# Patient Record
Sex: FEMALE | Race: WHITE | NOT HISPANIC OR LATINO | Employment: OTHER | URBAN - METROPOLITAN AREA
[De-identification: names, ages, dates, MRNs, and addresses within clinical notes are randomized per-mention and may not be internally consistent; named-entity substitution may affect disease eponyms.]

---

## 2022-02-12 ENCOUNTER — APPOINTMENT (OUTPATIENT)
Dept: GENERAL RADIOLOGY | Facility: CLINIC | Age: 72
DRG: 233 | End: 2022-02-12
Attending: STUDENT IN AN ORGANIZED HEALTH CARE EDUCATION/TRAINING PROGRAM
Payer: MEDICARE

## 2022-02-12 ENCOUNTER — HOSPITAL ENCOUNTER (INPATIENT)
Facility: CLINIC | Age: 72
LOS: 33 days | Discharge: HOME HEALTH | DRG: 233 | End: 2022-03-18
Attending: INTERNAL MEDICINE | Admitting: INTERNAL MEDICINE
Payer: MEDICARE

## 2022-02-12 DIAGNOSIS — Z95.1 S/P CABG X 4: Primary | ICD-10-CM

## 2022-02-12 DIAGNOSIS — I99.8 LIMB ISCHEMIA: ICD-10-CM

## 2022-02-12 DIAGNOSIS — I50.20 SYSTOLIC HEART FAILURE, UNSPECIFIED HF CHRONICITY (H): ICD-10-CM

## 2022-02-12 LAB
ALBUMIN SERPL-MCNC: 2.3 G/DL (ref 3.4–5)
ALP SERPL-CCNC: 67 U/L (ref 40–150)
ALT SERPL W P-5'-P-CCNC: 39 U/L (ref 0–50)
ANION GAP SERPL CALCULATED.3IONS-SCNC: 7 MMOL/L (ref 3–14)
AST SERPL W P-5'-P-CCNC: 21 U/L (ref 0–45)
BASE EXCESS BLDA CALC-SCNC: 4.4 MMOL/L (ref -9–1.8)
BASE EXCESS BLDV CALC-SCNC: 2.9 MMOL/L (ref -7.7–1.9)
BASOPHILS # BLD AUTO: 0 10E3/UL (ref 0–0.2)
BASOPHILS NFR BLD AUTO: 0 %
BILIRUB SERPL-MCNC: 0.4 MG/DL (ref 0.2–1.3)
BUN SERPL-MCNC: 24 MG/DL (ref 7–30)
CALCIUM SERPL-MCNC: 8.4 MG/DL (ref 8.5–10.1)
CHLORIDE BLD-SCNC: 106 MMOL/L (ref 94–109)
CO2 SERPL-SCNC: 25 MMOL/L (ref 20–32)
CREAT SERPL-MCNC: 0.63 MG/DL (ref 0.52–1.04)
EOSINOPHIL # BLD AUTO: 1.6 10E3/UL (ref 0–0.7)
EOSINOPHIL NFR BLD AUTO: 13 %
ERYTHROCYTE [DISTWIDTH] IN BLOOD BY AUTOMATED COUNT: 13.9 % (ref 10–15)
GFR SERPL CREATININE-BSD FRML MDRD: >90 ML/MIN/1.73M2
GLUCOSE BLD-MCNC: 102 MG/DL (ref 70–99)
GLUCOSE BLDC GLUCOMTR-MCNC: 101 MG/DL (ref 70–99)
HCO3 BLD-SCNC: 28 MMOL/L (ref 21–28)
HCO3 BLDV-SCNC: 26 MMOL/L (ref 21–28)
HCT VFR BLD AUTO: 32.4 % (ref 35–47)
HGB BLD-MCNC: 10.6 G/DL (ref 11.7–15.7)
IMM GRANULOCYTES # BLD: 0.1 10E3/UL
IMM GRANULOCYTES NFR BLD: 1 %
INR PPP: 1.49 (ref 0.85–1.15)
LYMPHOCYTES # BLD AUTO: 4.6 10E3/UL (ref 0.8–5.3)
LYMPHOCYTES NFR BLD AUTO: 39 %
MCH RBC QN AUTO: 28.6 PG (ref 26.5–33)
MCHC RBC AUTO-ENTMCNC: 32.7 G/DL (ref 31.5–36.5)
MCV RBC AUTO: 87 FL (ref 78–100)
MONOCYTES # BLD AUTO: 1.5 10E3/UL (ref 0–1.3)
MONOCYTES NFR BLD AUTO: 13 %
NEUTROPHILS # BLD AUTO: 4.1 10E3/UL (ref 1.6–8.3)
NEUTROPHILS NFR BLD AUTO: 34 %
NRBC # BLD AUTO: 0 10E3/UL
NRBC BLD AUTO-RTO: 0 /100
NT-PROBNP SERPL-MCNC: 6517 PG/ML (ref 0–900)
O2/TOTAL GAS SETTING VFR VENT: 21 %
O2/TOTAL GAS SETTING VFR VENT: 21 %
OXYHGB MFR BLDV: 63 % (ref 70–75)
PCO2 BLD: 35 MM HG (ref 35–45)
PCO2 BLDV: 36 MM HG (ref 40–50)
PH BLD: 7.51 [PH] (ref 7.35–7.45)
PH BLDV: 7.48 [PH] (ref 7.32–7.43)
PLATELET # BLD AUTO: 278 10E3/UL (ref 150–450)
PO2 BLD: 65 MM HG (ref 80–105)
PO2 BLDV: 32 MM HG (ref 25–47)
POTASSIUM BLD-SCNC: 4.4 MMOL/L (ref 3.4–5.3)
POTASSIUM BLD-SCNC: 4.4 MMOL/L (ref 3.4–5.3)
PROT SERPL-MCNC: 6.1 G/DL (ref 6.8–8.8)
RBC # BLD AUTO: 3.71 10E6/UL (ref 3.8–5.2)
SODIUM SERPL-SCNC: 138 MMOL/L (ref 133–144)
TROPONIN I SERPL HS-MCNC: 832 NG/L
UFH PPP CHRO-ACNC: 0.27 IU/ML
URATE SERPL-MCNC: 4.6 MG/DL (ref 2.6–6)
WBC # BLD AUTO: 11.9 10E3/UL (ref 4–11)

## 2022-02-12 PROCEDURE — 85610 PROTHROMBIN TIME: CPT | Performed by: STUDENT IN AN ORGANIZED HEALTH CARE EDUCATION/TRAINING PROGRAM

## 2022-02-12 PROCEDURE — 83880 ASSAY OF NATRIURETIC PEPTIDE: CPT | Performed by: STUDENT IN AN ORGANIZED HEALTH CARE EDUCATION/TRAINING PROGRAM

## 2022-02-12 PROCEDURE — 82962 GLUCOSE BLOOD TEST: CPT

## 2022-02-12 PROCEDURE — 84550 ASSAY OF BLOOD/URIC ACID: CPT | Performed by: STUDENT IN AN ORGANIZED HEALTH CARE EDUCATION/TRAINING PROGRAM

## 2022-02-12 PROCEDURE — 999N000065 XR CHEST PORT 1 VIEW

## 2022-02-12 PROCEDURE — 84484 ASSAY OF TROPONIN QUANT: CPT | Performed by: STUDENT IN AN ORGANIZED HEALTH CARE EDUCATION/TRAINING PROGRAM

## 2022-02-12 PROCEDURE — 71045 X-RAY EXAM CHEST 1 VIEW: CPT | Mod: 26 | Performed by: RADIOLOGY

## 2022-02-12 PROCEDURE — 99223 1ST HOSP IP/OBS HIGH 75: CPT | Mod: GC | Performed by: INTERNAL MEDICINE

## 2022-02-12 PROCEDURE — 85520 HEPARIN ASSAY: CPT | Performed by: STUDENT IN AN ORGANIZED HEALTH CARE EDUCATION/TRAINING PROGRAM

## 2022-02-12 PROCEDURE — 82805 BLOOD GASES W/O2 SATURATION: CPT | Performed by: INTERNAL MEDICINE

## 2022-02-12 PROCEDURE — 84132 ASSAY OF SERUM POTASSIUM: CPT | Performed by: STUDENT IN AN ORGANIZED HEALTH CARE EDUCATION/TRAINING PROGRAM

## 2022-02-12 PROCEDURE — 82803 BLOOD GASES ANY COMBINATION: CPT | Performed by: INTERNAL MEDICINE

## 2022-02-12 PROCEDURE — 83735 ASSAY OF MAGNESIUM: CPT | Performed by: STUDENT IN AN ORGANIZED HEALTH CARE EDUCATION/TRAINING PROGRAM

## 2022-02-12 PROCEDURE — 93005 ELECTROCARDIOGRAM TRACING: CPT

## 2022-02-12 PROCEDURE — 250N000011 HC RX IP 250 OP 636

## 2022-02-12 PROCEDURE — 250N000009 HC RX 250

## 2022-02-12 PROCEDURE — 258N000003 HC RX IP 258 OP 636: Performed by: STUDENT IN AN ORGANIZED HEALTH CARE EDUCATION/TRAINING PROGRAM

## 2022-02-12 PROCEDURE — 250N000011 HC RX IP 250 OP 636: Performed by: STUDENT IN AN ORGANIZED HEALTH CARE EDUCATION/TRAINING PROGRAM

## 2022-02-12 PROCEDURE — 82040 ASSAY OF SERUM ALBUMIN: CPT | Performed by: STUDENT IN AN ORGANIZED HEALTH CARE EDUCATION/TRAINING PROGRAM

## 2022-02-12 PROCEDURE — 85018 HEMOGLOBIN: CPT | Performed by: STUDENT IN AN ORGANIZED HEALTH CARE EDUCATION/TRAINING PROGRAM

## 2022-02-12 PROCEDURE — 93010 ELECTROCARDIOGRAM REPORT: CPT | Performed by: INTERNAL MEDICINE

## 2022-02-12 PROCEDURE — 80053 COMPREHEN METABOLIC PANEL: CPT | Performed by: STUDENT IN AN ORGANIZED HEALTH CARE EDUCATION/TRAINING PROGRAM

## 2022-02-12 RX ORDER — ASPIRIN 81 MG/1
81 TABLET, CHEWABLE ORAL DAILY
Status: DISCONTINUED | OUTPATIENT
Start: 2022-02-13 | End: 2022-02-24

## 2022-02-12 RX ORDER — ATORVASTATIN CALCIUM 40 MG/1
40 TABLET, FILM COATED ORAL EVERY MORNING
Status: DISCONTINUED | OUTPATIENT
Start: 2022-02-13 | End: 2022-03-18 | Stop reason: HOSPADM

## 2022-02-12 RX ORDER — DOBUTAMINE HYDROCHLORIDE 200 MG/100ML
2.5 INJECTION INTRAVENOUS CONTINUOUS
Status: DISCONTINUED | OUTPATIENT
Start: 2022-02-12 | End: 2022-02-16

## 2022-02-12 RX ORDER — ISOSORBIDE MONONITRATE 30 MG/1
30 TABLET, EXTENDED RELEASE ORAL DAILY
Status: DISCONTINUED | OUTPATIENT
Start: 2022-02-13 | End: 2022-02-12

## 2022-02-12 RX ORDER — NOREPINEPHRINE BITARTRATE 0.06 MG/ML
INJECTION, SOLUTION INTRAVENOUS
Status: COMPLETED
Start: 2022-02-12 | End: 2022-02-12

## 2022-02-12 RX ORDER — NITROGLYCERIN 0.4 MG/1
0.4 TABLET SUBLINGUAL EVERY 5 MIN PRN
Status: DISCONTINUED | OUTPATIENT
Start: 2022-02-12 | End: 2022-03-18 | Stop reason: HOSPADM

## 2022-02-12 RX ORDER — IPRATROPIUM BROMIDE AND ALBUTEROL SULFATE 2.5; .5 MG/3ML; MG/3ML
3 SOLUTION RESPIRATORY (INHALATION) EVERY 4 HOURS PRN
Status: DISCONTINUED | OUTPATIENT
Start: 2022-02-12 | End: 2022-02-28

## 2022-02-12 RX ORDER — NOREPINEPHRINE BITARTRATE 0.06 MG/ML
.01-.6 INJECTION, SOLUTION INTRAVENOUS CONTINUOUS
Status: DISCONTINUED | OUTPATIENT
Start: 2022-02-12 | End: 2022-02-15

## 2022-02-12 RX ORDER — HEPARIN SODIUM 10000 [USP'U]/100ML
0-5000 INJECTION, SOLUTION INTRAVENOUS CONTINUOUS
Status: DISCONTINUED | OUTPATIENT
Start: 2022-02-12 | End: 2022-02-16

## 2022-02-12 RX ORDER — HEPARIN SODIUM 5000 [USP'U]/.5ML
5000 INJECTION, SOLUTION INTRAVENOUS; SUBCUTANEOUS EVERY 12 HOURS
Status: CANCELLED | OUTPATIENT
Start: 2022-02-12

## 2022-02-12 RX ADMIN — Medication 0.18 MCG/KG/MIN: at 19:55

## 2022-02-12 RX ADMIN — DOBUTAMINE IN DEXTROSE 2.5 MCG/KG/MIN: 200 INJECTION, SOLUTION INTRAVENOUS at 21:02

## 2022-02-12 RX ADMIN — HEPARIN SODIUM 950 UNITS/HR: 1000 INJECTION INTRAVENOUS; SUBCUTANEOUS at 21:03

## 2022-02-12 RX ADMIN — NOREPINEPHRINE BITARTRATE 0.18 MCG/KG/MIN: 0.06 INJECTION, SOLUTION INTRAVENOUS at 19:55

## 2022-02-12 ASSESSMENT — MIFFLIN-ST. JEOR: SCORE: 1037.94

## 2022-02-12 NOTE — Clinical Note
IABP inserted in the left femoral artery. IABP inserted with 40 cc balloon volume Lot number is: [id number]

## 2022-02-12 NOTE — LETTER
[facility] [address] [address]  [phone]    FACSIMILE TRANSMITTAL SHEET        FROM: Rosa BECKFORD   PHONE: [phone]  DATE: 03/17/22      NOTES/COMMENTS:     RE: Ashley PARISI today   Needs RN/PT/OT                                      IF YOU DID NOT RECEIVE THE CORRECT NUMBER OF PAGES OR THE FAX DID NOT COME THROUGH CLEARLY, PLEASE CALL THE SENDER     CONFIDENTIALITY STATEMENT: Confidential information that may accompany this transmission contains protected health information under state and federal law and is legally privileged. This information is intended only for the use of the individual or entity named above and may be used only for carrying out treatment, payment or other healthcare operations. The recipient or person responsible for delivering this information is prohibited by law from disclosing this information without proper authorization to any other party, unless required to do so by law or regulation. If you are not the intended recipient, you are hereby notified that any review, dissemination, distribution, or copying of this message is strictly prohibited. If you have received this communication in error, please destroy the materials and contact us immediately by calling the number listed above. No response indicates that the information was received by the appropriate authorized party

## 2022-02-13 ENCOUNTER — APPOINTMENT (OUTPATIENT)
Dept: CARDIOLOGY | Facility: CLINIC | Age: 72
DRG: 233 | End: 2022-02-13
Attending: STUDENT IN AN ORGANIZED HEALTH CARE EDUCATION/TRAINING PROGRAM
Payer: MEDICARE

## 2022-02-13 PROBLEM — I50.9 HEART FAILURE (H): Status: ACTIVE | Noted: 2022-02-13

## 2022-02-13 LAB
ALBUMIN SERPL-MCNC: 2.4 G/DL (ref 3.4–5)
ALBUMIN UR-MCNC: 10 MG/DL
ALP SERPL-CCNC: 70 U/L (ref 40–150)
ALT SERPL W P-5'-P-CCNC: 40 U/L (ref 0–50)
ANION GAP SERPL CALCULATED.3IONS-SCNC: 4 MMOL/L (ref 3–14)
APPEARANCE UR: CLEAR
AST SERPL W P-5'-P-CCNC: 22 U/L (ref 0–45)
BASE EXCESS BLDV CALC-SCNC: 1.2 MMOL/L (ref -7.7–1.9)
BASE EXCESS BLDV CALC-SCNC: 2.5 MMOL/L (ref -7.7–1.9)
BASE EXCESS BLDV CALC-SCNC: 3 MMOL/L (ref -7.7–1.9)
BASE EXCESS BLDV CALC-SCNC: 4.9 MMOL/L (ref -7.7–1.9)
BASOPHILS # BLD AUTO: 0 10E3/UL (ref 0–0.2)
BASOPHILS NFR BLD AUTO: 0 %
BILIRUB SERPL-MCNC: 0.4 MG/DL (ref 0.2–1.3)
BILIRUB UR QL STRIP: NEGATIVE
BUN SERPL-MCNC: 20 MG/DL (ref 7–30)
C PNEUM DNA SPEC QL NAA+PROBE: NOT DETECTED
CALCIUM SERPL-MCNC: 8.6 MG/DL (ref 8.5–10.1)
CHLORIDE BLD-SCNC: 104 MMOL/L (ref 94–109)
CHOLEST SERPL-MCNC: 133 MG/DL
CHOLEST SERPL-MCNC: 133 MG/DL
CO2 SERPL-SCNC: 28 MMOL/L (ref 20–32)
COLOR UR AUTO: ABNORMAL
CREAT SERPL-MCNC: 0.62 MG/DL (ref 0.52–1.04)
EOSINOPHIL # BLD AUTO: 1.7 10E3/UL (ref 0–0.7)
EOSINOPHIL NFR BLD AUTO: 14 %
ERYTHROCYTE [DISTWIDTH] IN BLOOD BY AUTOMATED COUNT: 14 % (ref 10–15)
FLUAV H1 2009 PAND RNA SPEC QL NAA+PROBE: NOT DETECTED
FLUAV H1 RNA SPEC QL NAA+PROBE: NOT DETECTED
FLUAV H3 RNA SPEC QL NAA+PROBE: NOT DETECTED
FLUAV RNA SPEC QL NAA+PROBE: NOT DETECTED
FLUBV RNA SPEC QL NAA+PROBE: NOT DETECTED
GFR SERPL CREATININE-BSD FRML MDRD: >90 ML/MIN/1.73M2
GLUCOSE BLD-MCNC: 106 MG/DL (ref 70–99)
GLUCOSE UR STRIP-MCNC: NEGATIVE MG/DL
HADV DNA SPEC QL NAA+PROBE: NOT DETECTED
HBA1C MFR BLD: 5.6 % (ref 0–5.6)
HCO3 BLDV-SCNC: 25 MMOL/L (ref 21–28)
HCO3 BLDV-SCNC: 27 MMOL/L (ref 21–28)
HCO3 BLDV-SCNC: 27 MMOL/L (ref 21–28)
HCO3 BLDV-SCNC: 29 MMOL/L (ref 21–28)
HCOV PNL SPEC NAA+PROBE: NOT DETECTED
HCT VFR BLD AUTO: 32.6 % (ref 35–47)
HDLC SERPL-MCNC: 46 MG/DL
HDLC SERPL-MCNC: 46 MG/DL
HGB BLD-MCNC: 10.3 G/DL (ref 11.7–15.7)
HGB UR QL STRIP: ABNORMAL
HMPV RNA SPEC QL NAA+PROBE: NOT DETECTED
HOLD SPECIMEN: NORMAL
HPIV1 RNA SPEC QL NAA+PROBE: NOT DETECTED
HPIV2 RNA SPEC QL NAA+PROBE: NOT DETECTED
HPIV3 RNA SPEC QL NAA+PROBE: NOT DETECTED
HPIV4 RNA SPEC QL NAA+PROBE: NOT DETECTED
IMM GRANULOCYTES # BLD: 0.1 10E3/UL
IMM GRANULOCYTES NFR BLD: 0 %
INR PPP: 1.56 (ref 0.85–1.15)
KETONES UR STRIP-MCNC: NEGATIVE MG/DL
LACTATE SERPL-SCNC: 0.4 MMOL/L (ref 0.7–2)
LACTATE SERPL-SCNC: 0.5 MMOL/L (ref 0.7–2)
LDLC SERPL CALC-MCNC: 68 MG/DL
LDLC SERPL CALC-MCNC: 68 MG/DL
LEUKOCYTE ESTERASE UR QL STRIP: NEGATIVE
LVEF ECHO: NORMAL
LYMPHOCYTES # BLD AUTO: 4.4 10E3/UL (ref 0.8–5.3)
LYMPHOCYTES NFR BLD AUTO: 37 %
M PNEUMO DNA SPEC QL NAA+PROBE: NOT DETECTED
MAGNESIUM SERPL-MCNC: 1.9 MG/DL (ref 1.8–2.6)
MAGNESIUM SERPL-MCNC: 2 MG/DL (ref 1.8–2.6)
MCH RBC QN AUTO: 28 PG (ref 26.5–33)
MCHC RBC AUTO-ENTMCNC: 31.6 G/DL (ref 31.5–36.5)
MCV RBC AUTO: 89 FL (ref 78–100)
MONOCYTES # BLD AUTO: 1.6 10E3/UL (ref 0–1.3)
MONOCYTES NFR BLD AUTO: 13 %
MUCOUS THREADS #/AREA URNS LPF: PRESENT /LPF
NEUTROPHILS # BLD AUTO: 4.4 10E3/UL (ref 1.6–8.3)
NEUTROPHILS NFR BLD AUTO: 36 %
NITRATE UR QL: NEGATIVE
NONHDLC SERPL-MCNC: 87 MG/DL
NONHDLC SERPL-MCNC: 87 MG/DL
NRBC # BLD AUTO: 0 10E3/UL
NRBC BLD AUTO-RTO: 0 /100
O2/TOTAL GAS SETTING VFR VENT: 0 %
O2/TOTAL GAS SETTING VFR VENT: 0 %
O2/TOTAL GAS SETTING VFR VENT: 21 %
O2/TOTAL GAS SETTING VFR VENT: 30 %
OXYHGB MFR BLDV: 56 % (ref 70–75)
OXYHGB MFR BLDV: 59 % (ref 70–75)
OXYHGB MFR BLDV: 64 % (ref 70–75)
OXYHGB MFR BLDV: 72 % (ref 70–75)
PCO2 BLDV: 37 MM HG (ref 40–50)
PCO2 BLDV: 39 MM HG (ref 40–50)
PCO2 BLDV: 40 MM HG (ref 40–50)
PCO2 BLDV: 40 MM HG (ref 40–50)
PH BLDV: 7.44 [PH] (ref 7.32–7.43)
PH BLDV: 7.45 [PH] (ref 7.32–7.43)
PH BLDV: 7.45 [PH] (ref 7.32–7.43)
PH BLDV: 7.47 [PH] (ref 7.32–7.43)
PH UR STRIP: 6 [PH] (ref 5–7)
PLATELET # BLD AUTO: 266 10E3/UL (ref 150–450)
PO2 BLDV: 30 MM HG (ref 25–47)
PO2 BLDV: 32 MM HG (ref 25–47)
PO2 BLDV: 34 MM HG (ref 25–47)
PO2 BLDV: 38 MM HG (ref 25–47)
POTASSIUM BLD-SCNC: 4.3 MMOL/L (ref 3.4–5.3)
PROT SERPL-MCNC: 6.4 G/DL (ref 6.8–8.8)
RBC # BLD AUTO: 3.68 10E6/UL (ref 3.8–5.2)
RBC URINE: 4 /HPF
RSV RNA SPEC QL NAA+PROBE: NOT DETECTED
RSV RNA SPEC QL NAA+PROBE: NOT DETECTED
RV+EV RNA SPEC QL NAA+PROBE: NOT DETECTED
SARS-COV-2 RNA RESP QL NAA+PROBE: NEGATIVE
SODIUM SERPL-SCNC: 136 MMOL/L (ref 133–144)
SP GR UR STRIP: 1.02 (ref 1–1.03)
TRIGL SERPL-MCNC: 94 MG/DL
TRIGL SERPL-MCNC: 94 MG/DL
TROPONIN I SERPL HS-MCNC: 679 NG/L
TSH SERPL DL<=0.005 MIU/L-ACNC: 2.45 MU/L (ref 0.4–4)
UFH PPP CHRO-ACNC: 0.24 IU/ML
UFH PPP CHRO-ACNC: 0.29 IU/ML
UFH PPP CHRO-ACNC: 0.58 IU/ML
UROBILINOGEN UR STRIP-MCNC: NORMAL MG/DL
WBC # BLD AUTO: 12.1 10E3/UL (ref 4–11)
WBC URINE: 8 /HPF

## 2022-02-13 PROCEDURE — 87040 BLOOD CULTURE FOR BACTERIA: CPT | Performed by: INTERNAL MEDICINE

## 2022-02-13 PROCEDURE — 85025 COMPLETE CBC W/AUTO DIFF WBC: CPT | Performed by: STUDENT IN AN ORGANIZED HEALTH CARE EDUCATION/TRAINING PROGRAM

## 2022-02-13 PROCEDURE — 999N000208 ECHOCARDIOGRAM COMPLETE

## 2022-02-13 PROCEDURE — 85520 HEPARIN ASSAY: CPT | Performed by: INTERNAL MEDICINE

## 2022-02-13 PROCEDURE — 81001 URINALYSIS AUTO W/SCOPE: CPT | Performed by: INTERNAL MEDICINE

## 2022-02-13 PROCEDURE — 83036 HEMOGLOBIN GLYCOSYLATED A1C: CPT | Performed by: STUDENT IN AN ORGANIZED HEALTH CARE EDUCATION/TRAINING PROGRAM

## 2022-02-13 PROCEDURE — 80053 COMPREHEN METABOLIC PANEL: CPT | Performed by: STUDENT IN AN ORGANIZED HEALTH CARE EDUCATION/TRAINING PROGRAM

## 2022-02-13 PROCEDURE — 250N000013 HC RX MED GY IP 250 OP 250 PS 637: Performed by: STUDENT IN AN ORGANIZED HEALTH CARE EDUCATION/TRAINING PROGRAM

## 2022-02-13 PROCEDURE — 82805 BLOOD GASES W/O2 SATURATION: CPT | Performed by: STUDENT IN AN ORGANIZED HEALTH CARE EDUCATION/TRAINING PROGRAM

## 2022-02-13 PROCEDURE — 3E043XZ INTRODUCTION OF VASOPRESSOR INTO CENTRAL VEIN, PERCUTANEOUS APPROACH: ICD-10-PCS | Performed by: INTERNAL MEDICINE

## 2022-02-13 PROCEDURE — 255N000002 HC RX 255 OP 636: Performed by: STUDENT IN AN ORGANIZED HEALTH CARE EDUCATION/TRAINING PROGRAM

## 2022-02-13 PROCEDURE — 93306 TTE W/DOPPLER COMPLETE: CPT | Mod: 26 | Performed by: STUDENT IN AN ORGANIZED HEALTH CARE EDUCATION/TRAINING PROGRAM

## 2022-02-13 PROCEDURE — 80061 LIPID PANEL: CPT | Performed by: STUDENT IN AN ORGANIZED HEALTH CARE EDUCATION/TRAINING PROGRAM

## 2022-02-13 PROCEDURE — 82040 ASSAY OF SERUM ALBUMIN: CPT | Performed by: STUDENT IN AN ORGANIZED HEALTH CARE EDUCATION/TRAINING PROGRAM

## 2022-02-13 PROCEDURE — U0003 INFECTIOUS AGENT DETECTION BY NUCLEIC ACID (DNA OR RNA); SEVERE ACUTE RESPIRATORY SYNDROME CORONAVIRUS 2 (SARS-COV-2) (CORONAVIRUS DISEASE [COVID-19]), AMPLIFIED PROBE TECHNIQUE, MAKING USE OF HIGH THROUGHPUT TECHNOLOGIES AS DESCRIBED BY CMS-2020-01-R: HCPCS | Performed by: STUDENT IN AN ORGANIZED HEALTH CARE EDUCATION/TRAINING PROGRAM

## 2022-02-13 PROCEDURE — 250N000011 HC RX IP 250 OP 636: Performed by: STUDENT IN AN ORGANIZED HEALTH CARE EDUCATION/TRAINING PROGRAM

## 2022-02-13 PROCEDURE — 83735 ASSAY OF MAGNESIUM: CPT | Performed by: STUDENT IN AN ORGANIZED HEALTH CARE EDUCATION/TRAINING PROGRAM

## 2022-02-13 PROCEDURE — 87633 RESP VIRUS 12-25 TARGETS: CPT | Performed by: STUDENT IN AN ORGANIZED HEALTH CARE EDUCATION/TRAINING PROGRAM

## 2022-02-13 PROCEDURE — 99291 CRITICAL CARE FIRST HOUR: CPT | Mod: 25 | Performed by: INTERNAL MEDICINE

## 2022-02-13 PROCEDURE — 87486 CHLMYD PNEUM DNA AMP PROBE: CPT | Performed by: STUDENT IN AN ORGANIZED HEALTH CARE EDUCATION/TRAINING PROGRAM

## 2022-02-13 PROCEDURE — 84443 ASSAY THYROID STIM HORMONE: CPT | Performed by: STUDENT IN AN ORGANIZED HEALTH CARE EDUCATION/TRAINING PROGRAM

## 2022-02-13 PROCEDURE — 82805 BLOOD GASES W/O2 SATURATION: CPT | Performed by: INTERNAL MEDICINE

## 2022-02-13 PROCEDURE — 84484 ASSAY OF TROPONIN QUANT: CPT | Performed by: INTERNAL MEDICINE

## 2022-02-13 PROCEDURE — 258N000003 HC RX IP 258 OP 636: Performed by: STUDENT IN AN ORGANIZED HEALTH CARE EDUCATION/TRAINING PROGRAM

## 2022-02-13 PROCEDURE — 36415 COLL VENOUS BLD VENIPUNCTURE: CPT | Performed by: INTERNAL MEDICINE

## 2022-02-13 PROCEDURE — 83605 ASSAY OF LACTIC ACID: CPT | Performed by: STUDENT IN AN ORGANIZED HEALTH CARE EDUCATION/TRAINING PROGRAM

## 2022-02-13 PROCEDURE — 85610 PROTHROMBIN TIME: CPT | Performed by: STUDENT IN AN ORGANIZED HEALTH CARE EDUCATION/TRAINING PROGRAM

## 2022-02-13 PROCEDURE — 200N000002 HC R&B ICU UMMC

## 2022-02-13 RX ORDER — ACETAMINOPHEN 325 MG/1
650 TABLET ORAL EVERY 6 HOURS PRN
Status: DISCONTINUED | OUTPATIENT
Start: 2022-02-13 | End: 2022-02-24

## 2022-02-13 RX ADMIN — ATORVASTATIN CALCIUM 40 MG: 40 TABLET, FILM COATED ORAL at 09:11

## 2022-02-13 RX ADMIN — UMECLIDINIUM 1 PUFF: 62.5 AEROSOL, POWDER ORAL at 09:11

## 2022-02-13 RX ADMIN — ASPIRIN 81 MG CHEWABLE TABLET 81 MG: 81 TABLET CHEWABLE at 09:13

## 2022-02-13 RX ADMIN — HEPARIN SODIUM 900 UNITS/HR: 1000 INJECTION INTRAVENOUS; SUBCUTANEOUS at 22:31

## 2022-02-13 RX ADMIN — HUMAN ALBUMIN MICROSPHERES AND PERFLUTREN 3 ML: 10; .22 INJECTION, SOLUTION INTRAVENOUS at 09:41

## 2022-02-13 ASSESSMENT — ACTIVITIES OF DAILY LIVING (ADL)
ADLS_ACUITY_SCORE: 6
ADLS_ACUITY_SCORE: 10
ADLS_ACUITY_SCORE: 12
ADLS_ACUITY_SCORE: 12
ADLS_ACUITY_SCORE: 6
ADLS_ACUITY_SCORE: 6
ADLS_ACUITY_SCORE: 12
ADLS_ACUITY_SCORE: 6
ADLS_ACUITY_SCORE: 12
ADLS_ACUITY_SCORE: 6
ADLS_ACUITY_SCORE: 6
ADLS_ACUITY_SCORE: 12
ADLS_ACUITY_SCORE: 6
ADLS_ACUITY_SCORE: 12
ADLS_ACUITY_SCORE: 12

## 2022-02-13 NOTE — H&P
Cardiology ICU H&P  February 12, 2022      Assessment and Plan:  Ashley Osman is a 71 year old female with a pmh notable for newly diagnosed HFrEF (10-15% 2/11/22) 2/2 ICM, CAD (diagnosed 1/2022), COPD (diagnosed 1/2022), Hyperlipidemia, and cigarette smoking (quit in 12/2021) who presented to [facility] as a transfer from OSH for consideration of advanced therapies in the setting of cardiogenic shock with Biventricular failure requiring inotropic support.     ===NEURO===  # Sedation  - None    #Pain:  - None, has prn tylenol    ===CARDIOVASCULAR===  # Cardiogenic shock  # HFrEF 2/2 ICM (EF 10-15% from 2/11/22 down from 30% in 1/21/22)   # Biventricular failure  # CAD with non-viable tissue (mLAD , severe prox and dital LAD, LCx and D1 disease)  SCAI Stage C, AHA Stage D, NYHA Class III-IV  HD stable on presentation. Cause of most recent decompensation is unclear given no recent illness, arrhythmia, acute stressors, or new valvular disease on TTE although its possible that this was a continuation of the original process that triggered her initial decompensation and hospitalization in 1/2022. We will continue Dobutamine for inotropic support. We will try and wean off of NorEpi given decent CO and CI on presentation and obtain TTE in AM for further assesment. Will hold off on initiation of further GDMT pending overall status.   Date CVP PA CI CO PCWP SVR PVR MAP SVO2 % Therapies   1/12/22 4 56/24 2.89 4.54 * 1198 ** 72 63 NE 0.18, Dobu 5   * unable to obtain wedge, PVR is 2.3 with PA diastolic as wedge    - Continue Dobutamine 5 mcg/kg/min  - Wean off Norepinephrine as able  - Continue ASA 81 mg daily  - Continue Atorvastatin but increase dose to 40 mg daily (was on 30)  - Hold off on BB, ACEi/ARNI, nitrates, and MRA for now  - Euvolemic on exam, so will hold off on diuresis tonight and reassess in AM   - TTE in AM  - CXR on admission and daily  - BMP and CBC daily  - Keep K and Mg > 4 and 2  - 2g Na diet, 2L  fluid restriction  - Will need SCD ppx prior to discharge if no advance therapies offered      ===PULMONARY===  # COPD  Appears to be a new diagnosis from 1/2022.  On room air and stable with no respiratory concerns on clinical evaluation.   - Continue PTA medication Umeclidinium  @ 1 puff daily    ===GASTROINTESTINAL===  # No acute issues    # Nutrition:   -  2g sodium diet    ===RENAL===  # No acute issues. Creatinine of 0.63 on admission (range in OSH was 0.6-0.8).   - Monitor    ===HEME/ONC===  # No acute issues    ===ENDOCRINE===  # No acute concerns/issues. Will obtain TSH with admission labs    ===INFECTIOUS DISEASE===  # No acute issues/concern for infection    # Antimicrobials: N/A    ===SKIN/MSK===  # No acute issues  Daily RN skin checks per ICU protocol      Prophylaxis:  DVT: hepatin gtt GI: N/A  Family: called family (son Etienne @ [phone]) to update and left a voice message after multiple trials  Disposition: Critically Ill and in the ICU for management of cardiogenic shock  Code Status: Full code (discussed with patient on 1/12/22, she will like to revisit this after initial assessment of advanced therapies options).     Disposition Plan   Expected discharge in > 2 days days to prior living arrangement once medically stable.     Entered: Gabrielle Phelps 02/12/2022, 8:57 PM       I have discussed the above with Dr. Brewer who agrees with the above assessment and plan    Bert Phelps MD, PhD  Cardiology Fellow  Pager: [phone]  ======================================================    HPI:   Ashley Osman is a 71 year old female with a pmh notable for newly diagnosed HFrEF  (30 % on cardiac MR from 1/25) 2/2 ICM, CAD (diagnosed 1/2022), COPD (diagnosed 1/2022), Hyperlipidemia, and cigarette smoking (quit in 12/2021) who presented to [facility] as a transfer from OSH for consideration of advanced therapies in the setting of cardiogenic shock.     Patient reports that she had her first  episode of an acute onset shortness of breath in 1/18/22 prompting her to call EMS and she was intubated in the filed and brought to [facility] where she was admitted from 1/19-26/2022. She was acutely treated for septic shock and acute hypoxic/hypercapnic respiratory failure 2/2 CAP/COPD exacerbation. During her hospitalization she had elevated troponin (peaked at 5.6), TTE showed a moderately reduced EF of 45-45% with a subsequent coronary angiogram that showed multivessel disease (notable for  of mLAD, severe prox and distal LAD, severe LCx and D1 disease). Cardiac MRI done s/p coronary angiogram showed  an EF of 30% with large areas of poor viability including the inferior and inferior lateral walls (LCx regions). She was discharged home with some GDMT including Carvedilol (3.125 bid), Lisinopril (does not appear to have been discharged on this) and Imdur (30 mg daily). Patient reports that she was doing well for about 2 weeks after her discharge until 2/5/2022 when she again had acute onset shortness of breath and represented to [facility] and was found to be hypotensive requiring pressors. TTE on 2/5 showed an EF of 10-15% with concern for possible LV thrombus and anteroseptal aneurysm. She was started on Norepinephrine and Dobutamine. A repeat TTE from 2/11/22 re demonstrated an EF of 10-15% but did not show any LV thrombus or aneurysm. Given her worsened EF, patient was transferred to [facility] for advanced therapies evaluation.     On presentation to the ICU patient was alert and oriented with BP of 120/50 and MAPs of 70s on Dobutamine 5 and NE 0.18. She appeared euvolemic on exam and was warm and well perfused with palpable distal pulses.     At the time of interview, the patient denies chest pain, dyspnea , orthopnea, PND, palpitations, lightheadedness, or syncope.    Of note, patient reports of significant family history of CAD with CAD in both her parents and multiple siblings with severe CAD, two  of whom  from it. She reports that she is aware of having HLD and had been on statin therapy for it in the past but have never had chest pain in the past and was not aware of CAD until her 2022 hospitalization.     Review of Systems:    Complete review of systems was performed and negative except per HPI.    PMH:  No past medical history on file.  Active Problems:  There are no problems to display for this patient.    Social History:  Social History     Tobacco Use     Smoking status: Not on file     Smokeless tobacco: Not on file   Substance Use Topics     Alcohol use: Not on file     Drug use: Not on file     Family History:  No family history on file.    Medications:    [START ON 2022] aspirin  81 mg Oral Daily     [START ON 2022] atorvastatin  40 mg Oral QAM     [START ON 2022] umeclidinium  1 puff Inhalation Daily         - MEDICATION INSTRUCTIONS -       DOBUTamine       heparin       norepinephrine       - MEDICATION INSTRUCTIONS -       BETA BLOCKER NOT PRESCRIBED         Physical Exam:  No intake or output data in the 24 hours ending 22  GEN: pleasant, no acute distress  HEENT: no icterus  CV: RRR, normal s1/s2, no murmurs/rubs/s3/s4, no heave. JVP <8 cm, no peripheral edema.   CHEST: clear to ausculation bilaterally, no rales or wheezing  ABD: soft, non-tender, normal active bowel sounds  EXTR: pulses +2 on distal extremities bilaterally, No clubbing, cyanosis or edema.   NEURO: alert oriented, speech fluent/appropriate, motor grossly nonfocal    Diagnostics:  All labs and imaging were reviewed, of note:    Lab Results   Component Value Date    NTBNPI 6,517 (H) 2022     Lab Results   Component Value Date    WBC 11.9 (H) 2022    HGB 10.6 (L) 2022    HCT 32.4 (L) 2022    MCV 87 2022     2022     Lab Results   Component Value Date    CR 0.63 2022     No results found for: DD, DIMER  Lab Results   Component Value Date      02/12/2022    POTASSIUM 4.4 02/12/2022    POTASSIUM 4.4 02/12/2022    CHLORIDE 106 02/12/2022    CO2 25 02/12/2022     (H) 02/12/2022     (H) 02/12/2022     No results found for: SED  GFR Estimate   Date Value Ref Range Status   02/12/2022 >90 >60 mL/min/1.73m2 Final     Comment:     Effective December 21, 2021 eGFRcr in adults is calculated using the 2021 CKD-EPI creatinine equation which includes age and gender (Marleen et al., NE, DOI: 10.1056/CDQVoi7817371)     No results found for: GFRESTBLACK  Lab Results   Component Value Date     (H) 02/12/2022     (H) 02/12/2022     Lab Results   Component Value Date    HGB 10.6 (L) 02/12/2022     Lab Results   Component Value Date    AST 21 02/12/2022    ALT 39 02/12/2022    ALKPHOS 67 02/12/2022    BILITOTAL 0.4 02/12/2022     Lab Results   Component Value Date    INR 1.49 (H) 02/12/2022     No results found for: TSH  No results found for: TROPONIN, TROPI, TROPR, TROPN    No results found for: TROPI, TROPONIN, TROPR, TROPN    EKG:  Pending    Transthoracic echocardiogram: Will obtain in AM. From OSH in 2/11/22 showed EF of 10-15%    Coronary Angiogram from OSH 1/21/11:  From OSH on (1/21/22):   * Multivessel coronary disease in a left dominant system  * 70% stenosis in the proximal LAD  * 99% stenosis in the mid LAD  * Chronic total occlusion (with bridging collaterals) of the mid LAD  * 99% stenosis in the proximal 2nd Diagonal  * Chronic total occlusion (with bridging collaterals) of the proximal Circumflex    Cardiac MRI [place] 1/25/22:  1.  Moderate left ventricular enlargement with severely reduced overall systolic function (calculated LVEF 30%).    2.  Large areas of severe hypokinesis to akinesis without significant viability in the distributions of the mid to distal LAD as well as the mid to distal dominant left circumflex, both vessels of which are known to be occluded), as described below.  3.  Normal right ventricular size and systolic  function.  4.  Mild left atrial enlargement.   5.  No significant valve dysfunction.       I have reviewed today's vital signs, notes, medications, labs and imaging.  I have also seen and examined the patient and agree with the findings and plan as outlined above.  Briefly, this 72 yo WF with hx of severe CAD ( of LAD and Cx) with ischemic DCM (EF < 30%) and 50+ pack year tobacco abuse (quit X 2 months) represented to OSH with SOB, ELLER, fatigue and is accepted on transfer with cardiogenic shock on Dbx and NE iv.  VSS as above on Dbx and NE.  Lungs clear and soft S3.  Labs WNL.  Assessment: Pt with ischemic DCM now with cardiogenic shock.  Plan evaluate medical therapy, review angiogram, obtain TTE and consider LVAD eval.     Alex Brewer MD, PhD  Professor, Heart Failure and Cardiac Transplantation  [facility]

## 2022-02-13 NOTE — PLAN OF CARE
Problem: Cardiac Output Decreased  Goal: Effective Cardiac Output  Outcome: No Change  Intervention: Optimize Cardiac Output  Recent Flowsheet Documentation  Taken 2/13/2022 0400 by Kenton Saucedo, RN  VTE Prevention/Management: anticoagulant therapy maintained  Head of Bed (HOB) Positioning: HOB at 20-30 degrees  Taken 2/13/2022 0000 by Kenton Saucedo, RN  VTE Prevention/Management: anticoagulant therapy maintained  Head of Bed (HOB) Positioning: HOB at 20-30 degrees  Taken 2/12/2022 2100 by Kenton Saucedo, RN  VTE Prevention/Management: anticoagulant therapy maintained  Head of Bed (HOB) Positioning: HOB at 20-30 degrees     Major Shift Events:      Admitted to  from [facility] early in the shift for potential LVAD workup; BP MAP goal of >60, on Levo and Dobutamine with goals of titrating Levo off. [place] cath with Q6 Paula calculations. 2g sodium diet with 2L fluid restriction. Vizcaino in place for strict I&O.     For vital signs and complete assessments, please see documentation flowsheets.

## 2022-02-13 NOTE — PROGRESS NOTES
Admitted/transferred from: Transferred from [facility]  Reason for admission/transfer: Cardiac Eval/LVAD workup  2 RN skin assessment: completed by Lela  Result of skin assessment and interventions/actions: No changes  Height, weight, drug calc weight:   54kg  164cm  Patient belongings (see Flowsheet)  MDRO education added to care plan  ?

## 2022-02-13 NOTE — PROGRESS NOTES
CLINICAL NUTRITION SERVICES    Reason for Assessment:  Low-sodium (2 g/day) nutrition education    Diet History:  Pt reports that she was recently started on a low sodium diet and fluid restriction at [facility]. She was told to limit fluid to 48 oz per day, not add salt to foods, and to eat less than 2g of salt per day.     Nutrition Diagnosis:  Food- and nutrition-related knowledge deficit r/t limited previous knowledge of low-sodium diet AEB pt report of limited previous formal low-sodium nutrition education.    Nutrition Prescription/Recs:  Continue low-sodium diet and fluid restriction.      Interventions:  Nutrition Education  1. Provided verbal instruction on low-sodium meal planning and fluid restrictions. Discussed different fluid sources. Discussed that packaged foods tend to be high in sodium, how to season food without salt, and to read nutrition labels for sodium.   2. Provided the following handouts: Tips for a Low-Sodium Diet    Goals:    Pt will verbalize at least five high sodium foods and the importance of avoiding added salt to foods for cooking or seasoning foods.     Follow-up:   Patient to ask any further nutrition-related questions before discharge. In addition, pt may request outpatient RD appointment.    Jennifer Garay, IAM, LD  Weekend RD pager [phone]

## 2022-02-13 NOTE — PROGRESS NOTES
Cards 2 Progress Note     ASSESSMENT:   Ashley Osman is a 71 year old female with a pmh notable for newly diagnosed HFrEF (10-15% 2/11/22) 2/2 ICM, CAD (diagnosed 1/2022), COPD (diagnosed 1/2022), Hyperlipidemia, and cigarette smoking (quit in 12/2021) who presented to [facility] as a transfer from OSH for consideration of advanced therapies in the setting of cardiogenic shock requiring inotropic support.     Plan today:   - Decrease dobutamine to 2.5  - Wean off levophed as tolerated      ===CARDIOVASCULAR===  # Cardiogenic shock  # HFrEF 2/2 ICM (EF 10-15%)   # CAD with non-viable tissue (mLAD , severe prox and dital LAD, LCx and D1 disease)  SCAI Stage C, AHA Stage D, NYHA Class III-IV  HD stable on presentation. Cause of most recent decompensation is unclear given no recent illness, arrhythmia, acute stressors, or new valvular disease on TTE although its possible that this was a continuation of the original process that triggered her initial decompensation and hospitalization in 1/2022. We will continue Dobutamine and wean off levophed as tolerated   Date CVP PA CI CO PCWP SVR PVR MAP SVO2 % Therapies   1/12/22 4 56/24 2.89 4.54 * 1198 ** 72 63 NE 0.18, Dobu 5   1/13/22 8 57/26 4 2.5 22 1359 3.6 78 59 Ne 0.2, Dobu 5      - Continue Dobutamine 5 mcg/kg/min  - Wean off Norepinephrine as able  - Continue ASA 81 mg daily  - Continue Atorvastatin 40 mg daily   - Hold off on BB, ACEi/ARNI, nitrates, and MRA for now  - No indication for diuresis   - TTE in AM  - CXR on admission and daily  - BMP and CBC daily  - Keep K and Mg > 4 and 2  - 2g Na diet, 2L fluid restriction  - Will need ICD ppx prior to discharge if no advance therapies offered     ===PULMONARY===  # COPD  Appears to be a new diagnosis from 1/2022.  On room air and stable with no respiratory concerns on clinical evaluation.   - Continue PTA medication Umeclidinium @ 1 puff daily     ===GASTROINTESTINAL===  # No acute issues     # Nutrition:   -  2g sodium  diet     ===RENAL===  # No acute issues. Creatinine of 0.63 on admission (range in OSH was 0.6-0.8).   - Monitor     ===HEME/ONC===  # No acute issues     ===ENDOCRINE===  # No acute concerns/issues.     ===INFECTIOUS DISEASE===  # No acute issues/concern for infection     # Antimicrobials: N/A     ===SKIN/MSK===  # No acute issues  Daily RN skin checks per ICU protocol     Prophylaxis:  DVT: hepatin gtt GI: N/A  Family: called family  Disposition: Critically Ill and in the ICU for management of cardiogenic shock  Code Status: Full code (discussed with patient on 1/12/22, she will like to revisit this after initial assessment of advanced therapies options).     Melony Mart   Cardiology PGY4     REASON FOR CONSULT:     Subjective:   No acute complaints this AM   Reported feeling okay     Physical Exam   Temp: 98  F (36.7  C) Temp src: Oral   Pulse: 87   Resp: 18 SpO2: 94 % O2 Device: None (Room air) Oxygen Delivery: 1 LPM  Vital Signs with Ranges  Temp:  [98  F (36.7  C)-98.7  F (37.1  C)] 98  F (36.7  C)  Pulse:  [80-92] 87  Resp:  [12-18] 18  MAP:  [55 mmHg-115 mmHg] 69 mmHg  Arterial Line BP: ()/(36-66) 100/48  SpO2:  [93 %-99 %] 94 %  119 lbs .77 oz    GEN: NAD, pleasant  HEENT: no icterus  CV: RRR, normal s1/s2, no murmurs/rubs/s3/s4, no heave. +RIJ SG catheter   CHEST: CTAB  ABD: soft, NT/ND, NABS  : no flank/suprapubic tenderness  NEURO: AA&Ox3, fluent/appropriate, motor grossly nonfocal  PSYCH: cooperative, affect appropriate    Data   Data reviewed today:   Recent Labs   Lab 02/13/22  0334 02/12/22 2050 02/12/22 1950   WBC 12.1* 11.9*  --    HGB 10.3* 10.6*  --    MCV 89 87  --     278  --    INR 1.56* 1.49*  --     138  --    POTASSIUM 4.3 4.4  4.4  --    CHLORIDE 104 106  --    CO2 28 25  --    BUN 20 24  --    CR 0.62 0.63  --    ANIONGAP 4 7  --    HEIDI 8.6 8.4*  --    * 102* 101*   ALBUMIN 2.4* 2.3*  --    PROTTOTAL 6.4* 6.1*  --    BILITOTAL 0.4 0.4  --    ALKPHOS 70  67  --    ALT 40 39  --    AST 22 21  --        Recent Results (from the past 24 hour(s))   XR Chest Port 1 View    Narrative    Exam: XR CHEST PORT 1 VIEW, 2022 8:32 PM    Indication: Please evalute for PA catheter placement and other acute  cardiopulmonary pathologies    Comparison: None    Findings:   Pulmonary artery catheter tip projects over the proximal interlobar  artery. Left upper extremity PICC line tip projects over the mid SVC.  Mildly enlarged cardiac mediastinal silhouette. Calcifications of the  aortic arch. No pneumothorax. Blunting of the costophrenic angles,  left greater than right. Pulmonary vasculature is distinct with mildly  increased perihilar interstitial opacities. No focal infectious  consolidation is present. Osseous and soft tissue structures are  unremarkable.      Impression    Impression:   1. Pulmonary artery catheter tip project of the proximal interlobar  artery. Left PICC line tip projects over the mid SVC.  2. Effusions versus pleural thickening, left greater than right.  3. Mildly increased perihilar opacities could represent early  interstitial edema versus atelectasis.    I have personally reviewed the examination and initial interpretation  and I agree with the findings.    WHITNEY HEALY MD         SYSTEM ID:  [id number]   Echocardiogram Complete   Result Value    LVEF  15-20% (severely reduced)    Narrative    [id number]  [id number]  [id number]  [id number]^AMANDA LIEBERMAN^SEAN^SONDRA     [facility]  Echocardiography Laboratory  [address] [place] [id number]     Name: GLADYS SHUKLA  MRN: [id number]  : 1950  Study Date: 2022 09:10 AM  Age: 71 yrs  Gender: Female  Patient Location: [facility]  Reason For Study: CHF  Ordering Physician: SEAN LOBO  Referring Physician: EDDIE GRACE  Performed By: Marybel Blas LILIA     BSA: 1.6 m2  Height: 64 in  Weight: 119  lb  ______________________________________________________________________________  Procedure  Echocardiogram with two-dimensional, color and spectral Doppler performed.  Technically difficult study.Extremely difficult acoustic windows despite the  use of contrast for endcardial border definition. Optison (NDC #1053-5588-84)  given intravenously. Patient was given 3.0 ml mixture of 3 ml Optison and 6 ml  saline. 6.0 ml wasted.  ______________________________________________________________________________  Interpretation Summary  Technically difficult study.Extremely difficult acoustic windows despite the  use of contrast for endcardial border definition.  Left ventricular function is decreased. The ejection fraction is 15-20%  (severely reduced).  The right ventricle is normal size.  Global right ventricular function is normal.  Right ventricular systolic pressure is 39mmHg above the right atrial pressure.  IVC diameter and respiratory changes fall into an intermediate range  suggesting an RA pressure of 8 mmHg.  No pericardial effusion is present.  ______________________________________________________________________________  Left Ventricle  Mild left ventricular dilation is present. Mild concentric wall thickening  consistent with left ventricular hypertrophy is present. Left ventricular  function is decreased. The ejection fraction is 15-20% (severely reduced).  Grade III or advanced diastolic dysfunction. Severe diffuse hypokinesis is  present.     Right Ventricle  The right ventricle is normal size. Global right ventricular function is  normal.     Atria  Both atria appear normal.     Mitral Valve  Mild mitral insufficiency is present.     Aortic Valve  The valve leaflets are not well visualized. On Doppler interrogation, there is  no significant stenosis or regurgitation.     Tricuspid Valve  Mild tricuspid insufficiency is present. Right ventricular systolic pressure  is 39mmHg above the right atrial  pressure.     Pulmonic Valve  The valve leaflets are not well visualized. On Doppler interrogation, there is  no significant stenosis or regurgitation.     Vessels  The aorta root is normal. The thoracic aorta is normal. IVC diameter and  respiratory changes fall into an intermediate range suggesting an RA pressure  of 8 mmHg.     Pericardium  No pericardial effusion is present.     Compared to Previous Study  There is no prior study for direct comparison.  ______________________________________________________________________________  Report approved by: MD Nikunj Valles 02/13/2022 10:27 AM     ______________________________________________________________________________        I have reviewed today's vital signs, notes, medications, labs and imaging.  I have also seen and examined the patient and agree with the findings and plan as outlined above.  Pt without complaints.  VSS with CI 2.7 and PCW 22.  Labs WNL.  Pt treated with Dbx and NE.  Assessment: Pt with ischemic DCM on Dbx and NE with cardiogenic shock.  Plan to decrease Dbx and attempt to wean off NE.  Pt seen X2 for total critical care time 35 min.     Alex Brewer MD, PhD  Professor, Heart Failure and Cardiac Transplantation  [facility]

## 2022-02-13 NOTE — PLAN OF CARE
Neuro: Alert and oriented x4, 5/5 strength in all extremities.   Cardiac: normal sinus rhythm. HR 80-90s. Afebrile. MAP maintained above 60, titrated levo weaned off and dobutamine decreased to 2.5, remains on heparin gtt. [place] unable to be locked in place MD informed and fully aware, currently at 53 cm.  Respiratory: SpO2 maintained above 90% on RA  GI: 2 medium soft/loose BMs this shift. Eating % of meals  : Manning in place, adequate UOP, approx 30-60 mL/hr  LDAs: triple lumen PICC, L radial arterial line, PAC in RIJ, manning catheter  Lab: UA, COVID, Resp panel, BC, VBGs, lactic acid  Tests/Procedures: Echo    Plan: Wean levo, maintain MAP above 60, monitor UOP, [id number] VBGs to closely monitor hemodynamics

## 2022-02-14 ENCOUNTER — APPOINTMENT (OUTPATIENT)
Dept: GENERAL RADIOLOGY | Facility: CLINIC | Age: 72
DRG: 233 | End: 2022-02-14
Attending: STUDENT IN AN ORGANIZED HEALTH CARE EDUCATION/TRAINING PROGRAM
Payer: MEDICARE

## 2022-02-14 LAB
ALBUMIN SERPL-MCNC: 2.3 G/DL (ref 3.4–5)
ALP SERPL-CCNC: 67 U/L (ref 40–150)
ALT SERPL W P-5'-P-CCNC: 44 U/L (ref 0–50)
ANION GAP SERPL CALCULATED.3IONS-SCNC: 6 MMOL/L (ref 3–14)
AST SERPL W P-5'-P-CCNC: 31 U/L (ref 0–45)
BASE EXCESS BLDV CALC-SCNC: -0.1 MMOL/L (ref -7.7–1.9)
BASE EXCESS BLDV CALC-SCNC: -0.7 MMOL/L (ref -7.7–1.9)
BASE EXCESS BLDV CALC-SCNC: 1.8 MMOL/L (ref -7.7–1.9)
BASE EXCESS BLDV CALC-SCNC: 2.5 MMOL/L (ref -7.7–1.9)
BASE EXCESS BLDV CALC-SCNC: 2.7 MMOL/L (ref -7.7–1.9)
BASE EXCESS BLDV CALC-SCNC: 2.8 MMOL/L (ref -7.7–1.9)
BASE EXCESS BLDV CALC-SCNC: 3 MMOL/L (ref -7.7–1.9)
BASE EXCESS BLDV CALC-SCNC: 3.8 MMOL/L (ref -7.7–1.9)
BASE EXCESS BLDV CALC-SCNC: 4.2 MMOL/L (ref -7.7–1.9)
BASOPHILS # BLD AUTO: 0 10E3/UL (ref 0–0.2)
BASOPHILS NFR BLD AUTO: 0 %
BILIRUB SERPL-MCNC: 0.3 MG/DL (ref 0.2–1.3)
BUN SERPL-MCNC: 20 MG/DL (ref 7–30)
CALCIUM SERPL-MCNC: 8.6 MG/DL (ref 8.5–10.1)
CHLORIDE BLD-SCNC: 107 MMOL/L (ref 94–109)
CO2 SERPL-SCNC: 25 MMOL/L (ref 20–32)
CREAT SERPL-MCNC: 0.58 MG/DL (ref 0.52–1.04)
EOSINOPHIL # BLD AUTO: 1.5 10E3/UL (ref 0–0.7)
EOSINOPHIL NFR BLD AUTO: 13 %
ERYTHROCYTE [DISTWIDTH] IN BLOOD BY AUTOMATED COUNT: 14 % (ref 10–15)
GFR SERPL CREATININE-BSD FRML MDRD: >90 ML/MIN/1.73M2
GLUCOSE BLD-MCNC: 91 MG/DL (ref 70–99)
HCO3 BLDV-SCNC: 24 MMOL/L (ref 21–28)
HCO3 BLDV-SCNC: 24 MMOL/L (ref 21–28)
HCO3 BLDV-SCNC: 26 MMOL/L (ref 21–28)
HCO3 BLDV-SCNC: 27 MMOL/L (ref 21–28)
HCO3 BLDV-SCNC: 28 MMOL/L (ref 21–28)
HCO3 BLDV-SCNC: 29 MMOL/L (ref 21–28)
HCT VFR BLD AUTO: 29.6 % (ref 35–47)
HGB BLD-MCNC: 9.3 G/DL (ref 11.7–15.7)
IMM GRANULOCYTES # BLD: 0.1 10E3/UL
IMM GRANULOCYTES NFR BLD: 1 %
INR PPP: 1.69 (ref 0.85–1.15)
LACTATE SERPL-SCNC: 0.5 MMOL/L (ref 0.7–2)
LACTATE SERPL-SCNC: 0.6 MMOL/L (ref 0.7–2)
LYMPHOCYTES # BLD AUTO: 4 10E3/UL (ref 0.8–5.3)
LYMPHOCYTES NFR BLD AUTO: 35 %
MAGNESIUM SERPL-MCNC: 1.9 MG/DL (ref 1.8–2.6)
MCH RBC QN AUTO: 28.2 PG (ref 26.5–33)
MCHC RBC AUTO-ENTMCNC: 31.4 G/DL (ref 31.5–36.5)
MCV RBC AUTO: 90 FL (ref 78–100)
MONOCYTES # BLD AUTO: 1.4 10E3/UL (ref 0–1.3)
MONOCYTES NFR BLD AUTO: 12 %
NEUTROPHILS # BLD AUTO: 4.7 10E3/UL (ref 1.6–8.3)
NEUTROPHILS NFR BLD AUTO: 39 %
NRBC # BLD AUTO: 0 10E3/UL
NRBC BLD AUTO-RTO: 0 /100
O2/TOTAL GAS SETTING VFR VENT: 21 %
OXYHGB MFR BLDV: 51 % (ref 70–75)
OXYHGB MFR BLDV: 51 % (ref 70–75)
OXYHGB MFR BLDV: 52 % (ref 70–75)
OXYHGB MFR BLDV: 52 % (ref 70–75)
OXYHGB MFR BLDV: 60 % (ref 70–75)
OXYHGB MFR BLDV: 61 % (ref 70–75)
OXYHGB MFR BLDV: 63 % (ref 70–75)
OXYHGB MFR BLDV: 78 % (ref 70–75)
OXYHGB MFR BLDV: 84 % (ref 70–75)
PCO2 BLDV: 37 MM HG (ref 40–50)
PCO2 BLDV: 38 MM HG (ref 40–50)
PCO2 BLDV: 39 MM HG (ref 40–50)
PCO2 BLDV: 39 MM HG (ref 40–50)
PCO2 BLDV: 40 MM HG (ref 40–50)
PCO2 BLDV: 40 MM HG (ref 40–50)
PCO2 BLDV: 41 MM HG (ref 40–50)
PCO2 BLDV: 42 MM HG (ref 40–50)
PCO2 BLDV: 42 MM HG (ref 40–50)
PH BLDV: 7.41 [PH] (ref 7.32–7.43)
PH BLDV: 7.42 [PH] (ref 7.32–7.43)
PH BLDV: 7.43 [PH] (ref 7.32–7.43)
PH BLDV: 7.45 [PH] (ref 7.32–7.43)
PH BLDV: 7.46 [PH] (ref 7.32–7.43)
PLATELET # BLD AUTO: 197 10E3/UL (ref 150–450)
PO2 BLDV: 28 MM HG (ref 25–47)
PO2 BLDV: 33 MM HG (ref 25–47)
PO2 BLDV: 43 MM HG (ref 25–47)
PO2 BLDV: 50 MM HG (ref 25–47)
POTASSIUM BLD-SCNC: 4 MMOL/L (ref 3.4–5.3)
PROT SERPL-MCNC: 5.8 G/DL (ref 6.8–8.8)
RBC # BLD AUTO: 3.3 10E6/UL (ref 3.8–5.2)
SODIUM SERPL-SCNC: 138 MMOL/L (ref 133–144)
UFH PPP CHRO-ACNC: 0.26 IU/ML
WBC # BLD AUTO: 11.6 10E3/UL (ref 4–11)

## 2022-02-14 PROCEDURE — 200N000002 HC R&B ICU UMMC

## 2022-02-14 PROCEDURE — 85520 HEPARIN ASSAY: CPT | Performed by: INTERNAL MEDICINE

## 2022-02-14 PROCEDURE — 71045 X-RAY EXAM CHEST 1 VIEW: CPT | Mod: 26 | Performed by: RADIOLOGY

## 2022-02-14 PROCEDURE — 85025 COMPLETE CBC W/AUTO DIFF WBC: CPT | Performed by: STUDENT IN AN ORGANIZED HEALTH CARE EDUCATION/TRAINING PROGRAM

## 2022-02-14 PROCEDURE — 85610 PROTHROMBIN TIME: CPT | Performed by: STUDENT IN AN ORGANIZED HEALTH CARE EDUCATION/TRAINING PROGRAM

## 2022-02-14 PROCEDURE — 84134 ASSAY OF PREALBUMIN: CPT | Performed by: STUDENT IN AN ORGANIZED HEALTH CARE EDUCATION/TRAINING PROGRAM

## 2022-02-14 PROCEDURE — 82310 ASSAY OF CALCIUM: CPT | Performed by: STUDENT IN AN ORGANIZED HEALTH CARE EDUCATION/TRAINING PROGRAM

## 2022-02-14 PROCEDURE — 250N000013 HC RX MED GY IP 250 OP 250 PS 637: Performed by: STUDENT IN AN ORGANIZED HEALTH CARE EDUCATION/TRAINING PROGRAM

## 2022-02-14 PROCEDURE — 999N000015 HC STATISTIC ARTERIAL MONITORING DAILY

## 2022-02-14 PROCEDURE — 83735 ASSAY OF MAGNESIUM: CPT | Performed by: STUDENT IN AN ORGANIZED HEALTH CARE EDUCATION/TRAINING PROGRAM

## 2022-02-14 PROCEDURE — 999N000065 XR CHEST PORT 1 VIEW

## 2022-02-14 PROCEDURE — 83605 ASSAY OF LACTIC ACID: CPT | Performed by: STUDENT IN AN ORGANIZED HEALTH CARE EDUCATION/TRAINING PROGRAM

## 2022-02-14 PROCEDURE — 82805 BLOOD GASES W/O2 SATURATION: CPT | Performed by: STUDENT IN AN ORGANIZED HEALTH CARE EDUCATION/TRAINING PROGRAM

## 2022-02-14 PROCEDURE — 93005 ELECTROCARDIOGRAM TRACING: CPT

## 2022-02-14 PROCEDURE — 250N000011 HC RX IP 250 OP 636: Performed by: STUDENT IN AN ORGANIZED HEALTH CARE EDUCATION/TRAINING PROGRAM

## 2022-02-14 PROCEDURE — 99291 CRITICAL CARE FIRST HOUR: CPT | Mod: GC | Performed by: INTERNAL MEDICINE

## 2022-02-14 PROCEDURE — 93010 ELECTROCARDIOGRAM REPORT: CPT | Performed by: INTERNAL MEDICINE

## 2022-02-14 PROCEDURE — 82805 BLOOD GASES W/O2 SATURATION: CPT | Performed by: INTERNAL MEDICINE

## 2022-02-14 RX ORDER — DAPAGLIFLOZIN 5 MG/1
5 TABLET, FILM COATED ORAL DAILY
Status: DISCONTINUED | OUTPATIENT
Start: 2022-02-14 | End: 2022-02-25

## 2022-02-14 RX ORDER — FUROSEMIDE 10 MG/ML
40 INJECTION INTRAMUSCULAR; INTRAVENOUS ONCE
Status: CANCELLED | OUTPATIENT
Start: 2022-02-14

## 2022-02-14 RX ORDER — FUROSEMIDE 10 MG/ML
40 INJECTION INTRAMUSCULAR; INTRAVENOUS ONCE
Status: COMPLETED | OUTPATIENT
Start: 2022-02-14 | End: 2022-02-14

## 2022-02-14 RX ADMIN — Medication: at 18:39

## 2022-02-14 RX ADMIN — ASPIRIN 81 MG CHEWABLE TABLET 81 MG: 81 TABLET CHEWABLE at 07:57

## 2022-02-14 RX ADMIN — ACETAMINOPHEN 325 MG: 325 TABLET, FILM COATED ORAL at 22:24

## 2022-02-14 RX ADMIN — ATORVASTATIN CALCIUM 40 MG: 40 TABLET, FILM COATED ORAL at 07:57

## 2022-02-14 RX ADMIN — FUROSEMIDE 40 MG: 10 INJECTION, SOLUTION INTRAVENOUS at 12:45

## 2022-02-14 RX ADMIN — DAPAGLIFLOZIN 5 MG: 5 TABLET, FILM COATED ORAL at 14:42

## 2022-02-14 ASSESSMENT — ACTIVITIES OF DAILY LIVING (ADL)
ADLS_ACUITY_SCORE: 10
ADLS_ACUITY_SCORE: 12
ADLS_ACUITY_SCORE: 10
ADLS_ACUITY_SCORE: 12
ADLS_ACUITY_SCORE: 12
ADLS_ACUITY_SCORE: 10
ADLS_ACUITY_SCORE: 12
ADLS_ACUITY_SCORE: 12
ADLS_ACUITY_SCORE: 10
ADLS_ACUITY_SCORE: 10
ADLS_ACUITY_SCORE: 12
ADLS_ACUITY_SCORE: 12
ADLS_ACUITY_SCORE: 10
ADLS_ACUITY_SCORE: 10
ADLS_ACUITY_SCORE: 12
ADLS_ACUITY_SCORE: 10
ADLS_ACUITY_SCORE: 12
ADLS_ACUITY_SCORE: 10

## 2022-02-14 ASSESSMENT — MIFFLIN-ST. JEOR: SCORE: 1032.94

## 2022-02-14 NOTE — PLAN OF CARE
Problem: Cardiac Output Decreased  Goal: Effective Cardiac Output  Intervention: Optimize Cardiac Output  Recent Flowsheet Documentation  Taken 2/14/2022 0400 by Kenton Saucedo, RN  VTE Prevention/Management: anticoagulant therapy maintained  Head of Bed (HOB) Positioning: HOB at 20-30 degrees  Taken 2/14/2022 0000 by Kenton Saucedo, RN  VTE Prevention/Management: anticoagulant therapy maintained  Head of Bed (HOB) Positioning: HOB at 20-30 degrees  Taken 2/13/2022 2000 by Kenton Saucedo, RN  VTE Prevention/Management: anticoagulant therapy maintained  Head of Bed (HOB) Positioning: HOB at 20-30 degrees    Major Shift Events:      Vitally stable and only on dobutamine t/o PM shift; plan to further evaluate for LVAD workup.     For vital signs and complete assessments, please see documentation flowsheets.

## 2022-02-14 NOTE — PROGRESS NOTES
Cards 2 Progress Note     ASSESSMENT:   Ashley Osman is a 71 year old female with a pmh notable for newly diagnosed HFrEF (10-15% 2/11/22) 2/2 ICM, CAD (diagnosed 1/2022), COPD (diagnosed 1/2022), Hyperlipidemia, and cigarette smoking (quit in 12/2021) who presented to [facility] as a transfer from OSH for consideration of advanced therapies in the setting of cardiogenic shock requiring inotropic support.     Plan today:   - Stop dobutamine, reassess hemodynamics   - Lasix 40mg IV   - Repeat EKG   - Review CMRI and cath films, assess feasibility of revascularization   - Start dapagliflozin 5mg daily   - Check albumin and prealbumin      ===CARDIOVASCULAR===  # Cardiogenic shock  # HFrEF 2/2 ICM (EF 10-15%)   # LV thrombus   # CAD with non-viable tissue (mLAD , severe prox and distal LAD, LCx and D1 disease)    SCAI Stage C, AHA Stage D, NYHA Class III-IV  HD stable on presentation. Cause of most recent decompensation is unclear given no recent illness, arrhythmia, acute stressors, or new valvular disease on TTE although its possible that this was a continuation of the original process that triggered her initial decompensation and hospitalization in 1/2022.   Monitor off dobutamine   Date CVP PA CI CO PCWP SVR PVR MAP SVO2 % Therapies   2/12/22 4 56/24 2.89 4.54 * 1198 ** 72 63 NE 0.18, Dobu 5   2/13/22 8 57/26 2.5 4 22 1359 3.6 78 59 Ne 0.2, Dobu 5    2/14/22 14 60/28 2.7 4.3 * 1515 ** 67 60 Dobu 2.5      - Stop dobutamine gtt   - Start dapagliflozin 5mg   - Continue ASA 81 mg daily  - Continue Atorvastatin 40 mg daily   - Hold off on BB, ACEi/ARNI, nitrates, and MRA for now  - Lasix 40mg IV   - CXR on admission and daily  - BMP and CBC daily  - Keep K and Mg > 4 and 2  - 2g Na diet, 2L fluid restriction  - Will need ICD ppx prior to discharge if no advance therapies offered     ===PULMONARY===  # COPD  Appears to be a new diagnosis from 1/2022.  On room air and stable with no respiratory concerns on clinical  evaluation.   - Continue PTA medication Umeclidinium @ 1 puff daily     ===GASTROINTESTINAL===  # No acute issues     # Nutrition:   -  2g sodium diet     ===RENAL===  # No acute issues. Creatinine of 0.63 on admission (range in OSH was 0.6-0.8).   - Monitor     ===HEME/ONC===  # No acute issues     ===ENDOCRINE===  # No acute concerns/issues.     ===INFECTIOUS DISEASE===  # No acute issues/concern for infection     # Antimicrobials: N/A     ===SKIN/MSK===  # No acute issues  Daily RN skin checks per ICU protocol     Prophylaxis:  DVT: hepatin gtt GI: N/A  Family: called family  Disposition: Critically Ill and in the ICU for management of cardiogenic shock  Code Status: Full code (discussed with patient on 1/12/22, she will like to revisit this after initial assessment of advanced therapies options).     Melony Mart MD  Cardiology fellow    REASON FOR CONSULT:     Subjective:   No acute complaints this AM   Reported feeling okay     Physical Exam   Temp: 97.7  F (36.5  C) Temp src: Axillary   Pulse: 93   Resp: 18 SpO2: 96 % O2 Device: None (Room air)    Vital Signs with Ranges  Temp:  [97.7  F (36.5  C)-98.6  F (37  C)] 97.7  F (36.5  C)  Pulse:  [] 93  Resp:  [16-20] 18  MAP:  [49 mmHg-87 mmHg] 84 mmHg  Arterial Line BP: ()/(33-62) 119/56  SpO2:  [91 %-99 %] 96 %  117 lbs 15.14 oz    GEN: NAD, pleasant  HEENT: no icterus  CV: RRR, normal s1/s2, no murmurs/rubs/s3/s4, no heave. +RIJ SG catheter   CHEST: CTAB  ABD: soft, NT/ND, NABS  : no flank/suprapubic tenderness  NEURO: AA&Ox3, fluent/appropriate, motor grossly nonfocal  PSYCH: cooperative, affect appropriate    Data   Data reviewed today:   Recent Labs   Lab 02/14/22  0352 02/14/22  0107 02/13/22  0334 02/12/22 2050   WBC 11.6*  --  12.1* 11.9*   HGB 9.3*  --  10.3* 10.6*   MCV 90  --  89 87     --  266 278   INR  --  1.69* 1.56* 1.49*     --  136 138   POTASSIUM 4.0  --  4.3 4.4  4.4   CHLORIDE 107  --  104 106   CO2 25  --  28  25   BUN 20  --  20 24   CR 0.58  --  0.62 0.63   ANIONGAP 6  --  4 7   HEIDI 8.6  --  8.6 8.4*   GLC 91  --  106* 102*   ALBUMIN 2.3*  --  2.4* 2.3*   PROTTOTAL 5.8*  --  6.4* 6.1*   BILITOTAL 0.3  --  0.4 0.4   ALKPHOS 67  --  70 67   ALT 44  --  40 39   AST 31  --  22 21       Recent Results (from the past 24 hour(s))   XR Chest Port 1 View    Narrative    XR CHEST PORT 1 VIEW on 2/14/2022 1:08 AM.    INDICATION: swan repositioned.    COMPARISON: Radiograph dated 2/12/2022    FINDINGS:   Portable AP upright radiograph of the chest. Right IJ approach  [place]-Renay catheter tip projects over the interlobar artery, advanced  from prior. Left upper extremity PICC tip projects over the low SVC.     Trachea is clear. Cardiac silhouette is stable. Pulmonary vasculature  is indistinct. No pneumothorax. Unchanged blunting of the costophrenic  angles. Diffuse interstitial and airspace opacities are slightly  increased.      Impression    IMPRESSION:     1. [place]-Renay catheter tip projects over the interlobar artery,  advanced from prior. Recommend retracting.  2. Diffuse interstitial and airspace opacities are slightly increased.  3. Unchanged blunting of the costophrenic angles.    I have personally reviewed the examination and initial interpretation  and I agree with the findings.    JARVIS LINTON MD         SYSTEM ID:  [id number]   XR Chest Port 1 View    Narrative    XR CHEST PORT 1 VIEW on 2/14/2022 5:49 AM.    INDICATION: swan renay position.    COMPARISON: Same day radiograph.    FINDINGS:   Portable AP semiupright radiograph of the chest. Right IJ approach  [place]-Renay catheter tip projects over the proximal right pulmonary  artery, retracted from prior. Left upper extremity PICC tip projects  over the low SVC.    Trachea is clear. Cardiac silhouette is stable. Pulmonary vasculature  is relatively distinct. No pneumothorax. Blunting of the costophrenic  angles is unchanged. Diffuse interstitial and airspace opacities  are  unchanged.      Impression    IMPRESSION:   [place]-Renay catheter tip projects over the proximal right pulmonary  artery, retracted from prior.    I have personally reviewed the examination and initial interpretation  and I agree with the findings.    JARVIS LINTON MD         SYSTEM ID:  [id number]     I have reviewed today's vital signs, notes, medications, labs and imaging.  I have also seen and examined the patient and agree with the findings and plan as outlined above.  Pt with ischemic DCM with EF 10% and aneurysmal LV now weaned off of levophed and on Dbx.  CVP 7, PCW 22 and CI 2.5 with 880 ml UO.  Labs with WBC 11.6 and Hgb 9.3.  Assessment; Pt with ischemic DCM on Dbx with severely depressed LV.  Plan is to wean Dbx as tolerated and workup for LVAD.  Plan discussed with pt. Pt seen X2 for total critical care time 45 min.     Alex Brewer MD, PhD  Professor, Heart Failure and Cardiac Transplantation  [facility]

## 2022-02-15 ENCOUNTER — APPOINTMENT (OUTPATIENT)
Dept: CT IMAGING | Facility: CLINIC | Age: 72
DRG: 233 | End: 2022-02-15
Attending: INTERNAL MEDICINE
Payer: MEDICARE

## 2022-02-15 ENCOUNTER — APPOINTMENT (OUTPATIENT)
Dept: ULTRASOUND IMAGING | Facility: CLINIC | Age: 72
DRG: 233 | End: 2022-02-15
Attending: INTERNAL MEDICINE
Payer: MEDICARE

## 2022-02-15 ENCOUNTER — APPOINTMENT (OUTPATIENT)
Dept: GENERAL RADIOLOGY | Facility: CLINIC | Age: 72
DRG: 233 | End: 2022-02-15
Attending: STUDENT IN AN ORGANIZED HEALTH CARE EDUCATION/TRAINING PROGRAM
Payer: MEDICARE

## 2022-02-15 LAB
ALBUMIN UR-MCNC: NEGATIVE MG/DL
AMORPH CRY #/AREA URNS HPF: ABNORMAL /HPF
AMPHETAMINES UR QL SCN: NORMAL
ANION GAP SERPL CALCULATED.3IONS-SCNC: 8 MMOL/L (ref 3–14)
APPEARANCE UR: ABNORMAL
ATRIAL RATE - MUSE: 79 BPM
ATRIAL RATE - MUSE: 90 BPM
BARBITURATES UR QL: NORMAL
BASE EXCESS BLDV CALC-SCNC: 2.5 MMOL/L (ref -7.7–1.9)
BASE EXCESS BLDV CALC-SCNC: 2.6 MMOL/L (ref -7.7–1.9)
BASE EXCESS BLDV CALC-SCNC: 3.4 MMOL/L (ref -7.7–1.9)
BASE EXCESS BLDV CALC-SCNC: 3.4 MMOL/L (ref -7.7–1.9)
BASE EXCESS BLDV CALC-SCNC: 3.5 MMOL/L (ref -7.7–1.9)
BASE EXCESS BLDV CALC-SCNC: 3.8 MMOL/L (ref -7.7–1.9)
BASOPHILS # BLD AUTO: 0 10E3/UL (ref 0–0.2)
BASOPHILS NFR BLD AUTO: 0 %
BENZODIAZ UR QL: NORMAL
BILIRUB UR QL STRIP: NEGATIVE
BUN SERPL-MCNC: 19 MG/DL (ref 7–30)
CALCIUM SERPL-MCNC: 8.8 MG/DL (ref 8.5–10.1)
CANNABINOIDS UR QL SCN: NORMAL
CHLORIDE BLD-SCNC: 107 MMOL/L (ref 94–109)
CO2 SERPL-SCNC: 24 MMOL/L (ref 20–32)
COCAINE UR QL: NORMAL
COLOR UR AUTO: ABNORMAL
CREAT SERPL-MCNC: 0.62 MG/DL (ref 0.52–1.04)
DIASTOLIC BLOOD PRESSURE - MUSE: NORMAL MMHG
DIASTOLIC BLOOD PRESSURE - MUSE: NORMAL MMHG
EOSINOPHIL # BLD AUTO: 1.2 10E3/UL (ref 0–0.7)
EOSINOPHIL NFR BLD AUTO: 11 %
ERYTHROCYTE [DISTWIDTH] IN BLOOD BY AUTOMATED COUNT: 14.1 % (ref 10–15)
ETHANOL UR QL SCN: NORMAL
GFR SERPL CREATININE-BSD FRML MDRD: >90 ML/MIN/1.73M2
GLUCOSE BLD-MCNC: 93 MG/DL (ref 70–99)
GLUCOSE UR STRIP-MCNC: >=1000 MG/DL
HCO3 BLDV-SCNC: 27 MMOL/L (ref 21–28)
HCO3 BLDV-SCNC: 27 MMOL/L (ref 21–28)
HCO3 BLDV-SCNC: 28 MMOL/L (ref 21–28)
HCO3 BLDV-SCNC: 29 MMOL/L (ref 21–28)
HCT VFR BLD AUTO: 30.6 % (ref 35–47)
HGB BLD-MCNC: 9.8 G/DL (ref 11.7–15.7)
HGB UR QL STRIP: ABNORMAL
IMM GRANULOCYTES # BLD: 0.1 10E3/UL
IMM GRANULOCYTES NFR BLD: 1 %
INR PPP: 1.51 (ref 0.85–1.15)
INTERPRETATION ECG - MUSE: NORMAL
INTERPRETATION ECG - MUSE: NORMAL
KETONES UR STRIP-MCNC: NEGATIVE MG/DL
LACTATE SERPL-SCNC: 0.5 MMOL/L (ref 0.7–2)
LACTATE SERPL-SCNC: 0.7 MMOL/L (ref 0.7–2)
LEUKOCYTE ESTERASE UR QL STRIP: ABNORMAL
LYMPHOCYTES # BLD AUTO: 4.6 10E3/UL (ref 0.8–5.3)
LYMPHOCYTES NFR BLD AUTO: 41 %
MAGNESIUM SERPL-MCNC: 1.8 MG/DL (ref 1.8–2.6)
MCH RBC QN AUTO: 28.7 PG (ref 26.5–33)
MCHC RBC AUTO-ENTMCNC: 32 G/DL (ref 31.5–36.5)
MCV RBC AUTO: 90 FL (ref 78–100)
MONOCYTES # BLD AUTO: 1.2 10E3/UL (ref 0–1.3)
MONOCYTES NFR BLD AUTO: 11 %
MUCOUS THREADS #/AREA URNS LPF: PRESENT /LPF
NEUTROPHILS # BLD AUTO: 4 10E3/UL (ref 1.6–8.3)
NEUTROPHILS NFR BLD AUTO: 36 %
NITRATE UR QL: NEGATIVE
NRBC # BLD AUTO: 0 10E3/UL
NRBC BLD AUTO-RTO: 0 /100
NT-PROBNP SERPL-MCNC: 7408 PG/ML (ref 0–900)
O2/TOTAL GAS SETTING VFR VENT: 21 %
OPIATES UR QL SCN: NORMAL
OXYHGB MFR BLDV: 46 % (ref 70–75)
OXYHGB MFR BLDV: 46 % (ref 70–75)
OXYHGB MFR BLDV: 47 % (ref 70–75)
OXYHGB MFR BLDV: 51 % (ref 70–75)
OXYHGB MFR BLDV: 54 % (ref 70–75)
OXYHGB MFR BLDV: 60 % (ref 70–75)
P AXIS - MUSE: 57 DEGREES
P AXIS - MUSE: 57 DEGREES
PCO2 BLDV: 37 MM HG (ref 40–50)
PCO2 BLDV: 39 MM HG (ref 40–50)
PCO2 BLDV: 42 MM HG (ref 40–50)
PCO2 BLDV: 43 MM HG (ref 40–50)
PCO2 BLDV: 43 MM HG (ref 40–50)
PCO2 BLDV: 44 MM HG (ref 40–50)
PCP UR QL SCN: NORMAL
PH BLDV: 7.41 [PH] (ref 7.32–7.43)
PH BLDV: 7.43 [PH] (ref 7.32–7.43)
PH BLDV: 7.45 [PH] (ref 7.32–7.43)
PH BLDV: 7.48 [PH] (ref 7.32–7.43)
PH UR STRIP: 5 [PH] (ref 5–7)
PHOSPHATE SERPL-MCNC: 4.8 MG/DL (ref 2.5–4.5)
PLATELET # BLD AUTO: 184 10E3/UL (ref 150–450)
PO2 BLDV: 26 MM HG (ref 25–47)
PO2 BLDV: 27 MM HG (ref 25–47)
PO2 BLDV: 27 MM HG (ref 25–47)
PO2 BLDV: 29 MM HG (ref 25–47)
PO2 BLDV: 30 MM HG (ref 25–47)
PO2 BLDV: 33 MM HG (ref 25–47)
POTASSIUM BLD-SCNC: 3.6 MMOL/L (ref 3.4–5.3)
PR INTERVAL - MUSE: 122 MS
PR INTERVAL - MUSE: 130 MS
PREALB SERPL IA-MCNC: 12 MG/DL (ref 15–45)
QRS DURATION - MUSE: 88 MS
QRS DURATION - MUSE: 88 MS
QT - MUSE: 352 MS
QT - MUSE: 380 MS
QTC - MUSE: 430 MS
QTC - MUSE: 435 MS
R AXIS - MUSE: -14 DEGREES
R AXIS - MUSE: -7 DEGREES
RBC # BLD AUTO: 3.41 10E6/UL (ref 3.8–5.2)
RBC URINE: 25 /HPF
SODIUM SERPL-SCNC: 139 MMOL/L (ref 133–144)
SP GR UR STRIP: 1.02 (ref 1–1.03)
SQUAMOUS EPITHELIAL: <1 /HPF
SYSTOLIC BLOOD PRESSURE - MUSE: NORMAL MMHG
SYSTOLIC BLOOD PRESSURE - MUSE: NORMAL MMHG
T AXIS - MUSE: 104 DEGREES
T AXIS - MUSE: 109 DEGREES
TRANSITIONAL EPI: <1 /HPF
UFH PPP CHRO-ACNC: 0.3 IU/ML
UROBILINOGEN UR STRIP-MCNC: NORMAL MG/DL
VENTRICULAR RATE- MUSE: 79 BPM
VENTRICULAR RATE- MUSE: 90 BPM
WBC # BLD AUTO: 11 10E3/UL (ref 4–11)
WBC CLUMPS #/AREA URNS HPF: PRESENT /HPF
WBC URINE: 14 /HPF
YEAST #/AREA URNS HPF: ABNORMAL /HPF
YEAST #/AREA URNS HPF: ABNORMAL /HPF

## 2022-02-15 PROCEDURE — 93925 LOWER EXTREMITY STUDY: CPT | Mod: 26 | Performed by: RADIOLOGY

## 2022-02-15 PROCEDURE — 83605 ASSAY OF LACTIC ACID: CPT | Performed by: STUDENT IN AN ORGANIZED HEALTH CARE EDUCATION/TRAINING PROGRAM

## 2022-02-15 PROCEDURE — 82805 BLOOD GASES W/O2 SATURATION: CPT | Performed by: STUDENT IN AN ORGANIZED HEALTH CARE EDUCATION/TRAINING PROGRAM

## 2022-02-15 PROCEDURE — 87106 FUNGI IDENTIFICATION YEAST: CPT | Performed by: INTERNAL MEDICINE

## 2022-02-15 PROCEDURE — 71250 CT THORAX DX C-: CPT | Mod: 26 | Performed by: RADIOLOGY

## 2022-02-15 PROCEDURE — 258N000003 HC RX IP 258 OP 636: Performed by: STUDENT IN AN ORGANIZED HEALTH CARE EDUCATION/TRAINING PROGRAM

## 2022-02-15 PROCEDURE — 71045 X-RAY EXAM CHEST 1 VIEW: CPT | Mod: 26 | Performed by: RADIOLOGY

## 2022-02-15 PROCEDURE — 84100 ASSAY OF PHOSPHORUS: CPT | Performed by: INTERNAL MEDICINE

## 2022-02-15 PROCEDURE — 85025 COMPLETE CBC W/AUTO DIFF WBC: CPT | Performed by: INTERNAL MEDICINE

## 2022-02-15 PROCEDURE — 80048 BASIC METABOLIC PNL TOTAL CA: CPT | Performed by: INTERNAL MEDICINE

## 2022-02-15 PROCEDURE — 80307 DRUG TEST PRSMV CHEM ANLYZR: CPT | Performed by: INTERNAL MEDICINE

## 2022-02-15 PROCEDURE — 85610 PROTHROMBIN TIME: CPT | Performed by: STUDENT IN AN ORGANIZED HEALTH CARE EDUCATION/TRAINING PROGRAM

## 2022-02-15 PROCEDURE — 200N000002 HC R&B ICU UMMC

## 2022-02-15 PROCEDURE — 74176 CT ABD & PELVIS W/O CONTRAST: CPT | Mod: 26 | Performed by: RADIOLOGY

## 2022-02-15 PROCEDURE — 70486 CT MAXILLOFACIAL W/O DYE: CPT

## 2022-02-15 PROCEDURE — 250N000013 HC RX MED GY IP 250 OP 250 PS 637: Performed by: STUDENT IN AN ORGANIZED HEALTH CARE EDUCATION/TRAINING PROGRAM

## 2022-02-15 PROCEDURE — 250N000011 HC RX IP 250 OP 636: Performed by: STUDENT IN AN ORGANIZED HEALTH CARE EDUCATION/TRAINING PROGRAM

## 2022-02-15 PROCEDURE — 83880 ASSAY OF NATRIURETIC PEPTIDE: CPT

## 2022-02-15 PROCEDURE — 70450 CT HEAD/BRAIN W/O DYE: CPT | Mod: 26 | Performed by: RADIOLOGY

## 2022-02-15 PROCEDURE — 99291 CRITICAL CARE FIRST HOUR: CPT | Mod: GC | Performed by: INTERNAL MEDICINE

## 2022-02-15 PROCEDURE — 82805 BLOOD GASES W/O2 SATURATION: CPT | Performed by: INTERNAL MEDICINE

## 2022-02-15 PROCEDURE — 71250 CT THORAX DX C-: CPT

## 2022-02-15 PROCEDURE — 70450 CT HEAD/BRAIN W/O DYE: CPT

## 2022-02-15 PROCEDURE — 83735 ASSAY OF MAGNESIUM: CPT | Performed by: STUDENT IN AN ORGANIZED HEALTH CARE EDUCATION/TRAINING PROGRAM

## 2022-02-15 PROCEDURE — 70486 CT MAXILLOFACIAL W/O DYE: CPT | Mod: 26 | Performed by: RADIOLOGY

## 2022-02-15 PROCEDURE — 81001 URINALYSIS AUTO W/SCOPE: CPT | Performed by: INTERNAL MEDICINE

## 2022-02-15 PROCEDURE — 93925 LOWER EXTREMITY STUDY: CPT

## 2022-02-15 PROCEDURE — 85520 HEPARIN ASSAY: CPT | Performed by: INTERNAL MEDICINE

## 2022-02-15 PROCEDURE — 250N000011 HC RX IP 250 OP 636: Performed by: INTERNAL MEDICINE

## 2022-02-15 PROCEDURE — 71045 X-RAY EXAM CHEST 1 VIEW: CPT

## 2022-02-15 RX ORDER — DOBUTAMINE HYDROCHLORIDE 200 MG/100ML
2.5 INJECTION INTRAVENOUS CONTINUOUS
Status: DISCONTINUED | OUTPATIENT
Start: 2022-02-15 | End: 2022-02-15

## 2022-02-15 RX ORDER — POTASSIUM CHLORIDE 7.45 MG/ML
10 INJECTION INTRAVENOUS ONCE
Status: COMPLETED | OUTPATIENT
Start: 2022-02-15 | End: 2022-02-15

## 2022-02-15 RX ORDER — ISOSORBIDE DINITRATE 10 MG/1
10 TABLET ORAL
Status: DISCONTINUED | OUTPATIENT
Start: 2022-02-15 | End: 2022-02-16

## 2022-02-15 RX ORDER — FUROSEMIDE 10 MG/ML
20 INJECTION INTRAMUSCULAR; INTRAVENOUS ONCE
Status: DISCONTINUED | OUTPATIENT
Start: 2022-02-15 | End: 2022-02-16

## 2022-02-15 RX ORDER — FUROSEMIDE 10 MG/ML
20 INJECTION INTRAMUSCULAR; INTRAVENOUS ONCE
Status: COMPLETED | OUTPATIENT
Start: 2022-02-15 | End: 2022-02-15

## 2022-02-15 RX ADMIN — FUROSEMIDE 20 MG: 10 INJECTION, SOLUTION INTRAVENOUS at 15:41

## 2022-02-15 RX ADMIN — DOBUTAMINE HYDROCHLORIDE 2.5 MCG/KG/MIN: 200 INJECTION INTRAVENOUS at 17:23

## 2022-02-15 RX ADMIN — HEPARIN SODIUM 900 UNITS/HR: 1000 INJECTION INTRAVENOUS; SUBCUTANEOUS at 04:54

## 2022-02-15 RX ADMIN — Medication 12.5 MG: at 17:26

## 2022-02-15 RX ADMIN — POTASSIUM CHLORIDE 10 MEQ: 7.46 INJECTION, SOLUTION INTRAVENOUS at 09:12

## 2022-02-15 RX ADMIN — ATORVASTATIN CALCIUM 40 MG: 40 TABLET, FILM COATED ORAL at 09:11

## 2022-02-15 RX ADMIN — ASPIRIN 81 MG CHEWABLE TABLET 81 MG: 81 TABLET CHEWABLE at 09:11

## 2022-02-15 RX ADMIN — ISOSORBIDE DINITRATE 10 MG: 10 TABLET ORAL at 17:25

## 2022-02-15 RX ADMIN — DAPAGLIFLOZIN 5 MG: 5 TABLET, FILM COATED ORAL at 09:12

## 2022-02-15 RX ADMIN — UMECLIDINIUM 1 PUFF: 62.5 AEROSOL, POWDER ORAL at 09:12

## 2022-02-15 ASSESSMENT — ACTIVITIES OF DAILY LIVING (ADL)
ADLS_ACUITY_SCORE: 10
ADLS_ACUITY_SCORE: 12
ADLS_ACUITY_SCORE: 10
ADLS_ACUITY_SCORE: 12
ADLS_ACUITY_SCORE: 10
ADLS_ACUITY_SCORE: 12

## 2022-02-15 NOTE — CONSULTS
CARDIOTHORACIC SURGERY CONSULT NOTE  2/15/2022      Reason for Consult: CABG       ASSESSMENT/PLAN: Ashley Osman is a 71 year old female with a pmh notable for newly diagnosed HFrEF (10-15% 2/11/22) 2/2 ICM, CAD (diagnosed 1/2022), COPD (diagnosed 1/2022), Hyperlipidemia, and cigarette smoking (quit in 12/2021) who presented to [facility] as a transfer from OSH for consideration of advanced therapies in the setting of cardiogenic shock requiring inotropic support. presented with acute onset shortness of breath (intubated in the field by EMS) with treated for septic shock and acute hypoxic/hypercapnic respiratory failure 2/2 CAP/COPD exacerbation. Patient underwent hospitalization where she had elevated troponin (peaked at 5.6), TTE showed a moderately reduced EF of 45-45% with a subsequent coronary angiogram which showed  of mLAD, severe prox and distal LAD, severe LCx and D1 disease. TTE on 2/5 showed an EF of 10-15% with concern for possible LV thrombus and anteroseptal aneurysm. She was started on Norepinephrine and Dobutamine. A repeat TTE from 2/11/22 re demonstrated an EF of 10-15% but did not show any LV thrombus or aneurysm. Given her worsened EF, patient was transferred to [facility] for advanced therapies evaluation.    Coronary Angiogram from OSH 1/21/11:  From OSH on (1/21/22):   * Multivessel coronary disease in a left dominant system  * 70% stenosis in the proximal LAD  * 99% stenosis in the mid LAD  * Chronic total occlusion (with bridging collaterals) of the mid LAD  * 99% stenosis in the proximal 2nd Diagonal  * Chronic total occlusion (with bridging collaterals) of the proximal Circumflex     Cardiac MRI [place] 1/25/22:  1.  Moderate left ventricular enlargement with severely reduced overall systolic function (calculated LVEF 30%).    2.  Large areas of severe hypokinesis to akinesis without significant viability in the distributions of the mid to distal LAD as well as the mid to distal dominant left  circumflex, both vessels of which are known to be occluded), as described below.  3.  Normal right ventricular size and systolic function.  4.  Mild left atrial enlargement.   5.  No significant valve dysfunction.         - Agree with MRI cardiac viability study with contrast.  - Will discuss surgical options with attending Dr. Sarah  - Other cares per primary team  - Thank you for the opportunity to participate in the care of this patient.    Patient and plan discussed with attending, Dr. Sarah.      Amita Smith PA-C  Cardiothoracic Surgery  February 15, 2022 11:30 AM   p: [phone]      ________________________________________________________________________________________________    HPI: Ashley Osman is a 71 year old female with a pmh notable for newly diagnosed HFrEF (10-15% 2/11/22) 2/2 ICM, CAD (diagnosed 1/2022), COPD (diagnosed 1/2022), Hyperlipidemia, and cigarette smoking (quit in 12/2021) who presented to [facility] as a transfer from OS for consideration of advanced therapies in the setting of cardiogenic shock requiring inotropic support. presented with acute onset shortness of breath (intubated in the field by EMS) with treated for septic shock and acute hypoxic/hypercapnic respiratory failure 2/2 CAP/COPD exacerbation. Patient underwent hospitalization where she had elevated troponin (peaked at 5.6), TTE showed a moderately reduced EF of 45-45% with a subsequent coronary angiogram which showed  of mLAD, severe prox and distal LAD, severe LCx and D1 disease. TTE on 2/5 showed an EF of 10-15% with concern for possible LV thrombus and anteroseptal aneurysm. She was started on Norepinephrine and Dobutamine. A repeat TTE from 2/11/22 re demonstrated an EF of 10-15% but did not show any LV thrombus or aneurysm. Given her worsened EF, patient was transferred to [facility] for advanced therapies evaluation. Patient currently admitted under Cardiology service and CVTS was consulted for consideration of  CABG.    Patient currently complains of fatigue and SOB with activiy, but not at rest. She otherwise denies any chest pain, SOB, lightheadedness, dizziness, palpitations, LE edema.     PMH:  No past medical history on file.    PSH:  No past surgical history on file.    FH:  No family history on file.    SH:  Social History     Socioeconomic History     Marital status:      Spouse name: Not on file     Number of children: Not on file     Years of education: Not on file     Highest education level: Not on file   Occupational History     Not on file   Tobacco Use     Smoking status: Not on file     Smokeless tobacco: Not on file   Substance and Sexual Activity     Alcohol use: Not on file     Drug use: Not on file     Sexual activity: Not on file   Other Topics Concern     Not on file   Social History Narrative     Not on file     Social Determinants of Health     Financial Resource Strain: Not on file   Food Insecurity: Not on file   Transportation Needs: Not on file   Physical Activity: Not on file   Stress: Not on file   Social Connections: Not on file   Intimate Partner Violence: Not on file   Housing Stability: Not on file       Home Meds:  No medications prior to admission.     Allergies:  Allergies   Allergen Reactions     Wasp Venom Protein Anaphylaxis     Bee Venom Swelling and Hives     Other Drug Allergy (See Comments)      1980- had c-sec. Was in ICU for swelling, chills- unsure of what med it was -at [facility]. Was a Dr. Harsh Estradas Rash     Siblings are allergic to PCN       ROS: No fevers, chills, or night sweats. No recent weight changes.  No new visual or hearing complaints. No sore throat or nasal congestion. No SOB, cough, or wheezing.  No CP, palpitations, syncopal episodes, or dependent edema.  No new muscle or joint pain.  No weakness, numbness, or tingling of extremities. No new headaches. No changes in memory, mood, or affect.  No new rashes or bruises.     Physical  Exam:  Temp:  [97.7  F (36.5  C)-98.6  F (37  C)] 98.6  F (37  C)  Pulse:  [68-93] 79  Resp:  [16-22] 18  MAP:  [60 mmHg-88 mmHg] 70 mmHg  Arterial Line BP: ()/(44-63) 105/53  SpO2:  [91 %-97 %] 95 %  Gen: NAD, resting comfortably in bed, conversational  HEENT: normocephalic, atraumatic cranium, EOMI, sclerae anicteric. Oral mucosa pink and moist, no tonsillar edema or erythema, midline trachea, nonpalpable thyroid  Lungs: CTA in all fields, no wheezing or rhonchi  CV: RRR, S1S2 normal, no murmur. Radial pulses and DP pulses symmetric. No dependent edema.   Abd: no scars, positive normal pitched bowel sounds, overall soft and non distended, nontender, no hepatosplenomegaly, no masses/guarding/rebound tenderness.   Musculoskeletal: grossly intact, strength 5/5 upper and lower extremities  Neuro: AOx3, CN II-VII grossly intact, sensation/motor intact in upper and lower extremities  Mental: normal mood and affect, regular rate of speech    Labs:  ABG   Recent Labs   Lab 02/12/22 2050   PH 7.51*   PCO2 35   PO2 65*   HCO3 28     CBC  Recent Labs   Lab 02/15/22  0401 02/14/22  0352 02/13/22  0334 02/12/22  2050   WBC 11.0 11.6* 12.1* 11.9*   HGB 9.8* 9.3* 10.3* 10.6*    197 266 278     BMP  Recent Labs   Lab 02/15/22  0401 02/14/22  0352 02/13/22  0334 02/12/22 2050    138 136 138   POTASSIUM 3.6 4.0 4.3 4.4  4.4   CHLORIDE 107 107 104 106   CO2 24 25 28 25   BUN 19 20 20 24   CR 0.62 0.58 0.62 0.63   GLC 93 91 106* 102*     LFT  Recent Labs   Lab 02/15/22  0401 02/14/22  0352 02/14/22 0107 02/13/22  0334 02/12/22 2050   AST  --  31  --  22 21   ALT  --  44  --  40 39   ALKPHOS  --  67  --  70 67   BILITOTAL  --  0.3  --  0.4 0.4   ALBUMIN  --  2.3*  --  2.4* 2.3*   INR 1.51*  --  1.69* 1.56* 1.49*     PancreasNo lab results found in last 7 days.    Imaging:  Recent Results (from the past 24 hour(s))   XR Chest Port 1 View    Narrative    XR CHEST PORT 1 VIEW on 2/15/2022 12:45 AM.    INDICATION:  SWAN RENAY CATHETER.    COMPARISON: Radiograph dated 2/14/2022.    FINDINGS:   Portable AP semiupright radiograph of the chest. Right IJ approach  [place]-Renay catheter tip projects over the proximal right pulmonary  artery, slightly retracted from prior. Left upper extremity PICC tip  projects over the low SVC.    Trachea is clear. Cardiac silhouette is stable. Pulmonary vasculature  is relatively distinct. Coarsened prominence of the pulmonary  interstitium. No pneumothorax. Small left pleural effusion. Right  costophrenic angle was excluded from the field-of-view and exam.  Diffuse interstitial and airspace opacities are unchanged.      Impression    IMPRESSION:   1. [place]-Renay catheter tip projects over the proximal right pulmonary  artery, slightly retracted from prior.   2. Small left pleural effusion.  3. Diffuse interstitial and airspace opacities are unchanged.    I have personally reviewed the examination and initial interpretation  and I agree with the findings.    GORDON SUTHERLAND MD         SYSTEM ID:  [id number]   CT Head w/o contrast*    Narrative    CT HEAD W/O CONTRAST 2/15/2022 10:22 AM    History: Heart Failure pre Ventricular Assist Device (VAD) planning   ICD-10:    Comparison: None available    Technique: Using multidetector thin collimation helical acquisition  technique, axial, coronal and sagittal CT images from the skull base  to the vertex were obtained without intravenous contrast.   (topogram) image(s) also obtained and reviewed.    Findings: There is no intracranial hemorrhage, mass effect, or midline  shift. Gray/white matter differentiation in both cerebral hemispheres  is preserved. Ventricles are proportionate to the cerebral sulci.  There is mild cerebral volume loss. There is patchy periventricular  and subcortical white matter hypodensities that are nonspecific, but  likely secondary to small vessel disease. The basal cisterns are  clear.    The bony calvaria and the bones of the  skull base are normal. The  visualized portions of the paranasal sinuses and mastoid air cells are  clear.      Impression    Impression:    1. No acute intracranial pathology.   2. Mild cerebral volume loss and mild leukoaraiosis.    JAGDISH SO MD         SYSTEM ID:  [id number]   CT Dental wo Contrast    Narrative    CT DENTAL WO CONTRAST 2/15/2022 10:25 AM    History:  Pls assess for dental abscesses    Comparison:  None available      TECHNIQUE: 3-D reconstruction by the technologists, with curved  multiplanar reformat of thin section imaging through the mandible and  maxilla obtained without intravenous contrast.    FINDINGS: No periapical dental disease is identified. The patient is  missing the majority of his left maxillary teeth and all molar teeth.  No significant soft tissue swelling or mass. Normal facial bone  alignment. No bony erosion.     Visualized portions of the paranasal sinuses are clear. Normal  temporomandibular joints.      Impression    IMPRESSION:  No evidence for periapical abscess.    JAGDISH SO MD         SYSTEM ID:  [id number]

## 2022-02-15 NOTE — PLAN OF CARE
Major Shift Events: Very anxious/particular overnight, difficult to educate on some topics, reassurance given but pt not receptive. Was able to fall asleep at midnight. MAP 60-65 when asleep, 65-85 when awake. CVP 5- 6, CO 3.5-4.3, CI 2.2-2.8. Placed on 1L NC when asleep for comfort.  Plan: Pt would benefit from a Health Psychology consult, if not already included in LVAD work-up. Cardiac MRI today - [place] to be removed beforehand, possible line holiday after imaging. Keep MAPs above 60.  For vital signs and complete assessments, please see documentation flowsheets.

## 2022-02-15 NOTE — PROGRESS NOTES
"CLINICAL NUTRITION SERVICES - ASSESSMENT NOTE     Nutrition Prescription    RECOMMENDATIONS FOR MDs/PROVIDERS TO ORDER:  Fluids and bowel regimen per team     Malnutrition Status:    Unable to assess    Recommendations already ordered by Registered Dietitian (RD):  100 mg thiamine + multivitamin for reduced EF    Future/Additional Recommendations:  -- LVAD evaluation   -- Post LVAD education as appropriate      REASON FOR ASSESSMENT  Ashley Osman is a/an 71 year old female assessed by the dietitian for Provider Order - Heart Failure pre Ventricular Assist Device (VAD) planning, Frailty assessment    NUTRITION HISTORY  Admitted to  from [facility] early in the shift for potential LVAD workup. Per chart review, Dysphagia: pt reports intermittent choking w/ swallow. XR Swallow study on 1/23- normal. ok for regular diet.     RD unable to obtain further diet hx after 4x attempted visits over 2 days. Pt busy with cares, currently not in room.     CURRENT NUTRITION ORDERS  Diet: 2 g Sodium and 2000 mL Fluid Restriction  Intake/Tolerance: % of a few meals recorded since admit. Good appetite.    LABS  Labs reviewed, Phos: 4.8 (H)    MEDICATIONS  Medications reviewed    ANTHROPOMETRICS  Height: 163.8 cm (5' 4.5\")  Most Recent Weight: 53.5 kg (117 lb 15.1 oz)    IBW: 56.8 kg  BMI: Normal BMI- 19.93 kg/m2  Weight History: 6% wt loss in 3 weeks. Cannot rule out fluids.   Wt Readings from Last 10 Encounters:   02/14/22 53.5 kg (117 lb 15.1 oz)   1/31/22: 58.3 kg ( 128#)  1/28/22: 57.2 kg ( 126#)  1/25/22: 56.8 kg (125 lb 3.2 oz)    Dosing Weight: 54 kg (actual)     ASSESSED NUTRITION NEEDS  Estimated Energy Needs: 9096-9748 kcals/day (25 - 30 kcals/kg)  Justification: Maintenance  Estimated Protein Needs: 65-81 grams protein/day (1.2 - 1.5 grams of pro/kg)  Justification: Maintenance  Estimated Fluid Needs: 1 mL/kcal/day   Justification: Per provider pending fluid status    PHYSICAL FINDINGS  See malnutrition section " below.    MALNUTRITION  % Intake: Unable to assess  % Weight Loss: > 5% in 1 month (severe)  Subcutaneous Fat Loss: Unable to assess  Muscle Loss: Unable to assess  Fluid Accumulation/Edema: None noted  Malnutrition Diagnosis: Unable to determine due to RD unable to obtain all parameters.     NUTRITION DIAGNOSIS  Predicted inadequate nutrient intake kcals/pro related to dysphagia and potential inadequate PO intakes    INTERVENTIONS  Implementation  Nutrition Education: Unable to complete due to RD unable to meet with pt     Goals  Patient to consume % of nutritionally adequate meal trays TID, or the equivalent with supplements/snacks.     Monitoring/Evaluation  Progress toward goals will be monitored and evaluated per protocol.      Lacy Corbett, RD, MS, LD  SICU: [id number]

## 2022-02-15 NOTE — PLAN OF CARE
Neuro: A &Ox4, Perrl. Intermittent anxious episodes, reassurance given. 5+ strength in all extremities. No deficits noted.   Cardiac: SR, HR 80-90s. /50 w/ MAP 65-75. Afebrile. CHETAN q6h. CO 3.6, CI 2.3. CVP reduced from 14 to 9 post 40 of lasix.  Resp: RA, clear lung sounds.   GI: 2g Na restrict diet, fair appetite. BM x1 - loose/grn/black  : Vizcaino w/ UO 30ml/hr. 40 lasix given with 750ml UO.   LADs: R internal jugular [place] @ 47 (no locks present - MD aware), PICC x3, L ART  Gtts:  -Heparin at 900u/hr (next 10a check at 0400)  -Dobutamine stopped at 0945 per Cards 2 team      Plan: Cardiac MRI tomorrow post-[place] removal. Update MD with changes.

## 2022-02-15 NOTE — PLAN OF CARE
Neuro: A&Ox4. Calls appropriately. Anxious, doesn't seem to understand/not listening to care/medication explanations. (Possibly r/t anxiety?) Time taken to explain things and give pt time to ask questions. Pt will frequently ask same questions, somewhat forgetful. (Neuropsych consult placed today.)  Cardiac: SR. VSS. Started Dobutamine gtt at 2.5mcg at 1730. MAP goal >60. Afebrile      Respiratory: SPO2 >92 on RA while awake. (Required 1L overnight.)  GI/: Adequate urine output via manning. Lasix x1 at 1530. Keep manning cath for I/O. Last BM 2/14  Diet/appetite: Tolerating 2 gm Na regular diet. Good appetite.  Activity:  Able to reposition self independently.  Pain: Denies.  Skin: No new deficits noted.  LDA's: PICC, SG cath, manning     Plan: Continue with POC. Notify primary team with changes. Cardiac Cardiac MRI, US tomorrow AM, NPO after MN (for US). Son at  this afternoon, updated with POC.

## 2022-02-15 NOTE — PROGRESS NOTES
Cards 2 Progress Note     ASSESSMENT:   Ashley Osman is a 71 year old female with a pmh notable for newly diagnosed HFrEF (10-15% 2/11/22) 2/2 ICM, CAD (diagnosed 1/2022), COPD (diagnosed 1/2022), Hyperlipidemia, and cigarette smoking (quit in 12/2021) who presented to [facility] as a transfer from [facility] for consideration of advanced therapies in the setting of cardiogenic shock requiring inotropic support.     Plan today:   - Continue monitor hemodynamics  - Lasix 20mg IV once  - CVTS consult  - Review CMRI and cath films, assess feasibility of revascularization   - CMRI today vs tomorrow   - Start LVAD evaluation     ===CARDIOVASCULAR===  # Cardiogenic shock  # HFrEF 2/2 ICM (EF 10-15%)   # LV thrombus   # CAD with non-viable tissue (mLAD , severe prox and distal LAD, LCx and D1 disease)  AHA Stage D, NYHA Class III-IV  HD stable on presentation. Cause of most recent decompensation is unclear although thought to be secondary to another ACS given sudden onset L shoulder pain.   Monitor off dobutamine. We will assess feasibility of revascularization and consult interventional cardiology and CVTS. We will also obtain CMRI to assess viability.     Date CVP PA CI CO PCWP SVR PVR MAP SVO2 % Therapies   2/12/22 4 56/24 2.89 4.54 * 1198 ** 72 63 NE 0.18, Dobu 5   2/13/22 8 57/26 2.5 4 22 1359 3.6 78 59 Ne 0.2, Dobu 5    2/14/22 14 60/28 2.7 4.3 * 1515 ** 67 60 Dobu 2.5    2/15/22 6 40/24 4.3 2.8 * 1115 ** 69 60 None      - Stop dobutamine gtt   - Continue dapagliflozin 5mg   - Continue ASA 81 mg daily  - Continue Atorvastatin 40 mg daily   - Hold off on BB, ACEi/ARNI, nitrates, and MRA for now  - Lasix 20mg IV   - Keep K and Mg > 4 and 2   - 2g Na diet, 2L fluid restriction  - Will need ICD ppx prior to discharge if no advance therapies offered     ===PULMONARY===  # COPD  Appears to be a new diagnosis from 1/2022.  On room air and stable with no respiratory concerns on clinical evaluation.   - Continue PTA medication  Umeclidinium @ 1 puff daily     ===GASTROINTESTINAL===  # No acute issues     # Nutrition:   -  2g sodium diet     ===RENAL===  # No acute issues. Creatinine of 0.63 on admission (range in OSH was 0.6-0.8).   - Monitor     ===HEME/ONC===  # No acute issues     ===ENDOCRINE===  # No acute concerns/issues.     ===INFECTIOUS DISEASE===  # No acute issues/concern for infection     # Antimicrobials: N/A     ===SKIN/MSK===  # No acute issues  Daily RN skin checks per ICU protocol     Prophylaxis:  DVT: hepatin gtt GI: N/A  Family: called family  Disposition: Critically Ill and in the ICU for management of cardiogenic shock  Code Status: Full code     Melony Mart MD  Cardiology fellow    REASON FOR CONSULT:     Subjective:   No acute complaints this AM   Would like SG catheter out     Physical Exam   Temp: 98.6  F (37  C) Temp src: Oral   Pulse: 78   Resp: 18 SpO2: 92 % O2 Device: Nasal cannula Oxygen Delivery: 1 LPM  Vital Signs with Ranges  Temp:  [97.7  F (36.5  C)-98.6  F (37  C)] 98.6  F (37  C)  Pulse:  [68-93] 78  Resp:  [16-22] 18  MAP:  [60 mmHg-88 mmHg] 74 mmHg  Arterial Line BP: ()/(44-63) 116/58  SpO2:  [91 %-97 %] 92 %  117 lbs 15.14 oz    GEN: NAD, pleasant  HEENT: no icterus  CV: RRR, normal s1/s2, no murmurs/rubs/s3/s4, no heave. +RIJ SG catheter   CHEST: CTAB  ABD: soft, NT/ND, NABS  : no flank/suprapubic tenderness  NEURO: AA&Ox3, fluent/appropriate, motor grossly nonfocal  PSYCH: cooperative, affect appropriate    Data   Data reviewed today:   Recent Labs   Lab 02/15/22  0401 02/14/22  0352 02/14/22  0107 02/13/22  0334   WBC 11.0 11.6*  --  12.1*   HGB 9.8* 9.3*  --  10.3*   MCV 90 90  --  89    197  --  266   INR 1.51*  --  1.69* 1.56*    138  --  136   POTASSIUM 3.6 4.0  --  4.3   CHLORIDE 107 107  --  104   CO2 24 25  --  28   BUN 19 20  --  20   CR 0.62 0.58  --  0.62   ANIONGAP 8 6  --  4   HEIDI 8.8 8.6  --  8.6   GLC 93 91  --  106*   ALBUMIN  --  2.3*  --  2.4*   PROTTOTAL   --  5.8*  --  6.4*   BILITOTAL  --  0.3  --  0.4   ALKPHOS  --  67  --  70   ALT  --  44  --  40   AST  --  31  --  22       Recent Results (from the past 24 hour(s))   XR Chest Port 1 View    Narrative    XR CHEST PORT 1 VIEW on 2/15/2022 12:45 AM.    INDICATION: SWAN RENAY CATHETER.    COMPARISON: Radiograph dated 2/14/2022.    FINDINGS:   Portable AP semiupright radiograph of the chest. Right IJ approach  [place] catheter tip projects over the proximal right pulmonary  artery, slightly retracted from prior. Left upper extremity PICC tip  projects over the low SVC.    Trachea is clear. Cardiac silhouette is stable. Pulmonary vasculature  is relatively distinct. Coarsened prominence of the pulmonary  interstitium. No pneumothorax. Small left pleural effusion. Right  costophrenic angle was excluded from the field-of-view and exam.  Diffuse interstitial and airspace opacities are unchanged.      Impression    IMPRESSION:   1. [place] catheter tip projects over the proximal right pulmonary  artery, slightly retracted from prior.   2. Small left pleural effusion.  3. Diffuse interstitial and airspace opacities are unchanged.    I have personally reviewed the examination and initial interpretation  and I agree with the findings.    GORDON SUTHERLAND MD         SYSTEM ID:  [id number]     I have reviewed today's vital signs, notes, medications, labs and imaging.  I have also seen and examined the patient and agree with the findings and plan as outlined above.  Pt without complaints.  Now off Dbx and hemodynamically stable with CI 2.2 and HR 78.  Labs as above WNL.  Assessment; Pt with severe LV dysfn (EF approx 20%) with 2V .  Agree with CMR today for viability and then consider revascularization with VAD backup. Pt seen X2 for total critical care time 40 min.     Alex Brewer MD, PhD  Professor, Heart Failure and Cardiac Transplantation  [facility]

## 2022-02-15 NOTE — CONSULTS
Dental Service Consultation    Ashley Osman MRN# [id number]  YOB: 1950 Age: 71 year old  Date of Admission: 2/12/2022    Reason for consult: I was asked by Dr Zacarias to evaluate this patient for active dental infections.    Assessment:      Extra-oral :   -Patient presents symmetry between right and left side  -No lymphadenopathy noticed.     Intra-oral:  - Patient presents maxillary right first premolar, canine, lateral and central incisor, and maxillary left central incisor.  - Patient presents mandibular left first premolar, canine, incisors and mandibular right incisors and canine.  - Patient does not present any decay.  - Patient present some fillings  - No decay has been noticed  - Patient presents fair hygiene.  - Patient presents fair bone level with some recession  - Patient presents edentulous areas in good health.  - No sensitivity or discomfort to palpation or percussion of any tooth.     Radiology:  - Patient presents maxillary right first premolar, canine, lateral and central incisor, and maxillary left central incisor.  - Patient presents mandibular left first premolar, canine, incisors and mandibular right incisors and canine.  - Patient does not present any decay.  - Patient does not present any sabrina-apical radiolucencies on any teeth apices.     Plan:  - Patient explained that, upon examination, no active dental infection was noticed.  - Patient is cleared from a dental perspective.  - No further examination is needed     All information have been communicated to the patient and to the healthcare team:      Clinic information:   [facility]   [address] [place] [phone]    Chief Complaint:  I had some bad teeth extracted last year and since I have been to the dentist once.  I have no pain at the moment.    History is obtained from the patient    History of Present Illness:  Ashley Osman is a 71 year old female with a  pmh notable for newly diagnosed HFrEF (10-15% 2/11/22) 2/2 ICM, CAD (diagnosed 1/2022), COPD (diagnosed 1/2022), Hyperlipidemia, and cigarette smoking (quit in 12/2021) who presented to [facility] as a transfer from OSH for consideration of advanced therapies such as LVAD surgery in the setting of cardiogenic shock with Biventricular failure requiring inotropic support. Dental consult requested per healthcare team to clear patient from a dental perspective.       Past Medical History:  No past medical history on file.    Past Surgical History:  No past surgical history on file.    Social History:  Social History     Tobacco Use     Smoking status: Not on file     Smokeless tobacco: Not on file   Substance Use Topics     Alcohol use: Not on file       Family History:  No family history on file.    Immunizations:  Immunization History   Administered Date(s) Administered     COVID-19,PF,David 09/30/2021     Tdap (Adacel,Boostrix) 07/29/2010, 12/04/2020     Zoster vaccine recombinant adjuvanted (SHINGRIX) 12/04/2020, 02/05/2021       Allergies:  Allergies   Allergen Reactions     Wasp Venom Protein Anaphylaxis     Bee Venom Swelling and Hives     Other Drug Allergy (See Comments)      1980- had c-sec. Was in ICU for swelling, chills- unsure of what med it was -at [facility]. Was a Dr. Harsh Valentine Rash     Siblings are allergic to PCN         Medications:  Current Facility-Administered Medications Ordered in Epic   Medication Dose Route Frequency Last Rate Last Admin     acetaminophen (TYLENOL) tablet 650 mg  650 mg Oral Q6H PRN   325 mg at 02/14/22 2224     aspirin (ASA) chewable tablet 81 mg  81 mg Oral Daily   81 mg at 02/15/22 0911     atorvastatin (LIPITOR) tablet 40 mg  40 mg Oral QAM   40 mg at 02/15/22 0911     Continuing ACE inhibitor/ARB/ARNI from home medication list OR ACE inhibitor/ARB/ARNI order already placed during this visit   Does not apply DOES NOT GO TO MAR         dapagliflozin (FARXIGA) tablet  5 mg  5 mg Oral Daily   5 mg at 02/15/22 0912     DOBUTamine 500 mg in dextrose 5% 250 mL (adult std conc) premix  2.5 mcg/kg/min (Dosing Weight) Intravenous Continuous   Stopped at 02/14/22 0945     heparin infusion 25,000 units in D5W 250 mL ANTICOAGULANT  0-5,000 Units/hr Intravenous Continuous 9 mL/hr at 02/15/22 1100 900 Units/hr at 02/15/22 1100     HOLD nitroGLYcerin IF   Does not apply HOLD         hydrALAZINE (APRESOLINE) half-tab 12.5 mg  12.5 mg Oral Q8H ANDREW         [START ON 2/16/2022] influenza vac high-dose quad (FLUZONE HD) injection SRIDHAR 0.7 mL  0.7 mL Intramuscular Prior to discharge         ipratropium - albuterol 0.5 mg/2.5 mg/3 mL (DUONEB) neb solution 3 mL  3 mL Nebulization Q4H PRN         isosorbide dinitrate (ISORDIL) tablet 10 mg  10 mg Oral TID         menthol (Topical Analgesic) 2.5% (BENGAY VANISHING SCENT) 2.5 % topical gel   Topical Q6H PRN   Given at 02/14/22 1839     nitroGLYcerin (NITROSTAT) sublingual tablet 0.4 mg  0.4 mg Sublingual Q5 Min PRN         Patient is already receiving anticoagulation with heparin, enoxaparin (LOVENOX), warfarin (COUMADIN)  or other anticoagulant medication   Does not apply Continuous PRN         Reason beta blocker not prescribed   Does not apply DOES NOT GO TO MAR         umeclidinium (INCRUSE ELLIPTA) 62.5 MCG/INH inhaler 1 puff  1 puff Inhalation Daily   1 puff at 02/15/22 0912     No current [facility]-ordered outpatient medications on file.       Review of Systems:  The 10 point Review of Systems is negative other than noted in the HPI    Physical Exam:  Vitals were reviewed  Temp: 98.6  F (37  C) Temp src: Oral   Pulse: 88   Resp: 16 SpO2: 94 % O2 Device: None (Room air) Oxygen Delivery: 1 LPM    HEENT: NC/AT, EOMI, PERRL,no lymphadenopathy observed, no induration or erythema, no lesions noticed. Inferior border of mandible is palpable bilaterally. PAYAM >45mm. No TMJ discomfort bilaterally.     Intra-oral exam: OP clear, uvula midline,fair oral  hygiene, no edema, FOM is flat bilaterally, No decay on remaining teeth and fair oral hygiene.    Patient has no difficulty breathing, swallowing, talking and eating.      Data:  Radiographic interpretation: Dental CT taken on 2/15/2022  Osseous pathology: None  Pulpal Pathology: All teeth are vital  Periodontal Pathology: Chronic generalized moderate periodontitis  Caries: None  Odontogenic pathology: None    The patient was discussed with: Dr Mario JEFFERS, MAIK  PGY1  Pager: [phone]

## 2022-02-16 ENCOUNTER — APPOINTMENT (OUTPATIENT)
Dept: ULTRASOUND IMAGING | Facility: CLINIC | Age: 72
DRG: 233 | End: 2022-02-16
Attending: INTERNAL MEDICINE
Payer: MEDICARE

## 2022-02-16 ENCOUNTER — APPOINTMENT (OUTPATIENT)
Dept: GENERAL RADIOLOGY | Facility: CLINIC | Age: 72
DRG: 233 | End: 2022-02-16
Attending: STUDENT IN AN ORGANIZED HEALTH CARE EDUCATION/TRAINING PROGRAM
Payer: MEDICARE

## 2022-02-16 ENCOUNTER — APPOINTMENT (OUTPATIENT)
Dept: MRI IMAGING | Facility: CLINIC | Age: 72
DRG: 233 | End: 2022-02-16
Attending: STUDENT IN AN ORGANIZED HEALTH CARE EDUCATION/TRAINING PROGRAM
Payer: MEDICARE

## 2022-02-16 LAB
ABO/RH(D): NORMAL
ALBUMIN SERPL-MCNC: 1.6 G/DL (ref 3.4–5)
ALP SERPL-CCNC: 42 U/L (ref 40–150)
ALT SERPL W P-5'-P-CCNC: 29 U/L (ref 0–50)
ANION GAP SERPL CALCULATED.3IONS-SCNC: 7 MMOL/L (ref 3–14)
ANION GAP SERPL CALCULATED.3IONS-SCNC: 7 MMOL/L (ref 3–14)
ANTIBODY SCREEN: NEGATIVE
AST SERPL W P-5'-P-CCNC: 18 U/L (ref 0–45)
BASE EXCESS BLDV CALC-SCNC: 0.9 MMOL/L (ref -7.7–1.9)
BASE EXCESS BLDV CALC-SCNC: 1.2 MMOL/L (ref -7.7–1.9)
BASE EXCESS BLDV CALC-SCNC: 2.1 MMOL/L (ref -7.7–1.9)
BASE EXCESS BLDV CALC-SCNC: 3.1 MMOL/L (ref -7.7–1.9)
BASE EXCESS BLDV CALC-SCNC: 3.3 MMOL/L (ref -7.7–1.9)
BASOPHILS # BLD AUTO: 0 10E3/UL (ref 0–0.2)
BASOPHILS NFR BLD AUTO: 0 %
BILIRUB DIRECT SERPL-MCNC: <0.1 MG/DL (ref 0–0.2)
BILIRUB SERPL-MCNC: 0.2 MG/DL (ref 0.2–1.3)
BUN SERPL-MCNC: 20 MG/DL (ref 7–30)
BUN SERPL-MCNC: 22 MG/DL (ref 7–30)
CALCIUM SERPL-MCNC: 8.7 MG/DL (ref 8.5–10.1)
CALCIUM SERPL-MCNC: 8.7 MG/DL (ref 8.5–10.1)
CHLORIDE BLD-SCNC: 106 MMOL/L (ref 94–109)
CHLORIDE BLD-SCNC: 109 MMOL/L (ref 94–109)
CHOLEST SERPL-MCNC: 125 MG/DL
CO2 SERPL-SCNC: 24 MMOL/L (ref 20–32)
CO2 SERPL-SCNC: 26 MMOL/L (ref 20–32)
CREAT SERPL-MCNC: 0.62 MG/DL (ref 0.52–1.04)
CREAT SERPL-MCNC: 0.66 MG/DL (ref 0.52–1.04)
EOSINOPHIL # BLD AUTO: 0.8 10E3/UL (ref 0–0.7)
EOSINOPHIL NFR BLD AUTO: 8 %
ERYTHROCYTE [DISTWIDTH] IN BLOOD BY AUTOMATED COUNT: 14.2 % (ref 10–15)
FERRITIN SERPL-MCNC: 300 NG/ML (ref 8–252)
GFR SERPL CREATININE-BSD FRML MDRD: >90 ML/MIN/1.73M2
GFR SERPL CREATININE-BSD FRML MDRD: >90 ML/MIN/1.73M2
GLUCOSE BLD-MCNC: 88 MG/DL (ref 70–99)
GLUCOSE BLD-MCNC: 90 MG/DL (ref 70–99)
HCO3 BLDV-SCNC: 25 MMOL/L (ref 21–28)
HCO3 BLDV-SCNC: 26 MMOL/L (ref 21–28)
HCO3 BLDV-SCNC: 27 MMOL/L (ref 21–28)
HCO3 BLDV-SCNC: 28 MMOL/L (ref 21–28)
HCO3 BLDV-SCNC: 28 MMOL/L (ref 21–28)
HCT VFR BLD AUTO: 29 % (ref 35–47)
HDLC SERPL-MCNC: 40 MG/DL
HGB BLD-MCNC: 9.4 G/DL (ref 11.7–15.7)
HOLD SPECIMEN: NORMAL
IMM GRANULOCYTES # BLD: 0.1 10E3/UL
IMM GRANULOCYTES NFR BLD: 1 %
INR PPP: 2.1 (ref 0.85–1.15)
IRON SATN MFR SERPL: 14 % (ref 15–46)
IRON SERPL-MCNC: 39 UG/DL (ref 35–180)
LACTATE SERPL-SCNC: 0.8 MMOL/L (ref 0.7–2)
LACTATE SERPL-SCNC: 0.9 MMOL/L (ref 0.7–2)
LDLC SERPL CALC-MCNC: 68 MG/DL
LYMPHOCYTES # BLD AUTO: 4.3 10E3/UL (ref 0.8–5.3)
LYMPHOCYTES NFR BLD AUTO: 40 %
MAGNESIUM SERPL-MCNC: 1.1 MG/DL (ref 1.8–2.6)
MAGNESIUM SERPL-MCNC: 1.8 MG/DL (ref 1.8–2.6)
MCH RBC QN AUTO: 28.8 PG (ref 26.5–33)
MCHC RBC AUTO-ENTMCNC: 32.4 G/DL (ref 31.5–36.5)
MCV RBC AUTO: 89 FL (ref 78–100)
MONOCYTES # BLD AUTO: 1 10E3/UL (ref 0–1.3)
MONOCYTES NFR BLD AUTO: 9 %
NEUTROPHILS # BLD AUTO: 4.6 10E3/UL (ref 1.6–8.3)
NEUTROPHILS NFR BLD AUTO: 42 %
NONHDLC SERPL-MCNC: 85 MG/DL
NRBC # BLD AUTO: 0 10E3/UL
NRBC BLD AUTO-RTO: 0 /100
O2/TOTAL GAS SETTING VFR VENT: 21 %
OXYHGB MFR BLDV: 51 % (ref 70–75)
OXYHGB MFR BLDV: 52 % (ref 70–75)
OXYHGB MFR BLDV: 57 % (ref 70–75)
OXYHGB MFR BLDV: 59 % (ref 70–75)
OXYHGB MFR BLDV: 66 % (ref 70–75)
PCO2 BLDV: 39 MM HG (ref 40–50)
PCO2 BLDV: 42 MM HG (ref 40–50)
PCO2 BLDV: 43 MM HG (ref 40–50)
PCO2 BLDV: 43 MM HG (ref 40–50)
PCO2 BLDV: 44 MM HG (ref 40–50)
PH BLDV: 7.4 [PH] (ref 7.32–7.43)
PH BLDV: 7.41 [PH] (ref 7.32–7.43)
PH BLDV: 7.42 [PH] (ref 7.32–7.43)
PLATELET # BLD AUTO: 174 10E3/UL (ref 150–450)
PO2 BLDV: 29 MM HG (ref 25–47)
PO2 BLDV: 30 MM HG (ref 25–47)
PO2 BLDV: 31 MM HG (ref 25–47)
PO2 BLDV: 32 MM HG (ref 25–47)
PO2 BLDV: 37 MM HG (ref 25–47)
POTASSIUM BLD-SCNC: 3.3 MMOL/L (ref 3.4–5.3)
POTASSIUM BLD-SCNC: 3.8 MMOL/L (ref 3.4–5.3)
PREALB SERPL IA-MCNC: 12 MG/DL (ref 15–45)
PROT SERPL-MCNC: 3.6 G/DL (ref 6.8–8.8)
RBC # BLD AUTO: 3.26 10E6/UL (ref 3.8–5.2)
SODIUM SERPL-SCNC: 139 MMOL/L (ref 133–144)
SODIUM SERPL-SCNC: 140 MMOL/L (ref 133–144)
SPECIMEN EXPIRATION DATE: NORMAL
TIBC SERPL-MCNC: 279 UG/DL (ref 240–430)
TRANSFERRIN SERPL-MCNC: 177 MG/DL (ref 210–360)
TRIGL SERPL-MCNC: 84 MG/DL
TSH SERPL DL<=0.005 MIU/L-ACNC: 2.08 MU/L (ref 0.4–4)
UFH PPP CHRO-ACNC: 0.75 IU/ML
UFH PPP CHRO-ACNC: <0.1 IU/ML
WBC # BLD AUTO: 10.8 10E3/UL (ref 4–11)

## 2022-02-16 PROCEDURE — 84134 ASSAY OF PREALBUMIN: CPT | Performed by: INTERNAL MEDICINE

## 2022-02-16 PROCEDURE — 84466 ASSAY OF TRANSFERRIN: CPT | Performed by: INTERNAL MEDICINE

## 2022-02-16 PROCEDURE — 250N000011 HC RX IP 250 OP 636

## 2022-02-16 PROCEDURE — 82728 ASSAY OF FERRITIN: CPT | Performed by: INTERNAL MEDICINE

## 2022-02-16 PROCEDURE — 250N000013 HC RX MED GY IP 250 OP 250 PS 637: Performed by: STUDENT IN AN ORGANIZED HEALTH CARE EDUCATION/TRAINING PROGRAM

## 2022-02-16 PROCEDURE — 250N000011 HC RX IP 250 OP 636: Performed by: STUDENT IN AN ORGANIZED HEALTH CARE EDUCATION/TRAINING PROGRAM

## 2022-02-16 PROCEDURE — 80061 LIPID PANEL: CPT | Performed by: INTERNAL MEDICINE

## 2022-02-16 PROCEDURE — 255N000002 HC RX 255 OP 636: Performed by: INTERNAL MEDICINE

## 2022-02-16 PROCEDURE — 83605 ASSAY OF LACTIC ACID: CPT | Performed by: STUDENT IN AN ORGANIZED HEALTH CARE EDUCATION/TRAINING PROGRAM

## 2022-02-16 PROCEDURE — 258N000003 HC RX IP 258 OP 636: Performed by: STUDENT IN AN ORGANIZED HEALTH CARE EDUCATION/TRAINING PROGRAM

## 2022-02-16 PROCEDURE — 82310 ASSAY OF CALCIUM: CPT | Performed by: INTERNAL MEDICINE

## 2022-02-16 PROCEDURE — 99291 CRITICAL CARE FIRST HOUR: CPT | Mod: 25 | Performed by: INTERNAL MEDICINE

## 2022-02-16 PROCEDURE — 82805 BLOOD GASES W/O2 SATURATION: CPT | Performed by: INTERNAL MEDICINE

## 2022-02-16 PROCEDURE — 85520 HEPARIN ASSAY: CPT | Performed by: INTERNAL MEDICINE

## 2022-02-16 PROCEDURE — 250N000013 HC RX MED GY IP 250 OP 250 PS 637: Performed by: INTERNAL MEDICINE

## 2022-02-16 PROCEDURE — 96133 NRPSYC TST EVAL PHYS/QHP EA: CPT | Mod: 95 | Performed by: CLINICAL NEUROPSYCHOLOGIST

## 2022-02-16 PROCEDURE — P9041 ALBUMIN (HUMAN),5%, 50ML: HCPCS

## 2022-02-16 PROCEDURE — 85730 THROMBOPLASTIN TIME PARTIAL: CPT | Performed by: INTERNAL MEDICINE

## 2022-02-16 PROCEDURE — 75561 CARDIAC MRI FOR MORPH W/DYE: CPT | Mod: 26 | Performed by: INTERNAL MEDICINE

## 2022-02-16 PROCEDURE — 250N000011 HC RX IP 250 OP 636: Performed by: INTERNAL MEDICINE

## 2022-02-16 PROCEDURE — 82805 BLOOD GASES W/O2 SATURATION: CPT | Performed by: STUDENT IN AN ORGANIZED HEALTH CARE EDUCATION/TRAINING PROGRAM

## 2022-02-16 PROCEDURE — 71045 X-RAY EXAM CHEST 1 VIEW: CPT

## 2022-02-16 PROCEDURE — 90791 PSYCH DIAGNOSTIC EVALUATION: CPT | Mod: 95 | Performed by: CLINICAL NEUROPSYCHOLOGIST

## 2022-02-16 PROCEDURE — 85025 COMPLETE CBC W/AUTO DIFF WBC: CPT | Performed by: INTERNAL MEDICINE

## 2022-02-16 PROCEDURE — 75561 CARDIAC MRI FOR MORPH W/DYE: CPT

## 2022-02-16 PROCEDURE — 96132 NRPSYC TST EVAL PHYS/QHP 1ST: CPT | Mod: 95 | Performed by: CLINICAL NEUROPSYCHOLOGIST

## 2022-02-16 PROCEDURE — A9585 GADOBUTROL INJECTION: HCPCS | Performed by: INTERNAL MEDICINE

## 2022-02-16 PROCEDURE — 82310 ASSAY OF CALCIUM: CPT | Performed by: STUDENT IN AN ORGANIZED HEALTH CARE EDUCATION/TRAINING PROGRAM

## 2022-02-16 PROCEDURE — 83735 ASSAY OF MAGNESIUM: CPT | Performed by: STUDENT IN AN ORGANIZED HEALTH CARE EDUCATION/TRAINING PROGRAM

## 2022-02-16 PROCEDURE — 85610 PROTHROMBIN TIME: CPT | Performed by: STUDENT IN AN ORGANIZED HEALTH CARE EDUCATION/TRAINING PROGRAM

## 2022-02-16 PROCEDURE — 76700 US EXAM ABDOM COMPLETE: CPT | Mod: 26 | Performed by: RADIOLOGY

## 2022-02-16 PROCEDURE — 86850 RBC ANTIBODY SCREEN: CPT | Performed by: INTERNAL MEDICINE

## 2022-02-16 PROCEDURE — 71045 X-RAY EXAM CHEST 1 VIEW: CPT | Mod: 26 | Performed by: RADIOLOGY

## 2022-02-16 PROCEDURE — 83550 IRON BINDING TEST: CPT | Performed by: INTERNAL MEDICINE

## 2022-02-16 PROCEDURE — 86901 BLOOD TYPING SEROLOGIC RH(D): CPT | Performed by: INTERNAL MEDICINE

## 2022-02-16 PROCEDURE — 200N000002 HC R&B ICU UMMC

## 2022-02-16 PROCEDURE — 82248 BILIRUBIN DIRECT: CPT | Performed by: STUDENT IN AN ORGANIZED HEALTH CARE EDUCATION/TRAINING PROGRAM

## 2022-02-16 PROCEDURE — 76700 US EXAM ABDOM COMPLETE: CPT

## 2022-02-16 PROCEDURE — 80321 ALCOHOLS BIOMARKERS 1OR 2: CPT | Performed by: INTERNAL MEDICINE

## 2022-02-16 PROCEDURE — 84443 ASSAY THYROID STIM HORMONE: CPT | Performed by: INTERNAL MEDICINE

## 2022-02-16 RX ORDER — GADOBUTROL 604.72 MG/ML
6 INJECTION INTRAVENOUS ONCE
Status: COMPLETED | OUTPATIENT
Start: 2022-02-16 | End: 2022-02-16

## 2022-02-16 RX ORDER — ALBUMIN, HUMAN INJ 5% 5 %
SOLUTION INTRAVENOUS
Status: COMPLETED
Start: 2022-02-16 | End: 2022-02-16

## 2022-02-16 RX ORDER — HEPARIN SODIUM 5000 [USP'U]/.5ML
5000 INJECTION, SOLUTION INTRAVENOUS; SUBCUTANEOUS EVERY 12 HOURS
Status: DISCONTINUED | OUTPATIENT
Start: 2022-02-16 | End: 2022-02-23

## 2022-02-16 RX ORDER — POTASSIUM CHLORIDE 750 MG/1
20 TABLET, EXTENDED RELEASE ORAL ONCE
Status: COMPLETED | OUTPATIENT
Start: 2022-02-16 | End: 2022-02-16

## 2022-02-16 RX ORDER — LORAZEPAM 0.5 MG/1
0.25 TABLET ORAL ONCE
Status: COMPLETED | OUTPATIENT
Start: 2022-02-16 | End: 2022-02-16

## 2022-02-16 RX ORDER — POTASSIUM CHLORIDE 29.8 MG/ML
20 INJECTION INTRAVENOUS ONCE
Status: COMPLETED | OUTPATIENT
Start: 2022-02-16 | End: 2022-02-16

## 2022-02-16 RX ORDER — MULTIPLE VITAMINS W/ MINERALS TAB 9MG-400MCG
1 TAB ORAL DAILY
Status: DISCONTINUED | OUTPATIENT
Start: 2022-02-16 | End: 2022-02-24

## 2022-02-16 RX ORDER — ALBUMIN, HUMAN INJ 5% 5 %
250 SOLUTION INTRAVENOUS ONCE
Status: DISCONTINUED | OUTPATIENT
Start: 2022-02-16 | End: 2022-02-16

## 2022-02-16 RX ADMIN — Medication 1 TABLET: at 14:49

## 2022-02-16 RX ADMIN — DAPAGLIFLOZIN 5 MG: 5 TABLET, FILM COATED ORAL at 08:51

## 2022-02-16 RX ADMIN — GADOBUTROL 6 ML: 604.72 INJECTION INTRAVENOUS at 13:31

## 2022-02-16 RX ADMIN — ALBUMIN HUMAN 250 ML: 0.05 INJECTION, SOLUTION INTRAVENOUS at 03:30

## 2022-02-16 RX ADMIN — THIAMINE HCL TAB 100 MG 100 MG: 100 TAB at 14:49

## 2022-02-16 RX ADMIN — DOBUTAMINE IN DEXTROSE 2.5 MCG/KG/MIN: 200 INJECTION, SOLUTION INTRAVENOUS at 04:30

## 2022-02-16 RX ADMIN — ASPIRIN 81 MG CHEWABLE TABLET 81 MG: 81 TABLET CHEWABLE at 08:52

## 2022-02-16 RX ADMIN — HEPARIN SODIUM 5000 UNITS: 5000 INJECTION, SOLUTION INTRAVENOUS; SUBCUTANEOUS at 16:23

## 2022-02-16 RX ADMIN — ATORVASTATIN CALCIUM 40 MG: 40 TABLET, FILM COATED ORAL at 08:52

## 2022-02-16 RX ADMIN — Medication 0.25 MG: at 10:44

## 2022-02-16 RX ADMIN — POTASSIUM CHLORIDE 20 MEQ: 750 TABLET, EXTENDED RELEASE ORAL at 10:44

## 2022-02-16 RX ADMIN — POTASSIUM CHLORIDE 20 MEQ: 29.8 INJECTION, SOLUTION INTRAVENOUS at 05:58

## 2022-02-16 RX ADMIN — ALBUMIN HUMAN 250 ML: 50 SOLUTION INTRAVENOUS at 03:30

## 2022-02-16 RX ADMIN — HEPARIN SODIUM 600 UNITS/HR: 1000 INJECTION INTRAVENOUS; SUBCUTANEOUS at 10:49

## 2022-02-16 RX ADMIN — Medication 12.5 MG: at 16:23

## 2022-02-16 ASSESSMENT — ACTIVITIES OF DAILY LIVING (ADL)
ADLS_ACUITY_SCORE: 12
ADLS_ACUITY_SCORE: 14
ADLS_ACUITY_SCORE: 10
ADLS_ACUITY_SCORE: 12
ADLS_ACUITY_SCORE: 14
ADLS_ACUITY_SCORE: 12
ADLS_ACUITY_SCORE: 10
ADLS_ACUITY_SCORE: 12
ADLS_ACUITY_SCORE: 14
ADLS_ACUITY_SCORE: 12
ADLS_ACUITY_SCORE: 10

## 2022-02-16 NOTE — PROGRESS NOTES
Cards 2 Progress Note     ASSESSMENT:   Ashley Osman is a 71 year old female with a pmh notable for newly diagnosed HFrEF (10-15% 2/11/22) 2/2 ICM, CAD (diagnosed 1/2022), COPD (diagnosed 1/2022), Hyperlipidemia, and cigarette smoking (quit in 12/2021) who presented to [facility] as a transfer from OSH for consideration of advanced therapies in the setting of cardiogenic shock requiring inotropic support.     Plan today:   - Continue monitor hemodynamics off dobutamine   - CMRI today   - Hold diuretics   - Remove SG catheter     ===CARDIOVASCULAR===  # Cardiogenic shock  # HFrEF 2/2 ICM (EF 10-15%)   # LV thrombus   # CAD with non-viable tissue (mLAD , severe prox and distal LAD, LCx and D1 disease)  AHA Stage D, NYHA Class III-IV  HD stable on presentation. Cause of most recent decompensation is unclear although thought to be secondary to another ACS given sudden onset L shoulder pain.   We will assess feasibility of revascularization  CVTS. We will also obtain CMRI to assess viability.   We will also initiate LVAD evaluation since patient is high risk for any planned intervention and if no plan for revascularization, patient will likely need LVAD given Stage D and inability to be on GDMT due to low blood pressure.     Date CVP PA CI CO PCWP SVR PVR MAP SVO2 % Therapies   2/12/22 4 56/24 2.89 4.54 * 1198 ** 72 63 NE 0.18, Dobu 5   2/13/22 8 57/26 2.5 4 22 1359 3.6 78 59 Ne 0.2, Dobu 5    2/14/22 14 60/28 2.7 4.3 * 1515 ** 67 60 Dobu 2.5    2/15/22 6 40/24 2.8 4.3 * 1115 ** 69 60 None    2/16 8 40/20 2.4 3.7 18 1405 2.3 73 57 None     - Stop dobutamine gtt and monitor   - Continue dapagliflozin 5mg   - Continue ASA 81 mg daily  - Continue Atorvastatin 40 mg daily   - Trial of hydralazine and ISDN   - Keep K and Mg > 4 and 2   - 2g Na diet, 2L fluid restriction  - Will need ICD ppx prior to discharge if no advance therapies offered    LVAD Evaluation   LVAD/transplant work up:   [x]? Labs (CBC, CMP, PT/INR, cystatin  C, prealbumin, UA + micro)  [x]? Infectious  []? Utox/nicotine and cotinine/PeTH   []? Immunocompatibility (last transfusion, ABO, HLA tissue typing, PRA)  []? CVTS consult  []? Social work evaluation  []? Palliative care evaluation  [x]? Neuropsych evaluation  []? Nutrition evaluation  []? CT Dental + evaluation- cleared  []? Abd US + doppler  []? Extremity US and ABIs- 2/8/22: normal PAIGE's bilaterally.  []? Carotid US (if DM or ICM or >49yo) once SG catheter is put  []? PFTs    []? Dexascan: 11/6/2019: Moderate low bone densit  []? CT head non-contrast: 2/8/22:   [x]? CT CAP non-contrast: pulmonary nodules   []? Colonoscopy (>49 yo)  []? Mammogram   []? Pap test     ===PULMONARY===  # COPD  Appears to be a new diagnosis from 1/2022.  On room air and stable with no respiratory concerns on clinical evaluation.   - Continue PTA medication Umeclidinium @ 1 puff daily    #Pulmonary nodule seen on CT   History of chronic smoking   - May need PET scan for further evaluation     ===GASTROINTESTINAL===  # No acute issues     # Nutrition:   -  2g sodium diet     ===RENAL===  # No acute issues. Creatinine of 0.63 on admission (range in OSH was 0.6-0.8).   - Monitor     ===HEME/ONC===  # No acute issues     ===ENDOCRINE===  # No acute concerns/issues.     ===INFECTIOUS DISEASE===  # No acute issues/concern for infection     # Antimicrobials: N/A     ===SKIN/MSK===  # No acute issues  Daily RN skin checks per ICU protocol     Prophylaxis:  DVT: hepatin gtt GI: N/A  Family: called family  Disposition: Critically Ill and in the ICU for management of cardiogenic shock  Code Status: Full code     Melony Mart MD  Cardiology fellow    Subjective:   Awaiting MRI today    Physical Exam   Temp: 98.6  F (37  C) Temp src: Oral BP: 100/61 Pulse: 86   Resp: 18 SpO2: 95 % O2 Device: Nasal cannula Oxygen Delivery: 2 LPM  Vital Signs with Ranges  Temp:  [98.5  F (36.9  C)-98.8  F (37.1  C)] 98.6  F (37  C)  Pulse:  [76-93] 86  Resp:   [16-22] 18  BP: (100)/(61) 100/61  Cuff Mean (mmHg):  [77] 77  MAP:  [56 mmHg-82 mmHg] 62 mmHg  Arterial Line BP: ()/(39-61) 98/43  SpO2:  [91 %-99 %] 95 %  117 lbs 15.14 oz    GEN: NAD, pleasant  HEENT: no icterus  CV: RRR, normal s1/s2, no murmurs/rubs/s3/s4, no heave. +RIJ SG catheter   CHEST: CTAB  ABD: soft, NT/ND, NABS  : no flank/suprapubic tenderness  NEURO: AA&Ox3, fluent/appropriate, motor grossly nonfocal  PSYCH: cooperative, affect appropriate    Data   Data reviewed today:   Recent Labs   Lab 02/16/22  0400 02/15/22  0401 02/14/22  0352 02/14/22  0107 02/13/22  0334   WBC 10.8 11.0 11.6*  --  12.1*   HGB 9.4* 9.8* 9.3*  --  10.3*   MCV 89 90 90  --  89    184 197  --  266   INR 2.10* 1.51*  --  1.69* 1.56*    139 138  --  136   POTASSIUM 3.3* 3.6 4.0  --  4.3   CHLORIDE 106 107 107  --  104   CO2 26 24 25  --  28   BUN 22 19 20  --  20   CR 0.66 0.62 0.58  --  0.62   ANIONGAP 7 8 6  --  4   HEIDI 8.7 8.8 8.6  --  8.6   GLC 88 93 91  --  106*   ALBUMIN  --   --  2.3*  --  2.4*   PROTTOTAL  --   --  5.8*  --  6.4*   BILITOTAL  --   --  0.3  --  0.4   ALKPHOS  --   --  67  --  70   ALT  --   --  44  --  40   AST  --   --  31  --  22       Recent Results (from the past 24 hour(s))   CT Head w/o contrast*    Narrative    CT HEAD W/O CONTRAST 2/15/2022 10:22 AM    History: Heart Failure pre Ventricular Assist Device (VAD) planning   ICD-10:    Comparison: None available    Technique: Using multidetector thin collimation helical acquisition  technique, axial, coronal and sagittal CT images from the skull base  to the vertex were obtained without intravenous contrast.   (topogram) image(s) also obtained and reviewed.    Findings: There is no intracranial hemorrhage, mass effect, or midline  shift. Gray/white matter differentiation in both cerebral hemispheres  is preserved. Ventricles are proportionate to the cerebral sulci.  There is mild cerebral volume loss. There is patchy  periventricular  and subcortical white matter hypodensities that are nonspecific, but  likely secondary to small vessel disease. The basal cisterns are  clear.    The bony calvaria and the bones of the skull base are normal. The  visualized portions of the paranasal sinuses and mastoid air cells are  clear.      Impression    Impression:    1. No acute intracranial pathology.   2. Mild cerebral volume loss and mild leukoaraiosis.    JAGDISH SO MD         SYSTEM ID:  [id number]   CT Dental wo Contrast    Narrative    CT DENTAL WO CONTRAST 2/15/2022 10:25 AM    History:  Pls assess for dental abscesses    Comparison:  None available      TECHNIQUE: 3-D reconstruction by the technologists, with curved  multiplanar reformat of thin section imaging through the mandible and  maxilla obtained without intravenous contrast.    FINDINGS: No periapical dental disease is identified. The patient is  missing the majority of his left maxillary teeth and all molar teeth.  No significant soft tissue swelling or mass. Normal facial bone  alignment. No bony erosion.     Visualized portions of the paranasal sinuses are clear. Normal  temporomandibular joints.      Impression    IMPRESSION:  No evidence for periapical abscess.    JAGDISH SO MD         SYSTEM ID:  [id number]   CT Chest Abdomen Pelvis w/o Contrast    Narrative    EXAMINATION: CT CHEST ABDOMEN PELVIS W/O CONTRAST, 2/15/2022 10:29 AM    TECHNIQUE: Helical CT images from the thoracic inlet through the  symphysis pubis were obtained without intravenous contrast.     COMPARISON: Chest x-ray 2/15/2022    HISTORY: Heart Failure pre Ventricular Assist Device (VAD) planning    FINDINGS:    Chest:    Heart/ Mediastinum: Stable cardiomegaly. There appears to be dilation  of the left ventricle and left atrium with evaluation limited due to  lack of intravenous contrast. Atherosclerotic calcifications of the  thoracic aorta. Prominent calcified atheromatous plaque of the  aortic  arch extending to the great vessel origins and also seen within the  proximal right brachiocephalic artery and at the origin of the  subclavian artery. Severe coronary artery calcifications.  Calcifications of the aortic root. Descending thoracic aorta  demonstrates crescentic calcifications along the left lateral aspect,  may represent calcified plaque within the mural thrombus although  evaluation is limited due to lack of intravenous contrast (series 9,  image 244).     Enlarged mediastinal lymph nodes including a precarinal 19 x 17 mm  node (series 9, image 108). Left PICC line with tip in the low SVC.  Right internal jugular approach [place] catheter with distal tip in  the proximal right pulmonary artery. Right left pulmonary arteries are  dilated measuring up to 2.8 cm on the right and 2.7 cm on the left.  Esophageal wall thickening.    Lungs/pleura: The central tracheobronchial tree is patent. Bronchial  wall thickening. No focal mass or consolidation.  Emphysema. Small  partially loculated bilateral pleural effusions. Loculated fluid with  overlying atelectatic changes within the posterior aspect of the right  upper lobe abutting the fissure. Atelectatic changes at the lung  bases. Smooth interlobular septal thickening. Pleural based  atelectasis versus scarring within the right middle lobe (series 3,  image 174) and atelectasis versus scarring within the lingula,  abutting the fissure (series 3, image 181). 7.5 mm in diameter  irregular pulmonary nodule within the right upper lobe (series 3,  image 61). 4.2 mm solid left lower lobe pulmonary nodule abutting the  pleura (series 3, image 72).    Chest wall/axilla: No bulky lymphadenopathy. 6 mm hypoattenuating  focus within the left upper breast, may represent a tiny cyst,  correlate with physical exam and patient's most recent mammogram  (series 9, image 85).    Abdomen and Pelvis:    Liver: Normal noncontrast appearance of the  liver.    Gallbladder/biliary tree: Hyperdense intraluminal content, may  represent gallbladder sludge versus small gallstones. No intra or  extrahepatic biliary dilation.    Spleen: Unremarkable.    Pancreas: Unremarkable. No mass or ductal dilatation.    Adrenal glands: Unremarkable.    Genitourinary: Normal noncontrast appearance of the kidneys. No  hydronephrosis. Vizcaino catheter balloon within a decompressed urinary  bladder.    Bowel: Normal appearance of the stomach and duodenum. No small or  large bowel dilation. Colonic diverticulosis without evidence of acute  diverticulitis. Normal caliber appendix demonstrating intraluminal  hyperdense content, may represent appendicoliths.    Retroperitoneum: Atherosclerotic calcifications of the aorta and its  major branches including origins of the celiac, SMA, bilateral renal  arteries, HILL. There is dilatation of the infrarenal abdominal aorta  measuring up to 2.8 cm. Moderate to severe calcified atheromatous  plaque involving the bilateral common iliac arteries, external and  internal iliac arteries..  No bulky lymphadenopathy.    Pelvis: Vizcaino catheter is present within the urinary bladder.  Uterus  and adnexa are within normal limits.    Bones: Unremarkable. No suspicious lesions. Degenerative changes of  the thoracolumbar spine.    Soft Tissues: Tiny fat-containing umbilical hernia.      Impression    IMPRESSION:    1.  CT findings suggestive of pulmonary edema in this patient with  cardiomegaly and left cardiac enlargement. Additional left greater  than right small oral effusions, which demonstrate loculated  components associated with overlying presumed atelectatic changes.  2.  Prominent mediastinal lymph nodes are nonspecific, likely  reactive.  3.  Moderate to marked atherosclerotic calcification of the aorta and  its branches as above  4.  Bilateral pulmonary nodules including an irregular 7.5 mm in  diameter right upper lobe solid pulmonary nodule.  Recommend follow-up  per Fleischner Society guidelines.  5.  Dilation of the right and left pulmonary arteries, which can be  seen in patients with pulmonary arterial hypertension.  6.  Emphysematous changes seen throughout both lungs.  7.  Please see above for additional findings and recommendations.    I have personally reviewed the examination and initial interpretation  and I agree with the findings.    GORDON SUTHERLAND MD         SYSTEM ID:  [id number]   US Lower Extremity Arterial Duplex Bilateral    Narrative    Exam: Duplex ultrasound of bilateral lower extremity arteries dated  2/15/2022 3:53 PM     Clinical information: Heart Failure pre Ventricular Assist Device  (VAD) planning     Comparison: None    Technique: Grayscale (B-mode), color Doppler, and duplex spectral  Doppler ultrasound of the lower extremity arteries. Velocity  measurements obtained with angle correction of 60 degrees or less.    Ordering provider: EDDIE GRACE    Findings:     Right lower extremity:     EIA: Velocity: Not seen  Common femoral artery: Velocity: 57 cm/sec. Waveforms: Monophasic  Profunda femoral artery: Velocity: 44 cm/sec. Waveforms: Monophasic  and retrograde  Proximal SFA: Velocity: 51 cm/sec. Waveforms: Monophasic  Mid SFA:Velocity:  95 cm/sec. Waveforms: Monophasic  Distal SFA: Velocity: 63 cm/sec. Waveforms: Monophasic    Popliteal artery, proximal: Velocity: 40 cm/sec. Waveforms: Monophasic  Popliteal artery, distal: Velocity:  cm/sec. Waveforms:    PTA ankle: Velocity: 40 cm/sec. Waveforms: Monophasic  MAT ankle: Velocity: 42 cm/sec. Waveforms: Monophasic    Left lower extremity:    EIA: Not seen  Common femoral artery: Velocity: 228 cm/sec. Waveforms: Monophasic  Deep femoral artery: Velocity: 161 cm/sec. Waveforms: Monophasic  Proximal SFA: Velocity: 145 cm/sec. Waveforms: Monophasic  Mid SFA: Velocity: 207 cm/sec. Waveforms: Monophasic  Distal SFA: Velocity: 90 cm/sec. Waveforms: Monophasic    Popliteal  artery, proximal: Velocity: 49 cm/sec. Waveforms: Monophasic    PTA ankle: Velocity: 77 cm/sec. Waveforms: Monophasic  MAT ankle: Velocity: 72 cm/sec. Waveforms: Biphasic      Impression    Impression:     1. Right leg: The visualized arteries are patent from the common  femoral through tibial arteries. There is retrograde flow in the  profunda femoral artery which is nonspecific but can be seen with a  more central occlusion. CTA would be the test of choice if further  characterization is desired. Of note there does appear to be severe  possibly occlusive calcification of the right external iliac artery on  same day noncontrast CT.      2. Left leg: The visualized arteries are patent from the common  femoral through tibial arteries. Nonspecific monophasic waveforms  which can suggest a more proximal stenosis.      Guidelines:    [facility] duplex criteria for lower limb arterial  occlusive disease    Percent stenosis:     Normal (1-19%): Peak systolic velocity (cm/s): <150, End-diastolic  velocity (cm/s): <40, Velocity ratio (Vr): <1.5, Distal arterial  waveform: Triphasic    20-49%: Peak systolic velocity (cm/s): 150-200, End-diastolic velocity  (cm/s): <40, Velocity ratio (Vr): 1.5-2.0, Distal arterial waveform:  Triphasic    50-75%: Peak systolic velocity (cm/s): 200-300, End-diastolic velocity  (cm/s): <90, Velocity ratio (Vr): 2.0-3.9, Distal arterial waveform:  Poststenotic turbulence distal to stenosis, monophasic distal waveform    >75%: Peak systolic velocity (cm/s): >300, End-diastolic velocity  (cm/s): <90, Velocity ratio (Vr): >4.0, Distal arterial waveform:  Dampened distal waveform and low PSV/EDV* in the stenosis    Occlusion: Absent flow by color Doppler/pulsed Doppler spectral  analysis; length of occlusion estimated from distance between exit and  reentry collateral arteries    *PSV = peak systolic velocity, EDV = end-diastolic  velocity  http://[email or web address].com/chapter/[email or web address]/[email or web address]_23/fulltext  html    I have personally reviewed the examination and initial interpretation  and I agree with the findings.    ADELIA SARMIENTO MD         SYSTEM ID:  [id number]   XR Chest Port 1 View    Narrative    XR CHEST PORT 1 VIEW on 2/16/2022 12:37 AM.    INDICATION: SWAN ROCKY CATHETER.    COMPARISON: Radiograph dated 2/15/2022.    FINDINGS:   Portable AP upright radiograph of the chest. Right IJ approach  [place] catheter tip projects over the proximal right pulmonary  artery, unchanged. Left upper extremity PICC tip projects over the low  SVC.    Trachea is clear. Cardiac silhouette is stable. Pulmonary vasculature  is relatively distinct. Aortic arch calcifications. No pneumothorax.  Small left and trace right pleural effusions are not significantly  changed. Diffuse interstitial and airspace opacities are unchanged.      Impression    IMPRESSION:   1. [place] catheter tip projects over the proximal right pulmonary  artery, stable.   2. Unchanged small left and trace right pleural effusions.  3. Diffuse interstitial and airspace opacities are unchanged.    I have personally reviewed the examination and initial interpretation  and I agree with the findings.    BARBARA GARLAND MD         SYSTEM ID:  [id number]     I have reviewed today's vital signs, notes, medications, labs and imaging.  I have also seen and examined the patient and agree with the findings and plan as outlined above.  Pt with son at bedside.  VSS with HR 80s and CI 2.2 with PCW 18.  Labs WNL.  Assessment: Pt with 2V  with CMR revealing viability in LAD and Cx distributions.  No clot noted and RV fn is nl.  Will consult CVTS.  Iscehmic cardiomyopathy with acute on chronic systolic heart failure.  Pt seen X3 for total critical care time 40 min.        Alex Brewer MD, PhD  Professor, Heart Failure and Cardiac Transplantation  [facility]

## 2022-02-16 NOTE — PLAN OF CARE
Major Shift Events: Anxious/particular overnight, difficult to educate on some topics, reassurance/detailed explanations given but pt not receptive/understanding. Was able to sleep for majority of night. MAP 50-60 overnight, 5% 250 mL Albumin given this AM w/ some improvement. 10 PM Lasix & Hydralazine held d/t low MAPs/I's & O's. 0600 Hydralazine rescheduled for 0800 and to reevaluate the need for med by day team as MAPs were 55-60 when med was due (per Cards overnight MD). CVP 4-6, CO 3.8-5.1, CI 2.4-3.3. Placed on 2L NC when asleep for saturations low to mid 80's. HypoK this AM, 20 mEq given for replacement. Dobutamine @ 2.5. Heparin @ 600.   Plan: NeuroPsych eval this AM. Abdominal US this AM, maintain NPO status until after. Cardiac MRI today - Boring to be removed beforehand, possible line holiday after imaging. Keep MAPs above 60. Vizcaino to be removed after MRI, per pt request.   For vital signs and complete assessments, please see documentation flowsheets.

## 2022-02-16 NOTE — PROGRESS NOTES
"Met with patient and Anand (son) to present the HM3 as a possible treatment option.    Discussed patient and caregiver responsibilities before and after VAD implant. Clarified that, r/t COVID-19 visitor restrictions, only 2 caregivers may be trained. Clarified the need for a caregiver to be present for education and to assist patient for at least first 30 days after a patient returns home. Patient's designated caregiver is Anand.     Discussed basic overview of VAD equipment, on going management, need for anticoagulation, regular dressing change, grounded three-pronged outlet and functioning telephone. Also discussed frequency of follow-up clinic appointments and the need to stay locally for at least 2-4 weeks.  Reviewed restrictions of having a VAD such as no swimming, bathing, boats, MRI's, or arc welding.     Provided and discussed the VAD educational brochure, information regarding the VAD and transplant support groups, and \"VAD What You Should Know\" with additional details. Patient and Anand signed \"VAD What You Should Know\" document (or agreed to have VAD Coordinator sign on their behalf). VAD coordinator contact information provided.  Encouraged patient and son to call with questions. Learners verbalized understanding of the above information.    "

## 2022-02-17 ENCOUNTER — APPOINTMENT (OUTPATIENT)
Dept: ULTRASOUND IMAGING | Facility: CLINIC | Age: 72
DRG: 233 | End: 2022-02-17
Attending: INTERNAL MEDICINE
Payer: MEDICARE

## 2022-02-17 ENCOUNTER — PREP FOR PROCEDURE (OUTPATIENT)
Dept: CARDIOLOGY | Facility: CLINIC | Age: 72
End: 2022-02-17

## 2022-02-17 DIAGNOSIS — I25.10 CAD (CORONARY ARTERY DISEASE): Primary | ICD-10-CM

## 2022-02-17 LAB
ALBUMIN UR-MCNC: NEGATIVE MG/DL
ANION GAP SERPL CALCULATED.3IONS-SCNC: 5 MMOL/L (ref 3–14)
APPEARANCE UR: CLEAR
BASE EXCESS BLDV CALC-SCNC: 0.7 MMOL/L (ref -7.7–1.9)
BASE EXCESS BLDV CALC-SCNC: 1.8 MMOL/L (ref -7.7–1.9)
BASE EXCESS BLDV CALC-SCNC: 1.9 MMOL/L (ref -7.7–1.9)
BASOPHILS # BLD AUTO: 0.1 10E3/UL (ref 0–0.2)
BASOPHILS NFR BLD AUTO: 0 %
BILIRUB UR QL STRIP: NEGATIVE
BUN SERPL-MCNC: 23 MG/DL (ref 7–30)
CALCIUM SERPL-MCNC: 9.4 MG/DL (ref 8.5–10.1)
CHLORIDE BLD-SCNC: 108 MMOL/L (ref 94–109)
CO2 SERPL-SCNC: 25 MMOL/L (ref 20–32)
COLOR UR AUTO: ABNORMAL
CREAT SERPL-MCNC: 0.69 MG/DL (ref 0.52–1.04)
DRVVT SCREEN RATIO: 0.96
EOSINOPHIL # BLD AUTO: 0.9 10E3/UL (ref 0–0.7)
EOSINOPHIL NFR BLD AUTO: 8 %
ERYTHROCYTE [DISTWIDTH] IN BLOOD BY AUTOMATED COUNT: 14.6 % (ref 10–15)
GFR SERPL CREATININE-BSD FRML MDRD: >90 ML/MIN/1.73M2
GLUCOSE BLD-MCNC: 89 MG/DL (ref 70–99)
GLUCOSE UR STRIP-MCNC: >=1000 MG/DL
HCO3 BLDV-SCNC: 26 MMOL/L (ref 21–28)
HCO3 BLDV-SCNC: 27 MMOL/L (ref 21–28)
HCO3 BLDV-SCNC: 27 MMOL/L (ref 21–28)
HCT VFR BLD AUTO: 32.2 % (ref 35–47)
HEPZYMED PTT RATIO: 1
HEPZYMED PTT-LA: 37 SECONDS (ref 31–45)
HEPZYMED THROMBIN TIME: 18.4 SECONDS (ref 15.7–21.7)
HGB BLD-MCNC: 10.1 G/DL (ref 11.7–15.7)
HGB UR QL STRIP: ABNORMAL
IMM GRANULOCYTES # BLD: 0.1 10E3/UL
IMM GRANULOCYTES NFR BLD: 1 %
INR PPP: 1.13 (ref 0.85–1.15)
KETONES UR STRIP-MCNC: NEGATIVE MG/DL
LA PPP-IMP: NEGATIVE
LACTATE SERPL-SCNC: 0.6 MMOL/L (ref 0.7–2)
LACTATE SERPL-SCNC: 0.7 MMOL/L (ref 0.7–2)
LEUKOCYTE ESTERASE UR QL STRIP: NEGATIVE
LUPUS INTERPRETATION: ABNORMAL
LYMPHOCYTES # BLD AUTO: 3.8 10E3/UL (ref 0.8–5.3)
LYMPHOCYTES NFR BLD AUTO: 31 %
MAGNESIUM SERPL-MCNC: 2 MG/DL (ref 1.8–2.6)
MCH RBC QN AUTO: 28.3 PG (ref 26.5–33)
MCHC RBC AUTO-ENTMCNC: 31.4 G/DL (ref 31.5–36.5)
MCV RBC AUTO: 90 FL (ref 78–100)
MONOCYTES # BLD AUTO: 1.1 10E3/UL (ref 0–1.3)
MONOCYTES NFR BLD AUTO: 9 %
MUCOUS THREADS #/AREA URNS LPF: PRESENT /LPF
NEUTROPHILS # BLD AUTO: 6.4 10E3/UL (ref 1.6–8.3)
NEUTROPHILS NFR BLD AUTO: 51 %
NITRATE UR QL: NEGATIVE
NRBC # BLD AUTO: 0 10E3/UL
NRBC BLD AUTO-RTO: 0 /100
O2/TOTAL GAS SETTING VFR VENT: 21 %
O2/TOTAL GAS SETTING VFR VENT: 21 %
O2/TOTAL GAS SETTING VFR VENT: 24 %
OXYHGB MFR BLDV: 51 % (ref 70–75)
OXYHGB MFR BLDV: 56 % (ref 70–75)
OXYHGB MFR BLDV: 62 % (ref 70–75)
PATIENT PTT-LA: 64 SECONDS (ref 31–45)
PCO2 BLDV: 41 MM HG (ref 40–50)
PCO2 BLDV: 42 MM HG (ref 40–50)
PCO2 BLDV: 44 MM HG (ref 40–50)
PH BLDV: 7.38 [PH] (ref 7.32–7.43)
PH BLDV: 7.41 [PH] (ref 7.32–7.43)
PH BLDV: 7.42 [PH] (ref 7.32–7.43)
PH UR STRIP: 5 [PH] (ref 5–7)
PLATELET # BLD AUTO: 231 10E3/UL (ref 150–450)
PO2 BLDV: 29 MM HG (ref 25–47)
PO2 BLDV: 31 MM HG (ref 25–47)
PO2 BLDV: 35 MM HG (ref 25–47)
POTASSIUM BLD-SCNC: 4.2 MMOL/L (ref 3.4–5.3)
RBC # BLD AUTO: 3.57 10E6/UL (ref 3.8–5.2)
RBC URINE: 1 /HPF
SODIUM SERPL-SCNC: 138 MMOL/L (ref 133–144)
SP GR UR STRIP: 1.02 (ref 1–1.03)
SQUAMOUS EPITHELIAL: 1 /HPF
THROMBIN TIME: 47 SECONDS (ref 13–19)
UROBILINOGEN UR STRIP-MCNC: NORMAL MG/DL
WBC # BLD AUTO: 12.3 10E3/UL (ref 4–11)
WBC URINE: 6 /HPF

## 2022-02-17 PROCEDURE — 82610 CYSTATIN C: CPT

## 2022-02-17 PROCEDURE — 214N000001 HC R&B CCU UMMC

## 2022-02-17 PROCEDURE — 83605 ASSAY OF LACTIC ACID: CPT | Performed by: STUDENT IN AN ORGANIZED HEALTH CARE EDUCATION/TRAINING PROGRAM

## 2022-02-17 PROCEDURE — 85025 COMPLETE CBC W/AUTO DIFF WBC: CPT | Performed by: INTERNAL MEDICINE

## 2022-02-17 PROCEDURE — 99221 1ST HOSP IP/OBS SF/LOW 40: CPT | Mod: GC | Performed by: INTERNAL MEDICINE

## 2022-02-17 PROCEDURE — 81001 URINALYSIS AUTO W/SCOPE: CPT

## 2022-02-17 PROCEDURE — 93922 UPR/L XTREMITY ART 2 LEVELS: CPT | Mod: 26 | Performed by: RADIOLOGY

## 2022-02-17 PROCEDURE — 80323 ALKALOIDS NOS: CPT

## 2022-02-17 PROCEDURE — 36592 COLLECT BLOOD FROM PICC: CPT

## 2022-02-17 PROCEDURE — 80048 BASIC METABOLIC PNL TOTAL CA: CPT | Performed by: INTERNAL MEDICINE

## 2022-02-17 PROCEDURE — 85390 FIBRINOLYSINS SCREEN I&R: CPT | Mod: 26 | Performed by: PATHOLOGY

## 2022-02-17 PROCEDURE — 99233 SBSQ HOSP IP/OBS HIGH 50: CPT | Mod: GC | Performed by: INTERNAL MEDICINE

## 2022-02-17 PROCEDURE — 250N000013 HC RX MED GY IP 250 OP 250 PS 637: Performed by: STUDENT IN AN ORGANIZED HEALTH CARE EDUCATION/TRAINING PROGRAM

## 2022-02-17 PROCEDURE — 82805 BLOOD GASES W/O2 SATURATION: CPT | Performed by: STUDENT IN AN ORGANIZED HEALTH CARE EDUCATION/TRAINING PROGRAM

## 2022-02-17 PROCEDURE — 83735 ASSAY OF MAGNESIUM: CPT | Performed by: STUDENT IN AN ORGANIZED HEALTH CARE EDUCATION/TRAINING PROGRAM

## 2022-02-17 PROCEDURE — 80321 ALCOHOLS BIOMARKERS 1OR 2: CPT

## 2022-02-17 PROCEDURE — 250N000011 HC RX IP 250 OP 636: Performed by: STUDENT IN AN ORGANIZED HEALTH CARE EDUCATION/TRAINING PROGRAM

## 2022-02-17 PROCEDURE — 250N000013 HC RX MED GY IP 250 OP 250 PS 637: Performed by: INTERNAL MEDICINE

## 2022-02-17 PROCEDURE — 85610 PROTHROMBIN TIME: CPT | Performed by: STUDENT IN AN ORGANIZED HEALTH CARE EDUCATION/TRAINING PROGRAM

## 2022-02-17 PROCEDURE — 93922 UPR/L XTREMITY ART 2 LEVELS: CPT

## 2022-02-17 RX ADMIN — DAPAGLIFLOZIN 5 MG: 5 TABLET, FILM COATED ORAL at 08:54

## 2022-02-17 RX ADMIN — ASPIRIN 81 MG CHEWABLE TABLET 81 MG: 81 TABLET CHEWABLE at 10:42

## 2022-02-17 RX ADMIN — HEPARIN SODIUM 5000 UNITS: 5000 INJECTION, SOLUTION INTRAVENOUS; SUBCUTANEOUS at 16:18

## 2022-02-17 RX ADMIN — THIAMINE HCL TAB 100 MG 100 MG: 100 TAB at 08:54

## 2022-02-17 RX ADMIN — HEPARIN SODIUM 5000 UNITS: 5000 INJECTION, SOLUTION INTRAVENOUS; SUBCUTANEOUS at 03:52

## 2022-02-17 RX ADMIN — Medication 1 TABLET: at 08:54

## 2022-02-17 RX ADMIN — ATORVASTATIN CALCIUM 40 MG: 40 TABLET, FILM COATED ORAL at 08:54

## 2022-02-17 ASSESSMENT — ACTIVITIES OF DAILY LIVING (ADL)
ADLS_ACUITY_SCORE: 8
ADLS_ACUITY_SCORE: 10
ADLS_ACUITY_SCORE: 10
ADLS_ACUITY_SCORE: 8
ADLS_ACUITY_SCORE: 10
ADLS_ACUITY_SCORE: 8
ADLS_ACUITY_SCORE: 10
ADLS_ACUITY_SCORE: 8
ADLS_ACUITY_SCORE: 10
ADLS_ACUITY_SCORE: 8
ADLS_ACUITY_SCORE: 10
ADLS_ACUITY_SCORE: 10
ADLS_ACUITY_SCORE: 8
ADLS_ACUITY_SCORE: 10
ADLS_ACUITY_SCORE: 8
ADLS_ACUITY_SCORE: 10
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
## 2022-02-17 NOTE — CONSULTS
[facility]  Palliative Care Consultation Note    Patient: Ashley Osman  Date of Admission:  2/12/2022    Requesting Clinician / Team: Cardiology  Reason for consult:   Goals of care  Decisional support  Patient and family support    Recommendations:    There are no concerns from a palliative care standpoint about proceeding with LVAD.  Patient is hoping that she will only require a bypass, and is awaiting further information from cardiothoracic surgery team.    She has completed a healthcare directive, and thinks it is on file with [facility].  Would recommend reaching out to [facility] or speaking with her son to get a copy of the directive into our system.  She indicates that if she is unable to make decisions for herself she would like both of her sons in concert with her doctors to make decisions.    She would appreciate  support, and I have put in a request for this.    Please see LVAD preparedness plan below.    Heart transplantation and/or LVAD preparedness plan    Patient's legally designated health care agent:   While we do not have her healthcare directive on file, she states that she has completed it and that it identifies her 2 sons as her healthcare agents.  She would like her sons to make decisions for her with the guidance of her doctors.      Patient's description of how they make decisions (by themselves, in consultation with certain close loved ones, based on advice from medical professionals, etc):    In consultation with her sons and her doctors      Patient's thoughts about what constitutes a 'good' quality of life/what makes life worth living:   The patient values her family, 2 sons, multiple grandchildren, one great grandchild along the way, as well as many nieces and nephews that she  cared for while they were growing up.  She thinks interaction with them is a very important component of good quality of life.  She [place] lives on [place]  "[address] land near [place], [place], and she finds living in her home and important part of her quality of life.  She states that she would be willing to go to a rehab facility for a short period of time if needed, but would not want to move into a skilled nursing facility or other setting.    Patient's personal goals/hopes for receiving the LVAD and/or transplant and how they anticipate future with LVAD and/or transplant to be like :   She is hoping for \"a longer life,\" \"a year or maybe much longer than that.\"  She is not able to specifically identify anything that would make a longer life not worth living for her, but does identify her family and the ability to live in her home as important aspects of her quality of life during our conversation.    Patient's worries/concerns when considering receiving the LVAD and/or transplant:   She is somewhat disturbed by the idea of having the machinery on the outside of her body, and states that she wishes they could put something that is completely inside her body.  She is somewhat concerned about activity restrictions with the LVAD.    Patient's thoughts about what health conditions would be unacceptable (situations the patient would want her/his doctors and loved ones to know that she/he would not want life prolonged)?   She tells me that she would not want to be in a state where she could not interact with her family.  She tells me that she would not want to live for a long time in a skilled nursing facility or other nonhome setting.  She tells me that she values both her ability to be independent in caring for herself and her home as a place that she loves.      Serious surgeries like LVAD and/or Heart transplantation carries a small risk of perioperative stroke/brain injury, which for some people, may limit their ability to communicate their wishes to others.  After a stroke, what sort of functional outcomes would be acceptable vs unacceptable to the patient? " "  Again she finds the idea of significant debility/dependence and being required to leave her home unacceptable.  She would not want to live in a state where she could not interact with her family.     The need to be on a ventilator/breathing machine through a tracheostomy (a tube in your neck) is a risk. What are with circumstances you would want your medical providers and family to keep you alive with long term mechanical ventilation?  She would accept a limited trial of mechanical ventilation if it were part of the process of rehabilitation that would eventually get her home.  She would not want to be maintained indefinitely on mechanical ventilation.    There are risks for kidney failure in patients with advancing heart disease who need LVAD support.  The places/locations that offer dialysis and accept patients with LVADs may be limited in your community.  What do you know about dialysis? Would you be open to doing dialysis and if so what quality of life concerns would you have?   She states that she would be willing to undergo dialysis if needed, but that if she was required to move away from her home she might not wish to continue with, especially long-term.    What circumstances or events would lead you to decide or ask your health care agent to decide that you would want the LVAD turned off and be allowed to die a natural death?  She has not specifically considered under what circumstances she would want life-sustaining technology stopped.  We discussed this briefly in the context of the LVAD and in the context of dialysis.  Again, she values independence and the ability to live in her home as well as the ability to interact with her family.  She does have some ethical/[place] concerns about stopping life-sustaining technology and whether it is \"the right thing to do.\"  We discussed some of the ways that people approach these decisions ethically, and I also offered her  support. I emphasized to " her, as I do with all patients in this situation, that the fact that we start an intervention does not mean we have to continue it indefinitely.    These recommendations have been discussed with palliative care attending.      Thank you for the opportunity to participate in the care of this patient and family. Our team: does not plan on following further, however do not hesitate to call or re-consult if we can be of further assistance to the patient/family.     During regular M-F work hours -- if you are not sure who specifically to contact -- please contact us by sending a text page to our team consult pager at [phone].    After regular work hours and on weekends/holidays, you can call our answering service at [phone]. Also, who's on call for us is available in [facility] Smart Web.     Rafael Forrest DO  Palliative Medicine Fellow      Assessments:  Ashley Osman is a 71 year old female with a past medical history significant for new diagnosis of CAD, COPD, hyperlipidemia, and HFrEF secondary to ischemic cardiomyopathy who was transferred to our facility from [facility] for consideration of advanced therapy including LVAD and inotropic support.  The patient's clinical condition has been improving, and there is some hope at this point that she may be eligible for a cardiac bypass graft without requiring an LVAD at this time.    Today, the patient was seen for:  Ischemic cardiomyopathy  LVAD evaluation  Goals of care  Decisional support    Prognosis, Goals, & Planning:      Functional Status just prior to hospitalization: 1 (Restricted in physically strenuous activity but ambulatory and able to carry out work of a light or sedentary nature) she had some ongoing worsening dyspnea of exertion at home, but remained mostly independent and just prior to hospitalization.      Prognosis, Goals, and/or Advance Care Planning were addressed today: Yes   please see LVAD planning in conversation discussed  "above      Patient's decision making preferences: with input from medical clinicians and loved ones          Patient has decision-making capacity today for complex decisions: Yes            I have concerns about the patient/family's health literacy today: No           Patient has a completed Health Care Directive: Reportedly yes, but not available to us currently.      Code status: Full Code    Coping, Meaning, & Spirituality:   Mood, coping, and/or meaning in the context of serious illness were addressed today: Yes  She reports to me that she had \"made her peace\" with the idea that she would likely die during this hospitalization, and that she and her sons have actually enjoyed planning her .  She tells me that she is feeling more uncertain now that her prognosis has improved.  She is very much hoping that she will be able to undergo CABG without requiring LVAD.    Social:     Living situation: Lives in her own home on a large tract of land near [place].  Her son has moved back in with her since she got ill.    Key family / caregivers: 2 sons, 3 grandchildren.  Many nieces and nephews that she cared for while she was growing up and is very close to.    Occupational history: Worked as a , for [employer], and had an [employer] shop    History of Present Illness:  History gathered today from: patient, medical chart    Ashley Osman is a 71-year-old woman with history of recently diagnosed CAD, resultant HFrEF from ischemic cardiomyopathy who was transferred to our facility for consideration of advanced cardiac therapies.  She had an episode of shortness of breath on 2022, and was intubated in the field and hospitalized at [facility] for CAP and acute hypoxemic/hypercapnic respiratory failure from -.  During the time of that hospitalization she was noted to have significant CAD with likely ischemic cardiomyopathy.  She was discharged home and did well until about " 2/5/2022 when she had another episode of severe shortness of breath, presented to [facility] and was found to be in cardiogenic shock requiring vasopressor support.  Since transfer here, she has had some improvement in her cardiac function with decrease in requirement for vasopressor support.  She states that the cardiac surgeon came and spoke with her last night and told her that they might not need to consider LVAD in her, and that doing a CABG might be a possibility.    Key Palliative Symptom Data:  Pain location: none  We are not helping to manage these symptoms currently in this patient.      ROS:  Besides above, a complete 10+ ROS was reviewed and is unremarkable      Past Medical History:  No past medical history on file.     Past Surgical History:  No past surgical history on file.      Family History:  No family history on file.      Allergies:  Allergies   Allergen Reactions     Wasp Venom Protein Anaphylaxis     Bee Venom Swelling and Hives     Other Drug Allergy (See Comments)      1980- had c-sec. Was in ICU for swelling, chills- unsure of what med it was -at [facility]. 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   Noted scheduled meds are:  Current Facility-Administered Medications   Medication     acetaminophen (TYLENOL) tablet 650 mg     aspirin (ASA) chewable tablet 81 mg     atorvastatin (LIPITOR) tablet 40 mg     Continuing ACE inhibitor/ARB/ARNI from home medication list OR ACE inhibitor/ARB/ARNI order already placed during this visit     dapagliflozin (FARXIGA) tablet 5 mg     heparin ANTICOAGULANT injection 5,000 Units     HOLD nitroGLYcerin IF     influenza vac high-dose quad (FLUZONE HD) injection SRIDHAR 0.7 mL     ipratropium - albuterol 0.5 mg/2.5 mg/3 mL (DUONEB) neb solution 3 mL     menthol (Topical Analgesic) 2.5% (BENGAY VANISHING SCENT) 2.5 %  "topical gel     multivitamin w/minerals (THERA-VIT-M) tablet 1 tablet     nitroGLYcerin (NITROSTAT) sublingual tablet 0.4 mg     Patient is already receiving anticoagulation with heparin, enoxaparin (LOVENOX), warfarin (COUMADIN)  or other anticoagulant medication     Reason beta blocker not prescribed     thiamine (B-1) tablet 100 mg     umeclidinium (INCRUSE ELLIPTA) 62.5 MCG/INH inhaler 1 puff       Physical Exam:  Vital Signs: Temp: 97.3  F (36.3  C) Temp src: Oral BP: (!) 89/59 Pulse: 83   Resp: 18 SpO2: 92 % O2 Device: Nasal cannula Oxygen Delivery: 1 LPM  Weight: 114 lbs 6.7 oz  Constitutional: alert, no acute distress, appears comfortable.  Sitting up in bed eating breakfast as we speak.  Head: Normocephalic, atraumatic.  ENT: mucous membranes moist and pink  Cardiovascular: Appears well-perfused  Respiratory: No evidence of distress  Gastrointestinal: Abdomen nondistended  : deferred  Musculoskeletal: Normal bulk/tone  Skin: warm, dry  Neurologic: Alert, oriented x4.  Face symmetric.  Speech clear. Moves all extremities.  Psychiatric: Mood \"good\", affect euthymic with periods of anxiety during appropriate points in the conversation.. Speech clear, fluent, goal-directed with some mild tangential content. No obvious psychomotor retardation or agitation, no evidence of attention to internal stimuli.    Data reviewed:  Reviewed recent labs and pertinent imaging  CBC, metabolic panel, VBG reviewed with no findings that would change palliative care recommendations    "

## 2022-02-17 NOTE — PROGRESS NOTES
Cards 2 Progress Note     ASSESSMENT:   Ashley Osman is a 71 year old female with a pmh notable for newly diagnosed HFrEF (10-15% 2/11/22) 2/2 ICM, CAD (diagnosed 1/2022), COPD (diagnosed 1/2022), Hyperlipidemia, and cigarette smoking (quit in 12/2021) who presented to [facility] as a transfer from OSH for consideration of advanced therapies in the setting of cardiogenic shock requiring inotropic support.     Plan today:   - Continue monitor hemodynamics off dobutamine   - Plan for pulling arterial line today  - Plan to transfer to floor today    ===CARDIOVASCULAR===  # Cardiogenic shock  # HFrEF 2/2 ICM (EF 10-15%)   # LV thrombus   # CAD with non-viable tissue (mLAD , severe prox and distal LAD, LCx and D1 disease)  AHA Stage D, NYHA Class III-IV  HD stable on presentation. Cause of most recent decompensation is unclear although thought to be secondary to another ACS given sudden onset L shoulder pain.   CMRI (2/16) showing viability of 50% in LAD territory (intermediate likelihood of recovery) and 75% in dominant LCx territory (good likelihood of recovery).  We will assess feasibility of revascularization  CVTS.   Initiated LVAD evaluation since patient is high risk for any planned intervention and if no plan for revascularization, patient will likely need LVAD given Stage D and inability to be on GDMT due to low blood pressure.     Date CVP PA CI CO PCWP SVR PVR MAP SVO2 % Therapies   2/12/22 4 56/24 2.89 4.54 * 1198 ** 72 63 NE 0.18, Dob[place] 5   2/13/22 8 57/26 2.5 4 22 1359 3.6 78 59 Ne 0.2, Dobu 5    2/14/22 14 60/28 2.7 4.3 * 1515 ** 67 60 Dobu 2.5    2/15/22 6 40/24 2.8 4.3 * 1115 ** 69 60 None    2/16 8 40/20 2.4 3.7 18 1405 2.3 73 57 None     - Continue dapagliflozin 5mg   - Continue ASA 81 mg daily  - Continue Atorvastatin 40 mg daily   - Keep K and Mg > 4 and 2   - 2g Na diet, 2L fluid restriction  - Will need ICD ppx prior to discharge if no advance therapies offered    LVAD Evaluation   LVAD/transplant  work up:   [x]? Labs (CBC, CMP, PT/INR, cystatin C, prealbumin, UA + micro)  [x]? Infectious  []? Utox/nicotine and cotinine/PeTH   []? Immunocompatibility (last transfusion, ABO, HLA tissue typing, PRA)  []? CVTS consult  []? Social work evaluation  [x]? Palliative care evaluation  [x]? Neuropsych evaluation  []? Nutrition evaluation  [x]? CT Dental + evaluation- cleared  [x]? Abd US + doppler  [x]? Extremity US and ABIs- 2/8/22: normal PAIGE's bilaterally.  []? Carotid US (if DM or ICM or >51yo) once SG catheter is put  []? PFTs    []? Dexascan: 11/6/2019: Moderate low bone densit  []? CT head non-contrast: 2/8/22:   [x]? CT CAP non-contrast: pulmonary nodules   []? Colonoscopy (>49 yo)  []? Mammogram   []? Pap test     ===PULMONARY===  # COPD  Appears to be a new diagnosis from 1/2022.  On room air and stable with no respiratory concerns on clinical evaluation.   - Continue PTA medication Umeclidinium @ 1 puff daily    #Pulmonary nodule seen on CT   History of chronic smoking   - May need PET scan for further evaluation     ===GASTROINTESTINAL===  # No acute issues     # Nutrition:   -  2g sodium diet     ===RENAL===  # No acute issues. Creatinine of 0.63 on admission (range in OSH was 0.6-0.8).   - Monitor     ===HEME/ONC===  # No acute issues     ===ENDOCRINE===  # No acute concerns/issues.     ===INFECTIOUS DISEASE===  # No acute issues/concern for infection     # Antimicrobials: N/A     ===SKIN/MSK===  # No acute issues  Daily RN skin checks per ICU protocol     Prophylaxis:  DVT: hepatin gtt GI: N/A  Family: called family  Disposition: Critically Ill and in the ICU for management of cardiogenic shock  Code Status: Full code     Melony Mart MD  Cardiology fellow    Subjective:   Reviewed events from last 24 hours.  No acute events overnight.  No new symptoms.      Physical Exam   Temp: 97.3  F (36.3  C) Temp src: (P) Oral BP: (!) 89/59 Pulse: 83   Resp: 18 SpO2: 92 % O2 Device: Nasal cannula Oxygen  Delivery: 1 LPM  Vital Signs with Ranges  Temp:  [97.3  F (36.3  C)-98.6  F (37  C)] 97.3  F (36.3  C)  Pulse:  [75-98] 83  Resp:  [16-35] 18  BP: ()/(40-63) 89/59  MAP:  [63 mmHg-86 mmHg] 67 mmHg  Arterial Line BP: ()/(38-67) 96/51  SpO2:  [91 %-99 %] 92 %  114 lbs 6.7 oz    GEN: NAD, pleasant  HEENT: no icterus  CV: RRR, normal s1/s2, no murmurs/rubs/s3/s4, no heave. Dressing on R neck   CHEST: CTAB  ABD: soft, NT/ND, NABS  : no flank/suprapubic tenderness  NEURO: AA&Ox3, fluent/appropriate, motor grossly nonfocal  PSYCH: cooperative, affect appropriate    Data   Data reviewed today:   Recent Labs   Lab 22  0348 22  0939 22  0907 22  0400 02/15/22  0401 22  0352   WBC 12.3*  --   --  10.8 11.0 11.6*   HGB 10.1*  --   --  9.4* 9.8* 9.3*   MCV 90  --   --  89 90 90     --   --  174 184 197   INR 1.13  --   --  2.10* 1.51*  --     140  --  139 139 138   POTASSIUM 4.2 3.8  --  3.3* 3.6 4.0   CHLORIDE 108 109  --  106 107 107   CO2 25 24  --  26 24 25   BUN 23 20  --  22 19 20   CR 0.69 0.62  --  0.66 0.62 0.58   ANIONGAP 5 7  --  7 8 6   HEIDI 9.4 8.7  --  8.7 8.8 8.6   GLC 89 90  --  88 93 91   ALBUMIN  --   --  1.6*  --   --  2.3*   PROTTOTAL  --   --  3.6*  --   --  5.8*   BILITOTAL  --   --  0.2  --   --  0.3   ALKPHOS  --   --  42  --   --  67   ALT  --   --  29  --   --  44   AST  --   --  18  --   --  31       Recent Results (from the past 24 hour(s))   MRI Cardiac w/contrast    Narrative                                                     Formerly [facility], [facility]                                                     CMR Report      MRN:                  [id number]                                  Name:            GLADYS SHUKLA                                  :                  1950                                  Scan Date:   2022 12:17:29                                  Electronically signed by Bertin Figueroa  14:42:33    SUMMARY    ==========================================================================================================    Clinical history: 71 F severe Ischemic CM, severe multivessel CAD, admitted with cardiogenic shock, assess  viability  Comparison CMR: recent CMR done at [facility] had a difficult time with breath holding, limited imaging done to focus on viability assessment.     1. The LV is severely dilated. The global systolic function is severely reduced. The LVEF is 20%. There is  wall thinning with akinesis of the entire inferior/inferolateral, mid to apical anterior and true apical  segments.    2. The RV is normal in cavity size. The global systolic function is normal. The RVEF is 64%.     3. Left atrium is dilated, right atrium is normal in size.    4. There is MR present, likely moderate. Other valves not assessed.     5. Late gadolinium enhancement imaging shows near transmural enhancement of the mid to apical anterior  segments consistent with prior LAD MI, along with subendocardial enhancement of the apical inferior/lateral  segments consistent with prior circumflex MI. There is 50% viability in the LAD and 75% viability in the  dominant circumflex territories (counting RCA and circumflex together), indicating intermediate likelihood  of recovery for LAD and good likelihood of recovery for circumflex territories.      6. There is no pericardial effusion or thickening.    7. There is no intracardiac thrombus.    8. Small left pleural effusions.     CONCLUSIONS: Ischemic CM, LVEF 20% and RVEF 64%. Prior LAD and dominant circumflex MIs, no cardiac  thrombus. 50% viability in the LAD and 75% viability in the dominant circumflex, indicating intermediate  likelihood of recovery for LAD and good likelihood of recovery for circumflex territories.     CORE EXAM   ==========================================================================================================    MEASUREMENTS    ----------------------------------------------------------------------------------------      VOLUMETRIC ANALYSIS       ----------------------------------------------  .-------------------------------------------------------.                   LV    Reference  RV    Reference   +-----+-----------+------+-----------+------+-----------+   EDV  ml          242   ()    65   ()         ml/m^2      154   (50-84)     41   (45-82)     ESV  ml          195   (18-55)     23   (6-58)           ml/m^2      124   (12-30)     15   (6-32)      CO   L/min      3.88             3.38                    L/min/m^2  2.46             2.15               SV   ml           48   ()    42   ()         ml/m^2       30   (34-59)     27   (33-57)     EF   %            20   (60-78)     64   (59-83)    '-----+-----------+------+-----------+------+-----------'            CARDIAC OUTPUT HR:  81 BPM      LV DIMENSIONS       ----------------------------------------------          WALL THICKNESS - ANTEROSEPTAL:  1.0 cm          WALL THICKNESS - INFEROLATERAL:  0.5 cm        AORTIC ROOT DIMENSIONS       ----------------------------------------------          AORTIC ROOT SIZE:  Normal      ANATOMY   ----------------------------------------------------------------------------------------      LEFT VENTRICLE       ----------------------------------------------          WALL THICKNESS:  Normal          CAVITY SIZE:  SEVERELY ENLARGED          MASS/THROMBUS:  None        RIGHT VENTRICLE       ----------------------------------------------          WALL THICKNESS:  Normal          CONTRACTILITY:  Normal          CAVITY SIZE:  Normal        LEFT ATRIUM       ----------------------------------------------          CAVITY SIZE:  MILDLY ENLARGED        RIGHT ATRIUM       ----------------------------------------------          CAVITY SIZE:  Normal        PERICARDIUM        ----------------------------------------------          Normal          EFFUSION:  None        PLEURAL EFFUSION       ----------------------------------------------   SMALL LEFT       VALVES   ----------------------------------------------------------------------------------------      MITRAL VALVE       ----------------------------------------------          MITRAL VALVE LEAFLETS:  Normal Leaflets          MITRAL REGURGITATION:  MODERATE        TRICUSPID VALVE       ----------------------------------------------          TRICUSPID VALVE LEAFLETS:  Normal Leaflets      17 SEGMENT   ----------------------------------------------------------------------------------------  .-------------------------------------------------------------------------------------------.   Segments            Wall Motion    Hyperenhancement  Stress Perfusion  Interpretation   +--------------------+---------------+------------------+------------------+----------------+   Base Anterior       Mild/Mod Hypo  None                                Normal            Base Anteroseptal   Mild/Mod Hypo  None                                Normal            Base Inferoseptal   Akinetic       None                                Normal            Base Inferior       Akinetic       None                                Normal            Base Inferolateral  Akinetic       1-25%                               Sub-Endo MI       Base Anterolateral  Mild/Mod Hypo  None                                Normal            Mid Anterior        Severe Hypo    51-75%                              Transmural MI     Mid Anteroseptal    Mild/Mod Hypo  51-75%                              Sub-Endo MI       Mid Inferoseptal    Akinetic       1-25%                               Sub-Endo MI       Mid Inferior        Akinetic       1-25%                               Sub-Endo MI       Mid Inferolateral   Akinetic        1-25%                               Sub-Endo MI       Mid Anterolateral   Mild/Mod Hypo  1-25%                               Sub-Endo MI       Apical Anterior     Akinetic       %                             Transmural MI     Apical Septal       Mild/Mod Hypo  1-25%                               Sub-Endo MI       Apical Inferior     Mild/Mod Hypo  26-50%                              Sub-Endo MI       Apical Lateral      Severe Hypo    26-50%                              Sub-Endo MI       [place]                Akinetic       51-75%                              Sub-Endo MI      +--------------------+---------------+------------------+------------------+----------------+   RV Segments         Wall Motion    Hyperenhancement                    Interpretation   +--------------------+---------------+------------------+------------------+----------------+   RV Basal Anterior   Normal/Hyper   None                                Normal            RV Basal Inferior   Normal/Hyper   None                                Normal            RV Mid              Normal/Hyper   None                                Normal            RV Apical           Normal/Hyper   None                                Normal           '--------------------+---------------+------------------+------------------+----------------'        FINDINGS       ----------------------------------------------          LV SCAR SIZE (17 SEGMENT):  25 %      SCAN INFO   ==========================================================================================================    GENERAL   ----------------------------------------------------------------------------------------      CONTRAST AGENT       ----------------------------------------------          TYPE:  Gadavist          GD CONCENTRATION:  0.5 M          VOLUME ADMINISTERED:  6 ml          DOSAGE:  0.06 mmol/kg        VITALS        ----------------------------------------------          HEIGHT:  64.00 in          HEIGHT:  162.56 cm          WEIGHT:  120.00 lbs          WEIGHT:  54.43 kgs          BSA:  1.57 m^2        SETUP       ----------------------------------------------          REASON(S) FOR SCAN:  Viability: chronic CAD           REFERRING PHYSICIAN:  EDDIE GRACE          ATTENDING PHYSICIAN:  CORNEL BERNSTEIN   ----------------------------------------------------------------------------------------      CPT Codes      ICD10 Codes      Report generated by [employer], a product of [employer]       I have reviewed today's vital signs, notes, medications, labs and imaging.  I have also seen and examined the patient and agree with the findings and plan as outlined above.  Pt with VSS and MAPs 65 with SVO2 62%.  Labs with Cr 0.69 and WBC 12.3.  Asessment: Pt with ischemic DCM and 2V  awaiting surgical revascularization.  CMR reveals viable myocardium.     Cornel Brewer MD, PhD  Professor, Heart Failure and Cardiac Transplantation  [facility]

## 2022-02-17 NOTE — PLAN OF CARE
Major Shift Events: Patient went down for an MRI, swan pulled. Heparin drip discontinued, now on SubQ heparin. PERRLA, alert and orientedx4. Room air, lung sounds clear bilaterally. Vizcaino removed, up to commode x1 after removal. BM x1, 2gram Na diet, 1L fluid restriction. Patient denied pain,nausea and headache throughout shift         Plan: manage blood pressures    For vital signs and complete assessments, please see documentation flowsheets.

## 2022-02-17 NOTE — PLAN OF CARE
Transferred to:  at 1630  Belongings: Purse, phone, wallet, phone, chargers all sent with pt. Per pt she has all belongings.  Vizcaino removed? NA pt voiding in commode  Central line removed? L PICC remains per MD, L radial Anna removed.  Chart and medications sent with patient Yes      Major Shift Events:  Pt AO, VSS with soft BPs, MAP>65. NSR with occasional PVCs. Anna removed this AM. Pt on RA. Up to commode with SBA, complaining of mild SOB with exertion but states improving. Tolerating 2g Na diet and 2L FR. PAIGE doppler completed for LVAD workup.  Plan: Continue LVAD workup. Transfer to  when bed becomes available.    For vital signs and complete assessments, please see documentation flowsheets.

## 2022-02-17 NOTE — CONSULTS
Patient seen in the hospital on patient care unit 4A for psychosocial assessment.   90 minutes spent with patient. 100% of visit consisted of counseling related to issues surrounding LVAD implant.    Psychosocial Assessment   Name: Ashley Osman     MRN:  [id number]        Patient Name / Age / Race: Ashley Osman 71 year old    Source of Information: Patient   : BARAK Gutierrez   Interview Date: February 17, 2022   Reason for Referral: Mechanical Circulatory Support     Current Living Situation   Location (City/State): [address] [place] [id number]   With Whom: Living arrangements - the patient lives alone.   Type of Home: Single family home Rambler style with finished walkout basement. Laundry in the basement. Patient reports she designed her home. She lives on 40 acres and manages her own yard/land   Working Phone? Yes    [employer] [place] Out[place]? Yes    Distance from Hospital: 45 minutes   Need to Relocate Post Surgery? No   Discussed? No     Vocational/Employment/Financial   Employment: Retired just in December of 2021   Occupation: Worked for the [employer] for last 35-40 years as a    Education: High school graduate with 2 years of programming in Business, another 2 years in Art, and another 2 years for Real Estate   [place]? No   Income: Federal pension   Insurance: Medicare part A only   Name of Private Insurance: Blue Cross Blue Shield Federal (typically doesn't have TCU benefits)   Medication Coverage: BCBS federal program    In current situation, pt. can afford medication and supply costs:  Yes -But hasn't been on any medications until this last month   Other Financial Concerns/Issues: None     Family/Social Support   Marital Status: -but stayed good friends with ex- until he passed away 4 years ago   Partner Name: N/A   Length of Time : They were only  for 10 years   Partner s Employment: N/A   Relationship: other: N/A   Children: 2 Adult  "Sons: Anand and Etienne-both live nearby. Son-Anand has been staying with his Mom last few weeks with 2 hospitalizations and new heart failure diagnosis.   Parents:    Siblings: 9 siblings all . Patient was the youngest   Other Family or Friends Close by: Nieces/Nephews   Support System: available, helpful   Primary Support Person: Sons Anand and Etienne, but Anand will be primary as he is able to work from home   Issues: None-but son will experience a financial hardship if he can't work. Has FMLA, but won't get paid during that time     Activities/Functional Ability   Current Level: ambulatory and independent with ADL's   Daily Activities: Patient reports she retired from full-time employment on 2021. Had been feeling fine, running errands, managing her house and 40 acres independently until the end of January when diagnosed with heart failure. Has only been home 5 days with her new diagnosis.   Transportation: self      Medical Status   Patient s understanding of need for surgical intervention: Fair-Just hearing VAD for first time. New heart failure diagnosis. Having difficulty retaining medical information   Advanced Directive? Yes -says she has one, but filed at [facility]    Details: Reports her 2 Son's are listed as decision-makers   Adherence History: No medical history to evaluate compliance   Ability to Adhere to Complex Medical Regime: Yes, but showing signs of anxiety and forgetfulness, so difficult to ascertain her ability to manage a complex regimen     Behavioral   Chemical Dependency Issues: No-reports she doesn't like alcohol and doesn't drink it. No illicit drug use reported as well   Smoker? No-but just quit in 2021   Psychiatric impairment: No-denies any history or current problems with mental health. However, exhibits signs of anxiety-see nursing notes   Coping Style/Strategies: Reports she is a \"problem-solver\" Talks with family for support, but denies any " history of mental health concerns. Has experienced a lot of family losses throughout her life-tremendously large and supportive family. Many are .  Does reports enjoying arts and crafts and enjoys painting furniture, sewing and quilting   Recent Legal History: No   Teaching Completed During Assessment Related To Mechanical Circulatory Support: 1. Housing and relocation needs post surgery.  2. Caregiver needs post surgery.  3. Financial issues related to surgery.  4. Risks of alcohol use post surgery.  5. Common psychosocial stressors pre/post surgery.  6. Support group availability.   Psychosocial Risks Reviewed Related To Mechanical Circulatory Support: 1. Increased stress related to your emotional, family, social, employment, or financial situation.  2. Affect on work and/or disability benefits.  3. Affect on future health and life insurance.  4. Outcome expectations may not be met.  5. Mental Health risks: anxiety, depression, PTSD, guilt, grief and chronic fatigue.     Observed Behavior   Well informed? Yes  Angry? No   Process info well? Sometimes Hostile? No   Evasive? No Oriented X3? Yes    Cautious/Suspicious? No Motivated? Yes    Appropriate Behavior? Yes  Depressed? No   Appropriate Affect? Yes  Anxious? No   Interview Observations: Was friendly and talkative. Good eye contact and engaged in conversation, but unable to retain significant details of previous conversations about VAD     Selection Criteria Met   Plan for Support Yes    Chemical Dependence Yes    Smoking Yes    Mental Health Yes -but probably requires close monitoring for anxiety   Adequate Finances Yes      Risk Issues:    -Observed forgetfulness and inability to process certain information during conversation  -Will be a financial hardship for the son, Anand, to provide 24-hour care. Other son can only help on the weekends  -Has Medicare part A only and [facility] Blue Cross, which typically does not cover TCU/SNF    Final  Evaluation/Assessment:     Ashley was friendly and talkative during the assessment, but at times, seemed to have difficulty with processing information during our conversation. She was unable to name any of the risks associated with VAD, only that there were significant risks. She does seem motivated to proceed with VAD and wishes to prolong her life. She reports her son, Anand, will be her 24-hour caregiver post-VAD. Anand confirms this, but also states that he does not qualify for intermittent FMLA and that missing work for 4-6 weeks will be a significant financial hardship for him. He does report that his Brother, Etienne, can help out on the weekends. They may need sotero assistance.    Ashley has no experience with medical issues and only hospitalizations prior to January 2022, were her 2 C-Sections 40 years ago. No records available to establish compliance. She also hasn't had any issues with medical bills or financial concerns. However, with Medicare part A only and BCBS Federal, it is unclear what her deductible, copays, coinsurance amounts will be and what her out of pocket costs might be. With BCBS, she probably won't have TCU/SNF coverage and does not have any history or experience with how much medications cost.    Ashley doesn't seem to have any issues with chemical dependency or substance use and she denies all mental health concerns. She does admit to having some anxiety over some personal issues from time to time, but significantly downplays any anxious behaviors. According to the nursing notes and documentation, she is having difficulty retaining a lot of the medical information being given to her and they contribute a lot of it to anxiety. She denies any counseling in the past and does not take any medications to support her mood.    Several concerning factors right now for this writer is whether she has appropriate medical insurance, and her ability to retain information about the VAD and the  etiology of this.         Patient understands risks and benefits of Mechanical Circulatory Support: No    Recommendation:    Conditional; upon the patient being able to process risks/benefits of VAD, evaluation of whether she has appropriate medical insurance and further discussion/evaluation of anxiety.     Signature: BARAK Gutierrez     Title: Licensed Independent Clinical                Interview Date: February 17, 2022

## 2022-02-17 NOTE — PROGRESS NOTES
Name  Ashley Osman    MRN  [id number]      48     Age  71     Sex  Female     Education  12     Handedness Right     ELLER  22     Provider  NENA     Tech       Station  OP     Visit Type  Video     Platform  [place]            ORIENTATION      Time  0     Personal Information     Place      Presidents             BOSTON NAMING TEST      Raw 52      SS 9 %ile 29-40    Total Stim. Correct 0     Total Phon. Correct 6            COWAT       Form CFL      Raw 40      SS 12      %ile 72-81 z 0           ANIMAL FLUENCY      Raw 21      SS 11 z 0    T 58             COMPLEX IDEATIONAL MATERIAL     Raw  12     SS  12     T  58            CLOCK DRAWING      Command  NORMAL/BORDERLINE    Copy  NORMAL            ORAL TRAILS        Raw z Errors   Trails A  11 -1.77 0   Trails B  35.6 0.35 1          TSAT         Raw z    Total Time  127.4 -0.33647    Total Errors  5 -1.08           RBANS         Raw z SS/%ile   Story Immediate 22 1.28 15   Story Delay  10 0.45 12          HVLT         Raw T    Trial 1  5     Trial 2  8     Trial 3  10     Learning  5     Total Recall  23 50    Delayed Recall 10 56    Percent Retention 100 56    True Positives 12     False Positives 1     Discrimination Index 11 54           ILS HEALTH & SAFETY QUESTIONNAIRE    Total  35     Classification HIGH            GDS       Total  5     Interpretation MINIMAL            DORY-7       Total  2     Interpretation MINIMAL

## 2022-02-17 NOTE — PLAN OF CARE
Problem: Adult Inpatient Plan of Care  Goal: Plan of Care Review  Outcome: Ongoing, Progressing    Major Shift Events:  Pt is alert and oriented x4. [place]. PERRLA. SR 70-80s. Afebrile. A line and cuff pressures being taken per cardiology. Cuff pressures significantly lower, cards aware and want to keep taking them, but to treat pt based on A-line pressure. A-line pressure is positional. Room air during day, 1-3L NC at night. 2L fluid restriction, 2gm Na diet. Voids well, concentrated. L rad a-line, L PICC three lumen. No gtt. SBA to commode.  Plan: transfer to floor  For vital signs and complete assessments, please see documentation flowsheets.

## 2022-02-17 NOTE — PROGRESS NOTES
SPIRITUAL HEALTH SERVICES  SPIRITUAL ASSESSMENT Progress Note (Palliative Focus)  [facility] ([place] 4A    REFERRAL SOURCE: Palliative care consult, pt asked for  visit    Visited with pt Ashley to offer emotional and spiritual support. Ashley spoke about the importance of her Bahai shanna. She says that she feels at peace with whatever comes next, though she would appreciate having more time to spend with her two sons and many nieces and nephews. She expressed deep gratitude that God brought her back to consciousness so she could talk to her sons and get her affairs in order. She explained that she has been very grateful for the care she is receiving in the hospital, but that the past few days have been more stressful. She appreciates prayer and ongoing  support.    Plan: Will continue to follow for emotional and spiritual support while palliative care is consulted.    Jose Enrique Esqueda  Palliative Chaplain Resident  Pager [phone]    [facility] Palliative Care Inpatient Team Consult pager [phone] (M-F 8-4:30)  After-hours Answering Service [phone]

## 2022-02-17 NOTE — PLAN OF CARE
Goal Outcome Evaluation:  - Bible  -Purse  - coloring books  - business papers  - stuffed animal  - two cell phones and chargers  - wallet  - 3 pairs of glasses  - toiletries

## 2022-02-18 ENCOUNTER — TEAM CONFERENCE (OUTPATIENT)
Dept: CARDIOLOGY | Facility: CLINIC | Age: 72
End: 2022-02-18
Payer: COMMERCIAL

## 2022-02-18 ENCOUNTER — APPOINTMENT (OUTPATIENT)
Dept: ULTRASOUND IMAGING | Facility: CLINIC | Age: 72
DRG: 233 | End: 2022-02-18
Attending: INTERNAL MEDICINE
Payer: MEDICARE

## 2022-02-18 LAB
ANION GAP SERPL CALCULATED.3IONS-SCNC: 7 MMOL/L (ref 3–14)
BACTERIA BLD CULT: NO GROWTH
BACTERIA BLD CULT: NO GROWTH
BASE EXCESS BLDV CALC-SCNC: 1.9 MMOL/L (ref -7.7–1.9)
BASOPHILS # BLD AUTO: 0.1 10E3/UL (ref 0–0.2)
BASOPHILS NFR BLD AUTO: 1 %
BUN SERPL-MCNC: 18 MG/DL (ref 7–30)
CALCIUM SERPL-MCNC: 9 MG/DL (ref 8.5–10.1)
CHLORIDE BLD-SCNC: 108 MMOL/L (ref 94–109)
CO2 SERPL-SCNC: 24 MMOL/L (ref 20–32)
CREAT SERPL-MCNC: 0.66 MG/DL (ref 0.52–1.04)
EOSINOPHIL # BLD AUTO: 0.8 10E3/UL (ref 0–0.7)
EOSINOPHIL NFR BLD AUTO: 8 %
ERYTHROCYTE [DISTWIDTH] IN BLOOD BY AUTOMATED COUNT: 14.6 % (ref 10–15)
GFR SERPL CREATININE-BSD FRML MDRD: >90 ML/MIN/1.73M2
GLUCOSE BLD-MCNC: 87 MG/DL (ref 70–99)
HCO3 BLDV-SCNC: 27 MMOL/L (ref 21–28)
HCT VFR BLD AUTO: 29.8 % (ref 35–47)
HGB BLD-MCNC: 9.7 G/DL (ref 11.7–15.7)
IMM GRANULOCYTES # BLD: 0 10E3/UL
IMM GRANULOCYTES NFR BLD: 0 %
INR PPP: 1.23 (ref 0.85–1.15)
LYMPHOCYTES # BLD AUTO: 3.4 10E3/UL (ref 0.8–5.3)
LYMPHOCYTES NFR BLD AUTO: 32 %
MAGNESIUM SERPL-MCNC: 2 MG/DL (ref 1.8–2.6)
MCH RBC QN AUTO: 29 PG (ref 26.5–33)
MCHC RBC AUTO-ENTMCNC: 32.6 G/DL (ref 31.5–36.5)
MCV RBC AUTO: 89 FL (ref 78–100)
MONOCYTES # BLD AUTO: 1 10E3/UL (ref 0–1.3)
MONOCYTES NFR BLD AUTO: 9 %
NEUTROPHILS # BLD AUTO: 5.1 10E3/UL (ref 1.6–8.3)
NEUTROPHILS NFR BLD AUTO: 50 %
NRBC # BLD AUTO: 0 10E3/UL
NRBC BLD AUTO-RTO: 0 /100
O2/TOTAL GAS SETTING VFR VENT: 21 %
OXYHGB MFR BLDV: 48 % (ref 70–75)
PCO2 BLDV: 42 MM HG (ref 40–50)
PH BLDV: 7.41 [PH] (ref 7.32–7.43)
PLATELET # BLD AUTO: 254 10E3/UL (ref 150–450)
PO2 BLDV: 27 MM HG (ref 25–47)
POTASSIUM BLD-SCNC: 4.1 MMOL/L (ref 3.4–5.3)
RBC # BLD AUTO: 3.35 10E6/UL (ref 3.8–5.2)
SODIUM SERPL-SCNC: 139 MMOL/L (ref 133–144)
WBC # BLD AUTO: 10.3 10E3/UL (ref 4–11)

## 2022-02-18 PROCEDURE — 82805 BLOOD GASES W/O2 SATURATION: CPT | Performed by: STUDENT IN AN ORGANIZED HEALTH CARE EDUCATION/TRAINING PROGRAM

## 2022-02-18 PROCEDURE — 99207 PR NON-BILLABLE SERV PER CHARTING: CPT | Performed by: SURGERY

## 2022-02-18 PROCEDURE — 93880 EXTRACRANIAL BILAT STUDY: CPT

## 2022-02-18 PROCEDURE — 250N000013 HC RX MED GY IP 250 OP 250 PS 637: Performed by: STUDENT IN AN ORGANIZED HEALTH CARE EDUCATION/TRAINING PROGRAM

## 2022-02-18 PROCEDURE — 99233 SBSQ HOSP IP/OBS HIGH 50: CPT | Mod: GC | Performed by: INTERNAL MEDICINE

## 2022-02-18 PROCEDURE — 214N000001 HC R&B CCU UMMC

## 2022-02-18 PROCEDURE — 250N000011 HC RX IP 250 OP 636: Performed by: STUDENT IN AN ORGANIZED HEALTH CARE EDUCATION/TRAINING PROGRAM

## 2022-02-18 PROCEDURE — 36592 COLLECT BLOOD FROM PICC: CPT | Performed by: INTERNAL MEDICINE

## 2022-02-18 PROCEDURE — 250N000013 HC RX MED GY IP 250 OP 250 PS 637: Performed by: INTERNAL MEDICINE

## 2022-02-18 PROCEDURE — 85025 COMPLETE CBC W/AUTO DIFF WBC: CPT | Performed by: INTERNAL MEDICINE

## 2022-02-18 PROCEDURE — 80048 BASIC METABOLIC PNL TOTAL CA: CPT | Performed by: INTERNAL MEDICINE

## 2022-02-18 PROCEDURE — 36592 COLLECT BLOOD FROM PICC: CPT | Performed by: STUDENT IN AN ORGANIZED HEALTH CARE EDUCATION/TRAINING PROGRAM

## 2022-02-18 PROCEDURE — 83735 ASSAY OF MAGNESIUM: CPT | Performed by: INTERNAL MEDICINE

## 2022-02-18 PROCEDURE — 85610 PROTHROMBIN TIME: CPT | Performed by: INTERNAL MEDICINE

## 2022-02-18 PROCEDURE — 93880 EXTRACRANIAL BILAT STUDY: CPT | Mod: 26 | Performed by: RADIOLOGY

## 2022-02-18 RX ORDER — DOBUTAMINE HYDROCHLORIDE 200 MG/100ML
2.5 INJECTION INTRAVENOUS CONTINUOUS
Status: DISCONTINUED | OUTPATIENT
Start: 2022-02-18 | End: 2022-02-25

## 2022-02-18 RX ADMIN — ASPIRIN 81 MG CHEWABLE TABLET 81 MG: 81 TABLET CHEWABLE at 08:02

## 2022-02-18 RX ADMIN — THIAMINE HCL TAB 100 MG 100 MG: 100 TAB at 08:03

## 2022-02-18 RX ADMIN — DAPAGLIFLOZIN 5 MG: 5 TABLET, FILM COATED ORAL at 08:03

## 2022-02-18 RX ADMIN — DOBUTAMINE IN DEXTROSE 2.5 MCG/KG/MIN: 200 INJECTION, SOLUTION INTRAVENOUS at 14:18

## 2022-02-18 RX ADMIN — HEPARIN SODIUM 5000 UNITS: 5000 INJECTION, SOLUTION INTRAVENOUS; SUBCUTANEOUS at 21:16

## 2022-02-18 RX ADMIN — ATORVASTATIN CALCIUM 40 MG: 40 TABLET, FILM COATED ORAL at 08:03

## 2022-02-18 RX ADMIN — Medication 1 TABLET: at 08:03

## 2022-02-18 RX ADMIN — HEPARIN SODIUM 5000 UNITS: 5000 INJECTION, SOLUTION INTRAVENOUS; SUBCUTANEOUS at 03:02

## 2022-02-18 ASSESSMENT — ACTIVITIES OF DAILY LIVING (ADL)
ADLS_ACUITY_SCORE: 10

## 2022-02-18 ASSESSMENT — NEW YORK HEART ASSOCIATION (NYHA) CLASSIFICATION: NYHA FUNCTIONAL CLASS: IV

## 2022-02-18 ASSESSMENT — PULMONARY FUNCTION TESTS: FEV1 (%PREDICTED): 2

## 2022-02-18 NOTE — PLAN OF CARE
D: VAD workup  PMH: HFrEF (10-15% 2/11/22) 2/2 ICM, CAD (diagnosed 1/2022), COPD (diagnosed 1/2022), Hyperlipidemia, and cigarette smoking (quit in 12/2021)    I/A: A0x4. SR, rates of 80s-90s. On RA while awake, 1-2 L NC when sleeping for intermittently desatting into mid-80s. Hypotensive overnight with MAPs in low 60s. Pt not experiencing symptoms associated with BP. Notified xcover, will continue to monitor. Denies pain/palpitations. Endorses dyspnea with activities.       P: Continue to monitor Pt status and report changes to Cards 2

## 2022-02-18 NOTE — PLAN OF CARE
Pt admitted 2/12 with cardiogenic shock s/p inotrope support.  Dobutamine gtt restarted at 2.5 mcg/kg/min (4.1 ml/hr) thru PICC.  BP low all day and manual attempt confirmed that and shaky while up and team aware.  OT consulted, but not done r/t BP concern.  SR on RA with ELLER and denied pain.  B/l Carotid US ordered and transferred down.  CABG tentatively planned for 2/24.  LVAD w/up ongoing.  Otherwise, pt still anxious and up SBA.  Continue to monitor and with POC.

## 2022-02-18 NOTE — PROVIDER NOTIFICATION
Cards 2 notified of BP of 68/45 with MAP of 52. HR of 75 and on 2L oxygen. Pt does not feel symptomatic. Awaiting additional orders

## 2022-02-18 NOTE — CONSULTS
Vascular Surgery Imaging Review:    Imaging reviewed that was obtained for CABG. Patient has less than 50% internal carotid stenosis bilaterally. She has normal PAIGE on the L leg and only mild abnormal PAIGE on the right leg. No contraindication for proceeding with CABG. No further vascular workup needed. Please call with any other questions/concerns.     Laura Allen MD, RPVI  , Vascular Surgery  [facility]  Cell: [phone]  [email or web address]

## 2022-02-18 NOTE — PROGRESS NOTES
Cards 2 Progress Note     ASSESSMENT:   Ashley Osman is a 71 year old female with a pmh notable for newly diagnosed HFrEF (10-15% 2/11/22) 2/2 ICM, CAD (diagnosed 1/2022), COPD (diagnosed 1/2022), Hyperlipidemia, and cigarette smoking (quit in 12/2021) who presented to [facility] as a transfer from OSH for consideration of advanced therapies in the setting of cardiogenic shock requiring inotropic support.     Plan today:   - Will start dobutamine 2.5 mcg/kg/min to aid inotropy and subsequently blood pressure  - Will need to discuss with Interventional Cardiology about possible PCI options. CVTS considering CABG as well but her vasculopathy is an issue  - Will obtain Carotid US and obtain Vascular Surgery consultation to assess stroke risk prior to possible CABG  -     ===CARDIOVASCULAR===  # Cardiogenic shock  # HFrEF 2/2 ICM (EF 10-15%) AHA Stage D, NYHA Class III-IV  # LV thrombus   # CAD with non-viable tissue (mLAD , severe prox and distal LAD, LCx and D1 disease)  HD stable on presentation. Cause of most recent decompensation is unclear although thought to be secondary to another ACS given sudden onset L shoulder pain.   CMRI (2/16) showing viability of 50% in LAD territory (intermediate likelihood of recovery) and 75% in dominant LCx territory (good likelihood of recovery).  We will assess feasibility of revascularization  CVTS.   Initiated LVAD evaluation since patient is high risk for any planned intervention and if no plan for revascularization, patient will likely need LVAD given Stage D and inability to be on GDMT due to low blood pressure.   Pt noted to have low blood pressure since being off dobutamine, hence will restart dobutamine while awaiting decision on surgery/intervention    Date CVP PA CI CO PCWP SVR PVR MAP SVO2 % Therapies   2/12/22 4 56/24 2.89 4.54 * 1198 ** 72 63 NE 0.18, Dobu 5   2/13/22 8 57/26 2.5 4 22 1359 3.6 78 59 Ne 0.2, Dobu 5    2/14/22 14 60/28 2.7 4.3 * 1515 ** 67 60 Dobu 2.5     2/15/22 6 40/24 2.8 4.3 * 1115 ** 69 60 None    2/16 8 40/20 2.4 3.7 18 1405 2.3 73 57 None     - Start dobutamine 2.5mcg/kg/min  - Continue dapagliflozin 5mg   - Did not tolerate trial of afterload reduction with Isordil/Hydral on 2/16  - Continue ASA 81 mg daily  - Continue Atorvastatin 40 mg daily   - Will need input from CVTS and interventional to make decision of CABG vs PCI options  - Given vasculopathy, will obtain Carotid US and Vascular Surgery consult to assess stroke risk periop  - Keep K and Mg > 4 and 2   - 2g Na diet, 2L fluid restriction  - Will need ICD ppx prior to discharge if no advance therapies offered    LVAD Evaluation   LVAD/transplant work up:   [x]? Labs (CBC, CMP, PT/INR, cystatin C, prealbumin, UA + micro)  [x]? Infectious  []? Utox/nicotine and cotinine/PeTH   []? Immunocompatibility (last transfusion, ABO, HLA tissue typing, PRA)  []? CVTS consult  []? Social work evaluation  [x]? Palliative care evaluation  [x]? Neuropsych evaluation  []? Nutrition evaluation  [x]? CT Dental + evaluation- cleared  [x]? Abd US + doppler  [x]? Extremity US and ABIs- 2/8/22: normal PAIGE's bilaterally.  []? Carotid US (if DM or ICM or >49yo) once SG catheter is put  []? PFTs    []? Dexascan: 11/6/2019: Moderate low bone densit  []? CT head non-contrast: 2/8/22:   [x]? CT CAP non-contrast: pulmonary nodules   []? Colonoscopy (>51 yo)  []? Mammogram   []? Pap test     ===PULMONARY===  # COPD  Appears to be a new diagnosis from 1/2022.  On room air and stable with no respiratory concerns on clinical evaluation.   - Continue PTA medication Umeclidinium @ 1 puff daily    #Pulmonary nodule seen on CT   History of chronic smoking   - May need PET scan for further evaluation     ===GASTROINTESTINAL===  # No acute issues     # Nutrition:   -  2g sodium diet     ===RENAL===  # No acute issues. Creatinine of 0.63 on admission (range in OSH was 0.6-0.8).   - Monitor     ===HEME/ONC===  # No acute  issues     ===ENDOCRINE===  # No acute concerns/issues.     ===INFECTIOUS DISEASE===  # No acute issues/concern for infection     # Antimicrobials: N/A     ===SKIN/MSK===  # No acute issues  Daily RN skin checks per ICU protocol     Prophylaxis:  DVT: hepatin gtt GI: N/A  Disposition: Continue monitoring on floor  Code Status: Full code     Patient discussed with staff attending, Dr. Carter and the note reflects our joint plan.    Young Wynn MD  Cardiology Fellow      Subjective:   Reviewed events from last 24 hours.  No acute events overnight.  No new symptoms.      Physical Exam   Temp: 98.6  F (37  C) Temp src: Oral BP: (!) 86/47 Pulse: 80   Resp: 20 SpO2: 97 % O2 Device: Nasal cannula Oxygen Delivery: 2 LPM  Vital Signs with Ranges  Temp:  [97.6  F (36.4  C)-98.6  F (37  C)] 98.6  F (37  C)  Pulse:  [72-92] 80  Resp:  [15-30] 20  BP: ()/(42-72) 86/47  Cuff Mean (mmHg):  [67-69] 69  SpO2:  [88 %-97 %] 97 %  117 lbs 3.2 oz    GEN: NAD, pleasant  HEENT: no icterus  CV: RRR, normal s1/s2, no murmurs/rubs/s3/s4, no heave. Dressing on R neck   CHEST: CTAB  ABD: soft, NT/ND, NABS  : no flank/suprapubic tenderness  NEURO: AA&Ox3, fluent/appropriate, motor grossly nonfocal  PSYCH: cooperative, affect appropriate    Data   Data reviewed today:   Recent Labs   Lab 02/18/22  0549 02/17/22  0348 02/16/22  0939 02/16/22  0907 02/16/22  0400 02/15/22  0401 02/14/22  0352   WBC 10.3 12.3*  --   --  10.8   < > 11.6*   HGB 9.7* 10.1*  --   --  9.4*   < > 9.3*   MCV 89 90  --   --  89   < > 90    231  --   --  174   < > 197   INR 1.23* 1.13  --   --  2.10*   < >  --     138 140  --  139   < > 138   POTASSIUM 4.1 4.2 3.8  --  3.3*   < > 4.0   CHLORIDE 108 108 109  --  106   < > 107   CO2 24 25 24  --  26   < > 25   BUN 18 23 20  --  22   < > 20   CR 0.66 0.69 0.62  --  0.66   < > 0.58   ANIONGAP 7 5 7  --  7   < > 6   HEIDI 9.0 9.4 8.7  --  8.7   < > 8.6   GLC 87 89 90  --  88   < > 91   ALBUMIN  --   --   --   1.6*  --   --  2.3*   PROTTOTAL  --   --   --  3.6*  --   --  5.8*   BILITOTAL  --   --   --  0.2  --   --  0.3   ALKPHOS  --   --   --  42  --   --  67   ALT  --   --   --  29  --   --  44   AST  --   --   --  18  --   --  31    < > = values in this interval not displayed.       Recent Results (from the past 24 hour(s))   US PAIGE Doppler No Exercise    Narrative    Exam: Bilateral lower extremity resting ankle brachial indices dated  2/17/2022 1:00 PM    Comparison study: None    Clinical history: Heart Failure pre Ventricular Assist Device (VAD)  planning    Ordering provider: EDDIE GRACE    Technique: Bilateral lower extremity resting ankle brachial indices  obtained.    Findings:    Right:      Arm: 84 mmHg   PT at ankle: 74 mmHg   DP at foot: 67 mmHg      PAIGE: 0.88    Left:     Arm: Not obtained due to the presence of catheter.   PT at ankle: 106 mmHg   DP at foot: 93 mmHg      PAIGE: 1.26      Impression    Impression:     Right leg: Resting PAIGE is 0.88. Mild to moderately abnormal,  0.41-0.90.    Left leg: Resting PAIGE is 1.26. Normal.    Guidelines:    PAIGE Diagnostic Criteria (Based on criteria published in Circulation  2011; 124: 9155-3272):    > 1.4: Non compressible    1.00 - 1.40: Normal    0.91 - 0.99: Borderline    At or below 0.90: Abnormal    PAIGE Diagnostic Criteria (Based on ACC/AHA guideline 2008):    >/=1.3 - non compressible vessels    1.00  -1.29 - Normal    0.91 - 0.99 - Borderline    0.41 - 0.90 - Mild to moderate PAD    0.00 - 0.40 - Severe PAD    LEONIE DRAKE         SYSTEM ID:  [id number]

## 2022-02-19 ENCOUNTER — APPOINTMENT (OUTPATIENT)
Dept: OCCUPATIONAL THERAPY | Facility: CLINIC | Age: 72
DRG: 233 | End: 2022-02-19
Attending: STUDENT IN AN ORGANIZED HEALTH CARE EDUCATION/TRAINING PROGRAM
Payer: MEDICARE

## 2022-02-19 LAB
ALBUMIN SERPL-MCNC: 2.8 G/DL (ref 3.4–5)
ALP SERPL-CCNC: 65 U/L (ref 40–150)
ALT SERPL W P-5'-P-CCNC: 45 U/L (ref 0–50)
ANION GAP SERPL CALCULATED.3IONS-SCNC: 7 MMOL/L (ref 3–14)
ANION GAP SERPL CALCULATED.3IONS-SCNC: 7 MMOL/L (ref 3–14)
AST SERPL W P-5'-P-CCNC: 30 U/L (ref 0–45)
BASE EXCESS BLDV CALC-SCNC: 0.5 MMOL/L (ref -7.7–1.9)
BASOPHILS # BLD AUTO: 0 10E3/UL (ref 0–0.2)
BASOPHILS NFR BLD AUTO: 0 %
BILIRUB DIRECT SERPL-MCNC: 0.2 MG/DL (ref 0–0.2)
BILIRUB SERPL-MCNC: 0.6 MG/DL (ref 0.2–1.3)
BUN SERPL-MCNC: 19 MG/DL (ref 7–30)
BUN SERPL-MCNC: 20 MG/DL (ref 7–30)
CALCIUM SERPL-MCNC: 8.8 MG/DL (ref 8.5–10.1)
CALCIUM SERPL-MCNC: 8.9 MG/DL (ref 8.5–10.1)
CHLORIDE BLD-SCNC: 108 MMOL/L (ref 94–109)
CHLORIDE BLD-SCNC: 109 MMOL/L (ref 94–109)
CO2 SERPL-SCNC: 23 MMOL/L (ref 20–32)
CO2 SERPL-SCNC: 24 MMOL/L (ref 20–32)
CREAT SERPL-MCNC: 0.63 MG/DL (ref 0.52–1.04)
CREAT SERPL-MCNC: 0.7 MG/DL (ref 0.52–1.04)
CYSTATIN C SERPL-MCNC: 1 MG/L
EOSINOPHIL # BLD AUTO: 0.5 10E3/UL (ref 0–0.7)
EOSINOPHIL NFR BLD AUTO: 7 %
ERYTHROCYTE [DISTWIDTH] IN BLOOD BY AUTOMATED COUNT: 14.6 % (ref 10–15)
GFR SERPL CREATININE-BSD FRML MDRD: >90 ML/MIN/1.73M2
GFR SERPL CREATININE-BSD FRML MDRD: >90 ML/MIN/1.73M2
GFR/BSA.PRED SERPLBLD CYS-BASED-ARV: 69 ML/MIN/BSA
GLUCOSE BLD-MCNC: 85 MG/DL (ref 70–99)
GLUCOSE BLD-MCNC: 88 MG/DL (ref 70–99)
HCO3 BLDV-SCNC: 26 MMOL/L (ref 21–28)
HCT VFR BLD AUTO: 29 % (ref 35–47)
HGB BLD-MCNC: 9.1 G/DL (ref 11.7–15.7)
IMM GRANULOCYTES # BLD: 0 10E3/UL
IMM GRANULOCYTES NFR BLD: 0 %
INR PPP: 1.13 (ref 0.85–1.15)
LYMPHOCYTES # BLD AUTO: 2.7 10E3/UL (ref 0.8–5.3)
LYMPHOCYTES NFR BLD AUTO: 34 %
MAGNESIUM SERPL-MCNC: 2 MG/DL (ref 1.8–2.6)
MAGNESIUM SERPL-MCNC: 2.1 MG/DL (ref 1.8–2.6)
MCH RBC QN AUTO: 28.4 PG (ref 26.5–33)
MCHC RBC AUTO-ENTMCNC: 31.4 G/DL (ref 31.5–36.5)
MCV RBC AUTO: 91 FL (ref 78–100)
MONOCYTES # BLD AUTO: 0.8 10E3/UL (ref 0–1.3)
MONOCYTES NFR BLD AUTO: 10 %
NEUTROPHILS # BLD AUTO: 3.8 10E3/UL (ref 1.6–8.3)
NEUTROPHILS NFR BLD AUTO: 49 %
NRBC # BLD AUTO: 0 10E3/UL
NRBC BLD AUTO-RTO: 0 /100
O2/TOTAL GAS SETTING VFR VENT: 21 %
OXYHGB MFR BLDV: 65 % (ref 70–75)
PCO2 BLDV: 42 MM HG (ref 40–50)
PETH BLD-MCNC: NEGATIVE NG/ML
PH BLDV: 7.39 [PH] (ref 7.32–7.43)
PLATELET # BLD AUTO: 257 10E3/UL (ref 150–450)
PO2 BLDV: 36 MM HG (ref 25–47)
POTASSIUM BLD-SCNC: 3.8 MMOL/L (ref 3.4–5.3)
POTASSIUM BLD-SCNC: 4.3 MMOL/L (ref 3.4–5.3)
PROT SERPL-MCNC: 5.9 G/DL (ref 6.8–8.8)
RBC # BLD AUTO: 3.2 10E6/UL (ref 3.8–5.2)
SODIUM SERPL-SCNC: 139 MMOL/L (ref 133–144)
SODIUM SERPL-SCNC: 139 MMOL/L (ref 133–144)
WBC # BLD AUTO: 7.9 10E3/UL (ref 4–11)

## 2022-02-19 PROCEDURE — 85610 PROTHROMBIN TIME: CPT | Performed by: INTERNAL MEDICINE

## 2022-02-19 PROCEDURE — 97165 OT EVAL LOW COMPLEX 30 MIN: CPT | Mod: GO

## 2022-02-19 PROCEDURE — 214N000001 HC R&B CCU UMMC

## 2022-02-19 PROCEDURE — 99233 SBSQ HOSP IP/OBS HIGH 50: CPT | Mod: GC | Performed by: INTERNAL MEDICINE

## 2022-02-19 PROCEDURE — 250N000011 HC RX IP 250 OP 636: Performed by: STUDENT IN AN ORGANIZED HEALTH CARE EDUCATION/TRAINING PROGRAM

## 2022-02-19 PROCEDURE — 250N000013 HC RX MED GY IP 250 OP 250 PS 637: Performed by: INTERNAL MEDICINE

## 2022-02-19 PROCEDURE — 97535 SELF CARE MNGMENT TRAINING: CPT | Mod: GO

## 2022-02-19 PROCEDURE — 83735 ASSAY OF MAGNESIUM: CPT

## 2022-02-19 PROCEDURE — 999N000007 HC SITE CHECK

## 2022-02-19 PROCEDURE — 250N000013 HC RX MED GY IP 250 OP 250 PS 637: Performed by: STUDENT IN AN ORGANIZED HEALTH CARE EDUCATION/TRAINING PROGRAM

## 2022-02-19 PROCEDURE — 84132 ASSAY OF SERUM POTASSIUM: CPT

## 2022-02-19 PROCEDURE — 82248 BILIRUBIN DIRECT: CPT

## 2022-02-19 PROCEDURE — 97530 THERAPEUTIC ACTIVITIES: CPT | Mod: GO

## 2022-02-19 PROCEDURE — 85004 AUTOMATED DIFF WBC COUNT: CPT | Performed by: INTERNAL MEDICINE

## 2022-02-19 PROCEDURE — 82805 BLOOD GASES W/O2 SATURATION: CPT

## 2022-02-19 RX ORDER — HYDROXYZINE HYDROCHLORIDE 25 MG/1
25 TABLET, FILM COATED ORAL EVERY 6 HOURS PRN
Status: DISCONTINUED | OUTPATIENT
Start: 2022-02-19 | End: 2022-02-25

## 2022-02-19 RX ORDER — ISOSORBIDE MONONITRATE 30 MG/1
30 TABLET, EXTENDED RELEASE ORAL DAILY
Status: ON HOLD | COMMUNITY
Start: 2022-02-18 | End: 2022-03-18

## 2022-02-19 RX ORDER — NITROGLYCERIN 0.4 MG/1
0.4 TABLET SUBLINGUAL EVERY 5 MIN PRN
COMMUNITY
Start: 2022-01-26

## 2022-02-19 RX ORDER — HYDROXYZINE HYDROCHLORIDE 50 MG/1
50 TABLET, FILM COATED ORAL ONCE
Status: COMPLETED | OUTPATIENT
Start: 2022-02-19 | End: 2022-02-19

## 2022-02-19 RX ORDER — ASPIRIN 325 MG
325 TABLET ORAL DAILY
Status: ON HOLD | COMMUNITY
Start: 2022-02-13 | End: 2022-03-18

## 2022-02-19 RX ORDER — HYDROXYZINE HYDROCHLORIDE 25 MG/1
50 TABLET, FILM COATED ORAL EVERY 6 HOURS PRN
Status: DISCONTINUED | OUTPATIENT
Start: 2022-02-19 | End: 2022-02-25

## 2022-02-19 RX ORDER — CARVEDILOL 3.12 MG/1
3.12 TABLET ORAL 2 TIMES DAILY
Status: ON HOLD | COMMUNITY
Start: 2022-02-18 | End: 2022-03-18

## 2022-02-19 RX ORDER — POTASSIUM CHLORIDE 750 MG/1
10 TABLET, EXTENDED RELEASE ORAL ONCE
Status: COMPLETED | OUTPATIENT
Start: 2022-02-19 | End: 2022-02-19

## 2022-02-19 RX ORDER — ALBUTEROL SULFATE 90 UG/1
1 AEROSOL, METERED RESPIRATORY (INHALATION) EVERY 6 HOURS PRN
COMMUNITY
Start: 2022-01-26

## 2022-02-19 RX ORDER — ATORVASTATIN CALCIUM 20 MG/1
30 TABLET, FILM COATED ORAL DAILY
Status: ON HOLD | COMMUNITY
Start: 2022-02-18 | End: 2022-03-18

## 2022-02-19 RX ADMIN — Medication 1 TABLET: at 07:57

## 2022-02-19 RX ADMIN — ASPIRIN 81 MG CHEWABLE TABLET 81 MG: 81 TABLET CHEWABLE at 07:57

## 2022-02-19 RX ADMIN — POTASSIUM CHLORIDE 10 MEQ: 750 TABLET, EXTENDED RELEASE ORAL at 07:57

## 2022-02-19 RX ADMIN — ATORVASTATIN CALCIUM 40 MG: 40 TABLET, FILM COATED ORAL at 07:57

## 2022-02-19 RX ADMIN — THIAMINE HCL TAB 100 MG 100 MG: 100 TAB at 07:56

## 2022-02-19 RX ADMIN — HYDROXYZINE HYDROCHLORIDE 50 MG: 50 TABLET ORAL at 00:33

## 2022-02-19 RX ADMIN — HEPARIN SODIUM 5000 UNITS: 5000 INJECTION, SOLUTION INTRAVENOUS; SUBCUTANEOUS at 07:58

## 2022-02-19 RX ADMIN — HYDROXYZINE HYDROCHLORIDE 25 MG: 25 TABLET ORAL at 23:40

## 2022-02-19 RX ADMIN — DAPAGLIFLOZIN 5 MG: 5 TABLET, FILM COATED ORAL at 07:57

## 2022-02-19 RX ADMIN — HEPARIN SODIUM 5000 UNITS: 5000 INJECTION, SOLUTION INTRAVENOUS; SUBCUTANEOUS at 20:01

## 2022-02-19 ASSESSMENT — ACTIVITIES OF DAILY LIVING (ADL)
ADLS_ACUITY_SCORE: 10
ADLS_ACUITY_SCORE: 11
ADLS_ACUITY_SCORE: 10
ADLS_ACUITY_SCORE: 10
ADLS_ACUITY_SCORE: 11
ADLS_ACUITY_SCORE: 11
ADLS_ACUITY_SCORE: 10
BADLS,_PREVIOUS_FUNCTIONAL_LEVEL: INDEPENDENT
ADLS_ACUITY_SCORE: 10
ADLS_ACUITY_SCORE: 11
PREVIOUS_RESPONSIBILITIES: MEAL PREP;HOUSEKEEPING;LAUNDRY;DRIVING;SHOPPING
ADLS_ACUITY_SCORE: 10
IADLS,_PREVIOUS_FUNCTIONAL_LEVEL: INDEPENDENT
ADLS_ACUITY_SCORE: 10
ADLS_ACUITY_SCORE: 11
ADLS_ACUITY_SCORE: 11
ADLS_ACUITY_SCORE: 10
ADLS_ACUITY_SCORE: 11
ADLS_ACUITY_SCORE: 10
ADLS_ACUITY_SCORE: 10
ADLS_ACUITY_SCORE: 11
ADLS_ACUITY_SCORE: 10
ADLS_ACUITY_SCORE: 11
ADLS_ACUITY_SCORE: 10
ADLS_ACUITY_SCORE: 11

## 2022-02-19 NOTE — PLAN OF CARE
D/I/A: Pt up with SBA of one.  A+O x4 and able to make needs known.  Denies pain; mild ELLER noted.  VSS, afebrile, BPs soft at baseline.  Pleasant and talkative with this nurse.  Dobutamine gtt monitored and maintained.    P: Continue to monitor status.

## 2022-02-19 NOTE — PLAN OF CARE
D: VAD workup  PMH: HFrEF (10-15% 2/11/22) 2/2 ICM, CAD (diagnosed 1/2022), COPD (diagnosed 1/2022), Hyperlipidemia, and cigarette smoking (quit in 12/2021)    I/A: A0x4. SR, rates of 80s-90s. On RA while awake, placed on 1 L NC for sleep. BP soft/hypotensive overnight at baseline. MAPs ranging 50s-60s. Denies pain/palpitions. Does report dyspnea with activity.     Dobutamine gtt running at 22.5 mcg/kg/min into PICC line. Pt was anxious about upcoming surgery. Agreed to atarax to aid in sleep. Reported that it was effective.     P: CABG tentatively scheduled for 2/24, continue VAD workup. Continue to monitor Pt status and report changes to Cards 2

## 2022-02-19 NOTE — PROGRESS NOTES
[facility]  Cardiology II Service / Advanced Heart Failure  Daily Progress Note    Patient: Ashley Osman      : 1950      MRN: [id number]    Assessment/Plan:   70yo female w/ PMHx of newly-diagnosed HFrEF (10-15% 22) 2/2 ICM, CAD (diagnosed 2022), COPD (diagnosed 2022), hyperlipidemia, and cigarette smoking (quit in 2021) who presented to [facility]  as a transfer from OS for consideration of advanced therapies in the setting of cardiogenic shock requiring inotropic support.     Today's changes:  - Continuing Dobutamine at 2.5 mcg/kg/min  - Pending further discussions with Interventional Cardiology about possible PCI options vs CABG for coronary interventions    # Cardiogenic shock  # HFrEF 2/2 ICM (EF 10-15%) AHA Stage D, NYHA Class III-IV  # LV thrombus   # CAD with non-viable tissue (mLAD , severe prox and distal LAD, LCx and D1 disease)  Initially requiring inotropic and vasoactive support briefly. Cause of most recent decompensation is unclear although thought to be secondary to another ACS given sudden onset L shoulder pain. CMRI () showing viability of 50% in LAD territory (intermediate likelihood of recovery) and 75% in dominant LCx territory (good likelihood of recovery). Plan to assess feasibility of revascularization w/ PCI with Interventional Cardiology vs CABG w/ CVTS. Had initiated LVAD evaluation since she is high-risk for any planned interventions and, if revascularization is not feasible, will need LVAD given Stage D and inability to be on GDMT due to low blood pressure.   Plan:  - Continuing Dobutamine 2.5 mcg/kg/min (restarted )  - Dapaglaflozin 5mg daily  - Previously intolerant of Isordil and Hydralazine ()  - Continuing ASA 81mg daily and Atorvastatin 40mg daily  - Vascular Surgery has evaluated, feel no contraindications for proceeding with revascularization procedure - completed carotid and extremity US  imaging  - Ongoing discussions with IC and CVTS regarding revascularization planning - PCI vs CABG  - Daily electrolyte monitoring - goals K>4 and Mg>2   - 2g Na diet, 2L fluid restriction  - Will need ICD ppx prior to discharge if no advance therapies offered     ================================  Chronic Problems:  #COPD - appears newly diagnosed from 1/2022, stable - continuing PTA Umeclidinium  #Pulmonary Nodule - Hx of tobacco use, may require PET scan in future    Hospital Checklist:  DVT Prophylaxis: Heparin SQ  Code Status: Full Code  Bowel regimen: N/A - last BM 2/18  Diet/Nutrition: Restrictions as per above  Lines: PICC    Disposition Planning:  Anticipate discharge in >7 days pending coronary interventional planning.    Staffed w/ Dr. Carter.    Lakhwinder Cabrales MD  Internal Medicine PGY-1  Cardiology II Service  ASCOM #[id number]  02/19/2022  ==================================    Subjective:   Interval History: Yesterday started Dobutamine at 2.5    This morning: Feeling overall overwhelmed with procedural planning happening. Reports she feels fatigued, though has been sleeping better the past night or two. No new chest pain, dyspnea, abdominal pain, n/v, leg swelling.    Objective:     Vitals:    02/17/22 0400 02/18/22 0800 02/19/22 0923   Weight: 51.9 kg (114 lb 6.7 oz) 53.2 kg (117 lb 3.2 oz) 51.6 kg (113 lb 11.2 oz)     Vital Signs with Ranges  Temp:  [97.5  F (36.4  C)-98.5  F (36.9  C)] 98.1  F (36.7  C)  Pulse:  [79-98] 79  Resp:  [16-20] 16  BP: (74-89)/(47-61) 89/51  Cuff Mean (mmHg):  [58-65] 65  SpO2:  [93 %-96 %] 93 %  I/O last 3 completed shifts:  In: 992.67 [P.O.:957; I.V.:35.67]  Out: 250 [Urine:250]    Exam:  General: No acute distress, sleeping prior to evaluation though rouses to participate in exam, conversational in soft voice  HEENT: MMM  Neck: No overt JVD visualized  CV: RRR, no audible murmurs  Lungs: On 1L NC, CTA b/l w/o wheezes or crackles, no increased WOB  Abd: Soft, non-tender,  non-distended  Ext: Warm and well-perfused, no appreciable lower extremity edema  Neuro: Awake, alert, conversational, moving all extremities spontaneously throughout examination    Medications     - MEDICATION INSTRUCTIONS -       DOBUTamine 2.5 mcg/kg/min (02/19/22 0754)     - MEDICATION INSTRUCTIONS -       BETA BLOCKER NOT PRESCRIBED         aspirin  81 mg Oral Daily     atorvastatin  40 mg Oral QAM     dapagliflozin  5 mg Oral Daily     heparin ANTICOAGULANT  5,000 Units Subcutaneous Q12H     influenza vac high-dose quad  0.7 mL Intramuscular Prior to discharge     multivitamin w/minerals  1 tablet Oral Daily     thiamine  100 mg Oral Daily     umeclidinium  1 puff Inhalation Daily       Data   Recent Labs   Lab 02/19/22  0448 02/18/22  0549 02/17/22  0348 02/16/22  0939 02/16/22  0907   WBC 7.9 10.3 12.3*  --   --    HGB 9.1* 9.7* 10.1*  --   --    MCV 91 89 90  --   --     254 231  --   --    INR 1.13 1.23* 1.13  --   --     139 138   < >  --    POTASSIUM 3.8 4.1 4.2   < >  --    CHLORIDE 108 108 108   < >  --    CO2 24 24 25   < >  --    BUN 19 18 23   < >  --    CR 0.63 0.66 0.69   < >  --    ANIONGAP 7 7 5   < >  --    HEIDI 8.9 9.0 9.4   < >  --    GLC 85 87 89   < >  --    ALBUMIN 2.8*  --   --   --  1.6*   PROTTOTAL 5.9*  --   --   --  3.6*   BILITOTAL 0.6  --   --   --  0.2   ALKPHOS 65  --   --   --  42   ALT 45  --   --   --  29   AST 30  --   --   --  18    < > = values in this interval not displayed.       Recent Results (from the past 24 hour(s))   US Carotid Bilateral    Narrative    BILATERAL CAROTID DUPLEX DOPPLER ULTRASOUND 2/18/2022 4:06 PM    CLINICAL HISTORY: Pre cabg work up    COMPARISON: None available.    REFERRING PROVIDER: ZOË ESPINOSA    TECHNIQUE: Grayscale (B-mode) and duplex and spectral Doppler  ultrasound of the common carotid, extracranial internal carotid,  external carotid, and vertebral artery origins. Velocity measurements  obtained with angle correction at or  less than 60 degrees.    FINDINGS:    RIGHT SIDE:     Plaque Morphology: Echogenic plaque causes less than 50% diameter  internal carotid artery stenosis.       Proximal CCA: 73/12 cm/s     Mid CCA: 103/17 cm/s     Distal CCA: 94/21 cm/s           External CA: 273/2 cm/s       Proximal ICA: 121/28 cm/s     Mid ICA: 127/29 cm/s     Distal ICA: 131/28 cm/s       Vertebral artery: Antethgthrthathdtheth:th th2th9th cm/s        Medial subclavian: 70/1 cm/s    ICA/CCA ratio: 1.39     LEFT SIDE:     Plaque Morphology: Echogenic plaque causes less than 50% diameter  internal carotid artery stenosis.        Proximal CCA: 101/16 cm/s     Mid CCA: 75/20 cm/s     Distal CCA: 85/21 cm/s           External CA: 210/30 cm/s       Proximal ICA: 86/29 cm/s     Mid ICA: 113/25 cm/s     Distal ICA: 152/44 cm/s       Vertebral artery: Antegrade: 62/30 cm/s      Subclavian medial: 118/0 cm/s    ICA/CCA ratio: 1.79       Impression    IMPRESSION:    1. RIGHT ICA: Less than 50% diameter narrowing by grayscale imaging  and sonographic velocity criteria.    2. LEFT ICA:  Less than 50% diameter narrowing by grayscale imaging  and sonographic velocity criteria.    3. Elevated velocities suggest bilateral external carotid artery  narrowing.    [employer] Accreditation Commission Updated Recommendations for  Carotid Stenosis Interpretation Criteria - October 2021.  [email or web address]://[email or web address]/wp-content/[email or web address]/10/[email or web address]-Vascular-Test  [email or web address]-for-Carotid-Stenosis-Interpre  ation-Criteria.[email or web address]         Normal            ICA PSV: < 180 cm/s            Plaque Estimate: None            ICA/CCA PSV Ratio: < 2.0            ICA EDV: < 40 cm/s         < 50%            ICA PSV: < 180 cm/s            Plaque Estimate: < 50%            ICA/CCA PSV Ratio: < 2.0            ICA EDV: < 40 cm/s         50 - 69%            ICA PSV: < 180 - 230 cm/s            Plaque Estimate: > 50%            ICA/CCA PSV Ratio: 2.0 - 4.0            ICA EDV: 40 -  100 cm/s         > 70% but less than near occlusion            ICA PSV: > 230 cm/s            Plaque Estimate: > 50%            ICA/CCA PSV Ratio: > 4.0            ICA EDV: > 100 cm/s         Near occlusion            ICA PSV: > 230 cm/s            Plaque Estimate: Visible            ICA/CCA PSV Ratio: Variable            ICA EDV: Variable         Total occlusion            ICA PSV: Undetectable            Plaque Estimate: Visible, no detectable lumen            ICA/CCA PSV Ratio: N/A            ICA EDV: N/A                                          Additional criteria from vascular surgery     > 80%       EDV > 120 cm/sec     CAROLINE KELLEY MD         SYSTEM ID:  [id number]

## 2022-02-19 NOTE — PROGRESS NOTES
02/19/22 0800   Quick Adds   Type of Visit Initial Occupational Therapy Evaluation   Living Environment   People in Home alone   Current Living Arrangements house   Home Accessibility stairs within home;stairs to enter home   Number of Stairs, Main Entrance 1   Stair Railings, Main Entrance none   Number of Stairs, Within Home, Primary other (see comments)  (flight of stairs into the basement)   Transportation Anticipated family or friend will provide;car, drives self   Living Environment Comments [place], walk-out basement. Pt lives alone and her son recently moved in with her after she got sick. Pt has two sons who live about 8 miles away. Laundry is in the basement. Pt has a tub shower at home w/ grab bars   Self-Care   Usual Activity Tolerance moderate   Current Activity Tolerance fair   Equipment Currently Used at Home grab bar, tub/shower   Fall history within last six months no   Activity/Exercise/Self-Care Comment Pt reports IND with ADLs at baseline.   Instrumental Activities of Daily Living (IADL)   Previous Responsibilities meal prep;housekeeping;laundry;driving;shopping   IADL Comments IND at baseline w/ son currently assisting   Previous Level of Function/Home Environm   Bathing/Grooming, Premorbid Functional Level independent   Dressing, Premorbid Functional Level independent   Eating/Feeding, Premorbid Functional Level independent   Toileting, Premorbid Functional Level independent   BADLs, Premorbid Functional Level independent   IADLs, Premorbid Functional Level independent   Bed Mobility, Premorbid Functional Level independent   Transfers, Premorbid Functional Level independent   Household Ambulation, Premorbid Functional Level independent   Stairs, Premorbid Functional Level independent   Community Ambulation, Premorbid Functional Level independent   Functional Cognition, Premorbid Functional Level   (WFL)   Previous Level of Function, Premorbid Pt was mobilizing IND and was IND w/  I/ADLs   General Information   Onset of Illness/Injury or Date of Surgery 02/12/22   Referring Physician Pooja Rg MD   Patient/Family Therapy Goal Statement (OT) To return to PLOF   Additional Occupational Profile Info/Pertinent History of Current Problem Ashley Osman is a 71 year old female with a pmh notable for newly diagnosed HFrEF (10-15% 2/11/22) 2/2 ICM, CAD (diagnosed 1/2022), COPD (diagnosed 1/2022), Hyperlipidemia, and cigarette smoking (quit in 12/2021) who presented to [facility] as a transfer from [facility] for consideration of advanced therapies in the setting of cardiogenic shock requiring inotropic support.    Existing Precautions/Restrictions fall;cardiac   General Observations and Info Activity: amb   Cognitive Status Examination   Orientation Status orientation to person, place and time   Behavioral Issues withdrawn   Affect/Mental Status (Cognitive) flat/blunted affect   Pain Assessment   Patient Currently in Pain No   Integumentary/Edema   Integumentary/Edema no deficits were identifed   Posture   Posture forward head position;protracted shoulders   Range of Motion Comprehensive   Comment, General Range of Motion WFL    Strength Comprehensive (MMT)   Comment, General Manual Muscle Testing (MMT) Assessment +3 RUE and 3 LUE    Muscle Tone Assessment   Muscle Tone Comments WFL    Coordination   Coordination Comments WFL    Bed Mobility   Bed Mobility supine-sit;sit-supine   Supine-Sit Louisa (Bed Mobility) modified independence   Sit-Supine [place]a (Bed Mobility) modified independence   Assistive Device (Bed Mobility) bed rails   Transfers   Transfers sit-stand transfer   Sit-Stand Transfer   Sit-Stand Louisa (Transfers) contact guard   Clinical Impression   Criteria for Skilled Therapeutic Interventions Met (OT) Yes, treatment indicated   OT Diagnosis Generalized weakness, decreased func mobility and IADL IND    OT Problem List-Impairments impacting ADL problems related  to;activity tolerance impaired;mobility;strength;balance   Assessment of Occupational Performance 3-5 Performance Deficits   Identified Performance Deficits toileting, func mobility, IADLs, bathing   Planned Therapy Interventions (OT) ADL retraining;strengthening;progressive activity/exercise;bed mobility training;transfer training   Clinical Decision Making Complexity (OT) low complexity   Anticipated Equipment Needs Upon Discharge (OT) shower chair   Risk & Benefits of therapy have been explained evaluation/treatment results reviewed;care plan/treatment goals reviewed;risks/benefits reviewed;current/potential barriers reviewed;participants voiced agreement with care plan;participants included;patient   Clinical Impression Comments Pt presents below func baseline d/t generalized weakness and decreased func mobility. Pt would benefit from skilled OT services to increase overall strength and ADL ax tolerance.   OT Discharge Planning   OT Discharge Recommendation (DC Rec) Transitional Care Facility;home with assist   OT Rationale for DC Rec Rec TCU d/t pt presenting w/ generalized weakness, decreased func mobility, and decreased ADL IND/tolerance.  Pt is currently waiting to hear from medical team to determine what type of surgery she will be having on 2/24. At this time we are predicting pt will benefit from short rehab admin to increase strength and  IADL IND post-surgery. If pt were to discharge home, would require continued A from two [facility] and  therapies.     OT Brief overview of current status A x 1 w/ FWW    Total Evaluation Time (Minutes)   Total Evaluation Time (Minutes) 5   OT Goals   Therapy Frequency (OT) Daily   OT Predicated Duration/Target Date for Goal Attainment 03/05/22   OT Goals Hygiene/Grooming;Toilet Transfer/Toileting;Lower Body Bathing;Upper Body Bathing   OT: Hygiene/Grooming supervision/stand-by assist   OT: Upper Body Bathing Modified independent   OT: Lower Body Bathing Modified  independent   OT: Transfer Modified independent  (for shower transfer)   OT: Toilet Transfer/Toileting Supervision/stand-by assist

## 2022-02-20 LAB
ALBUMIN SERPL-MCNC: 2.8 G/DL (ref 3.4–5)
ALP SERPL-CCNC: 72 U/L (ref 40–150)
ALT SERPL W P-5'-P-CCNC: 42 U/L (ref 0–50)
ANION GAP SERPL CALCULATED.3IONS-SCNC: 6 MMOL/L (ref 3–14)
ANION GAP SERPL CALCULATED.3IONS-SCNC: 8 MMOL/L (ref 3–14)
AST SERPL W P-5'-P-CCNC: 23 U/L (ref 0–45)
BACTERIA UR CULT: ABNORMAL
BASOPHILS # BLD AUTO: 0.1 10E3/UL (ref 0–0.2)
BASOPHILS NFR BLD AUTO: 1 %
BILIRUB DIRECT SERPL-MCNC: 0.2 MG/DL (ref 0–0.2)
BILIRUB SERPL-MCNC: 0.6 MG/DL (ref 0.2–1.3)
BUN SERPL-MCNC: 17 MG/DL (ref 7–30)
BUN SERPL-MCNC: 18 MG/DL (ref 7–30)
CALCIUM SERPL-MCNC: 8.8 MG/DL (ref 8.5–10.1)
CALCIUM SERPL-MCNC: 8.8 MG/DL (ref 8.5–10.1)
CHLORIDE BLD-SCNC: 108 MMOL/L (ref 94–109)
CHLORIDE BLD-SCNC: 109 MMOL/L (ref 94–109)
CO2 SERPL-SCNC: 23 MMOL/L (ref 20–32)
CO2 SERPL-SCNC: 24 MMOL/L (ref 20–32)
CREAT SERPL-MCNC: 0.62 MG/DL (ref 0.52–1.04)
CREAT SERPL-MCNC: 0.69 MG/DL (ref 0.52–1.04)
EOSINOPHIL # BLD AUTO: 0.6 10E3/UL (ref 0–0.7)
EOSINOPHIL NFR BLD AUTO: 6 %
ERYTHROCYTE [DISTWIDTH] IN BLOOD BY AUTOMATED COUNT: 14.9 % (ref 10–15)
GFR SERPL CREATININE-BSD FRML MDRD: >90 ML/MIN/1.73M2
GFR SERPL CREATININE-BSD FRML MDRD: >90 ML/MIN/1.73M2
GLUCOSE BLD-MCNC: 138 MG/DL (ref 70–99)
GLUCOSE BLD-MCNC: 84 MG/DL (ref 70–99)
HCT VFR BLD AUTO: 30 % (ref 35–47)
HGB BLD-MCNC: 9.5 G/DL (ref 11.7–15.7)
IMM GRANULOCYTES # BLD: 0 10E3/UL
IMM GRANULOCYTES NFR BLD: 0 %
INR PPP: 1.14 (ref 0.85–1.15)
LYMPHOCYTES # BLD AUTO: 2.7 10E3/UL (ref 0.8–5.3)
LYMPHOCYTES NFR BLD AUTO: 31 %
MAGNESIUM SERPL-MCNC: 1.9 MG/DL (ref 1.8–2.6)
MAGNESIUM SERPL-MCNC: 1.9 MG/DL (ref 1.8–2.6)
MCH RBC QN AUTO: 28.3 PG (ref 26.5–33)
MCHC RBC AUTO-ENTMCNC: 31.7 G/DL (ref 31.5–36.5)
MCV RBC AUTO: 89 FL (ref 78–100)
MONOCYTES # BLD AUTO: 0.8 10E3/UL (ref 0–1.3)
MONOCYTES NFR BLD AUTO: 10 %
NEUTROPHILS # BLD AUTO: 4.4 10E3/UL (ref 1.6–8.3)
NEUTROPHILS NFR BLD AUTO: 52 %
NRBC # BLD AUTO: 0 10E3/UL
NRBC BLD AUTO-RTO: 0 /100
PLATELET # BLD AUTO: 286 10E3/UL (ref 150–450)
POTASSIUM BLD-SCNC: 3.6 MMOL/L (ref 3.4–5.3)
POTASSIUM BLD-SCNC: 3.8 MMOL/L (ref 3.4–5.3)
PROT SERPL-MCNC: 6 G/DL (ref 6.8–8.8)
RBC # BLD AUTO: 3.36 10E6/UL (ref 3.8–5.2)
SARS-COV-2 RNA RESP QL NAA+PROBE: NEGATIVE
SODIUM SERPL-SCNC: 139 MMOL/L (ref 133–144)
SODIUM SERPL-SCNC: 139 MMOL/L (ref 133–144)
WBC # BLD AUTO: 8.6 10E3/UL (ref 4–11)

## 2022-02-20 PROCEDURE — 83735 ASSAY OF MAGNESIUM: CPT

## 2022-02-20 PROCEDURE — 258N000003 HC RX IP 258 OP 636: Performed by: STUDENT IN AN ORGANIZED HEALTH CARE EDUCATION/TRAINING PROGRAM

## 2022-02-20 PROCEDURE — 85610 PROTHROMBIN TIME: CPT | Performed by: INTERNAL MEDICINE

## 2022-02-20 PROCEDURE — 85025 COMPLETE CBC W/AUTO DIFF WBC: CPT | Performed by: INTERNAL MEDICINE

## 2022-02-20 PROCEDURE — 80053 COMPREHEN METABOLIC PANEL: CPT

## 2022-02-20 PROCEDURE — 99233 SBSQ HOSP IP/OBS HIGH 50: CPT | Mod: GC | Performed by: INTERNAL MEDICINE

## 2022-02-20 PROCEDURE — 214N000001 HC R&B CCU UMMC

## 2022-02-20 PROCEDURE — U0005 INFEC AGEN DETEC AMPLI PROBE: HCPCS | Performed by: STUDENT IN AN ORGANIZED HEALTH CARE EDUCATION/TRAINING PROGRAM

## 2022-02-20 PROCEDURE — 250N000013 HC RX MED GY IP 250 OP 250 PS 637: Performed by: STUDENT IN AN ORGANIZED HEALTH CARE EDUCATION/TRAINING PROGRAM

## 2022-02-20 PROCEDURE — 250N000013 HC RX MED GY IP 250 OP 250 PS 637: Performed by: INTERNAL MEDICINE

## 2022-02-20 PROCEDURE — 82248 BILIRUBIN DIRECT: CPT

## 2022-02-20 PROCEDURE — 250N000011 HC RX IP 250 OP 636: Performed by: STUDENT IN AN ORGANIZED HEALTH CARE EDUCATION/TRAINING PROGRAM

## 2022-02-20 RX ORDER — POTASSIUM CHLORIDE 750 MG/1
10 TABLET, EXTENDED RELEASE ORAL ONCE
Status: COMPLETED | OUTPATIENT
Start: 2022-02-20 | End: 2022-02-20

## 2022-02-20 RX ADMIN — THIAMINE HCL TAB 100 MG 100 MG: 100 TAB at 08:13

## 2022-02-20 RX ADMIN — POTASSIUM CHLORIDE 10 MEQ: 750 TABLET, EXTENDED RELEASE ORAL at 08:17

## 2022-02-20 RX ADMIN — POTASSIUM CHLORIDE 10 MEQ: 750 TABLET, EXTENDED RELEASE ORAL at 18:07

## 2022-02-20 RX ADMIN — Medication 1 TABLET: at 08:13

## 2022-02-20 RX ADMIN — SODIUM CHLORIDE, POTASSIUM CHLORIDE, SODIUM LACTATE AND CALCIUM CHLORIDE 500 ML: 600; 310; 30; 20 INJECTION, SOLUTION INTRAVENOUS at 12:22

## 2022-02-20 RX ADMIN — HEPARIN SODIUM 5000 UNITS: 5000 INJECTION, SOLUTION INTRAVENOUS; SUBCUTANEOUS at 08:14

## 2022-02-20 RX ADMIN — ATORVASTATIN CALCIUM 40 MG: 40 TABLET, FILM COATED ORAL at 08:13

## 2022-02-20 RX ADMIN — DAPAGLIFLOZIN 5 MG: 5 TABLET, FILM COATED ORAL at 08:13

## 2022-02-20 RX ADMIN — ASPIRIN 81 MG CHEWABLE TABLET 81 MG: 81 TABLET CHEWABLE at 08:13

## 2022-02-20 RX ADMIN — HYDROXYZINE HYDROCHLORIDE 25 MG: 25 TABLET ORAL at 21:38

## 2022-02-20 ASSESSMENT — ACTIVITIES OF DAILY LIVING (ADL)
ADLS_ACUITY_SCORE: 10
ADLS_ACUITY_SCORE: 10
ADLS_ACUITY_SCORE: 8
ADLS_ACUITY_SCORE: 10
ADLS_ACUITY_SCORE: 10
ADLS_ACUITY_SCORE: 8
ADLS_ACUITY_SCORE: 10
ADLS_ACUITY_SCORE: 8
ADLS_ACUITY_SCORE: 10

## 2022-02-20 NOTE — PLAN OF CARE
Pt admitted 2/12 with cardiogenic shock s/p inotrope support.  Dobutamine gtt continues at 2.5 mcg/kg/min (4.1 ml/hr) thru PICC.  BP remain low, but steady.   500 ml bolus of LR given.  SR on RA with ELLER and denied pain.  CABG tentatively planned for 2/24.  LVAD w/up ongoing.  COVID sample sent.  Replaced 10 mEq of K.  Otherwise, pt still anxious and up SBA.  Continue to monitor and with POC.

## 2022-02-20 NOTE — PROGRESS NOTES
[facility]  Cardiology II Service / Advanced Heart Failure  Daily Progress Note    Patient: Ashley Osman      : 1950      MRN: [id number]    Assessment/Plan:   72yo female w/ PMHx of newly-diagnosed HFrEF (10-15% 22) 2/2 ICM, CAD (diagnosed 2022), COPD (diagnosed 2022), hyperlipidemia, and cigarette smoking (quit in 2021) who presented to [facility]  as a transfer from [facility] for consideration of advanced therapies in the setting of cardiogenic shock requiring inotropic support.     Today's changes:  - Giving 500cc isotonic fluid bolus for persistently softer MAP's  - Continuing Dobutamine 2.5mcg/kg/min  - Pending further revascularization planning this week    # Cardiogenic shock  # HFrEF 2/2 ICM (EF 10-15%) AHA Stage D, NYHA Class III-IV  # LV thrombus   # CAD with non-viable tissue (mLAD , severe prox and distal LAD, LCx and D1 disease)  Initially requiring inotropic and vasoactive support briefly. Cause of most recent decompensation is unclear although thought to be secondary to another ACS given sudden onset L shoulder pain. CMRI () showing viability of 50% in LAD territory (intermediate likelihood of recovery) and 75% in dominant LCx territory (good likelihood of recovery). Plan to assess feasibility of revascularization w/ PCI with Interventional Cardiology vs CABG w/ CVTS. Had initiated LVAD evaluation since she is high-risk for any planned interventions and, if revascularization is not feasible, will need LVAD given Stage D and inability to be on GDMT due to low blood pressure. Will need ICD ppx prior to discharge if no advance therapies offeredWill need ICD ppx prior to discharge if no advance therapies offered  Plan:  - Trialing small fluid bolus today given softer MAP's on inotropic support, though reassuringly stable  - Continuing Dobutamine 2.5 mcg/kg/min (restarted )  - Dapaglaflozin 5mg daily  - Previously intolerant of Isordil  and Hydralazine (2/16)  - Continuing ASA 81mg daily and Atorvastatin 40mg daily  - Vascular Surgery has evaluated, feel no contraindications for proceeding with revascularization procedure - completed carotid and extremity US imaging  - Ongoing discussions with IC and CVTS regarding revascularization planning - PCI vs CABG  - Daily electrolyte monitoring - goals K>4 and Mg>2   - 2g Na diet, 2L fluid restriction     ================================  Chronic Problems:  #COPD - appears newly diagnosed from 1/2022, stable - continuing PTA Umeclidinium  #Pulmonary Nodule - Hx of tobacco use, may require PET scan in future    Hospital Checklist:  DVT Prophylaxis: Heparin SQ  Code Status: Full Code  Bowel regimen: N/A - last BM 2/18  Diet/Nutrition: Restrictions as per above  Lines: PICC    Disposition Planning:  Anticipate discharge in >7 days pending coronary interventional planning.    Staffed w/ Dr. Carter.    Lakhwinder Cabrales MD  Internal Medicine PGY-1  Cardiology II Service  ASCOM #[id number]  02/20/2022  ==================================    Subjective:   No acute overnight events. Reports she slept well last night, has been sleeping better the past few nights. Still fatigued. No new chest pain, dyspnea, abdominal pain, n/v. No new leg swelling.    Objective:     Vitals:    02/18/22 0800 02/19/22 0923 02/20/22 0804   Weight: 53.2 kg (117 lb 3.2 oz) 51.6 kg (113 lb 11.2 oz) 53 kg (116 lb 12.8 oz)     Vital Signs with Ranges  Temp:  [98  F (36.7  C)-98.9  F (37.2  C)] 98.1  F (36.7  C)  Pulse:  [82-91] 84  Resp:  [16-18] 16  BP: (70-89)/(43-54) 70/43  Cuff Mean (mmHg):  [65] 65  SpO2:  [90 %-96 %] 92 %  I/O last 3 completed shifts:  In: 1330.43 [P.O.:1228; I.V.:102.43]  Out: 750 [Urine:750]    Exam:    General: No acute distress, resting prior to evaluation, conversational in soft voice  HEENT: Dry mucus membranes  Neck: No overt JVD visualized  CV: RRR, no audible murmurs  Lungs: On 1L NC, CTA b/l w/o wheezes or  crackles, no increased WOB  Abd: Soft, non-tender, non-distended  Ext: Warm and well-perfused, no appreciable lower extremity edema  Neuro: Awake, alert, conversational, moving all extremities spontaneously throughout examination    Medications     - MEDICATION INSTRUCTIONS -       DOBUTamine 2.5 mcg/kg/min (02/20/22 0811)     - MEDICATION INSTRUCTIONS -       BETA BLOCKER NOT PRESCRIBED         aspirin  81 mg Oral Daily     atorvastatin  40 mg Oral QAM     dapagliflozin  5 mg Oral Daily     heparin ANTICOAGULANT  5,000 Units Subcutaneous Q12H     influenza vac high-dose quad  0.7 mL Intramuscular Prior to discharge     multivitamin w/minerals  1 tablet Oral Daily     thiamine  100 mg Oral Daily     umeclidinium  1 puff Inhalation Daily       Data   Recent Labs   Lab 02/20/22  0538 02/20/22  0537 02/19/22  1718 02/19/22  0448 02/18/22  0549   WBC 8.6  --   --  7.9 10.3   HGB 9.5*  --   --  9.1* 9.7*   MCV 89  --   --  91 89     --   --  257 254   INR  --  1.14  --  1.13 1.23*   NA  --  139 139 139 139   POTASSIUM  --  3.8 4.3 3.8 4.1   CHLORIDE  --  108 109 108 108   CO2  --  23 23 24 24   BUN  --  18 20 19 18   CR  --  0.69 0.70 0.63 0.66   ANIONGAP  --  8 7 7 7   HEIDI  --  8.8 8.8 8.9 9.0   GLC  --  84 88 85 87   ALBUMIN  --  2.8*  --  2.8*  --    PROTTOTAL  --  6.0*  --  5.9*  --    BILITOTAL  --  0.6  --  0.6  --    ALKPHOS  --  72  --  65  --    ALT  --  42  --  45  --    AST  --  23  --  30  --        No results found for this or any previous visit (from the past 24 hour(s)).

## 2022-02-20 NOTE — PLAN OF CARE
"D: Systolic heart failure, unspecified HF chronicity (H)  (primary encounter diagnosis)    I: Monitored vitals and assessed pt status.   Running: Dobutamine gtt 2.5 mcg/kg/hr      A: A0x4. VSS, on RA. NSR. Afebrile. No pain overnight. Pt slept majority of night, no issues.      BP (!) 86/51 (BP Location: Right arm)   Pulse 82   Temp 98.4  F (36.9  C) (Oral)   Resp 18   Ht 1.638 m (5' 4.5\")   Wt 51.6 kg (113 lb 11.2 oz)   SpO2 95%   BMI 19.22 kg/m      P: Continue to monitor Pt status and report changes to Cards 2.          "

## 2022-02-20 NOTE — PLAN OF CARE
Pt admitted 2/12 with cardiogenic shock s/p inotrope support.  Dobutamine gtt continues at 2.5 mcg/kg/min (4.1 ml/hr) thru PICC.  BP remain low, but steady.  OT consulted today. SR on RA with ELLER and denied pain.  CABG tentatively planned for 2/24.  LVAD w/up ongoing.  Otherwise, pt still anxious and up SBA.  Continue to monitor and with POC.

## 2022-02-21 ENCOUNTER — APPOINTMENT (OUTPATIENT)
Dept: PHYSICAL THERAPY | Facility: CLINIC | Age: 72
DRG: 233 | End: 2022-02-21
Attending: INTERNAL MEDICINE
Payer: MEDICARE

## 2022-02-21 ENCOUNTER — DOCUMENTATION ONLY (OUTPATIENT)
Dept: CARDIOLOGY | Facility: CLINIC | Age: 72
End: 2022-02-21
Payer: COMMERCIAL

## 2022-02-21 ENCOUNTER — ANESTHESIA EVENT (OUTPATIENT)
Dept: SURGERY | Facility: CLINIC | Age: 72
DRG: 233 | End: 2022-02-21
Payer: MEDICARE

## 2022-02-21 LAB
ALBUMIN SERPL-MCNC: 2.8 G/DL (ref 3.4–5)
ALP SERPL-CCNC: 69 U/L (ref 40–150)
ALT SERPL W P-5'-P-CCNC: 36 U/L (ref 0–50)
ANION GAP SERPL CALCULATED.3IONS-SCNC: 6 MMOL/L (ref 3–14)
AST SERPL W P-5'-P-CCNC: 20 U/L (ref 0–45)
BILIRUB DIRECT SERPL-MCNC: 0.2 MG/DL (ref 0–0.2)
BILIRUB SERPL-MCNC: 0.6 MG/DL (ref 0.2–1.3)
BUN SERPL-MCNC: 17 MG/DL (ref 7–30)
CALCIUM SERPL-MCNC: 8.8 MG/DL (ref 8.5–10.1)
CHLORIDE BLD-SCNC: 108 MMOL/L (ref 94–109)
CO2 SERPL-SCNC: 24 MMOL/L (ref 20–32)
COTININE SERPL-MCNC: <2 NG/ML
CREAT SERPL-MCNC: 0.65 MG/DL (ref 0.52–1.04)
ERYTHROCYTE [DISTWIDTH] IN BLOOD BY AUTOMATED COUNT: 15.1 % (ref 10–15)
GFR SERPL CREATININE-BSD FRML MDRD: >90 ML/MIN/1.73M2
GLUCOSE BLD-MCNC: 90 MG/DL (ref 70–99)
HCT VFR BLD AUTO: 30.6 % (ref 35–47)
HGB BLD-MCNC: 9.7 G/DL (ref 11.7–15.7)
INR PPP: 1.18 (ref 0.85–1.15)
MAGNESIUM SERPL-MCNC: 1.9 MG/DL (ref 1.8–2.6)
MCH RBC QN AUTO: 29 PG (ref 26.5–33)
MCHC RBC AUTO-ENTMCNC: 31.7 G/DL (ref 31.5–36.5)
MCV RBC AUTO: 91 FL (ref 78–100)
NICOTINE SERPL-MCNC: <2 NG/ML
PLATELET # BLD AUTO: 338 10E3/UL (ref 150–450)
POTASSIUM BLD-SCNC: 4.1 MMOL/L (ref 3.4–5.3)
PROT SERPL-MCNC: 6.1 G/DL (ref 6.8–8.8)
RBC # BLD AUTO: 3.35 10E6/UL (ref 3.8–5.2)
SODIUM SERPL-SCNC: 138 MMOL/L (ref 133–144)
TRANS-3-OH-COTININE SERPL-MCNC: <2 NG/ML
WBC # BLD AUTO: 9.5 10E3/UL (ref 4–11)

## 2022-02-21 PROCEDURE — 80053 COMPREHEN METABOLIC PANEL: CPT

## 2022-02-21 PROCEDURE — 97530 THERAPEUTIC ACTIVITIES: CPT | Mod: GP

## 2022-02-21 PROCEDURE — 82248 BILIRUBIN DIRECT: CPT

## 2022-02-21 PROCEDURE — 250N000013 HC RX MED GY IP 250 OP 250 PS 637: Performed by: STUDENT IN AN ORGANIZED HEALTH CARE EDUCATION/TRAINING PROGRAM

## 2022-02-21 PROCEDURE — 250N000011 HC RX IP 250 OP 636: Performed by: STUDENT IN AN ORGANIZED HEALTH CARE EDUCATION/TRAINING PROGRAM

## 2022-02-21 PROCEDURE — 97161 PT EVAL LOW COMPLEX 20 MIN: CPT | Mod: GP

## 2022-02-21 PROCEDURE — 999N000007 HC SITE CHECK

## 2022-02-21 PROCEDURE — 250N000013 HC RX MED GY IP 250 OP 250 PS 637: Performed by: INTERNAL MEDICINE

## 2022-02-21 PROCEDURE — 214N000001 HC R&B CCU UMMC

## 2022-02-21 PROCEDURE — 85027 COMPLETE CBC AUTOMATED: CPT

## 2022-02-21 PROCEDURE — 97116 GAIT TRAINING THERAPY: CPT | Mod: GP

## 2022-02-21 PROCEDURE — 83735 ASSAY OF MAGNESIUM: CPT

## 2022-02-21 PROCEDURE — 85610 PROTHROMBIN TIME: CPT | Performed by: INTERNAL MEDICINE

## 2022-02-21 PROCEDURE — 99233 SBSQ HOSP IP/OBS HIGH 50: CPT | Mod: GC | Performed by: INTERNAL MEDICINE

## 2022-02-21 RX ADMIN — DAPAGLIFLOZIN 5 MG: 5 TABLET, FILM COATED ORAL at 08:35

## 2022-02-21 RX ADMIN — HEPARIN SODIUM 5000 UNITS: 5000 INJECTION, SOLUTION INTRAVENOUS; SUBCUTANEOUS at 21:02

## 2022-02-21 RX ADMIN — HEPARIN SODIUM 5000 UNITS: 5000 INJECTION, SOLUTION INTRAVENOUS; SUBCUTANEOUS at 08:36

## 2022-02-21 RX ADMIN — THIAMINE HCL TAB 100 MG 100 MG: 100 TAB at 08:34

## 2022-02-21 RX ADMIN — DOBUTAMINE IN DEXTROSE 2.5 MCG/KG/MIN: 200 INJECTION, SOLUTION INTRAVENOUS at 02:13

## 2022-02-21 RX ADMIN — HYDROXYZINE HYDROCHLORIDE 25 MG: 25 TABLET ORAL at 22:16

## 2022-02-21 RX ADMIN — ASPIRIN 81 MG CHEWABLE TABLET 81 MG: 81 TABLET CHEWABLE at 08:33

## 2022-02-21 RX ADMIN — Medication 1 TABLET: at 08:33

## 2022-02-21 RX ADMIN — ATORVASTATIN CALCIUM 40 MG: 40 TABLET, FILM COATED ORAL at 08:33

## 2022-02-21 ASSESSMENT — ACTIVITIES OF DAILY LIVING (ADL)
ADLS_ACUITY_SCORE: 8

## 2022-02-21 NOTE — PROGRESS NOTES
02/21/22 1122   Quick Adds   Type of Visit Initial PT Evaluation   Living Environment   People in Home alone   Current Living Arrangements house   Home Accessibility stairs to enter home   Number of Stairs, Main Entrance 1   Stair Railings, Main Entrance none   Number of Stairs, Within Home, Primary other (see comments)  (10+ to basement laundry)   Transportation Anticipated car, drives self;family or friend will provide   Living Environment Comments Pt lives alone however has son nearby that can provide 24/7 A if needed. Pt previously active (amb 8 mi/day and lifting for work), and doing all ADLs indep.   Self-Care   Usual Activity Tolerance good   Current Activity Tolerance fair   Regular Exercise Yes   Activity/Exercise Type walking   Exercise Amount/Frequency other (see comments)  (amb 8 mi/day for work. recently retired)   Equipment Currently Used at Home grab bar, toilet   Fall history within last six months no   Activity/Exercise/Self-Care Comment Pt used to walk 8 mi day and lift for work. Pt previously was washing floors on hands and knees,was able to move furniture in home, etc.   General Information   Onset of Illness/Injury or Date of Surgery 02/12/22   Referring Physician Pooja Rg MD   Patient/Family Therapy Goals Statement (PT) to return home   Pertinent History of Current Problem (include personal factors and/or comorbidities that impact the POC)  PMHx of newly-diagnosed HFrEF (10-15% 2/11/22) 2/2 ICM, CAD (diagnosed 1/2022), COPD (diagnosed 1/2022), hyperlipidemia, and cigarette smoking (quit in 12/2021) who presented to [facility] 2/12 as a transfer from [facility] for consideration of advanced therapies in the setting of cardiogenic shock requiring inotropic support.   Existing Precautions/Restrictions cardiac   Heart Disease Risk Factors Age;Medical history;Smoking;Dislipidemia   Cognition   Orientation Status (Cognition) oriented x 4;person;place;situation;time   Pain Assessment   Patient  Currently in Pain No   Integumentary/Edema   Integumentary/Edema no deficits were identifed   Posture    Posture Kyphosis;Protracted shoulders;Forward head position   Range of Motion (ROM)   Range of Motion ROM is WFL   Strength (Manual Muscle Testing)   Strength (Manual Muscle Testing) Deficits observed during functional mobility   Strength Comments LE weakness noted in stairs and sit<>stand   Bed Mobility   Comment, (Bed Mobility) Pt independent in bed mobility   Transfers   Comment, (Transfers) Pt SBA w/ sit<>stand   Gait/Stairs (Locomotion)   Distance in Feet (Required for LE Total Joints) 15x2   Comment, (Gait/Stairs) Pt amb w/CGA, FWW. Stairs navigation w/ CGA, L railing   Balance   Balance no deficits were identified   Balance Comments with use of FWW. Anticipate mild balance defecits w/o AD use.   Sensory Examination   Sensory Perception WFL   Coordination   Coordination no deficits were identified   Muscle Tone   Muscle Tone no deficits were identified   Clinical Impression   Criteria for Skilled Therapeutic Intervention Yes, treatment indicated   PT Diagnosis (PT) Impaired functinoal mobility   Influenced by the following impairments poor endurance, LE weakness, dynamic balance, cardiovascular impairments, hearing   Functional limitations due to impairments amb, stairs, transfers from low seats, functional endurance   Clinical Presentation (PT Evaluation Complexity) Stable/Uncomplicated   Clinical Presentation Rationale clinical reasoning   Clinical Decision Making (Complexity) low complexity   Planned Therapy Interventions (PT) gait training;home exercise program;stair training;strengthening;patient/family education;transfer training   Risk & Benefits of therapy have been explained patient;current/potential barriers reviewed;participants voiced agreement with care plan;risks/benefits reviewed;evaluation/treatment results reviewed;care plan/treatment goals reviewed   PT Discharge Planning   PT Discharge  Recommendation (DC Rec) home with assist;home with outpatient physical therapy   PT Rationale for DC Rec PT: recommend home w/ assist for errands, and ALD's requiring functional endurance, as well as DME needs potentially including shower chair, FWW.- however pt remains well below baseline in terms of functional endurance and would benefit from OP cardiac rehab. Anticipate discharge recommendations may change pending surgery.    PT Brief overview of current status Ax1 w/FWW   Total Evaluation Time   Total Evaluation Time (Minutes) 8   Physical Therapy Goals   PT Frequency 3x/week   PT Predicated Duration/Target Date for Goal Attainment 03/07/22   PT: Transfers Independent;Within precautions;Sit to/from stand   PT: Gait Independent;Greater than 200 feet   PT: Stairs Greater than 10 stairs;Rail on both sides   PT: Perform aerobic activity with stable cardiovascular response 25 minutes;intermittent activity;ambulation

## 2022-02-21 NOTE — PROVIDER NOTIFICATION
D:Diya Zacarias pended orders on 2/15 about Pulmonary consult, Pt refusing inhalers, Mik LS diminished  A:SOB with exertion, Syst 70-80's, relax affect in bed  I:notified Cross cover  P:per Primary

## 2022-02-21 NOTE — PROGRESS NOTES
[facility]  Cardiology II Service / Advanced Heart Failure  Daily Progress Note    Patient: Ashley Osman      : 1950      MRN: [id number]    Assessment/Plan:   70yo female w/ PMHx of newly-diagnosed HFrEF (10-15% 22) 2/2 ICM, CAD (diagnosed 2022), COPD (diagnosed 2022), hyperlipidemia, and cigarette smoking (quit in 2021) who presented to [facility]  as a transfer from OS for consideration of advanced therapies in the setting of cardiogenic shock requiring inotropic support.     Today's changes:  - Continuing Dobutamine 2.5mcg/kg/min  - Pending further revascularization planning this week - tentatively scheduled for CABG     # Cardiogenic shock  # HFrEF 2/2 ICM (EF 10-15%) AHA Stage D, NYHA Class III-IV  # LV thrombus   # CAD (mLAD , severe prox and distal LAD, LCx and D1 disease)  Initially requiring inotropic and vasoactive support briefly. Cause of most recent decompensation is unclear although thought to be secondary to another ACS given sudden onset L shoulder pain. CMRI () showing viability of 50% in LAD territory (intermediate likelihood of recovery) and 75% in dominant LCx territory (good likelihood of recovery). Planned to assess feasibility of revascularization w/ PCI with Interventional Cardiology vs CABG w/ CVTS - opting for CAB based on review of prior angiogram and cMRI. Had initiated LVAD evaluation since she is high-risk for any planned interventions and, if revascularization is not feasible, will need LVAD given Stage D and inability to be on GDMT due to low blood pressure. Will need ICD ppx prior to discharge if no advance therapies offered. Will need ICD ppx prior to discharge if no advance therapies offered  Plan:  - BP appears to be more reliable in the LUE  - Continuing Dobutamine 2.5 mcg/kg/min (restarted )  - Dapaglaflozin 5mg daily  - Previously intolerant of Isordil and Hydralazine ()  - Continuing ASA  81mg daily and Atorvastatin 40mg daily  - Vascular Surgery has evaluated, feel no contraindications for proceeding with revascularization procedure - completed carotid and extremity US imaging  - Ongoing discussions with CVTS regarding revascularization planning - tentatively scheduled for 2/24 for CABG  - Daily electrolyte monitoring - goals K>4 and Mg>2   - 2g Na diet, 2L fluid restriction     LVAD/transplant work up:   [x]?? Labs (CBC, CMP, PT/INR, cystatin C, prealbumin, UA + micro)  [x]?? Infectious  [x]?? Utox/nicotine and cotinine/PeTH   []?? Immunocompatibility (last transfusion, ABO, HLA tissue typing, PRA)  [x]?? CVTS consult  [x]?? Social work evaluation  [x]?? Palliative care evaluation  [x]?? Neuropsych evaluation  [x]?? Nutrition evaluation  [x]?? CT Dental + evaluation- cleared  [x]?? Abd US + doppler  [x]?? Extremity US and ABIs- 2/8/22: normal PAIGE's bilaterally.  [x]?? Carotid US (if DM or ICM or >49yo) once SG catheter is put  [x]?? PFTs    [x]?? Dexascan: 11/6/2019: Moderate low bone densit  [x]?? CT head non-contrast: 2/8/22:   [x]?? CT CAP non-contrast: pulmonary nodules   []?? Colonoscopy (>51 yo)   []?? Mammogram   []?? Pap test    ================================  Chronic Problems:  #COPD - appears newly diagnosed from 1/2022, stable - continuing PTA Umeclidinium  #Pulmonary Nodule - Hx of tobacco use, may require PET scan in future    Hospital Checklist:  DVT Prophylaxis: Heparin SQ   Code Status: Full Code  Bowel regimen: N/A - last BM 2/21  Diet/Nutrition: Restrictions as per above  Lines: PICC     Disposition Planning:  Anticipate discharge in >7 days pending coronary interventional planning.    Staffed w/ Dr. Carter.    Lakhwinder Cabrales MD  Internal Medicine PGY-1  Cardiology II Service  ASCOM #[id number]  02/21/2022  ==================================    Subjective:   No acute overnight events. Describes an episode last night of anxiety, resolved after an hour or two and was able to sleep  better. Attributes to her upcoming procedure(s) and anxiety revolving around being in the hospital. Still feels fatigued. Was able to walk around some, including going up stairs. No light-headedness or dizziness with that. No new chest pain, dyspnea, abdominal pain, n/v. No new leg swelling.    Objective:     Vitals:    02/19/22 0923 02/20/22 0804 02/21/22 0400   Weight: 51.6 kg (113 lb 11.2 oz) 53 kg (116 lb 12.8 oz) 53.4 kg (117 lb 12.8 oz)     Vital Signs with Ranges  Temp:  [97.4  F (36.3  C)-98.7  F (37.1  C)] 97.4  F (36.3  C)  Pulse:  [83-91] 91  Resp:  [16-20] 20  BP: ()/(47-67) 107/60  SpO2:  [92 %-96 %] 93 %  I/O last 3 completed shifts:  In: 1098.97 [P.O.:480; I.V.:618.97]  Out: 900 [Urine:900]    Exam:  General: No acute distress, resting laying back upright in bed prior to evaluation, conversational in soft voice  HEENT: MMM  Neck: No overt JVD visualized  CV: RRR, no audible murmurs  Lungs: On room air, CTA b/l w/o wheezes or crackles, no increased WOB  Abd: Soft, non-tender, non-distended  Ext: Warm and well-perfused, no appreciable lower extremity edema  Neuro: Awake, alert, conversational, moving all extremities spontaneously throughout examination    Medications     DOBUTamine 2.5 mcg/kg/min (02/21/22 0839)     - MEDICATION INSTRUCTIONS -       ACE/ARB/ARNI NOT PRESCRIBED       BETA BLOCKER NOT PRESCRIBED         aspirin  81 mg Oral Daily     atorvastatin  40 mg Oral QAM     dapagliflozin  5 mg Oral Daily     heparin ANTICOAGULANT  5,000 Units Subcutaneous Q12H     influenza vac high-dose quad  0.7 mL Intramuscular Prior to discharge     multivitamin w/minerals  1 tablet Oral Daily     thiamine  100 mg Oral Daily     umeclidinium  1 puff Inhalation Daily       Data   Recent Labs   Lab 02/21/22  0317 02/20/22  1636 02/20/22  0538 02/20/22  0537 02/19/22  1718 02/19/22  0448   WBC 9.5  --  8.6  --   --  7.9   HGB 9.7*  --  9.5*  --   --  9.1*   MCV 91  --  89  --   --  91     --  286  --    --  257   INR 1.18*  --   --  1.14  --  1.13    139  --  139   < > 139   POTASSIUM 4.1 3.6  --  3.8   < > 3.8   CHLORIDE 108 109  --  108   < > 108   CO2 24 24  --  23   < > 24   BUN 17 17  --  18   < > 19   CR 0.65 0.62  --  0.69   < > 0.63   ANIONGAP 6 6  --  8   < > 7   HEIDI 8.8 8.8  --  8.8   < > 8.9   GLC 90 138*  --  84   < > 85   ALBUMIN 2.8*  --   --  2.8*  --  2.8*   PROTTOTAL 6.1*  --   --  6.0*  --  5.9*   BILITOTAL 0.6  --   --  0.6  --  0.6   ALKPHOS 69  --   --  72  --  65   ALT 36  --   --  42  --  45   AST 20  --   --  23  --  30    < > = values in this interval not displayed.       No results found for this or any previous visit (from the past 24 hour(s)).

## 2022-02-21 NOTE — PLAN OF CARE
Problem: Cardiac Output Decreased (Heart Failure)  Goal: Optimal Cardiac Output  Outcome: Ongoing, Not Progressing    Major Shift Events:      Awaiting further evaluation for potential upcoming CABG on the 24th in hopes of revascularization of heart. Maintains BP goal and cardiac functioning on the 2.5mcg/kg/min Dobutamine drip.    For vital signs and complete assessments, please see documentation flowsheets.

## 2022-02-21 NOTE — PROGRESS NOTES
CLINICAL NUTRITION SERVICES    INTERVENTIONS:  Implementation:  Medical food supplement therapy - Per RN, team would like pt to consume oral supplements. Pt to request supplements via room service. Placed general supplement order. RN is providing pt with oral supplement list. Pt may request these prn.      Follow up/Monitoring:  Follow per protocol.     Asha Dykes, MS, RD, LD, [facility]   6C Pgr: [phone]

## 2022-02-21 NOTE — PLAN OF CARE
Goal Outcome Evaluation:    Status: Monitoring patient inpatient on dobutamine prior to CABG scheduled for Thursday. BP remains low, but reading higher in left arm. MDs aware. Complaining of anxiety, particularly at night, when thinking about surgery. Reporting anxiety is making her more short of breath.   Vitals: BP low, stable. Tolerating room air. SR on the monitor.   Neuros: Intact. A&Ox4. Anxious.   IV: TL PICC. Dobutamine infusing @ 2.5mcg/kg/min.   Labs/Electrolytes: Reviewed. No replacements needed.   Diet: 2L fluid restriction, 2gm NA diet. Patient with minimal appetite and needing encouragement. Supplement list given and education on the importance of a good diet before surgery. Patient tried one supplement shake today.  GI: Poor appetite. BM today.  : Voiding in bathroom/commode  Skin: Intact  Pain: Denies  Activity: Up in room with walker help with lines. Needs encouragement to get OOB.   Social: Support provided for verbalized anxiety related to breathing and upcoming surgery. Son to visit this week.  Plan: CABG with LVAD backup scheduled for Thursday. Continue dobutamine drip. Continue to encourage activity and PO intake.

## 2022-02-21 NOTE — PROGRESS NOTES
Prior Authorization Approval    farxiga 5mg tabs  Date Initiated: 2/21/2022  Date Completed: 2/21/2022  Prior Auth Type: Clinical                Status: Approved    Effective Date: 01/22/2022 - 02/21/2023  Copay: 24.52 ($15 with automatic copay savings card applied at [facility])     Filling Pharmacy: [facility] - [address]    Insurance: [employer] - Phone [phone] Fax [phone]  ID: [id number]  Case Number: [id number]   Submitted Via: Maria Isabel Tong  [facility] Liaison  Ph: [phone] Pager: [phone]

## 2022-02-21 NOTE — PROVIDER NOTIFICATION
"   02/21/22 1203 02/21/22 1219 02/21/22 1220   Vitals   BP (!) 81/57 (!) 71/49 (!) 75/48   BP Location Right arm  --   --    BP - Mean 66 57 57   Notified Cards 2 of low BP. Patient reports this is \"normal\" and she is asymptomatic. Plan to continue to monitor. No new orders at this time.   "

## 2022-02-22 ENCOUNTER — APPOINTMENT (OUTPATIENT)
Dept: ULTRASOUND IMAGING | Facility: CLINIC | Age: 72
DRG: 233 | End: 2022-02-22
Attending: PHYSICIAN ASSISTANT
Payer: MEDICARE

## 2022-02-22 ENCOUNTER — APPOINTMENT (OUTPATIENT)
Dept: OCCUPATIONAL THERAPY | Facility: CLINIC | Age: 72
DRG: 233 | End: 2022-02-22
Attending: INTERNAL MEDICINE
Payer: MEDICARE

## 2022-02-22 ENCOUNTER — PREP FOR PROCEDURE (OUTPATIENT)
Dept: CARDIOLOGY | Facility: CLINIC | Age: 72
End: 2022-02-22

## 2022-02-22 LAB
ALBUMIN SERPL-MCNC: 2.8 G/DL (ref 3.4–5)
ALP SERPL-CCNC: 67 U/L (ref 40–150)
ALT SERPL W P-5'-P-CCNC: 33 U/L (ref 0–50)
ANION GAP SERPL CALCULATED.3IONS-SCNC: 5 MMOL/L (ref 3–14)
AST SERPL W P-5'-P-CCNC: 20 U/L (ref 0–45)
BILIRUB DIRECT SERPL-MCNC: 0.1 MG/DL (ref 0–0.2)
BILIRUB SERPL-MCNC: 0.6 MG/DL (ref 0.2–1.3)
BUN SERPL-MCNC: 17 MG/DL (ref 7–30)
CALCIUM SERPL-MCNC: 8.8 MG/DL (ref 8.5–10.1)
CHLORIDE BLD-SCNC: 110 MMOL/L (ref 94–109)
CO2 SERPL-SCNC: 25 MMOL/L (ref 20–32)
CREAT SERPL-MCNC: 0.62 MG/DL (ref 0.52–1.04)
ERYTHROCYTE [DISTWIDTH] IN BLOOD BY AUTOMATED COUNT: 15.2 % (ref 10–15)
GFR SERPL CREATININE-BSD FRML MDRD: >90 ML/MIN/1.73M2
GLUCOSE BLD-MCNC: 86 MG/DL (ref 70–99)
HCT VFR BLD AUTO: 29.9 % (ref 35–47)
HGB BLD-MCNC: 9.5 G/DL (ref 11.7–15.7)
INR PPP: 1.14 (ref 0.85–1.15)
MAGNESIUM SERPL-MCNC: 1.9 MG/DL (ref 1.8–2.6)
MAGNESIUM SERPL-MCNC: 1.9 MG/DL (ref 1.8–2.6)
MAGNESIUM SERPL-MCNC: 2.1 MG/DL (ref 1.6–2.3)
MCH RBC QN AUTO: 29.1 PG (ref 26.5–33)
MCHC RBC AUTO-ENTMCNC: 31.8 G/DL (ref 31.5–36.5)
MCV RBC AUTO: 92 FL (ref 78–100)
PLATELET # BLD AUTO: 318 10E3/UL (ref 150–450)
PLPETH BLD-MCNC: <10 NG/ML
POPETH BLD-MCNC: <10 NG/ML
POTASSIUM BLD-SCNC: 3.8 MMOL/L (ref 3.4–5.3)
PROT SERPL-MCNC: 5.8 G/DL (ref 6.8–8.8)
RBC # BLD AUTO: 3.26 10E6/UL (ref 3.8–5.2)
SODIUM SERPL-SCNC: 140 MMOL/L (ref 133–144)
WBC # BLD AUTO: 8.9 10E3/UL (ref 4–11)

## 2022-02-22 PROCEDURE — 83735 ASSAY OF MAGNESIUM: CPT

## 2022-02-22 PROCEDURE — 86923 COMPATIBILITY TEST ELECTRIC: CPT | Performed by: STUDENT IN AN ORGANIZED HEALTH CARE EDUCATION/TRAINING PROGRAM

## 2022-02-22 PROCEDURE — 86850 RBC ANTIBODY SCREEN: CPT | Performed by: PHYSICIAN ASSISTANT

## 2022-02-22 PROCEDURE — 250N000013 HC RX MED GY IP 250 OP 250 PS 637: Performed by: STUDENT IN AN ORGANIZED HEALTH CARE EDUCATION/TRAINING PROGRAM

## 2022-02-22 PROCEDURE — 85610 PROTHROMBIN TIME: CPT | Performed by: INTERNAL MEDICINE

## 2022-02-22 PROCEDURE — 250N000013 HC RX MED GY IP 250 OP 250 PS 637: Performed by: INTERNAL MEDICINE

## 2022-02-22 PROCEDURE — 99233 SBSQ HOSP IP/OBS HIGH 50: CPT | Mod: GC | Performed by: INTERNAL MEDICINE

## 2022-02-22 PROCEDURE — 82248 BILIRUBIN DIRECT: CPT

## 2022-02-22 PROCEDURE — 85027 COMPLETE CBC AUTOMATED: CPT

## 2022-02-22 PROCEDURE — 93970 EXTREMITY STUDY: CPT

## 2022-02-22 PROCEDURE — 250N000011 HC RX IP 250 OP 636: Performed by: STUDENT IN AN ORGANIZED HEALTH CARE EDUCATION/TRAINING PROGRAM

## 2022-02-22 PROCEDURE — 86923 COMPATIBILITY TEST ELECTRIC: CPT | Performed by: INTERNAL MEDICINE

## 2022-02-22 PROCEDURE — 250N000013 HC RX MED GY IP 250 OP 250 PS 637

## 2022-02-22 PROCEDURE — 93970 EXTREMITY STUDY: CPT | Mod: 26 | Performed by: RADIOLOGY

## 2022-02-22 PROCEDURE — 97530 THERAPEUTIC ACTIVITIES: CPT | Mod: GO

## 2022-02-22 PROCEDURE — 82310 ASSAY OF CALCIUM: CPT

## 2022-02-22 PROCEDURE — 97535 SELF CARE MNGMENT TRAINING: CPT | Mod: GO

## 2022-02-22 PROCEDURE — 214N000001 HC R&B CCU UMMC

## 2022-02-22 RX ORDER — POTASSIUM CHLORIDE 750 MG/1
10 TABLET, EXTENDED RELEASE ORAL ONCE
Status: COMPLETED | OUTPATIENT
Start: 2022-02-22 | End: 2022-02-22

## 2022-02-22 RX ADMIN — ALTEPLASE 2 MG: 2.2 INJECTION, POWDER, LYOPHILIZED, FOR SOLUTION INTRAVENOUS at 19:50

## 2022-02-22 RX ADMIN — HEPARIN SODIUM 5000 UNITS: 5000 INJECTION, SOLUTION INTRAVENOUS; SUBCUTANEOUS at 19:15

## 2022-02-22 RX ADMIN — HYDROXYZINE HYDROCHLORIDE 50 MG: 50 TABLET, FILM COATED ORAL at 21:38

## 2022-02-22 RX ADMIN — ALTEPLASE 2 MG: 2.2 INJECTION, POWDER, LYOPHILIZED, FOR SOLUTION INTRAVENOUS at 21:38

## 2022-02-22 RX ADMIN — ASPIRIN 81 MG CHEWABLE TABLET 81 MG: 81 TABLET CHEWABLE at 08:14

## 2022-02-22 RX ADMIN — ALTEPLASE 2 MG: 2.2 INJECTION, POWDER, LYOPHILIZED, FOR SOLUTION INTRAVENOUS at 19:52

## 2022-02-22 RX ADMIN — THIAMINE HCL TAB 100 MG 100 MG: 100 TAB at 08:14

## 2022-02-22 RX ADMIN — POTASSIUM CHLORIDE 10 MEQ: 750 TABLET, EXTENDED RELEASE ORAL at 08:14

## 2022-02-22 RX ADMIN — Medication 1 TABLET: at 08:14

## 2022-02-22 RX ADMIN — HEPARIN SODIUM 5000 UNITS: 5000 INJECTION, SOLUTION INTRAVENOUS; SUBCUTANEOUS at 08:13

## 2022-02-22 RX ADMIN — DAPAGLIFLOZIN 5 MG: 5 TABLET, FILM COATED ORAL at 08:14

## 2022-02-22 RX ADMIN — ATORVASTATIN CALCIUM 40 MG: 40 TABLET, FILM COATED ORAL at 08:14

## 2022-02-22 ASSESSMENT — ACTIVITIES OF DAILY LIVING (ADL)
ADLS_ACUITY_SCORE: 8
ADLS_ACUITY_SCORE: 10
ADLS_ACUITY_SCORE: 8
ADLS_ACUITY_SCORE: 8
ADLS_ACUITY_SCORE: 10
ADLS_ACUITY_SCORE: 10
ADLS_ACUITY_SCORE: 8
ADLS_ACUITY_SCORE: 8
ADLS_ACUITY_SCORE: 10
ADLS_ACUITY_SCORE: 8
ADLS_ACUITY_SCORE: 8
ADLS_ACUITY_SCORE: 10
ADLS_ACUITY_SCORE: 8
ADLS_ACUITY_SCORE: 10
ADLS_ACUITY_SCORE: 8
ADLS_ACUITY_SCORE: 10
ADLS_ACUITY_SCORE: 10
ADLS_ACUITY_SCORE: 8
ADLS_ACUITY_SCORE: 8

## 2022-02-22 NOTE — PLAN OF CARE
D: presented to [facility] 2/12 as a transfer from OSH for consideration of advanced therapies in the setting of cardiogenic shock requiring inotropic support. Started on dobutamine and awaiting CABG (LVAD planned as back up plan).     PMHx: newly-diagnosed HFrEF (10-15% 2/11/22) 2/2 ICM, CAD (diagnosed 1/2022), COPD (diagnosed 1/2022), hyperlipidemia, and cigarette smoking (quit in 12/2021)     I: Monitored vitals and assessed pt status. Encouraged activity.      Changed: No changes overnight   IV Access: PICC triple lumen   Running:  dobutamine at 2.5 mcg/kg/min  PRN: NA  Tele: SR  O2: pt requested 2 L o2 at night for comfort rt anxiety.   Mobility: Indeendently going to commode SBA when moving outside the room or to the bathroom.     A: A&Ox4. VSS. Afebrile. Pt is nervous/anxious about surgery. No pain. No numbness or tingling. Exertional SOB. Makes needs known. Pt did not go to sleep until well after midnight. Voiding adequately.      P: Continue to monitor pt status and report changes to treatment team. CABG 2/24 and if revascularization is not an option then LVAD will be placed.

## 2022-02-22 NOTE — PROGRESS NOTES
CLINICAL NUTRITION SERVICES - REASSESSMENT NOTE     Nutrition Prescription    RECOMMENDATIONS FOR MDs/PROVIDERS TO ORDER:  Consider liberalizing diet order to help encourage oral intake.     Malnutrition Status:    Moderate malnutrition in the context of chronic illness    Recommendations already ordered by Registered Dietitian (RD):  Oral supplements    Future/Additional Recommendations:  1. Encourage small, frequent meals and intake of oral supplements. Rec high protein/kcal options as able.   2. Continue fluid restriction as per team.   3. Continue thera-vit-M, as ordered, to help ensure micronutrient needs are met with upcoming surgery.   4. Continue thiamine. Could check a thiamine lab.   5. Consider rechecking phos. Phos was 4.8 on 2/15/2022.   6. If TF becomes nutrition plan of care, would rec place feeding tube (FT) and initiate TFs, Osmolite 1.5 (concentrated, maintenance TF formula) and order Prosource TF modular, 1 pkt twice daily. Initiate TFs at 15 mL/hr, advancing rate by 10 mL Q 8 hrs (or per provider discretion, pending hemodynamic stability) to goal rate of 45 mL/hr. Osmolite 1.5 Byron @ goal of  45ml/hr  (1080ml/day)  will provide: 1620+ kcals, 67+ g PRO, 822 ml free H20, 219 g CHO, and 0 g fiber daily. Each packet of ProSource TF modular provides 40 kcals and 11 g protein.      If begin tube feeds:    - Flush FT with 30 mL water Q 4 hrs for patency. Rec provider adjust free water flushes as needed, pending fluid status.   - Ensure K+/Mg++/Phos labs are ordered daily until TFs advance to goal infusion to evaluate for sx of refeeding with nutrition received. If lytes trend low, aggressively replace lytes. Ok to advance TF if K+ >3, Mg++ > 1.5,  and Phos > 1.9.   - If not already ordered, order a multivitamin/mineral (certavite or liquid multivitamin/minerals 15 mL/day via FT) to help ensure micronutrient needs being met with suspected hypermetabolic demands and potential interruptions to TF  "infusions.   - Monitor TF and possible need to adjust nutrition support regimen if necessary, pending medical course and nutrition status.       - Monitor need to check a metabolic cart study    - Send a nutrition consult for \"Registered Dietitian to Order TF per Medical Nutrition Therapy Guidelines\" if desire RD to order TFs.      EVALUATION OF THE PROGRESS TOWARD GOALS   Diet: 2 g Sodium and 2000 mL Fluid Restriction since 2/12. Has a prn snack/supplement order (per RN 2/21, team has been encouraging pt to consume oral supplements due to upcoming surgery)  Intake: Fair diet tolerance. Per % intake flowsheets, pt consuming % with a good appetite 2/16, 75% on 2/17, % with a good appetite 2/18, 80% with a good appetite 2/19, 100% with a god appetite 2/20, 25-50% with a fair appetite 2/21, and 75% with a good appetite 2/22. Per discussion with pt (2/22), she is eating less than usual. States she does not have a \"'s appetite.\" Has been eating four small meals per day. Admits to having early satiety. Likes chocolate or strawberry Ensure Enlive and butter pecan Glucerna so far. Per RN 2/21, \"Patient with minimal appetite and needing encouragement.\" RN later in the evening mentioned pt has \"fair appetite, denies nausea.\" D1GTouch (meal ordering system) indicates food/beverages sent 2/19-2/21 totaled 1074 kcals and 45 g protein daily on average which does not meet estimated needs below.      NEW FINDINGS   Resp: On 1 L O2  General: Feels fatigued and anxious about surgery per chart 2/21.   Stools: Having zero to two stools daily. Last stool on 2/22. Stools are brown and soft/formed. No N/V or abdominal pain 2/21.    Wt Hx: 61.2 kg (2/9/2021), 61.4 kg (8/12/21), 56.8 kg (1/24/2022), 54 kg (2/12/2022, admit), 53.4 kg (2/21) - Pt has lost 6% of wt over the last month. She has lost 13% of her body wt over the last approximate six months. Diuresis this admission and pt reports fluid loss as well. Pt " potentially may have lost some actual wt.     ASSESSED NUTRITION NEEDS (updated)  Dosing Weight: 52 kg (based on lowest wt this admission of 51.6 kg on 2/19)  Estimated Energy Needs: 1436-1623 kcals/day (30-35 kcals/kg)  Justification: Increased needs with wt status (rec the low end of this range minimally)  Estimated Protein Needs: 65-81+ grams protein/day (1.2 - 1.5+ grams of pro/kg)  Justification: Increased needs with upcoming surgery. Pt will require up to  g protein/kg (1.5-2 g protein/kg) if receives an LVAD  Estimated Fluid Needs: 1 mL/kcal/day   Justification: Per provider pending fluid status    MALNUTRITION  % Intake: < 75% for > 7 days (moderate)  % Weight Loss: Difficult to assess actual wt loss from fluid loss.   Subcutaneous Fat Loss: Facial region:  Mild - Pt mostly covered and resting in bed.  Muscle Loss: Temporal:  Mild and Thoracic region (clavicle, acromium bone, deltoid, trapezius, pectoral):  Mild  - Pt mostly covered and resting in bed.  Fluid Accumulation/Edema: None noted  Malnutrition Diagnosis: Moderate malnutrition in the context of chronic illness    Previous Goals   Patient to consume % of nutritionally adequate meal trays TID, or the equivalent with supplements/snacks.  Evaluation: Not met    Previous Nutrition Diagnosis  Predicted inadequate nutrient intake kcals/pro related to dysphagia and potential inadequate PO intakes  Evaluation: Unresolved, declining. Changed to new nutrition dx.    CURRENT NUTRITION DIAGNOSIS  Inadequate oral intake related to decreased appetite, early satiety, and possibly due to anxiety with upcoming surgery as evidenced by food/beverages sent 2/19-2/21 totaled 1074 kcals and 45 g protein daily on average which does not meet estimated needs of 0159-9317 kcals/day (30-35 kcals/kg) and 65-81+ grams protein/day (1.2 - 1.5+ grams of pro/kg).    INTERVENTIONS  Implementation  Medical food supplement therapy: Discussed oral supplements. Ordered  strawberry or chocolate Ensure Enlive at 14:00 and butter pecan Glucerna at HS  Nutrition education for nutrition relationship to health/disease: Reinforced diet order. Gave sodium room service menu. Encouraged small, frequent meals and intake at meals and oral supplements with upcoming surgery.     Goals  1. Diet adv v nutrition support within 2-3 days once post-op.  2. Patient to consume % of nutritionally adequate meal trays TID, or the equivalent with supplements/snacks (or the equivalent to meet estimated needs if pt to require nutrition support).    Monitoring/Evaluation  Progress toward goals will be monitored and evaluated per protocol.     Nutrition will continue to follow.      Asha Dykes, MS, RD, LD, [facility]   6C Pgr: [phone]

## 2022-02-22 NOTE — PLAN OF CARE
Pt admitted 2/12 with cardiogenic shock s/p inotrope support.  Dobutamine gtt continues at 2.5 mcg/kg/min (4.1 ml/hr) thru PICC.  BP remain better on LUE forearm.   SR on RA with ELLER and denied pain.  CABG tentatively planned for 2/24.  LVAD w/up ongoing.  Replaced 10 mEq of K.  Otherwise, pt still anxious and up SBA.  Continue to monitor and with POC.

## 2022-02-22 NOTE — PROGRESS NOTES
[facility]  Cardiology II Service / Advanced Heart Failure  Daily Progress Note    Patient: Ashley Osman      : 1950      MRN: [id number]    Assessment/Plan:   72yo female w/ PMHx of newly-diagnosed HFrEF (10-15% 22) 2/2 ICM, CAD (diagnosed 2022), COPD (diagnosed 2022), hyperlipidemia, and cigarette smoking (quit in 2021) who presented to [facility]  as a transfer from OS for consideration of advanced therapies in the setting of cardiogenic shock requiring inotropic support.     Today's changes:  - Continuing Dobutamine 2.5mcg/kg/min  - Pending further revascularization planning this week - tentatively scheduled for CABG  and likely ICU transfer  for PA catheter +/- IABP    # Cardiogenic shock  # Acute on Chronic HFrEF 2/2 ICM (EF 10-15%) AHA Stage D, NYHA Class III-IV  # LV thrombus   # CAD (mLAD , severe prox and distal LAD, LCx and D1 disease)  Initially requiring inotropic and vasoactive support briefly. Cause of most recent decompensation is unclear although thought to be secondary to another ACS given sudden onset L shoulder pain. CMRI () showing viability of 50% in LAD territory (intermediate likelihood of recovery) and 75% in dominant LCx territory (good likelihood of recovery). Planned to assess feasibility of revascularization w/ PCI with Interventional Cardiology vs CABG w/ CVTS - opting for CAB based on review of prior angiogram and cMRI. Had initiated LVAD evaluation since she is high-risk for any planned interventions and, if revascularization is not feasible, will need LVAD given Stage D and inability to be on GDMT due to low blood pressure. Will need ICD ppx prior to discharge if no advance therapies offered.  Plan:  - Procedural planning:  - Likely ICU transfer  for PA catheter, possibly IABP  - Tentatively scheduled for  for CABG  - Continuing Dobutamine 2.5 mcg/kg/min (restarted )  - Dapaglaflozin 5mg  daily  - Previously intolerant of Isordil and Hydralazine (2/16)  - Continuing ASA 81mg daily and Atorvastatin 40mg daily  - Vascular Surgery has evaluated, feel no contraindications for proceeding with revascularization procedure - completed carotid and extremity US imaging  - Daily electrolyte monitoring - goals K>4 and Mg>2   - 2g Na diet, 2L fluid restriction     LVAD/transplant work up:   [x]?? Labs (CBC, CMP, PT/INR, cystatin C, prealbumin, UA + micro)  [x]?? Infectious  [x]?? Utox/nicotine and cotinine/PeTH   []?? Immunocompatibility (last transfusion, ABO, HLA tissue typing, PRA)  [x]?? CVTS consult  [x]?? Social work evaluation  [x]?? Palliative care evaluation  [x]?? Neuropsych evaluation  [x]?? Nutrition evaluation  [x]?? CT Dental + evaluation- cleared  [x]?? Abd US + doppler  [x]?? Extremity US and ABIs- 2/8/22: normal PAIGE's bilaterally.  [x]?? Carotid US (if DM or ICM or >51yo) once SG catheter is put  [x]?? PFTs    [x]?? Dexascan: 11/6/2019: Moderate low bone densit  [x]?? CT head non-contrast: 2/8/22:   [x]?? CT CAP non-contrast: pulmonary nodules   []?? Colonoscopy (>49 yo)   []?? Mammogram   []?? Pap test    ================================  Chronic Problems:  #COPD - appears newly diagnosed from 1/2022, stable - continuing PTA Umeclidinium  #Pulmonary Nodule - Hx of tobacco use, may require PET scan in future  #Malnutrition - Level of malnutrition: Moderate     Hospital Checklist:  DVT Prophylaxis: Heparin SQ   Code Status: Full Code  Bowel regimen: N/A - last BM 2/21  Diet/Nutrition: Restrictions as per above  Lines: PICC     Disposition Planning:  Anticipate discharge in >7 days pending coronary interventional planning.    Staffed w/ Dr. Carter.    Lakhwinder Cabrales MD  Internal Medicine PGY-1  Cardiology II Service  ASCOM #[id number]  02/22/2022  ==================================    Subjective:   No acute overnight events. Slept better last night. Prior episodes of anxiety two nights ago, none last  night. Reports appetite is about the same, feels she gets full quickly but this is not new. Energy is good, ambulated up stairs yesterday. No new chest pain, dyspnea, palpitations ,abdominal pain, n/v.    Objective:     Vitals:    02/19/22 0923 02/20/22 0804 02/21/22 0400   Weight: 51.6 kg (113 lb 11.2 oz) 53 kg (116 lb 12.8 oz) 53.4 kg (117 lb 12.8 oz)     Vital Signs with Ranges  Temp:  [97.6  F (36.4  C)-98.7  F (37.1  C)] 98.3  F (36.8  C)  Pulse:  [80-97] 85  Resp:  [18-20] 18  BP: ()/(47-74) 102/67  Cuff Mean (mmHg):  [65-79] 76  SpO2:  [89 %-97 %] 92 %  I/O last 3 completed shifts:  In: 724.48 [P.O.:650; I.V.:74.48]  Out: 350 [Urine:350]    Exam:  General: No acute distress, resting laying back upright in bed prior to evaluation, conversational in soft voice  HEENT: MMM  Neck: No overt JVD visualized  CV: RRR, no audible murmurs  Lungs: On room air, CTA b/l w/o wheezes or crackles, no increased WOB  Abd: Soft, non-tender, non-distended  Ext: Warm and well-perfused, no appreciable lower extremity edema  Neuro: Awake, alert, conversational, moving all extremities spontaneously throughout examination    Medications     DOBUTamine 2.5 mcg/kg/min (02/22/22 0812)     - MEDICATION INSTRUCTIONS -       ACE/ARB/ARNI NOT PRESCRIBED       BETA BLOCKER NOT PRESCRIBED         aspirin  81 mg Oral Daily     atorvastatin  40 mg Oral QAM     dapagliflozin  5 mg Oral Daily     heparin ANTICOAGULANT  5,000 Units Subcutaneous Q12H     influenza vac high-dose quad  0.7 mL Intramuscular Prior to discharge     multivitamin w/minerals  1 tablet Oral Daily     thiamine  100 mg Oral Daily     umeclidinium  1 puff Inhalation Daily       Data   Recent Labs   Lab 02/22/22  0457 02/21/22  0317 02/20/22  1636 02/20/22  0538 02/20/22  0537   WBC 8.9 9.5  --  8.6  --    HGB 9.5* 9.7*  --  9.5*  --    MCV 92 91  --  89  --     338  --  286  --    INR 1.14 1.18*  --   --  1.14    138 139  --  139   POTASSIUM 3.8 4.1 3.6  --   3.8   CHLORIDE 110* 108 109  --  108   CO2 25 24 24  --  23   BUN 17 17 17  --  18   CR 0.62 0.65 0.62  --  0.69   ANIONGAP 5 6 6  --  8   HEIDI 8.8 8.8 8.8  --  8.8   GLC 86 90 138*  --  84   ALBUMIN 2.8* 2.8*  --   --  2.8*   PROTTOTAL 5.8* 6.1*  --   --  6.0*   BILITOTAL 0.6 0.6  --   --  0.6   ALKPHOS 67 69  --   --  72   ALT 33 36  --   --  42   AST 20 20  --   --  23       No results found for this or any previous visit (from the past 24 hour(s)).

## 2022-02-22 NOTE — PLAN OF CARE
Neuro: A&Ox4. Torres Martinez  Cardiac: Afebrile, SR, soft BPs continue, patient denies symptoms.   Respiratory: RA. ELLER.  GI/: Voiding spontaneously. Reports voiding less than usual, but is also on a fluid restriction. No BM this shift.   Diet/appetite: Fair appetite. Denies nausea   Activity: Ambulated to bathroom and then in hallways with RN.  Pain: . Denies   Skin: No new deficits noted.   Lines: TL PICC. One lumen with dobutamin@2.5mcg/kg/min.  Replacements: Awaiting evening Mg result.  Plan: Tentative CAB with LVAD backup for Thursday.

## 2022-02-23 ENCOUNTER — APPOINTMENT (OUTPATIENT)
Dept: GENERAL RADIOLOGY | Facility: CLINIC | Age: 72
DRG: 233 | End: 2022-02-23
Attending: STUDENT IN AN ORGANIZED HEALTH CARE EDUCATION/TRAINING PROGRAM
Payer: MEDICARE

## 2022-02-23 LAB
ABO/RH(D): NORMAL
ALBUMIN SERPL-MCNC: 2.9 G/DL (ref 3.4–5)
ALP SERPL-CCNC: 87 U/L (ref 40–150)
ALT SERPL W P-5'-P-CCNC: 32 U/L (ref 0–50)
ANION GAP SERPL CALCULATED.3IONS-SCNC: 8 MMOL/L (ref 3–14)
ANION GAP SERPL CALCULATED.3IONS-SCNC: 9 MMOL/L (ref 3–14)
ANTIBODY SCREEN: NEGATIVE
AST SERPL W P-5'-P-CCNC: 23 U/L (ref 0–45)
ATRIAL RATE - MUSE: 89 BPM
BASE EXCESS BLDA CALC-SCNC: -4.8 MMOL/L (ref -9–1.8)
BASE EXCESS BLDA CALC-SCNC: -6.1 MMOL/L (ref -9–1.8)
BASE EXCESS BLDV CALC-SCNC: 0.7 MMOL/L (ref -7.7–1.9)
BASE EXCESS BLDV CALC-SCNC: 1.9 MMOL/L (ref -7.7–1.9)
BILIRUB DIRECT SERPL-MCNC: 0.1 MG/DL (ref 0–0.2)
BILIRUB SERPL-MCNC: 0.4 MG/DL (ref 0.2–1.3)
BUN SERPL-MCNC: 25 MG/DL (ref 7–30)
BUN SERPL-MCNC: 28 MG/DL (ref 7–30)
CA-I BLD-MCNC: 4.8 MG/DL (ref 4.4–5.2)
CALCIUM SERPL-MCNC: 8.7 MG/DL (ref 8.5–10.1)
CALCIUM SERPL-MCNC: 9 MG/DL (ref 8.5–10.1)
CHLORIDE BLD-SCNC: 107 MMOL/L (ref 94–109)
CHLORIDE BLD-SCNC: 109 MMOL/L (ref 94–109)
CO2 SERPL-SCNC: 24 MMOL/L (ref 20–32)
CO2 SERPL-SCNC: 24 MMOL/L (ref 20–32)
CREAT SERPL-MCNC: 0.82 MG/DL (ref 0.52–1.04)
CREAT SERPL-MCNC: 1.02 MG/DL (ref 0.52–1.04)
DIASTOLIC BLOOD PRESSURE - MUSE: NORMAL MMHG
ERYTHROCYTE [DISTWIDTH] IN BLOOD BY AUTOMATED COUNT: 15.6 % (ref 10–15)
ERYTHROCYTE [DISTWIDTH] IN BLOOD BY AUTOMATED COUNT: 15.7 % (ref 10–15)
GFR SERPL CREATININE-BSD FRML MDRD: 58 ML/MIN/1.73M2
GFR SERPL CREATININE-BSD FRML MDRD: 75 ML/MIN/1.73M2
GLUCOSE BLD-MCNC: 119 MG/DL (ref 70–99)
GLUCOSE BLD-MCNC: 190 MG/DL (ref 70–99)
GLUCOSE BLDC GLUCOMTR-MCNC: 117 MG/DL (ref 70–99)
HCO3 BLD-SCNC: 21 MMOL/L (ref 21–28)
HCO3 BLD-SCNC: 22 MMOL/L (ref 21–28)
HCO3 BLDV-SCNC: 26 MMOL/L (ref 21–28)
HCO3 BLDV-SCNC: 26 MMOL/L (ref 21–28)
HCT VFR BLD AUTO: 31.8 % (ref 35–47)
HCT VFR BLD AUTO: 36.9 % (ref 35–47)
HGB BLD-MCNC: 10 G/DL (ref 11.7–15.7)
HGB BLD-MCNC: 10.5 G/DL (ref 11.7–15.7)
HGB BLD-MCNC: 11.5 G/DL (ref 11.7–15.7)
INR PPP: 1.1 (ref 0.85–1.15)
INTERPRETATION ECG - MUSE: NORMAL
LACTATE SERPL-SCNC: 1.5 MMOL/L (ref 0.7–2)
MAGNESIUM SERPL-MCNC: 2.1 MG/DL (ref 1.6–2.3)
MCH RBC QN AUTO: 28.3 PG (ref 26.5–33)
MCH RBC QN AUTO: 28.6 PG (ref 26.5–33)
MCHC RBC AUTO-ENTMCNC: 31.2 G/DL (ref 31.5–36.5)
MCHC RBC AUTO-ENTMCNC: 31.4 G/DL (ref 31.5–36.5)
MCV RBC AUTO: 91 FL (ref 78–100)
MCV RBC AUTO: 91 FL (ref 78–100)
O2/TOTAL GAS SETTING VFR VENT: 15 %
O2/TOTAL GAS SETTING VFR VENT: 32 %
O2/TOTAL GAS SETTING VFR VENT: 80 %
O2/TOTAL GAS SETTING VFR VENT: 90 %
OXYHGB MFR BLDV: 52 % (ref 70–75)
OXYHGB MFR BLDV: 62 % (ref 70–75)
OXYHGB MFR BLDV: 79 % (ref 92–100)
P AXIS - MUSE: 51 DEGREES
PCO2 BLD: 40 MM HG (ref 35–45)
PCO2 BLD: 52 MM HG (ref 35–45)
PCO2 BLDV: 39 MM HG (ref 40–50)
PCO2 BLDV: 43 MM HG (ref 40–50)
PH BLD: 7.23 [PH] (ref 7.35–7.45)
PH BLD: 7.33 [PH] (ref 7.35–7.45)
PH BLDV: 7.39 [PH] (ref 7.32–7.43)
PH BLDV: 7.43 [PH] (ref 7.32–7.43)
PHOSPHATE SERPL-MCNC: 5.4 MG/DL (ref 2.5–4.5)
PLATELET # BLD AUTO: 323 10E3/UL (ref 150–450)
PLATELET # BLD AUTO: 385 10E3/UL (ref 150–450)
PO2 BLD: 64 MM HG (ref 80–105)
PO2 BLD: 65 MM HG (ref 80–105)
PO2 BLDV: 30 MM HG (ref 25–47)
PO2 BLDV: 35 MM HG (ref 25–47)
POTASSIUM BLD-SCNC: 3.6 MMOL/L (ref 3.4–5.3)
POTASSIUM BLD-SCNC: 4.1 MMOL/L (ref 3.4–5.3)
PR INTERVAL - MUSE: 124 MS
PROT SERPL-MCNC: 6.6 G/DL (ref 6.8–8.8)
QRS DURATION - MUSE: 94 MS
QT - MUSE: 394 MS
QTC - MUSE: 479 MS
R AXIS - MUSE: -13 DEGREES
RBC # BLD AUTO: 3.5 10E6/UL (ref 3.8–5.2)
RBC # BLD AUTO: 4.06 10E6/UL (ref 3.8–5.2)
SODIUM SERPL-SCNC: 139 MMOL/L (ref 133–144)
SODIUM SERPL-SCNC: 142 MMOL/L (ref 133–144)
SPECIMEN EXPIRATION DATE: NORMAL
SYSTOLIC BLOOD PRESSURE - MUSE: NORMAL MMHG
T AXIS - MUSE: 73 DEGREES
TROPONIN I SERPL HS-MCNC: 53 NG/L
TROPONIN I SERPL HS-MCNC: 60 NG/L
UFH PPP CHRO-ACNC: <0.1 IU/ML
VENTRICULAR RATE- MUSE: 89 BPM
WBC # BLD AUTO: 11.3 10E3/UL (ref 4–11)
WBC # BLD AUTO: 19.2 10E3/UL (ref 4–11)

## 2022-02-23 PROCEDURE — 250N000011 HC RX IP 250 OP 636: Performed by: STUDENT IN AN ORGANIZED HEALTH CARE EDUCATION/TRAINING PROGRAM

## 2022-02-23 PROCEDURE — 83735 ASSAY OF MAGNESIUM: CPT

## 2022-02-23 PROCEDURE — 83735 ASSAY OF MAGNESIUM: CPT | Performed by: INTERNAL MEDICINE

## 2022-02-23 PROCEDURE — 250N000009 HC RX 250: Performed by: INTERNAL MEDICINE

## 2022-02-23 PROCEDURE — 71045 X-RAY EXAM CHEST 1 VIEW: CPT

## 2022-02-23 PROCEDURE — 999N000248 HC STATISTIC IV INSERT WITH US BY RN

## 2022-02-23 PROCEDURE — 84100 ASSAY OF PHOSPHORUS: CPT | Performed by: INTERNAL MEDICINE

## 2022-02-23 PROCEDURE — C1894 INTRO/SHEATH, NON-LASER: HCPCS | Performed by: INTERNAL MEDICINE

## 2022-02-23 PROCEDURE — 250N000011 HC RX IP 250 OP 636

## 2022-02-23 PROCEDURE — 85027 COMPLETE CBC AUTOMATED: CPT

## 2022-02-23 PROCEDURE — 250N000011 HC RX IP 250 OP 636: Performed by: NURSE PRACTITIONER

## 2022-02-23 PROCEDURE — 71045 X-RAY EXAM CHEST 1 VIEW: CPT | Mod: 26 | Performed by: RADIOLOGY

## 2022-02-23 PROCEDURE — 85520 HEPARIN ASSAY: CPT | Performed by: INTERNAL MEDICINE

## 2022-02-23 PROCEDURE — 84484 ASSAY OF TROPONIN QUANT: CPT | Performed by: STUDENT IN AN ORGANIZED HEALTH CARE EDUCATION/TRAINING PROGRAM

## 2022-02-23 PROCEDURE — 999N000185 HC STATISTIC TRANSPORT TIME EA 15 MIN

## 2022-02-23 PROCEDURE — 82805 BLOOD GASES W/O2 SATURATION: CPT | Performed by: STUDENT IN AN ORGANIZED HEALTH CARE EDUCATION/TRAINING PROGRAM

## 2022-02-23 PROCEDURE — 80053 COMPREHEN METABOLIC PANEL: CPT

## 2022-02-23 PROCEDURE — 250N000011 HC RX IP 250 OP 636: Performed by: INTERNAL MEDICINE

## 2022-02-23 PROCEDURE — 272N000011 HC KIT CATH SVO2/CCO PA SUPPLY

## 2022-02-23 PROCEDURE — 85610 PROTHROMBIN TIME: CPT | Performed by: INTERNAL MEDICINE

## 2022-02-23 PROCEDURE — 999N000065 XR CHEST PORT 1 VIEW

## 2022-02-23 PROCEDURE — 99153 MOD SED SAME PHYS/QHP EA: CPT | Performed by: INTERNAL MEDICINE

## 2022-02-23 PROCEDURE — 99233 SBSQ HOSP IP/OBS HIGH 50: CPT | Mod: GC | Performed by: INTERNAL MEDICINE

## 2022-02-23 PROCEDURE — 250N000013 HC RX MED GY IP 250 OP 250 PS 637: Performed by: STUDENT IN AN ORGANIZED HEALTH CARE EDUCATION/TRAINING PROGRAM

## 2022-02-23 PROCEDURE — 83605 ASSAY OF LACTIC ACID: CPT

## 2022-02-23 PROCEDURE — 999N000045 HC STATISTIC DAILY SWAN MONITORING

## 2022-02-23 PROCEDURE — 250N000009 HC RX 250: Performed by: STUDENT IN AN ORGANIZED HEALTH CARE EDUCATION/TRAINING PROGRAM

## 2022-02-23 PROCEDURE — 272N000001 HC OR GENERAL SUPPLY STERILE: Performed by: INTERNAL MEDICINE

## 2022-02-23 PROCEDURE — 71045 X-RAY EXAM CHEST 1 VIEW: CPT | Mod: 26 | Performed by: STUDENT IN AN ORGANIZED HEALTH CARE EDUCATION/TRAINING PROGRAM

## 2022-02-23 PROCEDURE — 258N000003 HC RX IP 258 OP 636: Performed by: STUDENT IN AN ORGANIZED HEALTH CARE EDUCATION/TRAINING PROGRAM

## 2022-02-23 PROCEDURE — 93005 ELECTROCARDIOGRAM TRACING: CPT

## 2022-02-23 PROCEDURE — 85018 HEMOGLOBIN: CPT | Performed by: STUDENT IN AN ORGANIZED HEALTH CARE EDUCATION/TRAINING PROGRAM

## 2022-02-23 PROCEDURE — 272N000057 HC CATH BALLOON IABP

## 2022-02-23 PROCEDURE — 250N000013 HC RX MED GY IP 250 OP 250 PS 637: Performed by: INTERNAL MEDICINE

## 2022-02-23 PROCEDURE — 93451 RIGHT HEART CATH: CPT | Performed by: INTERNAL MEDICINE

## 2022-02-23 PROCEDURE — 999N000215 HC STATISTIC HFNC ADULT NON-CPAP

## 2022-02-23 PROCEDURE — 33967 INSERT I-AORT PERCUT DEVICE: CPT | Performed by: INTERNAL MEDICINE

## 2022-02-23 PROCEDURE — 82810 BLOOD GASES O2 SAT ONLY: CPT

## 2022-02-23 PROCEDURE — 82330 ASSAY OF CALCIUM: CPT | Performed by: INTERNAL MEDICINE

## 2022-02-23 PROCEDURE — 82803 BLOOD GASES ANY COMBINATION: CPT | Performed by: NURSE PRACTITIONER

## 2022-02-23 PROCEDURE — 999N000075 HC STATISTIC IABP MONITORING

## 2022-02-23 PROCEDURE — 999N000077 HC STATISTIC INSERT IABP

## 2022-02-23 PROCEDURE — 93010 ELECTROCARDIOGRAM REPORT: CPT | Mod: 76 | Performed by: INTERNAL MEDICINE

## 2022-02-23 PROCEDURE — 99152 MOD SED SAME PHYS/QHP 5/>YRS: CPT | Performed by: INTERNAL MEDICINE

## 2022-02-23 PROCEDURE — 94660 CPAP INITIATION&MGMT: CPT

## 2022-02-23 PROCEDURE — 999N000157 HC STATISTIC RCP TIME EA 10 MIN

## 2022-02-23 PROCEDURE — 82248 BILIRUBIN DIRECT: CPT

## 2022-02-23 PROCEDURE — 200N000002 HC R&B ICU UMMC

## 2022-02-23 PROCEDURE — 85018 HEMOGLOBIN: CPT

## 2022-02-23 RX ORDER — FUROSEMIDE 10 MG/ML
80 INJECTION INTRAMUSCULAR; INTRAVENOUS ONCE
Status: COMPLETED | OUTPATIENT
Start: 2022-02-23 | End: 2022-02-23

## 2022-02-23 RX ORDER — FENTANYL CITRATE 50 UG/ML
INJECTION, SOLUTION INTRAMUSCULAR; INTRAVENOUS
Status: DISCONTINUED | OUTPATIENT
Start: 2022-02-23 | End: 2022-02-23 | Stop reason: HOSPADM

## 2022-02-23 RX ORDER — NALOXONE HYDROCHLORIDE 0.4 MG/ML
0.2 INJECTION, SOLUTION INTRAMUSCULAR; INTRAVENOUS; SUBCUTANEOUS
Status: CANCELLED | OUTPATIENT
Start: 2022-02-23

## 2022-02-23 RX ORDER — FUROSEMIDE 10 MG/ML
20 INJECTION INTRAMUSCULAR; INTRAVENOUS ONCE
Status: COMPLETED | OUTPATIENT
Start: 2022-02-23 | End: 2022-02-23

## 2022-02-23 RX ORDER — POTASSIUM CHLORIDE 7.45 MG/ML
10 INJECTION INTRAVENOUS ONCE
Status: COMPLETED | OUTPATIENT
Start: 2022-02-23 | End: 2022-02-23

## 2022-02-23 RX ORDER — NALOXONE HYDROCHLORIDE 0.4 MG/ML
0.4 INJECTION, SOLUTION INTRAMUSCULAR; INTRAVENOUS; SUBCUTANEOUS
Status: CANCELLED | OUTPATIENT
Start: 2022-02-23

## 2022-02-23 RX ORDER — METHYLPREDNISOLONE SODIUM SUCCINATE 125 MG/2ML
125 INJECTION, POWDER, LYOPHILIZED, FOR SOLUTION INTRAMUSCULAR; INTRAVENOUS ONCE
Status: COMPLETED | OUTPATIENT
Start: 2022-02-23 | End: 2022-02-23

## 2022-02-23 RX ORDER — CARBOXYMETHYLCELLULOSE SODIUM 5 MG/ML
1 SOLUTION/ DROPS OPHTHALMIC
Status: DISCONTINUED | OUTPATIENT
Start: 2022-02-23 | End: 2022-03-18 | Stop reason: HOSPADM

## 2022-02-23 RX ORDER — AZITHROMYCIN 250 MG/1
500 TABLET, FILM COATED ORAL DAILY
Status: DISCONTINUED | OUTPATIENT
Start: 2022-02-24 | End: 2022-02-23

## 2022-02-23 RX ORDER — SODIUM CHLORIDE 9 MG/ML
INJECTION, SOLUTION INTRAVENOUS CONTINUOUS
Status: DISCONTINUED | OUTPATIENT
Start: 2022-02-23 | End: 2022-02-24

## 2022-02-23 RX ORDER — CARBOXYMETHYLCELLULOSE SODIUM 5 MG/ML
1 SOLUTION/ DROPS OPHTHALMIC
Status: DISCONTINUED | OUTPATIENT
Start: 2022-02-23 | End: 2022-02-23

## 2022-02-23 RX ORDER — LORAZEPAM 2 MG/ML
1 INJECTION INTRAMUSCULAR ONCE
Status: DISCONTINUED | OUTPATIENT
Start: 2022-02-23 | End: 2022-02-23

## 2022-02-23 RX ORDER — HEPARIN SODIUM 10000 [USP'U]/100ML
0-5000 INJECTION, SOLUTION INTRAVENOUS CONTINUOUS
Status: DISCONTINUED | OUTPATIENT
Start: 2022-02-23 | End: 2022-02-24

## 2022-02-23 RX ORDER — LORAZEPAM 0.5 MG/1
0.5 TABLET ORAL
Status: DISCONTINUED | OUTPATIENT
Start: 2022-02-23 | End: 2022-02-23

## 2022-02-23 RX ADMIN — FUROSEMIDE 20 MG: 10 INJECTION, SOLUTION INTRAVENOUS at 02:35

## 2022-02-23 RX ADMIN — IPRATROPIUM BROMIDE AND ALBUTEROL SULFATE 3 ML: 2.5; .5 SOLUTION RESPIRATORY (INHALATION) at 01:39

## 2022-02-23 RX ADMIN — DAPAGLIFLOZIN 5 MG: 5 TABLET, FILM COATED ORAL at 08:02

## 2022-02-23 RX ADMIN — ASPIRIN 81 MG CHEWABLE TABLET 81 MG: 81 TABLET CHEWABLE at 08:02

## 2022-02-23 RX ADMIN — METHYLPREDNISOLONE SODIUM SUCCINATE 125 MG: 125 INJECTION, POWDER, FOR SOLUTION INTRAMUSCULAR; INTRAVENOUS at 03:44

## 2022-02-23 RX ADMIN — FUROSEMIDE 80 MG: 10 INJECTION, SOLUTION INTRAVENOUS at 03:22

## 2022-02-23 RX ADMIN — HYDROXYZINE HYDROCHLORIDE 50 MG: 50 TABLET, FILM COATED ORAL at 10:06

## 2022-02-23 RX ADMIN — THIAMINE HCL TAB 100 MG 100 MG: 100 TAB at 08:02

## 2022-02-23 RX ADMIN — ATORVASTATIN CALCIUM 40 MG: 40 TABLET, FILM COATED ORAL at 08:02

## 2022-02-23 RX ADMIN — POTASSIUM CHLORIDE 10 MEQ: 7.46 INJECTION, SOLUTION INTRAVENOUS at 08:30

## 2022-02-23 RX ADMIN — DOBUTAMINE IN DEXTROSE 2.5 MCG/KG/MIN: 200 INJECTION, SOLUTION INTRAVENOUS at 10:01

## 2022-02-23 RX ADMIN — AZITHROMYCIN MONOHYDRATE 500 MG: 500 INJECTION, POWDER, LYOPHILIZED, FOR SOLUTION INTRAVENOUS at 03:49

## 2022-02-23 RX ADMIN — HEPARIN SODIUM 5000 UNITS: 5000 INJECTION, SOLUTION INTRAVENOUS; SUBCUTANEOUS at 07:52

## 2022-02-23 RX ADMIN — HEPARIN SODIUM 650 UNITS/HR: 1000 INJECTION INTRAVENOUS; SUBCUTANEOUS at 22:10

## 2022-02-23 RX ADMIN — FUROSEMIDE 80 MG: 10 INJECTION, SOLUTION INTRAVENOUS at 07:52

## 2022-02-23 RX ADMIN — IPRATROPIUM BROMIDE AND ALBUTEROL SULFATE 3 ML: 2.5; .5 SOLUTION RESPIRATORY (INHALATION) at 02:50

## 2022-02-23 RX ADMIN — HYDROXYZINE HYDROCHLORIDE 50 MG: 50 TABLET, FILM COATED ORAL at 21:30

## 2022-02-23 RX ADMIN — ACETAMINOPHEN 650 MG: 325 TABLET, FILM COATED ORAL at 06:47

## 2022-02-23 RX ADMIN — HYDROXYZINE HYDROCHLORIDE 50 MG: 50 TABLET, FILM COATED ORAL at 16:31

## 2022-02-23 RX ADMIN — ACETAMINOPHEN 650 MG: 325 TABLET, FILM COATED ORAL at 21:30

## 2022-02-23 ASSESSMENT — ACTIVITIES OF DAILY LIVING (ADL)
ADLS_ACUITY_SCORE: 10

## 2022-02-23 NOTE — PLAN OF CARE
Goal Outcome Evaluation:    Plan of Care Reviewed With: patient, son     Overall Patient Progress: no change  Shift summary [id number]    D:Pt admitted for evaluation of advanced heart failure therapies. Pt has PMHx of newly-diagnosed HFrEF (10-15% 2/11/22) 2/2 ICM, CAD (diagnosed 1/2022), COPD (diagnosed 1/2022), hyperlipidemia, and cigarette smoking (quit in 12/2021).     A/I: Pt has been in SR/ST ,other VSS. Pt is extremely anxious. Pt's son was here this evening which seem to help . Pt reports getting very ELLER but sating well on RA. Pt very steady on feet but requests someone to walk with her. Pt's lungs diminished bilaterally. Encouraged to use IS as pt only obtaining volumes of 700. Pt eating all evening(grazing), pt appears malnourished.  Pt's PICC unable to draw blood back. PICC line TPA'D. Wheat working red still dwelling and will dwell purple last.Pt is tentatively scheduled for CABG x2 with LVAD back up on Thursday. Pt instructed on Hibiclens shower x 2 tomorrow, supplies brought in room.   P: CABG x 2 with possible LVAD as back up Thursday. Possible transfer to  tomorrow for line placement.

## 2022-02-23 NOTE — PROGRESS NOTES
Summary of events pt care time [id number].     Pt was assessed at midnight. Mild SOB but not uncommon when pt is anxious. Increased from 2L to 3L per patient request for comfort. SPO2 was WDL at that time. 0100 pt got up to commode and when back in bed she experienced SOB and intense anxiety. Attempted therapeutic communication and breathing exercises. At this time SPO2 was stable. Notified cards resident about increase in anxiety anad SOB and asked for more medications to help relieve anxiety since PRNs were already given. Pt began to have increased SOB. Medications not given because pt began to decompensate at 0145. Cards resident arrived and SPO2 was consistently 88-90% with labored wet sounding breathing reporting intense anxiety. My charge nurse came to assist at this point. Requested chest xray, lasix, and an anti-anxiety medication and was denied. MD said to give nebulizer which we attempted but pt did not tolerate. No improvement. Continued to advocate for further imaging or interventions to help relieve symptoms and improve oxygenation status. After both charge nurse and myself attempted to advocate for further interventions and were denied, SpO2 consistently 85-87%, we made the decision to call a rapid response as the patient was requiring increased level of care. Please see rapid response note for specifics. I remained in the room with the patient until a bed became available. Pt was placed on Bipap FiO2 90%. Pt still very anxious and pulling at mask so I had to remain in the room. A total of 100 mg of lasix was given. IVP solumedrol was given. I started the Zithromax IV gtt. Pt remains on dobutamine gtt running at 2.5 mcg/kg/hr. Pt was transferred to  at 0500 and report was given to Wanda BUSTILLO.    See flowsheets for detailed assessments.

## 2022-02-23 NOTE — PLAN OF CARE
"Transfer  Transferred to: 4E  Via:bed  Reason for transfer:Pt no longer appropriate for 6B-worsened patient condition  Belongings: Packed and sent with pt  Chart: Delivered with pt to next unit  Medications: Meds sent to new unit with pt  Report given to: Hailey BUSTILLO  Pt status:      Neuro: A&Ox4. Anxious, prn atarax given x1.   Cardiac: SR. 's on left arm.   Respiratory:  On Bipap 70% FiO2 most of the day. Attempted HFNC throughout the day- tolerated 5-10 min each time. RR 20-30's. Clear/fine crackles at bases.   GI/: Adequate urine output via bedpan. No BM.   Diet/appetite: NPO.  Activity:  Activity minimized. Independently repositions.   Pain: Denied.   Skin: Pale. Blanchable redness on coccyx.   LDA's: L PICC.   Gtt: Dobutamine 2.5 mcg/kg/min.   BP 91/57   Pulse 91   Temp 97.5  F (36.4  C) (Axillary)   Resp (!) 34   Ht 1.638 m (5' 4.5\")   Wt 53.4 kg (117 lb 12.8 oz)   SpO2 100%   BMI 19.91 kg/m            "

## 2022-02-23 NOTE — PROGRESS NOTES
Transfer  Transferred from: 6C  Via:bed  Reason for transfer: Pt appropriate for 6B- worsened patient condition  Family: Aware of transfer  Belongings: Received with pt  Chart: Received with pt  Medications: Meds received from old unit with pt  Code Status verified on armband: yes  2 RN Skin Assessment Completed By: Wanda RN, Suzi RN  Suction/Ambu bag/Flowmeter at bedside: yes/no    Report received from: Mireille JUDGE RN  Pt status: pt on BiPAP 90%, tachypneic and anxious. VSS, slightly tachycardic low 100s.

## 2022-02-23 NOTE — PROVIDER NOTIFICATION
"   02/23/22 0300   Call Information   Date of Call 02/23/22   Time of Call 0237   Name of person requesting the team Mireille QUESADA   Title of person requesting team RN   RRT Arrival time 0239   Time RRT ended 0345   Reason for call   Type of RRT Adult   Primary reason for call Respiratory   Respiratory Rate greater than 24;O2sat less than 88% for greater than 5 minutes despite O2;Labored breathing;Respiratory pattern change   Was patient transferred from the ED, ICU, or PACU within last 24 hours prior to RRT call? No   SBAR   Situation Soon after using the bedside commode, patient started getting anxious resulting in the SpO2 dropping into the mid-80s while on NC at 2 LPM. O2 titrated to 4 LPM then rapidly to 15 LPM via Oxymask when SpO2 stayed put in the high 80s, became tachypneic, and patient's anxiety worsened. Rapid response was called when patient's breathing pattern became labored, with pronounced respiratory accessory muscle use, and looking \"exhausted.\"   Background Newly diagnosed HFrEF (10-15% 2/11/22) due to ICM admitted from OSH for consideration of advanced therapies and inotropic support and LVAD work-up.   Notable History/Conditions Cardiac;Congestive heart failure;COPD   Assessment Cards 2 on-call MD, Dr. Faisal RODAS, in room on arrival of this RN. Patient's anxious but also looking fatigued, follows simple commands and answers questions. Frequently tries to remove the OxyMask which the bedside RN is holding to keep it on. Breathing regular, tachypneic (high 40s), with equal chest expansion, and noticeably short of breath with strong respiratory accessory muscle use, satting in the mid-80%s. Very diminished breath sounds to all lung fields. BP and HR stable and within trend. Dobutamine drip infusing at 2.5 micrograms/kg/min via PICCL white port.    Interventions CXR;Labs;Meds;Neb treatment;O2 per N/C or mask   Adjustments to Recommend transfer to a higher level of care   RRT Team "   Attending/Primary/Covering Physician Faisal RODAS (Cards 2 on-call resident) and Wanda MARIE (cards fellow)   Date Attending Physician notified 02/23/22   Time Attending Physician notified 0150   Physician(s) Mara CARNES (NP)   Lead RN Barb QUESADA   RT Reynold and Serena CARNES   Post RRT Intervention Assessment   Post RRT Assessment Stable/Improved   Date Follow Up Done 02/23/22   Time Follow Up Done 0555   Comments Transferred to  at 0500. Calm, cooperative and keeping the BIPAP on, and breathing regular, slight respiratory accessory muscle use, and with equal chest expansion.

## 2022-02-23 NOTE — PROGRESS NOTES
Rapid Response Team Note    Assessment   A rapid response was called on Ashley Osman due to acute hypoxic respiratory failure. Patient with HFrEF, CAD, and cardiogenic shock. Plan in place to move to ICU today for lines and tentative CABG 2022.    Plan   -  Lasix  - chest x-ray  - Bipap  - ABG  - repeat ABG 1 hour after Bipap    -  The Cardiology primary team was was at bedside.  -  Disposition: The patient will be moved to IMC or ICU depending on bed availability   -  Reassessment and plan follow-up will be performed by the primary team      JOSÉ Uribe CNP  [facility] RRT AMCOM Job Code Contact #[phone]    Hospital Course   Brief Summary of events leading to rapid response:   Patient with HFrEF with CXR showing pulmonary edema    Admission Diagnosis:   Heart failure (H) [I50.9]    Physical Exam   Temp: 98.3  F (36.8  C) Temp  Min: 98.3  F (36.8  C)  Max: 98.7  F (37.1  C)  Resp: 30 Resp  Min: 16  Max: 30  SpO2: (!) 88 % SpO2  Min: 88 %  Max: 92 %  Pulse: 109 Pulse  Min: 80  Max: 109    No data recorded  BP: (!) 145/97 Systolic (24hrs), Av , Min:77 , Max:145   Diastolic (24hrs), Av, Min:47, Max:97     I/Os: I/O last 3 completed shifts:  In: 485.6 [P.O.:420; I.V.:65.6]  Out: 200 [Urine:200]     Exam:   Resp: tachypneic, using accessory muscles  Mental Status: anxious .      Significant Results and Procedures   Lactic Acid:   Recent Labs   Lab Test 22  1621 22  0348 22  1632   LACT 0.7 0.6* 0.9     CBC:   Recent Labs   Lab Test 22  0457 22  0317 22  0538   WBC 8.9 9.5 8.6   HGB 9.5* 9.7* 9.5*   HCT 29.9* 30.6* 30.0*    338 286

## 2022-02-23 NOTE — PROGRESS NOTES
[facility]  Cardiology II Service / Advanced Heart Failure  Daily Progress Note    Patient: Ashley Osman      : 1950      MRN: [id number]    Assessment/Plan:   72yo female w/ PMHx of newly-diagnosed HFrEF (10-15% 22) 2/2 ICM, CAD (diagnosed 2022), COPD (diagnosed 2022), hyperlipidemia, and cigarette smoking (quit in 2021) who presented to [facility]  as a transfer from OSH for consideration of advanced therapies in the setting of cardiogenic shock requiring inotropic support. C/b flash pulmonary edema into .    Today's changes:  - BiPAP for flash pulmonary edema, weaning to HFNC as tolerated  - No further diuresis for now, will continue to reassess  - Trending Troponin  - Continuing Dobutamine 2.5mcg/kg/min  - Continuing to reassess revascularization procedural planning and timeline    # Flash Pulmonary Edema  Overnight into morning of . Associated with diaphoresis and profound hypertension (abnormal for this patient). Suspect ischemic trigger. Has been slowly improving on BiPAP and diuresis overnight, crackles have cleared.  Plan:  - Continuing on BiPAP for now, weaning to HFNC as tolerated  - No further diuresis for now, will continue to reassess, prone to hypovolemia  - Not continuing Azithromycin and Solumedrol, low suspicion for clinically-significant COPD exacerbation though continuing to monitor    # Cardiogenic shock  # Acute on Chronic HFrEF 2/2 ICM (EF 10-15%) AHA Stage D, NYHA Class III-IV  # LV thrombus   # CAD (mLAD , severe prox and distal LAD, LCx and D1 disease)  Initially requiring inotropic and vasoactive support briefly. Cause of most recent decompensation is unclear although thought to be secondary to another ACS given sudden onset L shoulder pain. CMRI () showing viability of 50% in LAD territory (intermediate likelihood of recovery) and 75% in dominant LCx territory (good likelihood of recovery). Planned to assess  feasibility of revascularization w/ PCI with Interventional Cardiology vs CABG w/ CVTS - opting for CAB based on review of prior angiogram and cMRI. Had initiated LVAD evaluation since she is high-risk for any planned interventions and, if revascularization is not feasible, will need LVAD given Stage D and inability to be on GDMT due to low blood pressure. Will need ICD ppx prior to discharge if no advance therapies offered.  Plan:  - Had tentatively planned for CABG in the morning (2/24), now reassessing based on clinical stability for revascularization strategy/timeline, to be determined  - Continuing Dobutamine 2.5 mcg/kg/min (restarted 2/18)  - Dapaglaflozin 5mg daily  - Previously intolerant of Isordil and Hydralazine (2/16)  - Continuing ASA 81mg daily and Atorvastatin 40mg daily  - Vascular Surgery has evaluated, feel no contraindications for proceeding with revascularization procedure - completed carotid and extremity US imaging  - Daily electrolyte monitoring - goals K>4 and Mg>2   - 2g Na diet, 2L fluid restriction     LVAD/transplant work up:   [x]?? Labs (CBC, CMP, PT/INR, cystatin C, prealbumin, UA + micro)  [x]?? Infectious  [x]?? Utox/nicotine and cotinine/PeTH   []?? Immunocompatibility (last transfusion, ABO, HLA tissue typing, PRA)  [x]?? CVTS consult  [x]?? Social work evaluation  [x]?? Palliative care evaluation  [x]?? Neuropsych evaluation  [x]?? Nutrition evaluation  [x]?? CT Dental + evaluation- cleared  [x]?? Abd US + doppler  [x]?? Extremity US and ABIs- 2/8/22: normal PAIGE's bilaterally.  [x]?? Carotid US (if DM or ICM or >51yo) once SG catheter is put  [x]?? PFTs    [x]?? Dexascan: 11/6/2019: Moderate low bone densit  [x]?? CT head non-contrast: 2/8/22:   [x]?? CT CAP non-contrast: pulmonary nodules   []?? Colonoscopy (>49 yo)   []?? Mammogram   []?? Pap test    ================================  Chronic Problems:  #COPD - appears newly diagnosed from 1/2022, stable - continuing PTA  Umeclidinium  #Pulmonary Nodule - Hx of tobacco use, may require PET scan in future  #Malnutrition - Level of malnutrition: Moderate     Hospital Checklist:  DVT Prophylaxis: Heparin SQ   Code Status: Full Code  Bowel regimen: N/A - last BM 2/22  Diet/Nutrition: Restrictions as per above  Lines: PICC     Disposition Planning:  Anticipate discharge in >7 days pending coronary interventional planning.    Staffed w/ Dr. Carter.    Lakhwinder Cabrales MD  Internal Medicine PGY-1  Cardiology II Service  ASCOM #[id number]  02/23/2022  ==================================    Subjective:   Overnight around 1am had an episode of dyspnea and hypoxia despite supplemental O2. Found to be tachypneic, new crackles bilatearlly. CXR obtained w/ new pulmonary edema. Given a total of 100mg IV Lasix and placed on BiPAP with some improvement. Preemptively treated for COPD exacerbation by night team as well. Transferred to [facility].    This am doing a bit better. Remains on BiPAP, more comfortable while sleeping though worsenign tachypnea whne awake and trying to speak. Deneis chest pain or palpitations. Denies abdominal pain, nausea, vomiting. No new or worsening leg swelling.    Objective:     Vitals:    02/19/22 0923 02/20/22 0804 02/21/22 0400   Weight: 51.6 kg (113 lb 11.2 oz) 53 kg (116 lb 12.8 oz) 53.4 kg (117 lb 12.8 oz)     Vital Signs with Ranges  Temp:  [97.9  F (36.6  C)-98.5  F (36.9  C)] 97.9  F (36.6  C)  Pulse:  [] 85  Resp:  [16-48] 28  BP: ()/(52-97) 108/62  Cuff Mean (mmHg):  [68-79] 76  FiO2 (%):  [70 %-100 %] 70 %  SpO2:  [83 %-97 %] 97 %  I/O last 3 completed shifts:  In: 538.56 [P.O.:420; I.V.:118.56]  Out: 300 [Urine:300]    Exam:  General: No acute distress, resting laying back upright in bed prior to evaluation, conversational in soft voice  HEENT: MMM  Neck: No overt JVD visualized  CV: RRR, no audible murmurs  Lungs: On BiPAP 70% and 15/8, seems CTA b/l w/o wheezes or crackles though limited on anterior  examination this am, persistent tachypnea  Abd: Soft, non-tender, non-distended  Ext: Warm and well-perfused, remains with no appreciable lower extremity edema  Neuro: Awake, alert, conversational, moving all extremities spontaneously throughout examination    Medications     DOBUTamine 2.5 mcg/kg/min (02/23/22 0750)     - MEDICATION INSTRUCTIONS -       - MEDICATION INSTRUCTIONS -       - MEDICATION INSTRUCTIONS -       ACE/ARB/ARNI NOT PRESCRIBED       BETA BLOCKER NOT PRESCRIBED         aspirin  81 mg Oral Daily     atorvastatin  40 mg Oral QAM     dapagliflozin  5 mg Oral Daily     heparin ANTICOAGULANT  5,000 Units Subcutaneous Q12H     influenza vac high-dose quad  0.7 mL Intramuscular Prior to discharge     multivitamin w/minerals  1 tablet Oral Daily     thiamine  100 mg Oral Daily     umeclidinium-vilanterol  1 puff Inhalation Daily       Data   Recent Labs   Lab 02/23/22  0549 02/22/22  0457 02/21/22  0317   WBC 19.2* 8.9 9.5   HGB 11.5* 9.5* 9.7*   MCV 91 92 91    318 338   INR 1.10 1.14 1.18*    140 138   POTASSIUM 3.6 3.8 4.1   CHLORIDE 109 110* 108   CO2 24 25 24   BUN 25 17 17   CR 1.02 0.62 0.65   ANIONGAP 9 5 6   HEIDI 9.0 8.8 8.8   * 86 90   ALBUMIN 2.9* 2.8* 2.8*   PROTTOTAL 6.6* 5.8* 6.1*   BILITOTAL 0.4 0.6 0.6   ALKPHOS 87 67 69   ALT 32 33 36   AST 23 20 20       Recent Results (from the past 24 hour(s))   US Lower Extremity Venous Mapping Bilateral    Narrative    ULTRASOUND LOWER EXTREMITY VENOUS MAPPING BILATERAL 2/22/2022 5:37 PM    CLINICAL HISTORY: Pre operative evaluation. CABG candidate    COMPARISONS: None available.    REFERRING PROVIDER: VENKAT PORTILLO J    TECHNIQUE: Bilateral saphenofemoral junctions evaluated with  grayscale, color Doppler, and Doppler waveform ultrasound. Bilateral  great saphenous veins evaluated with grayscale imaging and  compression.    FINDINGS: Bilateral great saphenous veins are patent and fully  compressible. Normal phasic waveforms at  the bilateral saphenofemoral  junctions.    RIGHT great saphenous vein:       Saphenofemoral junction: 4.2 mm       Groin: 4.8 mm       Mid thigh: 1.5 mm       Lower thigh: 0.9 mm       Knee: 0.6 mm       Upper calf: 0.7 mm       Mid calf: 0.4 mm       Ankle: 0.5 mm    LEFT great saphenous vein:       Saphenofemoral junction: 5.6 mm       Groin: 7.2 mm       Mid thigh: 2.1 mm       Lower thigh: 1.4 mm       Knee: 1.7 mm       Upper calf: 0.9 mm       Mid calf: 0.8 mm       Ankle: 0.5 mm      Impression    IMPRESSION: Bilateral great saphenous veins are patent and fully  compressible with measurements as in the report.    CAROLINE KELLEY MD         SYSTEM ID:  [id number]   XR Chest Port 1 View    Impression    RESIDENT PRELIMINARY INTERPRETATION  IMPRESSION:   Mixed pulmonary emphysema/fibrosis with increased pleural effusions  and probable worsening pulmonary alveolar edema.

## 2022-02-23 NOTE — PROGRESS NOTES
"Brief Cross cover note    0130-Paged by bedside nurse, reported patient having significant anxiety regarding upcoming CABG tomorrow, \"coaching to breath through mouth/nose\" due to anxiety, requested anxiety medication, Ativan ordered.  0146-Called by bedside nurse, reported oxygen saturations in the high-mid 80's, states she is in route to give ativan but requested evaluation, reports nebulizer did not work. Informed bedside to hold the ativan given the change in respiratory status, went bedside to evaluate patient.    Patient with audible wheezing(declining COPD medications this week), in tripod position, nasal canula in place but breathing through mouth. Nurse reports 15-30 seconds of attempt of nebulizer administration. Patient O2 sats 86-88%. Patient quite fatigued at this time, continued to decline ativan order requests due to respiratory status, and mental status. Discussed trial of nebulizer again, nursing staff reports she will not tolerate. Elected to order inhaled albuterol/ipratropium, attempted to discuss with patient, bedside nurse requested to \"give them some time,\" as patients anxiety was believed to be playing a role in her SOB per nursing. I/O's and weights not accurately charted but elected to order lasix given her history and lack of diuretic use in the previous days. Inhaled medications not arriving in a timely manner, asked to retry nebulized medications. After CXR, Lasix, and inhaled medications were ordered, a rapid response was called(0250am), patient O2 sats dropped to 84% despite high flow and oxymask, then on nursing re attempt, patient tolerating inhaled medications, BiPAP placed, patient improved, O2 sats in 90's. CXR showing emphysema/fibrosis with increased pleural effusions and worsening edema. Treated patient for volume overload/COPD exacerbation.    Remained at bedside/outside the patients room until stabilized.  "

## 2022-02-23 NOTE — PROGRESS NOTES
[facility] care [id number]  Neuro: A&Ox4. Very anxious, confused about changes in care.  Cardiac: SR 80-100s. VSS.   Respiratory: Sating 95on BiPAP 70% FiO2, tolerating well.  GI/: Adequate urine output. No BM this shift.  Diet/appetite: NPO while on BiPAP except for meds.  Activity:  Assist of 1, up to chair; not OOB this shift.  Pain: At acceptable level on current regimen.   Skin: No new deficits noted. Coccyx reddened, blanchable.  LDA's: L PICC running dobutamine gtt @ 2.5mg/hr, other lumens SL.    Plan: Team to assess this AM and decide on course of action. Pt was scheduled for swan catheter placement & CABG both of which are postponed until respiratory status improves. Notify primary team with changes.

## 2022-02-24 ENCOUNTER — APPOINTMENT (OUTPATIENT)
Dept: GENERAL RADIOLOGY | Facility: CLINIC | Age: 72
DRG: 233 | End: 2022-02-24
Attending: SURGERY
Payer: MEDICARE

## 2022-02-24 ENCOUNTER — APPOINTMENT (OUTPATIENT)
Dept: GENERAL RADIOLOGY | Facility: CLINIC | Age: 72
DRG: 233 | End: 2022-02-24
Attending: PHYSICIAN ASSISTANT
Payer: MEDICARE

## 2022-02-24 ENCOUNTER — ANESTHESIA (OUTPATIENT)
Dept: SURGERY | Facility: CLINIC | Age: 72
DRG: 233 | End: 2022-02-24
Payer: MEDICARE

## 2022-02-24 ENCOUNTER — APPOINTMENT (OUTPATIENT)
Dept: GENERAL RADIOLOGY | Facility: CLINIC | Age: 72
DRG: 233 | End: 2022-02-24
Attending: STUDENT IN AN ORGANIZED HEALTH CARE EDUCATION/TRAINING PROGRAM
Payer: MEDICARE

## 2022-02-24 LAB
ALBUMIN SERPL-MCNC: 2.1 G/DL (ref 3.4–5)
ALBUMIN SERPL-MCNC: 2.8 G/DL (ref 3.4–5)
ALP SERPL-CCNC: 49 U/L (ref 40–150)
ALP SERPL-CCNC: 69 U/L (ref 40–150)
ALT SERPL W P-5'-P-CCNC: 27 U/L (ref 0–50)
ALT SERPL W P-5'-P-CCNC: 38 U/L (ref 0–50)
ANION GAP SERPL CALCULATED.3IONS-SCNC: 6 MMOL/L (ref 3–14)
ANION GAP SERPL CALCULATED.3IONS-SCNC: 7 MMOL/L (ref 3–14)
APTT PPP: 32 SECONDS (ref 22–38)
APTT PPP: 35 SECONDS (ref 22–38)
AST SERPL W P-5'-P-CCNC: 20 U/L (ref 0–45)
AST SERPL W P-5'-P-CCNC: 66 U/L (ref 0–45)
ATRIAL RATE - MUSE: 85 BPM
ATRIAL RATE - MUSE: 86 BPM
BASE EXCESS BLDA CALC-SCNC: -0.9 MMOL/L (ref -9–1.8)
BASE EXCESS BLDA CALC-SCNC: -2.2 MMOL/L (ref -9.6–2)
BASE EXCESS BLDA CALC-SCNC: -2.2 MMOL/L (ref -9–1.8)
BASE EXCESS BLDA CALC-SCNC: -2.7 MMOL/L (ref -9.6–2)
BASE EXCESS BLDA CALC-SCNC: -3.6 MMOL/L (ref -9.6–2)
BASE EXCESS BLDA CALC-SCNC: -4.2 MMOL/L (ref -9.6–2)
BASE EXCESS BLDA CALC-SCNC: -5 MMOL/L (ref -9.6–2)
BASE EXCESS BLDA CALC-SCNC: 0.2 MMOL/L (ref -9.6–2)
BASE EXCESS BLDA CALC-SCNC: 0.7 MMOL/L (ref -9.6–2)
BASE EXCESS BLDA CALC-SCNC: 0.7 MMOL/L (ref -9.6–2)
BASE EXCESS BLDA CALC-SCNC: 1.3 MMOL/L (ref -9.6–2)
BASE EXCESS BLDA CALC-SCNC: 1.3 MMOL/L (ref -9.6–2)
BASE EXCESS BLDV CALC-SCNC: -0.5 MMOL/L (ref -7.7–1.9)
BASE EXCESS BLDV CALC-SCNC: -1.1 MMOL/L (ref -7.7–1.9)
BASE EXCESS BLDV CALC-SCNC: -3.1 MMOL/L (ref -8.1–1.9)
BASE EXCESS BLDV CALC-SCNC: 1.5 MMOL/L (ref -7.7–1.9)
BASE EXCESS BLDV CALC-SCNC: 1.6 MMOL/L (ref -7.7–1.9)
BASE EXCESS BLDV CALC-SCNC: 2 MMOL/L (ref -8.1–1.9)
BILIRUB DIRECT SERPL-MCNC: 0.2 MG/DL (ref 0–0.2)
BILIRUB SERPL-MCNC: 0.5 MG/DL (ref 0.2–1.3)
BILIRUB SERPL-MCNC: 0.6 MG/DL (ref 0.2–1.3)
BLD PROD TYP BPU: NORMAL
BLOOD COMPONENT TYPE: NORMAL
BUN SERPL-MCNC: 30 MG/DL (ref 7–30)
BUN SERPL-MCNC: 33 MG/DL (ref 7–30)
CA-I BLD-MCNC: 4 MG/DL (ref 4.4–5.2)
CA-I BLD-MCNC: 4.5 MG/DL (ref 4.4–5.2)
CA-I BLD-MCNC: 4.5 MG/DL (ref 4.4–5.2)
CA-I BLD-MCNC: 4.6 MG/DL (ref 4.4–5.2)
CA-I BLD-MCNC: 4.7 MG/DL (ref 4.4–5.2)
CA-I BLD-MCNC: 5.7 MG/DL (ref 4.4–5.2)
CA-I BLD-MCNC: 5.7 MG/DL (ref 4.4–5.2)
CA-I BLD-MCNC: 5.8 MG/DL (ref 4.4–5.2)
CA-I BLD-MCNC: 6 MG/DL (ref 4.4–5.2)
CA-I BLD-MCNC: 6.1 MG/DL (ref 4.4–5.2)
CA-I BLD-MCNC: 6.1 MG/DL (ref 4.4–5.2)
CALCIUM SERPL-MCNC: 8.9 MG/DL (ref 8.5–10.1)
CALCIUM SERPL-MCNC: 9.7 MG/DL (ref 8.5–10.1)
CF REDUC 30M P MA P HEP LENFR BLD TEG: 0 % (ref 0–8)
CF REDUC 30M P MA P HEP LENFR BLD TEG: 0 % (ref 0–8)
CF REDUC 30M P MA P HEP LENFR BLD TEG: 0.1 % (ref 0–8)
CF REDUC 60M P MA LENFR BLD TEG: 0.2 % (ref 0–15)
CF REDUC 60M P MA P HEPASE LENFR BLD TEG: 0 % (ref 0–15)
CF REDUC 60M P MA P HEPASE LENFR BLD TEG: 0.2 % (ref 0–15)
CF REDUC 60M P MA P HEPASE LENFR BLD TEG: 2 % (ref 0–15)
CFT BLD TEG: 1.2 MINUTE (ref 1–3)
CFT P HPASE BLD TEG: 0.8 MINUTE (ref 1–3)
CFT P HPASE BLD TEG: 1.1 MINUTE (ref 1–3)
CFT P HPASE BLD TEG: 2.2 MINUTE (ref 1–3)
CHLORIDE BLD-SCNC: 108 MMOL/L (ref 94–109)
CHLORIDE BLD-SCNC: 116 MMOL/L (ref 94–109)
CI (COAGULATION INDEX)(Z) NON NATIVE: 3 (ref -3–3)
CI (COAGULATION INDEX)(Z): -0.3 (ref -3–3)
CI (COAGULATION INDEX)(Z): 2.9 (ref -3–3)
CI (COAGULATION INDEX)(Z): 5.6 (ref -3–3)
CLOT ANGLE BLD TEG: 73 DEGREES (ref 53–72)
CLOT ANGLE P HPASE BLD TEG: 62.2 DEGREES (ref 53–72)
CLOT ANGLE P HPASE BLD TEG: 74.5 DEGREES (ref 53–72)
CLOT ANGLE P HPASE BLD TEG: 79.6 DEGREES (ref 53–72)
CLOT INIT BLD TEG: 3.8 MINUTE (ref 5–10)
CLOT INIT P HPASE BLD TEG: 2.8 MINUTE (ref 5–10)
CLOT INIT P HPASE BLD TEG: 4 MINUTE (ref 5–10)
CLOT INIT P HPASE BLD TEG: 6.2 MINUTE (ref 5–10)
CLOT LYSIS 30M P MA LENFR BLD TEG: 0 % (ref 0–8)
CLOT STRENGTH BLD TEG: 10.2 KD/SC (ref 4.5–11)
CLOT STRENGTH P HPASE BLD TEG: 17.2 KD/SC (ref 4.5–11)
CLOT STRENGTH P HPASE BLD TEG: 8.4 KD/SC (ref 4.5–11)
CLOT STRENGTH P HPASE BLD TEG: 9.8 KD/SC (ref 4.5–11)
CO2 SERPL-SCNC: 25 MMOL/L (ref 20–32)
CO2 SERPL-SCNC: 26 MMOL/L (ref 20–32)
CODING SYSTEM: NORMAL
CREAT SERPL-MCNC: 0.74 MG/DL (ref 0.52–1.04)
CREAT SERPL-MCNC: 0.76 MG/DL (ref 0.52–1.04)
CROSSMATCH: NORMAL
DIASTOLIC BLOOD PRESSURE - MUSE: NORMAL MMHG
DIASTOLIC BLOOD PRESSURE - MUSE: NORMAL MMHG
ERYTHROCYTE [DISTWIDTH] IN BLOOD BY AUTOMATED COUNT: 15.7 % (ref 10–15)
ERYTHROCYTE [DISTWIDTH] IN BLOOD BY AUTOMATED COUNT: 15.8 % (ref 10–15)
ERYTHROCYTE [DISTWIDTH] IN BLOOD BY AUTOMATED COUNT: 16.1 % (ref 10–15)
FIBRINOGEN PPP-MCNC: 204 MG/DL (ref 170–490)
FIBRINOGEN PPP-MCNC: 386 MG/DL (ref 170–490)
GFR SERPL CREATININE-BSD FRML MDRD: 82 ML/MIN/1.73M2
GFR SERPL CREATININE-BSD FRML MDRD: 85 ML/MIN/1.73M2
GLUCOSE BLD-MCNC: 111 MG/DL (ref 70–99)
GLUCOSE BLD-MCNC: 112 MG/DL (ref 70–99)
GLUCOSE BLD-MCNC: 122 MG/DL (ref 70–99)
GLUCOSE BLD-MCNC: 123 MG/DL (ref 70–99)
GLUCOSE BLD-MCNC: 131 MG/DL (ref 70–99)
GLUCOSE BLD-MCNC: 138 MG/DL (ref 70–99)
GLUCOSE BLD-MCNC: 143 MG/DL (ref 70–99)
GLUCOSE BLD-MCNC: 152 MG/DL (ref 70–99)
GLUCOSE BLD-MCNC: 159 MG/DL (ref 70–99)
GLUCOSE BLD-MCNC: 163 MG/DL (ref 70–99)
GLUCOSE BLD-MCNC: 168 MG/DL (ref 70–99)
GLUCOSE BLD-MCNC: 169 MG/DL (ref 70–99)
GLUCOSE BLD-MCNC: 175 MG/DL (ref 70–99)
GLUCOSE BLD-MCNC: 188 MG/DL (ref 70–99)
GLUCOSE BLDC GLUCOMTR-MCNC: 118 MG/DL (ref 70–99)
GLUCOSE BLDC GLUCOMTR-MCNC: 124 MG/DL (ref 70–99)
GLUCOSE BLDC GLUCOMTR-MCNC: 154 MG/DL (ref 70–99)
GLUCOSE BLDC GLUCOMTR-MCNC: 157 MG/DL (ref 70–99)
GLUCOSE BLDC GLUCOMTR-MCNC: 169 MG/DL (ref 70–99)
GLUCOSE BLDC GLUCOMTR-MCNC: 93 MG/DL (ref 70–99)
HCO3 BLD-SCNC: 22 MMOL/L (ref 21–28)
HCO3 BLD-SCNC: 25 MMOL/L (ref 21–28)
HCO3 BLDA-SCNC: 22 MMOL/L (ref 21–28)
HCO3 BLDA-SCNC: 23 MMOL/L (ref 21–28)
HCO3 BLDA-SCNC: 23 MMOL/L (ref 21–28)
HCO3 BLDA-SCNC: 26 MMOL/L (ref 21–28)
HCO3 BLDA-SCNC: 27 MMOL/L (ref 21–28)
HCO3 BLDA-SCNC: 27 MMOL/L (ref 21–28)
HCO3 BLDV-SCNC: 23 MMOL/L (ref 21–28)
HCO3 BLDV-SCNC: 25 MMOL/L (ref 21–28)
HCO3 BLDV-SCNC: 25 MMOL/L (ref 21–28)
HCO3 BLDV-SCNC: 27 MMOL/L (ref 21–28)
HCT VFR BLD AUTO: 20.5 % (ref 35–47)
HCT VFR BLD AUTO: 28.5 % (ref 35–47)
HCT VFR BLD AUTO: 30.6 % (ref 35–47)
HGB BLD-MCNC: 5.9 G/DL (ref 11.7–15.7)
HGB BLD-MCNC: 6.4 G/DL (ref 11.7–15.7)
HGB BLD-MCNC: 6.7 G/DL (ref 11.7–15.7)
HGB BLD-MCNC: 6.8 G/DL (ref 11.7–15.7)
HGB BLD-MCNC: 6.8 G/DL (ref 11.7–15.7)
HGB BLD-MCNC: 6.9 G/DL (ref 11.7–15.7)
HGB BLD-MCNC: 7.3 G/DL (ref 11.7–15.7)
HGB BLD-MCNC: 7.4 G/DL (ref 11.7–15.7)
HGB BLD-MCNC: 7.9 G/DL (ref 11.7–15.7)
HGB BLD-MCNC: 8.6 G/DL (ref 11.7–15.7)
HGB BLD-MCNC: 8.6 G/DL (ref 11.7–15.7)
HGB BLD-MCNC: 9 G/DL (ref 11.7–15.7)
HGB BLD-MCNC: 9.1 G/DL (ref 11.7–15.7)
HGB BLD-MCNC: 9.5 G/DL (ref 11.7–15.7)
HGB BLD-MCNC: 9.5 G/DL (ref 11.7–15.7)
HGB BLD-MCNC: 9.6 G/DL (ref 11.7–15.7)
HOLD SPECIMEN: NORMAL
INR PPP: 1.72 (ref 0.85–1.15)
INR PPP: 1.82 (ref 0.85–1.15)
INR PPP: 2.23 (ref 0.85–1.15)
INTERPRETATION ECG - MUSE: NORMAL
INTERPRETATION ECG - MUSE: NORMAL
ISSUE DATE AND TIME: NORMAL
LACTATE BLD-SCNC: 1.6 MMOL/L
LACTATE BLD-SCNC: 2.1 MMOL/L
LACTATE BLD-SCNC: 2.2 MMOL/L
LACTATE BLD-SCNC: 2.2 MMOL/L
LACTATE BLD-SCNC: 2.5 MMOL/L
LACTATE BLD-SCNC: 2.7 MMOL/L
LACTATE BLD-SCNC: 3.1 MMOL/L
LACTATE BLD-SCNC: 3.4 MMOL/L
LACTATE BLD-SCNC: 3.8 MMOL/L
LACTATE BLD-SCNC: 4 MMOL/L
LACTATE BLD-SCNC: 4.2 MMOL/L
LACTATE BLD-SCNC: 4.3 MMOL/L
LACTATE SERPL-SCNC: 0.7 MMOL/L (ref 0.7–2)
LACTATE SERPL-SCNC: 2.5 MMOL/L (ref 0.7–2)
LACTATE SERPL-SCNC: 4.1 MMOL/L (ref 0.7–2)
LACTATE SERPL-SCNC: 4.5 MMOL/L (ref 0.7–2)
LACTATE SERPL-SCNC: 4.6 MMOL/L (ref 0.7–2)
MAGNESIUM SERPL-MCNC: 2.3 MG/DL (ref 1.6–2.3)
MAGNESIUM SERPL-MCNC: 2.4 MG/DL (ref 1.6–2.3)
MAGNESIUM SERPL-MCNC: 2.9 MG/DL (ref 1.6–2.3)
MCF BLD TEG: 67.1 MM (ref 50–70)
MCF P HPASE BLD TEG: 62.8 MM (ref 50–70)
MCF P HPASE BLD TEG: 66.1 MM (ref 50–70)
MCF P HPASE BLD TEG: 77.4 MM (ref 50–70)
MCH RBC QN AUTO: 28.8 PG (ref 26.5–33)
MCH RBC QN AUTO: 29.1 PG (ref 26.5–33)
MCH RBC QN AUTO: 29.1 PG (ref 26.5–33)
MCHC RBC AUTO-ENTMCNC: 31.2 G/DL (ref 31.5–36.5)
MCHC RBC AUTO-ENTMCNC: 31.4 G/DL (ref 31.5–36.5)
MCHC RBC AUTO-ENTMCNC: 31.6 G/DL (ref 31.5–36.5)
MCV RBC AUTO: 92 FL (ref 78–100)
MCV RBC AUTO: 92 FL (ref 78–100)
MCV RBC AUTO: 93 FL (ref 78–100)
O2/TOTAL GAS SETTING VFR VENT: 100 %
O2/TOTAL GAS SETTING VFR VENT: 36 %
O2/TOTAL GAS SETTING VFR VENT: 40 %
O2/TOTAL GAS SETTING VFR VENT: 5 %
O2/TOTAL GAS SETTING VFR VENT: 50 %
O2/TOTAL GAS SETTING VFR VENT: 50 %
O2/TOTAL GAS SETTING VFR VENT: 60 %
O2/TOTAL GAS SETTING VFR VENT: 70 %
O2/TOTAL GAS SETTING VFR VENT: 75 %
O2/TOTAL GAS SETTING VFR VENT: 80 %
OXYHGB MFR BLD: 98 % (ref 92–100)
OXYHGB MFR BLD: 98 % (ref 92–100)
OXYHGB MFR BLDA: 96 % (ref 92–100)
OXYHGB MFR BLDA: 97 % (ref 92–100)
OXYHGB MFR BLDA: 97 % (ref 92–100)
OXYHGB MFR BLDA: 98 % (ref 92–100)
OXYHGB MFR BLDA: 99 % (ref 92–100)
OXYHGB MFR BLDV: 52 % (ref 70–75)
OXYHGB MFR BLDV: 68 % (ref 70–75)
OXYHGB MFR BLDV: 68 % (ref 92–100)
OXYHGB MFR BLDV: 70 % (ref 70–75)
OXYHGB MFR BLDV: 70 % (ref 70–75)
OXYHGB MFR BLDV: 80 % (ref 92–100)
P AXIS - MUSE: 56 DEGREES
P AXIS - MUSE: 69 DEGREES
PCO2 BLD: 36 MM HG (ref 35–45)
PCO2 BLD: 47 MM HG (ref 35–45)
PCO2 BLDA: 42 MM HG (ref 35–45)
PCO2 BLDA: 43 MM HG (ref 35–45)
PCO2 BLDA: 45 MM HG (ref 35–45)
PCO2 BLDA: 46 MM HG (ref 35–45)
PCO2 BLDA: 47 MM HG (ref 35–45)
PCO2 BLDA: 48 MM HG (ref 35–45)
PCO2 BLDA: 49 MM HG (ref 35–45)
PCO2 BLDV: 41 MM HG (ref 40–50)
PCO2 BLDV: 44 MM HG (ref 40–50)
PCO2 BLDV: 44 MM HG (ref 40–50)
PCO2 BLDV: 45 MM HG (ref 40–50)
PCO2 BLDV: 45 MM HG (ref 40–50)
PCO2 BLDV: 48 MM HG (ref 40–50)
PH BLD: 7.34 [PH] (ref 7.35–7.45)
PH BLD: 7.4 [PH] (ref 7.35–7.45)
PH BLDA: 7.26 [PH] (ref 7.35–7.45)
PH BLDA: 7.3 [PH] (ref 7.35–7.45)
PH BLDA: 7.32 [PH] (ref 7.35–7.45)
PH BLDA: 7.33 [PH] (ref 7.35–7.45)
PH BLDA: 7.34 [PH] (ref 7.35–7.45)
PH BLDA: 7.36 [PH] (ref 7.35–7.45)
PH BLDA: 7.37 [PH] (ref 7.35–7.45)
PH BLDA: 7.38 [PH] (ref 7.35–7.45)
PH BLDV: 7.31 [PH] (ref 7.32–7.43)
PH BLDV: 7.35 [PH] (ref 7.32–7.43)
PH BLDV: 7.37 [PH] (ref 7.32–7.43)
PH BLDV: 7.38 [PH] (ref 7.32–7.43)
PH BLDV: 7.39 [PH] (ref 7.32–7.43)
PH BLDV: 7.4 [PH] (ref 7.32–7.43)
PHOSPHATE SERPL-MCNC: 3.5 MG/DL (ref 2.5–4.5)
PLATELET # BLD AUTO: 123 10E3/UL (ref 150–450)
PLATELET # BLD AUTO: 136 10E3/UL (ref 150–450)
PLATELET # BLD AUTO: 300 10E3/UL (ref 150–450)
PO2 BLD: 147 MM HG (ref 80–105)
PO2 BLD: 157 MM HG (ref 80–105)
PO2 BLDA: 114 MM HG (ref 80–105)
PO2 BLDA: 156 MM HG (ref 80–105)
PO2 BLDA: 334 MM HG (ref 80–105)
PO2 BLDA: 354 MM HG (ref 80–105)
PO2 BLDA: 382 MM HG (ref 80–105)
PO2 BLDA: 391 MM HG (ref 80–105)
PO2 BLDA: 391 MM HG (ref 80–105)
PO2 BLDA: 400 MM HG (ref 80–105)
PO2 BLDA: 476 MM HG (ref 80–105)
PO2 BLDA: 98 MM HG (ref 80–105)
PO2 BLDV: 30 MM HG (ref 25–47)
PO2 BLDV: 39 MM HG (ref 25–47)
PO2 BLDV: 39 MM HG (ref 25–47)
PO2 BLDV: 40 MM HG (ref 25–47)
PO2 BLDV: 41 MM HG (ref 25–47)
PO2 BLDV: 48 MM HG (ref 25–47)
POTASSIUM BLD-SCNC: 3 MMOL/L (ref 3.5–5)
POTASSIUM BLD-SCNC: 3.4 MMOL/L (ref 3.5–5)
POTASSIUM BLD-SCNC: 3.8 MMOL/L (ref 3.4–5.3)
POTASSIUM BLD-SCNC: 3.9 MMOL/L (ref 3.5–5)
POTASSIUM BLD-SCNC: 3.9 MMOL/L (ref 3.5–5)
POTASSIUM BLD-SCNC: 4 MMOL/L (ref 3.4–5.3)
POTASSIUM BLD-SCNC: 4 MMOL/L (ref 3.5–5)
POTASSIUM BLD-SCNC: 4 MMOL/L (ref 3.5–5)
POTASSIUM BLD-SCNC: 4.3 MMOL/L (ref 3.5–5)
POTASSIUM BLD-SCNC: 4.8 MMOL/L (ref 3.5–5)
POTASSIUM BLD-SCNC: 4.9 MMOL/L (ref 3.5–5)
POTASSIUM BLD-SCNC: 5 MMOL/L (ref 3.5–5)
POTASSIUM BLD-SCNC: 5.1 MMOL/L (ref 3.5–5)
POTASSIUM BLD-SCNC: 5.2 MMOL/L (ref 3.5–5)
PR INTERVAL - MUSE: 126 MS
PR INTERVAL - MUSE: 128 MS
PROT SERPL-MCNC: 3.9 G/DL (ref 6.8–8.8)
PROT SERPL-MCNC: 6 G/DL (ref 6.8–8.8)
QRS DURATION - MUSE: 90 MS
QRS DURATION - MUSE: 94 MS
QT - MUSE: 400 MS
QT - MUSE: 408 MS
QTC - MUSE: 476 MS
QTC - MUSE: 488 MS
R AXIS - MUSE: -11 DEGREES
R AXIS - MUSE: 18 DEGREES
RBC # BLD AUTO: 2.2 10E6/UL (ref 3.8–5.2)
RBC # BLD AUTO: 3.09 10E6/UL (ref 3.8–5.2)
RBC # BLD AUTO: 3.33 10E6/UL (ref 3.8–5.2)
SODIUM BLD-SCNC: 142 MMOL/L (ref 133–144)
SODIUM BLD-SCNC: 143 MMOL/L (ref 133–144)
SODIUM BLD-SCNC: 144 MMOL/L (ref 133–144)
SODIUM BLD-SCNC: 145 MMOL/L (ref 133–144)
SODIUM SERPL-SCNC: 140 MMOL/L (ref 133–144)
SODIUM SERPL-SCNC: 148 MMOL/L (ref 133–144)
SYSTOLIC BLOOD PRESSURE - MUSE: NORMAL MMHG
SYSTOLIC BLOOD PRESSURE - MUSE: NORMAL MMHG
T AXIS - MUSE: 76 DEGREES
T AXIS - MUSE: 85 DEGREES
UFH PPP CHRO-ACNC: <0.1 IU/ML
UFH PPP CHRO-ACNC: <0.1 IU/ML
UNIT ABO/RH: NORMAL
UNIT NUMBER: NORMAL
UNIT STATUS: NORMAL
UNIT TYPE ISBT: 5100
VENTRICULAR RATE- MUSE: 85 BPM
VENTRICULAR RATE- MUSE: 86 BPM
WBC # BLD AUTO: 14.3 10E3/UL (ref 4–11)
WBC # BLD AUTO: 18.7 10E3/UL (ref 4–11)
WBC # BLD AUTO: 23.2 10E3/UL (ref 4–11)

## 2022-02-24 PROCEDURE — 85018 HEMOGLOBIN: CPT | Performed by: PHYSICIAN ASSISTANT

## 2022-02-24 PROCEDURE — 85018 HEMOGLOBIN: CPT

## 2022-02-24 PROCEDURE — 83605 ASSAY OF LACTIC ACID: CPT | Performed by: PHYSICIAN ASSISTANT

## 2022-02-24 PROCEDURE — 99291 CRITICAL CARE FIRST HOUR: CPT | Mod: GC | Performed by: INTERNAL MEDICINE

## 2022-02-24 PROCEDURE — 250N000011 HC RX IP 250 OP 636: Performed by: PHYSICIAN ASSISTANT

## 2022-02-24 PROCEDURE — 250N000009 HC RX 250: Performed by: PHYSICIAN ASSISTANT

## 2022-02-24 PROCEDURE — 021209W BYPASS CORONARY ARTERY, THREE ARTERIES FROM AORTA WITH AUTOLOGOUS VENOUS TISSUE, OPEN APPROACH: ICD-10-PCS | Performed by: SURGERY

## 2022-02-24 PROCEDURE — 94002 VENT MGMT INPAT INIT DAY: CPT

## 2022-02-24 PROCEDURE — 82805 BLOOD GASES W/O2 SATURATION: CPT | Performed by: STUDENT IN AN ORGANIZED HEALTH CARE EDUCATION/TRAINING PROGRAM

## 2022-02-24 PROCEDURE — 33519 CABG ARTERY-VEIN THREE: CPT | Performed by: SURGERY

## 2022-02-24 PROCEDURE — 410N000004: Performed by: SURGERY

## 2022-02-24 PROCEDURE — 999N000065 XR CHEST PORT 1 VIEW

## 2022-02-24 PROCEDURE — 83735 ASSAY OF MAGNESIUM: CPT | Performed by: INTERNAL MEDICINE

## 2022-02-24 PROCEDURE — 84132 ASSAY OF SERUM POTASSIUM: CPT

## 2022-02-24 PROCEDURE — 85384 FIBRINOGEN ACTIVITY: CPT | Performed by: INTERNAL MEDICINE

## 2022-02-24 PROCEDURE — 250N000011 HC RX IP 250 OP 636: Performed by: STUDENT IN AN ORGANIZED HEALTH CARE EDUCATION/TRAINING PROGRAM

## 2022-02-24 PROCEDURE — 84295 ASSAY OF SERUM SODIUM: CPT

## 2022-02-24 PROCEDURE — 272N000088 HC PUMP APP ADULT PERFUSION: Performed by: SURGERY

## 2022-02-24 PROCEDURE — 83735 ASSAY OF MAGNESIUM: CPT

## 2022-02-24 PROCEDURE — 5A1221Z PERFORMANCE OF CARDIAC OUTPUT, CONTINUOUS: ICD-10-PCS | Performed by: SURGERY

## 2022-02-24 PROCEDURE — P9016 RBC LEUKOCYTES REDUCED: HCPCS | Performed by: INTERNAL MEDICINE

## 2022-02-24 PROCEDURE — 33533 CABG ARTERIAL SINGLE: CPT | Performed by: SURGERY

## 2022-02-24 PROCEDURE — 258N000003 HC RX IP 258 OP 636: Performed by: STUDENT IN AN ORGANIZED HEALTH CARE EDUCATION/TRAINING PROGRAM

## 2022-02-24 PROCEDURE — 250N000013 HC RX MED GY IP 250 OP 250 PS 637: Performed by: PHYSICIAN ASSISTANT

## 2022-02-24 PROCEDURE — 250N000024 HC ISOFLURANE, PER MIN: Performed by: SURGERY

## 2022-02-24 PROCEDURE — 02100Z9 BYPASS CORONARY ARTERY, ONE ARTERY FROM LEFT INTERNAL MAMMARY, OPEN APPROACH: ICD-10-PCS | Performed by: SURGERY

## 2022-02-24 PROCEDURE — 250N000012 HC RX MED GY IP 250 OP 636 PS 637: Performed by: SURGERY

## 2022-02-24 PROCEDURE — 999N000248 HC STATISTIC IV INSERT WITH US BY RN

## 2022-02-24 PROCEDURE — 85730 THROMBOPLASTIN TIME PARTIAL: CPT | Performed by: PHYSICIAN ASSISTANT

## 2022-02-24 PROCEDURE — C1898 LEAD, PMKR, OTHER THAN TRANS: HCPCS | Performed by: SURGERY

## 2022-02-24 PROCEDURE — 82248 BILIRUBIN DIRECT: CPT

## 2022-02-24 PROCEDURE — 71045 X-RAY EXAM CHEST 1 VIEW: CPT | Mod: 26 | Performed by: RADIOLOGY

## 2022-02-24 PROCEDURE — 250N000009 HC RX 250: Performed by: STUDENT IN AN ORGANIZED HEALTH CARE EDUCATION/TRAINING PROGRAM

## 2022-02-24 PROCEDURE — 83605 ASSAY OF LACTIC ACID: CPT

## 2022-02-24 PROCEDURE — P9041 ALBUMIN (HUMAN),5%, 50ML: HCPCS

## 2022-02-24 PROCEDURE — 250N000011 HC RX IP 250 OP 636: Performed by: SURGERY

## 2022-02-24 PROCEDURE — 250N000009 HC RX 250: Performed by: SURGERY

## 2022-02-24 PROCEDURE — 82330 ASSAY OF CALCIUM: CPT

## 2022-02-24 PROCEDURE — 82310 ASSAY OF CALCIUM: CPT

## 2022-02-24 PROCEDURE — 999N000045 HC STATISTIC DAILY SWAN MONITORING

## 2022-02-24 PROCEDURE — 258N000003 HC RX IP 258 OP 636: Performed by: PHYSICIAN ASSISTANT

## 2022-02-24 PROCEDURE — 82805 BLOOD GASES W/O2 SATURATION: CPT | Performed by: PHYSICIAN ASSISTANT

## 2022-02-24 PROCEDURE — 85018 HEMOGLOBIN: CPT | Performed by: INTERNAL MEDICINE

## 2022-02-24 PROCEDURE — 84155 ASSAY OF PROTEIN SERUM: CPT | Performed by: PHYSICIAN ASSISTANT

## 2022-02-24 PROCEDURE — 250N000009 HC RX 250

## 2022-02-24 PROCEDURE — 85610 PROTHROMBIN TIME: CPT | Performed by: PHYSICIAN ASSISTANT

## 2022-02-24 PROCEDURE — 84132 ASSAY OF SERUM POTASSIUM: CPT | Performed by: PHYSICIAN ASSISTANT

## 2022-02-24 PROCEDURE — 410N000003 HC PER-PERFUSION 1ST 30 MIN: Performed by: SURGERY

## 2022-02-24 PROCEDURE — 999N000075 HC STATISTIC IABP MONITORING

## 2022-02-24 PROCEDURE — 200N000002 HC R&B ICU UMMC

## 2022-02-24 PROCEDURE — 360N000079 HC SURGERY LEVEL 6, PER MIN: Performed by: SURGERY

## 2022-02-24 PROCEDURE — 85520 HEPARIN ASSAY: CPT | Performed by: INTERNAL MEDICINE

## 2022-02-24 PROCEDURE — 71045 X-RAY EXAM CHEST 1 VIEW: CPT | Mod: 26 | Performed by: STUDENT IN AN ORGANIZED HEALTH CARE EDUCATION/TRAINING PROGRAM

## 2022-02-24 PROCEDURE — 85730 THROMBOPLASTIN TIME PARTIAL: CPT | Performed by: INTERNAL MEDICINE

## 2022-02-24 PROCEDURE — 250N000011 HC RX IP 250 OP 636

## 2022-02-24 PROCEDURE — 99291 CRITICAL CARE FIRST HOUR: CPT | Mod: 24 | Performed by: ANESTHESIOLOGY

## 2022-02-24 PROCEDURE — 272N000085 HC PACK CELL SAVER CSP: Performed by: SURGERY

## 2022-02-24 PROCEDURE — 83605 ASSAY OF LACTIC ACID: CPT | Performed by: INTERNAL MEDICINE

## 2022-02-24 PROCEDURE — 85396 CLOTTING ASSAY WHOLE BLOOD: CPT | Performed by: STUDENT IN AN ORGANIZED HEALTH CARE EDUCATION/TRAINING PROGRAM

## 2022-02-24 PROCEDURE — 82805 BLOOD GASES W/O2 SATURATION: CPT

## 2022-02-24 PROCEDURE — 82810 BLOOD GASES O2 SAT ONLY: CPT

## 2022-02-24 PROCEDURE — 370N000017 HC ANESTHESIA TECHNICAL FEE, PER MIN: Performed by: SURGERY

## 2022-02-24 PROCEDURE — 93010 ELECTROCARDIOGRAM REPORT: CPT | Performed by: INTERNAL MEDICINE

## 2022-02-24 PROCEDURE — 85396 CLOTTING ASSAY WHOLE BLOOD: CPT | Performed by: INTERNAL MEDICINE

## 2022-02-24 PROCEDURE — 85610 PROTHROMBIN TIME: CPT | Performed by: INTERNAL MEDICINE

## 2022-02-24 PROCEDURE — 999N000063 XR CHEST PORT 1 VIEW

## 2022-02-24 PROCEDURE — 999N000065 XR ABDOMEN PORT 1 VIEWS

## 2022-02-24 PROCEDURE — 85384 FIBRINOGEN ACTIVITY: CPT | Performed by: STUDENT IN AN ORGANIZED HEALTH CARE EDUCATION/TRAINING PROGRAM

## 2022-02-24 PROCEDURE — 999N000155 HC STATISTIC RAPCV CVP MONITORING

## 2022-02-24 PROCEDURE — 258N000003 HC RX IP 258 OP 636: Performed by: SURGERY

## 2022-02-24 PROCEDURE — 74018 RADEX ABDOMEN 1 VIEW: CPT | Mod: 26 | Performed by: RADIOLOGY

## 2022-02-24 PROCEDURE — 71045 X-RAY EXAM CHEST 1 VIEW: CPT

## 2022-02-24 PROCEDURE — 93005 ELECTROCARDIOGRAM TRACING: CPT

## 2022-02-24 PROCEDURE — 999N000157 HC STATISTIC RCP TIME EA 10 MIN

## 2022-02-24 PROCEDURE — 272N000001 HC OR GENERAL SUPPLY STERILE: Performed by: SURGERY

## 2022-02-24 PROCEDURE — 84100 ASSAY OF PHOSPHORUS: CPT | Performed by: PHYSICIAN ASSISTANT

## 2022-02-24 PROCEDURE — 06BQ4ZZ EXCISION OF LEFT SAPHENOUS VEIN, PERCUTANEOUS ENDOSCOPIC APPROACH: ICD-10-PCS | Performed by: SURGERY

## 2022-02-24 PROCEDURE — 5A1945Z RESPIRATORY VENTILATION, 24-96 CONSECUTIVE HOURS: ICD-10-PCS | Performed by: SURGERY

## 2022-02-24 PROCEDURE — 999N000015 HC STATISTIC ARTERIAL MONITORING DAILY

## 2022-02-24 PROCEDURE — 82330 ASSAY OF CALCIUM: CPT | Performed by: PHYSICIAN ASSISTANT

## 2022-02-24 PROCEDURE — 85027 COMPLETE CBC AUTOMATED: CPT

## 2022-02-24 PROCEDURE — 999N000185 HC STATISTIC TRANSPORT TIME EA 15 MIN

## 2022-02-24 PROCEDURE — P9041 ALBUMIN (HUMAN),5%, 50ML: HCPCS | Performed by: STUDENT IN AN ORGANIZED HEALTH CARE EDUCATION/TRAINING PROGRAM

## 2022-02-24 RX ORDER — HYDROMORPHONE HCL IN WATER/PF 6 MG/30 ML
0.2 PATIENT CONTROLLED ANALGESIA SYRINGE INTRAVENOUS
Status: DISCONTINUED | OUTPATIENT
Start: 2022-02-24 | End: 2022-03-02

## 2022-02-24 RX ORDER — ACETAMINOPHEN 325 MG/1
650 TABLET ORAL EVERY 4 HOURS PRN
Status: DISCONTINUED | OUTPATIENT
Start: 2022-02-27 | End: 2022-03-03

## 2022-02-24 RX ORDER — ASPIRIN 81 MG/1
81 TABLET, CHEWABLE ORAL
Status: DISCONTINUED | OUTPATIENT
Start: 2022-02-24 | End: 2022-02-24 | Stop reason: HOSPADM

## 2022-02-24 RX ORDER — ALBUTEROL SULFATE 0.83 MG/ML
2.5 SOLUTION RESPIRATORY (INHALATION) EVERY 6 HOURS PRN
Status: DISCONTINUED | OUTPATIENT
Start: 2022-02-24 | End: 2022-03-18 | Stop reason: HOSPADM

## 2022-02-24 RX ORDER — PANTOPRAZOLE SODIUM 40 MG/1
40 TABLET, DELAYED RELEASE ORAL DAILY
Status: DISCONTINUED | OUTPATIENT
Start: 2022-02-24 | End: 2022-03-02

## 2022-02-24 RX ORDER — MUPIROCIN 20 MG/G
0.5 OINTMENT TOPICAL 2 TIMES DAILY
Status: DISPENSED | OUTPATIENT
Start: 2022-02-24 | End: 2022-03-01

## 2022-02-24 RX ORDER — FENTANYL CITRATE 50 UG/ML
25-50 INJECTION, SOLUTION INTRAMUSCULAR; INTRAVENOUS
Status: DISCONTINUED | OUTPATIENT
Start: 2022-02-24 | End: 2022-02-24 | Stop reason: HOSPADM

## 2022-02-24 RX ORDER — PROPOFOL 10 MG/ML
INJECTION, EMULSION INTRAVENOUS CONTINUOUS PRN
Status: DISCONTINUED | OUTPATIENT
Start: 2022-02-24 | End: 2022-02-24

## 2022-02-24 RX ORDER — LIDOCAINE HYDROCHLORIDE 20 MG/ML
INJECTION, SOLUTION INFILTRATION; PERINEURAL PRN
Status: DISCONTINUED | OUTPATIENT
Start: 2022-02-24 | End: 2022-02-24

## 2022-02-24 RX ORDER — DEXMEDETOMIDINE HYDROCHLORIDE 4 UG/ML
.2-.7 INJECTION, SOLUTION INTRAVENOUS CONTINUOUS
Status: DISCONTINUED | OUTPATIENT
Start: 2022-02-24 | End: 2022-02-24

## 2022-02-24 RX ORDER — PROPOFOL 10 MG/ML
5-75 INJECTION, EMULSION INTRAVENOUS CONTINUOUS
Status: DISCONTINUED | OUTPATIENT
Start: 2022-02-24 | End: 2022-02-26

## 2022-02-24 RX ORDER — DEXMEDETOMIDINE HYDROCHLORIDE 4 UG/ML
.1-1.2 INJECTION, SOLUTION INTRAVENOUS CONTINUOUS
Status: DISCONTINUED | OUTPATIENT
Start: 2022-02-24 | End: 2022-02-24 | Stop reason: HOSPADM

## 2022-02-24 RX ORDER — NALOXONE HYDROCHLORIDE 0.4 MG/ML
0.2 INJECTION, SOLUTION INTRAMUSCULAR; INTRAVENOUS; SUBCUTANEOUS
Status: DISCONTINUED | OUTPATIENT
Start: 2022-02-24 | End: 2022-03-18 | Stop reason: HOSPADM

## 2022-02-24 RX ORDER — CALCIUM CHLORIDE 100 MG/ML
INJECTION INTRAVENOUS; INTRAVENTRICULAR PRN
Status: DISCONTINUED | OUTPATIENT
Start: 2022-02-24 | End: 2022-02-24

## 2022-02-24 RX ORDER — AMOXICILLIN 250 MG
1 CAPSULE ORAL 2 TIMES DAILY
Status: DISCONTINUED | OUTPATIENT
Start: 2022-02-24 | End: 2022-03-02

## 2022-02-24 RX ORDER — ASPIRIN 81 MG/1
81 TABLET, CHEWABLE ORAL DAILY
Status: DISCONTINUED | OUTPATIENT
Start: 2022-02-25 | End: 2022-03-03

## 2022-02-24 RX ORDER — DAPAGLIFLOZIN 5 MG/1
5 TABLET, FILM COATED ORAL DAILY
Status: CANCELLED | OUTPATIENT
Start: 2022-02-25

## 2022-02-24 RX ORDER — NICOTINE POLACRILEX 4 MG
15-30 LOZENGE BUCCAL
Status: CANCELLED | OUTPATIENT
Start: 2022-02-24

## 2022-02-24 RX ORDER — DEXTROSE MONOHYDRATE 25 G/50ML
25-50 INJECTION, SOLUTION INTRAVENOUS
Status: DISCONTINUED | OUTPATIENT
Start: 2022-02-24 | End: 2022-02-26

## 2022-02-24 RX ORDER — FAMOTIDINE 10 MG
20 TABLET ORAL
Status: DISCONTINUED | OUTPATIENT
Start: 2022-02-24 | End: 2022-02-24 | Stop reason: HOSPADM

## 2022-02-24 RX ORDER — PROPOFOL 10 MG/ML
INJECTION, EMULSION INTRAVENOUS PRN
Status: DISCONTINUED | OUTPATIENT
Start: 2022-02-24 | End: 2022-02-24

## 2022-02-24 RX ORDER — ALBUMIN, HUMAN INJ 5% 5 %
SOLUTION INTRAVENOUS
Status: COMPLETED
Start: 2022-02-24 | End: 2022-02-24

## 2022-02-24 RX ORDER — DEXTROSE MONOHYDRATE 100 MG/ML
INJECTION, SOLUTION INTRAVENOUS CONTINUOUS PRN
Status: DISCONTINUED | OUTPATIENT
Start: 2022-02-24 | End: 2022-03-18 | Stop reason: HOSPADM

## 2022-02-24 RX ORDER — NALOXONE HYDROCHLORIDE 0.4 MG/ML
0.2 INJECTION, SOLUTION INTRAMUSCULAR; INTRAVENOUS; SUBCUTANEOUS
Status: DISCONTINUED | OUTPATIENT
Start: 2022-02-24 | End: 2022-02-24 | Stop reason: HOSPADM

## 2022-02-24 RX ORDER — ONDANSETRON 2 MG/ML
4 INJECTION INTRAMUSCULAR; INTRAVENOUS EVERY 6 HOURS PRN
Status: DISCONTINUED | OUTPATIENT
Start: 2022-02-24 | End: 2022-03-18 | Stop reason: HOSPADM

## 2022-02-24 RX ORDER — BISACODYL 10 MG
10 SUPPOSITORY, RECTAL RECTAL DAILY PRN
Status: DISCONTINUED | OUTPATIENT
Start: 2022-02-24 | End: 2022-03-11

## 2022-02-24 RX ORDER — DEXTROSE MONOHYDRATE 25 G/50ML
25-50 INJECTION, SOLUTION INTRAVENOUS
Status: DISCONTINUED | OUTPATIENT
Start: 2022-02-24 | End: 2022-02-24 | Stop reason: HOSPADM

## 2022-02-24 RX ORDER — SODIUM CHLORIDE, SODIUM GLUCONATE, SODIUM ACETATE, POTASSIUM CHLORIDE AND MAGNESIUM CHLORIDE 526; 502; 368; 37; 30 MG/100ML; MG/100ML; MG/100ML; MG/100ML; MG/100ML
INJECTION, SOLUTION INTRAVENOUS CONTINUOUS PRN
Status: DISCONTINUED | OUTPATIENT
Start: 2022-02-24 | End: 2022-02-24

## 2022-02-24 RX ORDER — ALBUTEROL SULFATE 0.83 MG/ML
2.5 SOLUTION RESPIRATORY (INHALATION)
Status: DISCONTINUED | OUTPATIENT
Start: 2022-02-24 | End: 2022-02-24

## 2022-02-24 RX ORDER — PHENYLEPHRINE HCL IN 0.9% NACL 50MG/250ML
.1-6 PLASTIC BAG, INJECTION (ML) INTRAVENOUS CONTINUOUS
Status: DISCONTINUED | OUTPATIENT
Start: 2022-02-24 | End: 2022-02-24 | Stop reason: HOSPADM

## 2022-02-24 RX ORDER — HYDRALAZINE HYDROCHLORIDE 20 MG/ML
10 INJECTION INTRAMUSCULAR; INTRAVENOUS EVERY 30 MIN PRN
Status: DISCONTINUED | OUTPATIENT
Start: 2022-02-24 | End: 2022-02-28

## 2022-02-24 RX ORDER — CHLORHEXIDINE GLUCONATE ORAL RINSE 1.2 MG/ML
10 SOLUTION DENTAL ONCE
Status: DISCONTINUED | OUTPATIENT
Start: 2022-02-24 | End: 2022-02-24 | Stop reason: HOSPADM

## 2022-02-24 RX ORDER — POTASSIUM CHLORIDE 29.8 MG/ML
INJECTION INTRAVENOUS PRN
Status: DISCONTINUED | OUTPATIENT
Start: 2022-02-24 | End: 2022-02-24

## 2022-02-24 RX ORDER — CALCIUM GLUCONATE 20 MG/ML
2 INJECTION, SOLUTION INTRAVENOUS
Status: ACTIVE | OUTPATIENT
Start: 2022-02-24 | End: 2022-02-27

## 2022-02-24 RX ORDER — FLUMAZENIL 0.1 MG/ML
0.2 INJECTION, SOLUTION INTRAVENOUS
Status: DISCONTINUED | OUTPATIENT
Start: 2022-02-24 | End: 2022-02-24 | Stop reason: HOSPADM

## 2022-02-24 RX ORDER — DEXMEDETOMIDINE HYDROCHLORIDE 4 UG/ML
.1-1.2 INJECTION, SOLUTION INTRAVENOUS CONTINUOUS
Status: DISCONTINUED | OUTPATIENT
Start: 2022-02-24 | End: 2022-02-24

## 2022-02-24 RX ORDER — HYDROMORPHONE HCL IN WATER/PF 6 MG/30 ML
0.4 PATIENT CONTROLLED ANALGESIA SYRINGE INTRAVENOUS
Status: DISCONTINUED | OUTPATIENT
Start: 2022-02-24 | End: 2022-03-02

## 2022-02-24 RX ORDER — HEPARIN SODIUM 1000 [USP'U]/ML
INJECTION, SOLUTION INTRAVENOUS; SUBCUTANEOUS PRN
Status: DISCONTINUED | OUTPATIENT
Start: 2022-02-24 | End: 2022-02-24

## 2022-02-24 RX ORDER — POLYETHYLENE GLYCOL 3350 17 G/17G
17 POWDER, FOR SOLUTION ORAL DAILY
Status: DISCONTINUED | OUTPATIENT
Start: 2022-02-25 | End: 2022-02-28

## 2022-02-24 RX ORDER — PROTAMINE SULFATE 10 MG/ML
INJECTION, SOLUTION INTRAVENOUS PRN
Status: DISCONTINUED | OUTPATIENT
Start: 2022-02-24 | End: 2022-02-24

## 2022-02-24 RX ORDER — NOREPINEPHRINE BITARTRATE 0.06 MG/ML
.01-.6 INJECTION, SOLUTION INTRAVENOUS CONTINUOUS
Status: DISCONTINUED | OUTPATIENT
Start: 2022-02-24 | End: 2022-02-25

## 2022-02-24 RX ORDER — LIDOCAINE 40 MG/G
CREAM TOPICAL
Status: DISCONTINUED | OUTPATIENT
Start: 2022-02-24 | End: 2022-02-24 | Stop reason: HOSPADM

## 2022-02-24 RX ORDER — VASOPRESSIN 20 U/ML
INJECTION PARENTERAL PRN
Status: DISCONTINUED | OUTPATIENT
Start: 2022-02-24 | End: 2022-02-24

## 2022-02-24 RX ORDER — NALOXONE HYDROCHLORIDE 0.4 MG/ML
0.4 INJECTION, SOLUTION INTRAMUSCULAR; INTRAVENOUS; SUBCUTANEOUS
Status: DISCONTINUED | OUTPATIENT
Start: 2022-02-24 | End: 2022-03-18 | Stop reason: HOSPADM

## 2022-02-24 RX ORDER — GUAIFENESIN 600 MG/1
15 TABLET, EXTENDED RELEASE ORAL DAILY
Status: DISCONTINUED | OUTPATIENT
Start: 2022-02-25 | End: 2022-02-25

## 2022-02-24 RX ORDER — ONDANSETRON 4 MG/1
4 TABLET, ORALLY DISINTEGRATING ORAL EVERY 6 HOURS PRN
Status: DISCONTINUED | OUTPATIENT
Start: 2022-02-24 | End: 2022-03-18 | Stop reason: HOSPADM

## 2022-02-24 RX ORDER — MILRINONE LACTATE 0.2 MG/ML
0.12 INJECTION, SOLUTION INTRAVENOUS CONTINUOUS
Status: DISCONTINUED | OUTPATIENT
Start: 2022-02-24 | End: 2022-02-24 | Stop reason: HOSPADM

## 2022-02-24 RX ORDER — HEPARIN SODIUM 5000 [USP'U]/.5ML
5000 INJECTION, SOLUTION INTRAVENOUS; SUBCUTANEOUS EVERY 8 HOURS
Status: DISCONTINUED | OUTPATIENT
Start: 2022-02-25 | End: 2022-02-24

## 2022-02-24 RX ORDER — DEXTROSE MONOHYDRATE 25 G/50ML
25-50 INJECTION, SOLUTION INTRAVENOUS
Status: CANCELLED | OUTPATIENT
Start: 2022-02-24

## 2022-02-24 RX ORDER — PROCHLORPERAZINE MALEATE 5 MG
5 TABLET ORAL EVERY 6 HOURS PRN
Status: DISCONTINUED | OUTPATIENT
Start: 2022-02-24 | End: 2022-02-25

## 2022-02-24 RX ORDER — NOREPINEPHRINE BITARTRATE 0.06 MG/ML
.01-.4 INJECTION, SOLUTION INTRAVENOUS CONTINUOUS PRN
Status: DISCONTINUED | OUTPATIENT
Start: 2022-02-24 | End: 2022-02-24

## 2022-02-24 RX ORDER — FIBRINOGEN (HUMAN) 700-1300MG
1050 KIT INTRAVENOUS ONCE
Status: DISCONTINUED | OUTPATIENT
Start: 2022-02-24 | End: 2022-02-25

## 2022-02-24 RX ORDER — CALCIUM GLUCONATE 20 MG/ML
1 INJECTION, SOLUTION INTRAVENOUS
Status: ACTIVE | OUTPATIENT
Start: 2022-02-24 | End: 2022-02-27

## 2022-02-24 RX ORDER — FENTANYL CITRATE 50 UG/ML
INJECTION, SOLUTION INTRAMUSCULAR; INTRAVENOUS PRN
Status: DISCONTINUED | OUTPATIENT
Start: 2022-02-24 | End: 2022-02-24

## 2022-02-24 RX ORDER — ASPIRIN 81 MG/1
162 TABLET, CHEWABLE ORAL
Status: DISCONTINUED | OUTPATIENT
Start: 2022-02-24 | End: 2022-02-24 | Stop reason: HOSPADM

## 2022-02-24 RX ORDER — NALOXONE HYDROCHLORIDE 0.4 MG/ML
0.4 INJECTION, SOLUTION INTRAMUSCULAR; INTRAVENOUS; SUBCUTANEOUS
Status: DISCONTINUED | OUTPATIENT
Start: 2022-02-24 | End: 2022-02-24 | Stop reason: HOSPADM

## 2022-02-24 RX ORDER — DEXTROSE MONOHYDRATE, SODIUM CHLORIDE, AND POTASSIUM CHLORIDE 50; 1.49; 4.5 G/1000ML; G/1000ML; G/1000ML
INJECTION, SOLUTION INTRAVENOUS CONTINUOUS
Status: DISCONTINUED | OUTPATIENT
Start: 2022-02-24 | End: 2022-02-26

## 2022-02-24 RX ORDER — NICOTINE POLACRILEX 4 MG
15-30 LOZENGE BUCCAL
Status: DISCONTINUED | OUTPATIENT
Start: 2022-02-24 | End: 2022-02-24 | Stop reason: HOSPADM

## 2022-02-24 RX ORDER — ALBUMIN, HUMAN INJ 5% 5 %
250 SOLUTION INTRAVENOUS
Status: COMPLETED | OUTPATIENT
Start: 2022-02-24 | End: 2022-02-25

## 2022-02-24 RX ORDER — NICOTINE POLACRILEX 4 MG
15-30 LOZENGE BUCCAL
Status: DISCONTINUED | OUTPATIENT
Start: 2022-02-24 | End: 2022-02-26

## 2022-02-24 RX ORDER — OXYCODONE HYDROCHLORIDE 10 MG/1
10 TABLET ORAL EVERY 4 HOURS PRN
Status: DISCONTINUED | OUTPATIENT
Start: 2022-02-24 | End: 2022-02-25

## 2022-02-24 RX ORDER — LIDOCAINE 40 MG/G
CREAM TOPICAL
Status: DISCONTINUED | OUTPATIENT
Start: 2022-02-24 | End: 2022-03-18 | Stop reason: HOSPADM

## 2022-02-24 RX ORDER — POTASSIUM CHLORIDE 7.45 MG/ML
10 INJECTION INTRAVENOUS ONCE
Status: COMPLETED | OUTPATIENT
Start: 2022-02-24 | End: 2022-02-24

## 2022-02-24 RX ORDER — HEPARIN SODIUM 5000 [USP'U]/.5ML
5000 INJECTION, SOLUTION INTRAVENOUS; SUBCUTANEOUS EVERY 8 HOURS
Status: DISCONTINUED | OUTPATIENT
Start: 2022-02-25 | End: 2022-02-25

## 2022-02-24 RX ORDER — ACETAMINOPHEN 325 MG/1
975 TABLET ORAL EVERY 8 HOURS
Status: DISPENSED | OUTPATIENT
Start: 2022-02-24 | End: 2022-02-27

## 2022-02-24 RX ORDER — ATORVASTATIN CALCIUM 40 MG/1
40 TABLET, FILM COATED ORAL EVERY MORNING
Status: CANCELLED | OUTPATIENT
Start: 2022-02-25

## 2022-02-24 RX ORDER — VANCOMYCIN HYDROCHLORIDE 1 G/20ML
INJECTION, POWDER, LYOPHILIZED, FOR SOLUTION INTRAVENOUS PRN
Status: DISCONTINUED | OUTPATIENT
Start: 2022-02-24 | End: 2022-02-24 | Stop reason: HOSPADM

## 2022-02-24 RX ORDER — OXYCODONE HYDROCHLORIDE 5 MG/1
5 TABLET ORAL EVERY 4 HOURS PRN
Status: DISCONTINUED | OUTPATIENT
Start: 2022-02-24 | End: 2022-02-25

## 2022-02-24 RX ADMIN — EPINEPHRINE 0.05 MCG/KG/MIN: 1 INJECTION PARENTERAL at 07:55

## 2022-02-24 RX ADMIN — CALCIUM CHLORIDE 1000 MG: 100 INJECTION INTRAVENOUS; INTRAVENTRICULAR at 08:40

## 2022-02-24 RX ADMIN — HEPARIN SODIUM 26000 UNITS: 1000 INJECTION INTRAVENOUS; SUBCUTANEOUS at 10:04

## 2022-02-24 RX ADMIN — LIDOCAINE HYDROCHLORIDE 100 MG: 20 INJECTION, SOLUTION INFILTRATION; PERINEURAL at 13:24

## 2022-02-24 RX ADMIN — FENTANYL CITRATE 50 MCG/HR: 50 INJECTION INTRAVENOUS at 17:55

## 2022-02-24 RX ADMIN — VANCOMYCIN HYDROCHLORIDE 1000 MG: 100 INJECTION, POWDER, LYOPHILIZED, FOR SOLUTION INTRAVENOUS at 20:27

## 2022-02-24 RX ADMIN — NOREPINEPHRINE BITARTRATE 6.4 MCG: 1 INJECTION, SOLUTION, CONCENTRATE INTRAVENOUS at 07:57

## 2022-02-24 RX ADMIN — FENTANYL CITRATE 50 MCG: 50 INJECTION, SOLUTION INTRAMUSCULAR; INTRAVENOUS at 13:30

## 2022-02-24 RX ADMIN — Medication 2.4 UNITS/HR: at 18:38

## 2022-02-24 RX ADMIN — CALCIUM CHLORIDE 1000 MG: 100 INJECTION INTRAVENOUS; INTRAVENTRICULAR at 14:00

## 2022-02-24 RX ADMIN — VASOPRESSIN 2 UNITS: 20 INJECTION INTRAVENOUS at 08:37

## 2022-02-24 RX ADMIN — ALBUMIN HUMAN 250 ML: 0.05 INJECTION, SOLUTION INTRAVENOUS at 22:20

## 2022-02-24 RX ADMIN — PROPOFOL 50 MCG/KG/MIN: 10 INJECTION, EMULSION INTRAVENOUS at 23:03

## 2022-02-24 RX ADMIN — SODIUM CHLORIDE 2000 MCG: 9 INJECTION, SOLUTION INTRAVENOUS at 13:18

## 2022-02-24 RX ADMIN — NOREPINEPHRINE BITARTRATE 6.4 MCG: 1 INJECTION, SOLUTION, CONCENTRATE INTRAVENOUS at 08:24

## 2022-02-24 RX ADMIN — SODIUM CHLORIDE, SODIUM GLUCONATE, SODIUM ACETATE, POTASSIUM CHLORIDE AND MAGNESIUM CHLORIDE: 526; 502; 368; 37; 30 INJECTION, SOLUTION INTRAVENOUS at 07:55

## 2022-02-24 RX ADMIN — Medication 40 MG: at 20:27

## 2022-02-24 RX ADMIN — FENTANYL CITRATE 50 MCG: 50 INJECTION, SOLUTION INTRAMUSCULAR; INTRAVENOUS at 09:12

## 2022-02-24 RX ADMIN — POTASSIUM CHLORIDE 10 MEQ: 7.46 INJECTION, SOLUTION INTRAVENOUS at 19:04

## 2022-02-24 RX ADMIN — SODIUM BICARBONATE 50 MEQ: 84 INJECTION, SOLUTION INTRAVENOUS at 14:55

## 2022-02-24 RX ADMIN — LIDOCAINE HYDROCHLORIDE 100 MG: 20 INJECTION, SOLUTION INFILTRATION; PERINEURAL at 07:55

## 2022-02-24 RX ADMIN — Medication 2.4 UNITS/HR: at 19:11

## 2022-02-24 RX ADMIN — ALBUMIN HUMAN 250 ML: 0.05 INJECTION, SOLUTION INTRAVENOUS at 20:55

## 2022-02-24 RX ADMIN — VASOPRESSIN 2 UNITS: 20 INJECTION INTRAVENOUS at 08:44

## 2022-02-24 RX ADMIN — Medication 1000 MG: at 07:55

## 2022-02-24 RX ADMIN — VASOPRESSIN 2 UNITS: 20 INJECTION INTRAVENOUS at 08:20

## 2022-02-24 RX ADMIN — SODIUM CHLORIDE, POTASSIUM CHLORIDE, SODIUM LACTATE AND CALCIUM CHLORIDE 500 ML: 600; 310; 30; 20 INJECTION, SOLUTION INTRAVENOUS at 19:02

## 2022-02-24 RX ADMIN — SUGAMMADEX 150 MG: 100 INJECTION, SOLUTION INTRAVENOUS at 15:12

## 2022-02-24 RX ADMIN — DEXMEDETOMIDINE HYDROCHLORIDE 0.4 MCG/KG/HR: 400 INJECTION INTRAVENOUS at 10:46

## 2022-02-24 RX ADMIN — NOREPINEPHRINE BITARTRATE 6.4 MCG: 1 INJECTION, SOLUTION, CONCENTRATE INTRAVENOUS at 07:55

## 2022-02-24 RX ADMIN — EPINEPHRINE 0.15 MCG/KG/MIN: 1 INJECTION PARENTERAL at 18:36

## 2022-02-24 RX ADMIN — HUMAN INSULIN 3 UNITS/HR: 100 INJECTION, SOLUTION SUBCUTANEOUS at 08:35

## 2022-02-24 RX ADMIN — FENTANYL CITRATE 200 MCG: 50 INJECTION, SOLUTION INTRAMUSCULAR; INTRAVENOUS at 07:55

## 2022-02-24 RX ADMIN — ROCURONIUM BROMIDE 100 MG: 50 INJECTION, SOLUTION INTRAVENOUS at 07:55

## 2022-02-24 RX ADMIN — FENTANYL CITRATE 50 MCG: 50 INJECTION, SOLUTION INTRAMUSCULAR; INTRAVENOUS at 13:42

## 2022-02-24 RX ADMIN — PROPOFOL 20 MCG/KG/MIN: 10 INJECTION, EMULSION INTRAVENOUS at 14:55

## 2022-02-24 RX ADMIN — SODIUM CHLORIDE, POTASSIUM CHLORIDE, SODIUM LACTATE AND CALCIUM CHLORIDE 500 ML: 600; 310; 30; 20 INJECTION, SOLUTION INTRAVENOUS at 19:57

## 2022-02-24 RX ADMIN — SENNOSIDES AND DOCUSATE SODIUM 1 TABLET: 50; 8.6 TABLET ORAL at 20:27

## 2022-02-24 RX ADMIN — PROPOFOL 30 MCG/KG/MIN: 10 INJECTION, EMULSION INTRAVENOUS at 18:34

## 2022-02-24 RX ADMIN — AMINOCAPROIC ACID 5 G: 250 INJECTION, SOLUTION INTRAVENOUS at 08:05

## 2022-02-24 RX ADMIN — SODIUM CHLORIDE, POTASSIUM CHLORIDE, SODIUM LACTATE AND CALCIUM CHLORIDE 500 ML: 600; 310; 30; 20 INJECTION, SOLUTION INTRAVENOUS at 16:49

## 2022-02-24 RX ADMIN — AMINOCAPROIC ACID 1 G/HR: 250 INJECTION, SOLUTION INTRAVENOUS at 09:05

## 2022-02-24 RX ADMIN — SODIUM CHLORIDE, SODIUM GLUCONATE, SODIUM ACETATE, POTASSIUM CHLORIDE AND MAGNESIUM CHLORIDE: 526; 502; 368; 37; 30 INJECTION, SOLUTION INTRAVENOUS at 14:37

## 2022-02-24 RX ADMIN — NOREPINEPHRINE BITARTRATE 6.4 MCG: 1 INJECTION, SOLUTION, CONCENTRATE INTRAVENOUS at 08:13

## 2022-02-24 RX ADMIN — ROCURONIUM BROMIDE 50 MG: 50 INJECTION, SOLUTION INTRAVENOUS at 09:12

## 2022-02-24 RX ADMIN — Medication 0.07 MCG/KG/MIN: at 18:37

## 2022-02-24 RX ADMIN — NOREPINEPHRINE BITARTRATE 6.4 MCG: 1 INJECTION, SOLUTION, CONCENTRATE INTRAVENOUS at 08:36

## 2022-02-24 RX ADMIN — FENTANYL CITRATE 150 MCG: 50 INJECTION, SOLUTION INTRAMUSCULAR; INTRAVENOUS at 14:09

## 2022-02-24 RX ADMIN — POTASSIUM CHLORIDE 20 MEQ: 29.8 INJECTION, SOLUTION INTRAVENOUS at 08:34

## 2022-02-24 RX ADMIN — PROPOFOL 20 MG: 10 INJECTION, EMULSION INTRAVENOUS at 07:55

## 2022-02-24 RX ADMIN — SODIUM CHLORIDE, POTASSIUM CHLORIDE, SODIUM LACTATE AND CALCIUM CHLORIDE 250 ML: 600; 310; 30; 20 INJECTION, SOLUTION INTRAVENOUS at 18:07

## 2022-02-24 RX ADMIN — NOREPINEPHRINE BITARTRATE 6.4 MCG: 1 INJECTION, SOLUTION, CONCENTRATE INTRAVENOUS at 08:02

## 2022-02-24 RX ADMIN — NOREPINEPHRINE BITARTRATE 0.03 MCG/KG/MIN: 1 INJECTION, SOLUTION, CONCENTRATE INTRAVENOUS at 07:55

## 2022-02-24 RX ADMIN — VASOPRESSIN 2 UNITS: 20 INJECTION INTRAVENOUS at 08:28

## 2022-02-24 RX ADMIN — VASOPRESSIN 4 UNITS/HR: 20 INJECTION INTRAVENOUS at 08:33

## 2022-02-24 RX ADMIN — ROCURONIUM BROMIDE 50 MG: 50 INJECTION, SOLUTION INTRAVENOUS at 13:20

## 2022-02-24 RX ADMIN — VASOPRESSIN 2 UNITS: 20 INJECTION INTRAVENOUS at 08:12

## 2022-02-24 RX ADMIN — PROTAMINE SULFATE 200 MG: 10 INJECTION, SOLUTION INTRAVENOUS at 13:38

## 2022-02-24 RX ADMIN — SODIUM CHLORIDE, SODIUM GLUCONATE, SODIUM ACETATE, POTASSIUM CHLORIDE AND MAGNESIUM CHLORIDE: 526; 502; 368; 37; 30 INJECTION, SOLUTION INTRAVENOUS at 11:16

## 2022-02-24 RX ADMIN — HYDROMORPHONE HYDROCHLORIDE 0.5 MG: 1 INJECTION, SOLUTION INTRAMUSCULAR; INTRAVENOUS; SUBCUTANEOUS at 13:57

## 2022-02-24 ASSESSMENT — ACTIVITIES OF DAILY LIVING (ADL)
ADLS_ACUITY_SCORE: 10
ADLS_ACUITY_SCORE: 13
ADLS_ACUITY_SCORE: 10
ADLS_ACUITY_SCORE: 13
ADLS_ACUITY_SCORE: 10
ADLS_ACUITY_SCORE: 12
ADLS_ACUITY_SCORE: 10
ADLS_ACUITY_SCORE: 12
ADLS_ACUITY_SCORE: 10

## 2022-02-24 ASSESSMENT — COPD QUESTIONNAIRES: COPD: 1

## 2022-02-24 NOTE — ANESTHESIA CARE TRANSFER NOTE
Patient: Ashley Osman    Procedure: Procedure(s):  median sternotomy, cardiopulmonary bypass, Coronary Artery Bypass Graft (CABG) x4 using left internal mammary artery and endoscopically harvested left saphenous vein, transesophageal echocardiogram per anesthesia       Diagnosis: CAD (coronary artery disease) [I25.10]  Diagnosis Additional Information: No value filed.    Anesthesia Type:   General     Note:    Oropharynx: endotracheal tube in place and ventilatory support  Level of Consciousness: iatrogenic sedation        Dentition: dentition unchanged  Vital Signs Stable: post-procedure vital signs reviewed and stable  Report to RN Given: handoff report given  Patient transferred to: ICU    ICU Handoff: Call for PAUSE to initiate/utilize ICU HANDOFF, Identified Patient, Identified Responsible Provider, Reviewed the Pertinent Medical History, Discussed Surgical Course, Reviewed Intra-OP Anesthesia Management and Issues during Anesthesia, Set Expectations for Post Procedure Period and Allowed Opportunity for Questions and Acknowledgement of Understanding      Vitals:  Vitals Value Taken Time   BP     Temp     Pulse 85 02/24/22 1539   Resp 16 02/24/22 1539   SpO2 100 % 02/24/22 1539   Vitals shown include unvalidated device data.    Electronically Signed By: JOSÉ Durán CRNA  February 24, 2022  3:41 PM

## 2022-02-24 NOTE — H&P
CV ICU H&P  2/24/2022      CO-MORBIDITIES:   Patient Active Problem List   Diagnosis     Heart failure (H)       ASSESSMENT: Ashley Osman is a 71 year old female with PMH of HFrEF (10-15%) 2/2 ICM (Hx LAD and Circumflex Mis; total occlusion of RCA and LAD), Biventricular failure (requiring inotropic support PTA to [facility]), COPD, HLD, Tobacco Use Disorder (quit 12/2021), CAD who underwent CABG x4 on 02/24 with Dr. Bhandari.    INTRAOPERATIVE EVENTS:  - Preop echo: LVEF < 20; mod/severe MR; dilated LV and LA; pre-op PA pressures 40/20  - Postop echo: note pended by anesthesia   - RN received signout that coronary perforation occurred during case, was successfully repaired  - Drips:    - dex 0.4   -  2   - levo 0.08   - epi 0.15   - prop 30   - insulin 1.5  - Intraoperative products:   - 2.5L crystalloid   - 1u pRBC   - 390 mL cellsaver  - Plan from Dr. Bhandari:   - IABP stays for 2-3 days   - Estimates will remain intubated 2 - 3 days    PLAN:  Neuro/ pain/ sedation:  Acute Postoperative pain  - Monitor neurological status. Notify the MD for any acute changes in exam.  - Pain:   - fentanyl gtt   - Scheduled:    - tylenol   - PRN     - tylenol, fentanyl, dilaudid, oxycodone  - Sedation:   - propofol gtt   - fentanyl gtt  - Goal RASS 0 to -1    Pulmonary care:   # Postoperative ventilation management  # Hx of COPD  - Intubated, ventilated  - Titrate supplemental oxygen to maintain saturation above 92%.  - Pulmonary hygiene: Incentive spirometer every 15- 30 minutes when awake, flutter valve, C&DB    Cardiovascular:    # S/p CABG x 4 on 02/24 by Dr. Bhandari  # History of Biventricular HF requiring pressor support 2/2 ICM  # HLD  Recent echo on 02/11/2022 with LVEF of 15%  - Goal MAP>65, SBP<140  - Hold atorvastatin, carvediliol, nitrostat, isosorbide mononitrate  - ASA: start 02/25  - Epi, norepi, vaso gtt; wean as tolerated    GI care/ Nutrition:   - NPO    - needs NJ 02/25  - PPI  - Bowel regimen: miralax,  senna, & dulcolax PRNs    Renal/ Fluid Balance/ Electrolytes:   BL creat appears to be ~ 0.70  - Strict I/O, daily weights  - Avoid/limit nephrotoxins as able  - Replete Mg, K, PO4 per protocol    Endocrine:    # Stress induced hyperglycemia  Preop A1c 5.6%  - Insulin gtt  - Goal BG <180 for optimal healing  - No prior Hx DM    ID/ Antibiotics:  # Stress induced leukocytosis  - To complete perioperative regimen   - no cefazolin (has allergy documented)   - vancomycin  - Continue to monitor fever curve, WBC and inflammatory markers as appropriate    Heme:     # Stress induced leukocytosis  # Acute blood loss anemia  # Acute blood loss thrombocytopenia  No s/sx active bleeding  - Continue to monitor  - CBC    MSK/ Skin:  # Sternotomy  # Surgical Incision  - Sternal precautions  - Postoperative incision management per protocol  - PT/OT/CR    Prophylaxis:    - Mechanical prophylaxis for DVT  - Chemical DVT prophylaxis - tentatively start subcutaneous heparin 02/25  - PPI    Lines/ tubes/ drains:  - Arterial Line  - ETT  - CTs x 2  - PA catheter  - RIJ  - PIV  - Vizcaino    Disposition:  - CVICU    Patient seen, findings and plan discussed with CVICU staff    Eloy Flannery, MS4    Resident/Fellow Attestation   I, Bruno Cabrales, was present with Eloy Flannery who participated in the service and in the documentation of the note.  I have verified the history and personally performed the physical exam and medical decision making.  I agree with the assessment and plan of care as documented in the note.      Bruno Cabrales MD  Anesthesiology, CA-2, PGY3    ====================================    HPI:   Ashley Osman is a 71 year old female with PMH of HFrEF (10-15%) 2/2 ICM (Hx LAD and Circumflex MIs), Biventricular failure (requiring inotropic support PTA to [facility]), COPD, HLD, Tobacco Use Disorder (quit 12/2021), CAD who underwent CABG x 4 on 02/24 with Dr. Bhandari.    She [facility] with [facility], not speaking in full sentences, found to have LVEF of 15%, intubated for cardiogenic shock, transferred to [facility] for consideration of advanced surgical therapies.    PAST MEDICAL HISTORY:   - HFrEF (ICM)  - CAD (three vessel disease)  - COPD  - Tobacco Use Disorder  - HLD    PAST SURGICAL HISTORY: History reviewed. No pertinent surgical history.    FAMILY HISTORY: History reviewed. No pertinent family history.    SOCIAL HISTORY:   Social History     Tobacco Use     Smoking status: Not on file     Smokeless tobacco: Not on file   Substance Use Topics     Alcohol use: Not on file       OBJECTIVE:   1. VITAL SIGNS:   BP (!) 145/63   Pulse 80   Temp 98.2  F (36.8  C) (Pulmonary Artery)   Resp 20   Ht 1.638 m (5' 4.5\")   Wt 56.2 kg (123 lb 14.4 oz)   SpO2 100%   BMI 20.94 kg/m      2. INTAKE/ OUTPUT:   Gross per 24 hour   Intake 2775.92 ml   Output 880 ml   Net 1895.92 ml     3. PHYSICAL EXAMINATION:     General: female patient, sedated and lying flat in bed  Neuro: deeply sedated  Resp: intubated, ventilated, mechanical lung sounds that are CTAB  CV: regular S1, S2, soft pericardial rub, there is a IV/VI systolic murmur most prominent at the mitral position, upper extremity pulses are 3+ bilaterally, lower extremity pulses are difficult to palpate, capillary refill ~2 sec or less in all extremities.  Abdomen: Soft, non-distended, non-tender  Incisions: c/d/i there is a balloon pump inserted in the L femoral artery, there is no associated hematoma  Extremities: hands and feet are cool to touch, legs and arms are warm, pulses and capillary refill as above  CT: To suction, serosang output, there is a slight air leak around the left chest tube    4. INVESTIGATIONS:   Arterial Blood Gases   Recent Labs   Lab 02/24/22  0835 02/23/22  0403 02/23/22  0257   PH 7.33* 7.33* 7.23*   PCO2 45 40 52*   PO2 334* 65* 64*   HCO3 23 21 22     Complete Blood Count   Recent Labs   Lab 02/24/22  0835 02/24/22  0335 " 02/23/22  2100 02/23/22  1855 02/23/22  0549 02/22/22 0457   WBC  --  14.3* 11.3*  --  19.2* 8.9   HGB 9.5* 9.6* 10.0* 10.5* 11.5* 9.5*   PLT  --  300 323  --  385 318     Basic Metabolic Panel  Recent Labs   Lab 02/24/22  0835 02/24/22  0335 02/23/22  2109 02/23/22  2108 02/23/22  0549 02/22/22 0457    140  --  139 142 140   POTASSIUM 3.0* 4.0  --  4.1 3.6 3.8   CHLORIDE  --  108  --  107 109 110*   CO2  --  25  --  24 24 25   BUN  --  33*  --  28 25 17   CR  --  0.76  --  0.82 1.02 0.62   * 112* 117* 119* 190* 86     Liver Function Tests  Recent Labs   Lab 02/24/22  0335 02/23/22  0549 02/22/22 0457 02/21/22  0317   AST 20 23 20 20   ALT 27 32 33 36   ALKPHOS 69 87 67 69   BILITOTAL 0.5 0.4 0.6 0.6   ALBUMIN 2.8* 2.9* 2.8* 2.8*   INR 1.72* 1.10 1.14 1.18*     Pancreatic Enzymes  No lab results found in last 7 days.  Coagulation Profile  Recent Labs   Lab 02/24/22  0335 02/23/22  0549 02/22/22 0457 02/21/22 0317   INR 1.72* 1.10 1.14 1.18*         5. RADIOLOGY:   Recent Results (from the past 24 hour(s))   Cardiac Catheterization    Narrative      Right sided filling pressures are mildly elevated.    Left sided filling pressures are mildly elevated.    Mild elevated pulmonary hypertension.    Reduced cardiac output level.     IABP placement through the LCFA  Gilead-renay catheter placement through the RIJ      XR Chest Port 1 View    Narrative    Exam: XR CHEST PORT 1 VIEW, 2/23/2022 9:42 PM    Indication: Confirm placement of the IABP    Comparison: 2/23/2022    Findings:   Left arm PICC tip remains in the low superior vena cava. New right IJ  Gilead-Renay catheter tip in the right pulmonary artery. Intra-aortic  balloon pump tip 2 cm above the elan. Heart within normal limits in  size. Diffuse mixed opacities throughout both lungs. Blunting of both  costophrenic angles.      Impression    Impression: Intra-aortic balloon pump tip 2 cm above the elan. New  Gilead-Renay catheter tip in the right  pulmonary artery. Unchanged  diffuse mixed opacities throughout both lungs. Stable bilateral  pleural effusions.    WHITNEY HEALY MD         SYSTEM ID:  [id number]       =========================================

## 2022-02-24 NOTE — ANESTHESIA PROCEDURE NOTES
Airway       Patient location during procedure: OR       Procedure Start/Stop Times: 2/24/2022 7:57 AM  Staff -        Anesthesiologist:  Tata Romero MD       Resident/Fellow: Anjali Patiño MD       Performed By: resident  Consent for Airway        Urgency: elective  Indications and Patient Condition       Indications for airway management: sabrina-procedural       Induction type:intravenous       Mask difficulty assessment: 1 - vent by mask    Final Airway Details       Final airway type: endotracheal airway       Successful airway: ETT - single and Oral  Endotracheal Airway Details        ETT size (mm): 8.0       Cuffed: yes       Successful intubation technique: video laryngoscopy       VL Blade Size: MAC 3       Grade View of Cords: 1       Adjucts: stylet       Position: Right       Measured from: gums/teeth       Secured at (cm): 23       Bite block used: None    Post intubation assessment        Placement verified by: capnometry and chest rise        Number of attempts at approach: 1       Number of other approaches attempted: 0       Secured with: pink tape       Ease of procedure: easy       Dentition: Intact and Unchanged

## 2022-02-24 NOTE — ANESTHESIA PREPROCEDURE EVALUATION
Anesthesia Pre-Procedure Evaluation    Patient: Ashley Osman   MRN: [id number] : 1950        Procedure : Procedure(s):  Coronary Artery Bypass Graft (CABG) and Associated Procedures          History reviewed. No pertinent past medical history.   History reviewed. No pertinent surgical history.   Allergies   Allergen Reactions     Wasp Venom Protein Anaphylaxis     Bee Venom Swelling and Hives     Other Drug Allergy (See Comments)      1980- had c-sec. Was in ICU for swelling, chills- unsure of what med it was -at [facility]. Was a Dr. House     Penicillins Rash     Siblings are allergic to PCN        Social History     Tobacco Use     Smoking status: Not on file     Smokeless tobacco: Not on file   Substance Use Topics     Alcohol use: Not on file      Wt Readings from Last 1 Encounters:   22 56.2 kg (123 lb 14.4 oz)        Anesthesia Evaluation            ROS/MED HX  ENT/Pulmonary: Comment:   #flash pulmonary edema    (+) COPD,     Neurologic:  - neg neurologic ROS     Cardiovascular:     (+) --CAD ---Taking blood thinners CHF Last EF: 17% pulmonary hypertension,     METS/Exercise Tolerance: 1 - Eating, dressing    Hematologic:     (+) anemia,     Musculoskeletal:  - neg musculoskeletal ROS     GI/Hepatic:  - neg GI/hepatic ROS     Renal/Genitourinary:  - neg Renal ROS     Endo:  - neg endo ROS     Psychiatric/Substance Use:  - neg psychiatric ROS     Infectious Disease:  - neg infectious disease ROS     Malignancy:  - neg malignancy ROS     Other:               OUTSIDE LABS:  CBC:   Lab Results   Component Value Date    WBC 14.3 (H) 2022    WBC 11.3 (H) 2022    HGB 9.6 (L) 2022    HGB 10.0 (L) 2022    HCT 30.6 (L) 2022    HCT 31.8 (L) 2022     2022     2022     BMP:   Lab Results   Component Value Date     2022     2022    POTASSIUM 4.0 2022    POTASSIUM 4.1 2022    CHLORIDE 108 2022    CHLORIDE  107 02/23/2022    CO2 25 02/24/2022    CO2 24 02/23/2022    BUN 33 (H) 02/24/2022    BUN 28 02/23/2022    CR 0.76 02/24/2022    CR 0.82 02/23/2022     (H) 02/24/2022     (H) 02/23/2022     COAGS:   Lab Results   Component Value Date    INR 1.72 (H) 02/24/2022    FIBR 386 02/24/2022     POC: No results found for: BGM, HCG, HCGS  HEPATIC:   Lab Results   Component Value Date    ALBUMIN 2.8 (L) 02/24/2022    PROTTOTAL 6.0 (L) 02/24/2022    ALT 27 02/24/2022    AST 20 02/24/2022    ALKPHOS 69 02/24/2022    BILITOTAL 0.5 02/24/2022     OTHER:   Lab Results   Component Value Date    PH 7.33 (L) 02/23/2022    LACT 0.7 02/24/2022    A1C 5.6 02/13/2022    HEIDI 8.9 02/24/2022    PHOS 5.4 (H) 02/23/2022    MAG 2.4 (H) 02/24/2022    TSH 2.08 02/16/2022       Anesthesia Plan    ASA Status:  4   NPO Status:  NPO Appropriate    Anesthesia Type: General.     - Airway: ETT   Induction: Intravenous.   Maintenance: Balanced.   Techniques and Equipment:     - Airway: Video-Laryngoscope     - Lines/Monitors: 2nd IV, Arterial Line, Central Line, PAC, CVP, BIS, NIRS, PACHECO            PACHECO Absolute Contra-indication: NONE            PACHECO Relative Contra-indication: NONE     Consents    Anesthesia Plan(s) and associated risks, benefits, and realistic alternatives discussed. Questions answered and patient/representative(s) expressed understanding.     - Discussed: Risks, Benefits and Alternatives for the PROCEDURE were discussed     - Discussed with:  Patient      - Extended Intubation/Ventilatory Support Discussed: Yes.      - Patient is DNR/DNI Status: No    Use of blood products discussed: Yes.     - Discussed with: Patient.     - Consented: consented to blood products            Reason for refusal: other.     Postoperative Care    Pain management: Multi-modal analgesia, IV analgesics.        Comments:                Anjali Patiño MD

## 2022-02-24 NOTE — BRIEF OP NOTE
[facility]    Brief Operative Note    Pre-operative diagnosis: CAD (coronary artery disease) [I25.10]  Post-operative diagnosis Same as pre-operative diagnosis    Procedure: Procedure(s):  median sternotomy, cardiopulmonary bypass, Coronary Artery Bypass Graft (CABG) x4 using left internal mammary artery and endoscopically harvested left saphenous vein, transesophageal echocardiogram per anesthesia  Surgeon: Surgeon(s) and Role:     * Calixto Bhandari MD - Primary     * Carlita Kelly PA-C - Assisting  Anesthesia: Combined General with Block   Estimated Blood Loss: see anesthesia report     Drains: 28F mediastinal drain, 24F riri to left pleural space.   Specimens: * No specimens in log *  Findings:   see op note.  Complications: see op note.  Implants: * No implants in log *     Carlita Ramirez PA-C  Cardiothoracic Surgery  Pager [phone]  February 24, 2022

## 2022-02-24 NOTE — PLAN OF CARE
Major Shift Events:      1) pt transferred to  with new IABP placement.     2) Vizcaino placed. Urine output down to 20mL every 2 hours. Team aware. Renal labs stable.     2) Heparin started.     3) Pt preped and ready for CABG.     4) CVP 5.  PA 45/23. SVO2 69. CI 3.1. CO. 4.8. SVR 1245.     Plan: CABG  For vital signs and complete assessments, please see documentation flowsheets.

## 2022-02-24 NOTE — ANESTHESIA PROCEDURE NOTES
Perioperative PACHECO Procedure Note    Staff -        Anesthesiologist:  Tata Romero MD       Resident/Fellow: Juan Carlos Mercer MD       Performed By: anesthesiologist and with residents       Procedure performed by resident/fellow/CRNA in presence of a teaching physician.    Preanesthesia Checklist:  Patient identified, IV assessed, risks and benefits discussed, monitors and equipment assessed, procedure being performed at surgeon's request and anesthesia consent obtained.    PACHECO Probe Insertion    Probe Status PRE Insertion: NO obvious damage  Probe type:  Adult 2D  Bite block used:   None           Reason: Edentulous  Insertion Technique: Easy, no oropharyngeal manipulation  Insertion complications: None obvious  Billing Report:PACHECO report by Anesthesiologist (See Separate Report note)  Probe Status POST Removal: NO obvious damage    PACHECO Report  General Procedure Information  Fellow/Resident Interpretation: I personally reviewed the images and the fellow/resident interpretation.  I agree with the fellow/resident interpretation as amended by myself.   Images for this study have been archived.  Modalities: 2D, 3D, CW Doppler and PW Doppler  Diagnostic indications for PACHECO:   Cardiomyopathy, other  Non-rheumatic mitral regurgitation  Echocardiographic and Doppler Measurements  Right Ventricle:  Hypertrophy not present.   Thrombus not present.    Global function normal.     Left Ventricle:  Cavity size dilated.   Hypertrophy not present.   Thrombus not present.   Ejection Fraction 20%.      Ventricular Regional Function:  1- Basal Anteroseptal:  hypokinetic  2- Basal Anterior:  hypokinetic  3- Basal Anterolateral:  hypokinetic  4- Basal Inferolateral:  hypokinetic  5- Basal Inferior:  hypokinetic  6- Basal Inferoseptal:  hypokinetic  7- Mid Anteroseptal:  hypokinetic  8- Mid Anterior:  hypokinetic  9- Mid Anterolateral:  hypokinetic  10- Mid Inferolateral:  hypokinetic  11- Mid Inferior:  hypokinetic  12- Mid  Inferoseptal:  hypokinetic  13- Apical Anterior:  hypokinetic  14- Apical Lateral:  hypokinetic  15- Apical Inferior:  hypokinetic  16- Apical Septal:  hypokinetic  17- [place]:  hypokinetic    Valves  Aortic Valve: Annulus normal.  Stenosis not present.  Regurgitation absent.  Leaflets normal.  Leaflet motions normal.    Mitral Valve: Annulus dilated.  Stenosis not present.  Regurgitation +3.  Leaflet motions restricted.  Specific leaflet segments with abnormal motions:  P1 and P2  Tricuspid Valve: Annulus normal.  Stenosis not present.  Regurgitation +1.  Leaflets normal.    Pulmonic Valve: Annulus normal.  Stenosis not present.  Regurgitation +1.      Aorta: Ascending Aorta: Size normal.  Dissection not present.  Plaque thickness less than 3 mm.  Mobile plaque not present.    Aortic Arch: Size normal.   Dissection not present.   Mobile plaque not present.    Descending Aorta: Size normal.   Dissection not present.   Mobile plaque not present.        Right Atrium:  Size normal.   Spontaneous echo contrast not present.   Thrombus not present.   Tumor not present.   Device not present.     Left Atrium: Size dilated.  Spontaneous echo contrast not present.  Thrombus not present.  Tumor not present.  Device not present.    Left atrial appendage normal.     Atrial Septum: Intra-atrial septal morphology normal.     Ventricular Septum: Intra-ventricular septum morphology normal.     Diastolic Function Measurements:  Diastolic Dysfunction Grade= indeterminate. E= ms. A= ms. E/A Ratio= . DT=  ms.  S/D= .  IVRT= ms.  Other Findings:   Pericardium:  normal. Pleural Effusion:  none. Pulmonary Arteries:  normal. Pulmonary Venous Flow:  blunted (decreased) systolic flow. Cornoary sinus catheter present.   Post Intervention Findings  Procedure(s) performed:  CABG. Regional wall motion:. Surgeon(s) notified of all postintervention findings: Yes.   Post Intervention comments: Pre bypass- Dilated LA/LV, Moderate to Severe MR, Severely  depressed LV with EF10-15%, Normal RV function, Mild TR, NH, IABP in situ  Post bypass-Dilated LA/LV, Moderate to Severe MR, Severely depressed LV with slight improvement in LV function with EF 20% on ionotropes, Normal RV function,  Mild TR and NH,IABP in situ  .    Echocardiogram Comments

## 2022-02-24 NOTE — ANESTHESIA PROCEDURE NOTES
Arterial Line Procedure Note    Pre-Procedure   Staff -        Anesthesiologist:  Tata Romero MD       Resident/Fellow: Anjali Patiño MD       Performed By: resident       Location: OR       Pre-Anesthestic Checklist: patient identified, IV checked, risks and benefits discussed, informed consent, monitors and equipment checked, pre-op evaluation and at physician/surgeon's request  Timeout:       Correct Patient: Yes        Correct Procedure: Yes        Correct Site: Yes        Correct Position: Yes   Line Placement:   This line was placed Post Induction  Procedure   Procedure: arterial line       Laterality: right       Insertion Site: radial.  Sterile Prep        Standard elements of sterile barrier followed       Skin prep: Chloraprep  Insertion/Injection        Technique: ultrasound guided        1. Ultrasound was used to evaluate the access site.       2. Artery evaluated via ultrasound for patency/adequacy.       3. Using real-time ultrasound the needle/catheter was observed entering the artery/vein.       Catheter Type/Size: 20 G, 12 cm  Narrative        Tegaderm dressing used.       Complications: None apparent,      Arterial waveform: Yes

## 2022-02-24 NOTE — PROGRESS NOTES
Admitted/transferred from: [facility]  Reason for admission/transfer: IABP/ increased level of care.   2 RN skin assessment: completed by Natalie RN & María RN  Result of skin assessment and interventions/actions: pt on isolibrium. Taps repositioning used. Sacral mepilex utilized. No skin concerns at this time.   Height, weight, drug calc weight: Done  Patient belongings (see Flowsheet)  MDRO education added to care planN/A  All belongings sent with pt from .   ?

## 2022-02-24 NOTE — PROGRESS NOTES
[facility], Procedure Note          Intra-Aortic Balloon Pump Insertion:       Ashley Osman  MRN# [id number]   February 23, 2022, 6:35 PM Indication: Pre-op for CABG           Procedure performed: February 23, 2022, 6:35 PM   Location: [facility]   Catheter size: 40cc   Inserted: 6 inch introducer sheath   Catheter placed: Left femoral artery   Complications:: None   Assist initiated: 1:1 ratio   Percent augmentation: 100%   Timing adjusted: Adjusted to achieve optimal assist   Verification of position: Confirmed   Comments: None      Recorded by Yessi Kim

## 2022-02-24 NOTE — PROGRESS NOTES
[facility]  Cardiology II Service / Advanced Heart Failure  Daily Progress Note    Patient: Ashley Osman      : 1950      MRN: [id number]    Assessment/Plan:   72yo female w/ PMHx of newly-diagnosed HFrEF (10-15% 22) 2/2 ICM, CAD (diagnosed 2022), COPD (diagnosed 2022), hyperlipidemia, and cigarette smoking (quit in 2021) who presented to [facility]  as a transfer from OSH for consideration of advanced therapies in the setting of cardiogenic shock requiring inotropic support. IABP placed on  and she was taken to the OR for CABG x4 on .    Today's changes:  - s/p CABG today  - CVP 4, PA , PCWP 9. Consider giving fluids or pRBC based on post-op labs.       Date RA PAP PCWP SvO2 CO/CI SVR  Notes    4  9    Epi 0.15, NE 0.08, vaso 2, IABP 1:1         # 3VCAD s/p CABGx4 (22)  # CAD (mLAD , severe prox and distal LAD, LCx and D1 disease)  # Cardiogenic shock  # Acute on Chronic HFrEF 2/2 ICM (EF 10-15%) AHA Stage D, NYHA Class III-IV  # LV thrombus   CMRI () showing viability of 50% in LAD territory (intermediate likelihood of recovery) and 75% in dominant LCx territory (good likelihood of recovery). Planned to assess feasibility of revascularization w/ PCI with Interventional Cardiology vs CABG w/ CVTS - opting for CAB based on review of prior angiogram and cMRI. Had initiated LVAD evaluation for back up to CABG surgery    - PTA ASA 81mg daily and Atorvastatin 40mg daily held immediately sabrina-op    LVAD work up:   [x]?? Labs (CBC, CMP, PT/INR, cystatin C, prealbumin, UA + micro)  [x]?? Infectious  [x]?? Utox/nicotine and cotinine/PeTH   []?? Immunocompatibility (last transfusion, ABO, HLA tissue typing, PRA)  [x]?? CVTS consult  [x]?? Social work evaluation  [x]?? Palliative care evaluation  [x]?? Neuropsych evaluation  [x]?? Nutrition evaluation  [x]?? CT Dental + evaluation- cleared  [x]?? Abd US +  doppler  [x]?? Extremity US and ABIs- 2/8/22: normal PAIGE's bilaterally.  [x]?? Carotid US (if DM or ICM or >49yo) once SG catheter is put  [x]?? PFTs    [x]?? Dexascan: 11/6/2019: Moderate low bone densit  [x]?? CT head non-contrast: 2/8/22:   [x]?? CT CAP non-contrast: pulmonary nodules   []?? Colonoscopy (>49 yo)  []?? Mammogram  []?? Pap test    Staffed w/ Dr. Carter.    Eloisa Romo MD  Cardiology Fellow  02/24/2022  ==================================    Subjective:   Did well overnight with IABP placement. Was able to come off BiPAP    Objective:     Vitals:    02/20/22 0804 02/21/22 0400 02/24/22 0600   Weight: 53 kg (116 lb 12.8 oz) 53.4 kg (117 lb 12.8 oz) 56.2 kg (123 lb 14.4 oz)     Vital Signs with Ranges  Temp:  [97.5  F (36.4  C)-98.6  F (37  C)] 98.2  F (36.8  C)  Pulse:  [81-94] 91  Resp:  [12-40] 25  BP: ()/(54-70) 145/63  FiO2 (%):  [40 %-45 %] 40 %  SpO2:  [89 %-100 %] 91 %  I/O last 3 completed shifts:  In: 456.18 [P.O.:240; I.V.:216.18]  Out: 1470 [Urine:1470]    Exam:  General: sedated  Neuro: sedated  Resp: intubated, ventilated  CV: S1, S2, RRR, no m/r/g . IABP 1:1  Abdomen: Soft, non-distended, non-tender  Incisions: c/d/i  Extremities: warm and well perfused. Dopplerable pulses  Medications     aminocaproic acid (AMICAR) infusion ADULT 1 g/hr (02/24/22 0905)     BETA BLOCKER NOT PRESCRIBED       dexmedetomidine 0.4 mcg/kg/hr (02/24/22 1046)     DOBUTamine Stopped (02/23/22 2045)     EPINEPHrine       heparin Stopped (02/24/22 0700)     insulin regular 3 Units/hr (02/24/22 1351)     milrinone       niCARdipine       - MEDICATION INSTRUCTIONS -       - MEDICATION INSTRUCTIONS -       - MEDICATION INSTRUCTIONS -       - MEDICATION INSTRUCTIONS -       - MEDICATION INSTRUCTIONS -       phenylephrine       [Auto Hold] ACE/ARB/ARNI NOT PRESCRIBED       [Auto Hold] BETA BLOCKER NOT PRESCRIBED       sodium chloride         antithrombin III (human)  618 Int'l Units Intravenous Once      aspirin  162 mg Oral Pre-Op/Pre-procedure x 1 dose    Or     aspirin  81 mg Oral Pre-Op/Pre-procedure x 1 dose     [Auto Hold] aspirin  81 mg Oral Daily     [Auto Hold] atorvastatin  40 mg Oral QAM     chlorhexidine  10 mL Swish & Spit Once     [Auto Hold] dapagliflozin  5 mg Oral Daily     famotidine  20 mg Oral Pre-Op/Pre-procedure x 1 dose     fibrinogen concentrate (Human)  1,050 mg Intravenous Once     pump prime ([facility] formula) solution   PERFUSION On Call to OR     heparin in saline (CELL SAVER) formula   PERFUSION On Call to OR     [Auto Hold] heparin ANTICOAGULANT Loading dose  60 Units/kg Intravenous Once     influenza vac high-dose quad  0.7 mL Intramuscular Prior to discharge     hot shot cardioplegia solution   PERFUSION On Call to OR     [Auto Hold] multivitamin w/minerals  1 tablet Oral Daily     low cardioplegia solution   PERFUSION On Call to OR     high cardioplegia solution   PERFUSION On Call to OR     prothrombin 4 factor complex concentrate  1,092 Units Intravenous Once     sodium chloride (PF)  3 mL Intracatheter Q8H     [Auto Hold] thiamine  100 mg Oral Daily     [Auto Hold] umeclidinium-vilanterol  1 puff Inhalation Daily       Data   Recent Labs   Lab 02/24/22  1451 02/24/22  1413 02/24/22  1412 02/24/22  1348 02/24/22  1345 02/24/22  0835 02/24/22  0335 02/23/22  2109 02/23/22  2108 02/23/22  2100 02/23/22  1855 02/23/22  0549   WBC  --   --   --   --  18.7*  --  14.3*  --   --  11.3*  --  19.2*   HGB 9.5* 8.6* 8.6*   < > 6.4*   < > 9.6*  --   --  10.0*   < > 11.5*   MCV  --   --   --   --  93  --  92  --   --  91  --  91   PLT  --   --   --   --  123*  --  300  --   --  323  --  385   INR  --   --   --   --  2.23*  --  1.72*  --   --   --   --  1.10   * 144 144   < >  --    < > 140  --  139  --   --  142   POTASSIUM 4.0 4.0 3.9   < >  --    < > 4.0  --  4.1  --   --  3.6   CHLORIDE  --   --   --   --   --   --  108  --  107  --   --  109   CO2  --   --   --   --   --   --  25  --  24   --   --  24   BUN  --   --   --   --   --   --  33*  --  28  --   --  25   CR  --   --   --   --   --   --  0.76  --  0.82  --   --  1.02   ANIONGAP  --   --   --   --   --   --  7  --  8  --   --  9   HEIDI  --   --   --   --   --   --  8.9  --  8.7  --   --  9.0   * 169* 163*   < >  --    < > 112*   < > 119*  --   --  190*   ALBUMIN  --   --   --   --   --   --  2.8*  --   --   --   --  2.9*   PROTTOTAL  --   --   --   --   --   --  6.0*  --   --   --   --  6.6*   BILITOTAL  --   --   --   --   --   --  0.5  --   --   --   --  0.4   ALKPHOS  --   --   --   --   --   --  69  --   --   --   --  87   ALT  --   --   --   --   --   --  27  --   --   --   --  32   AST  --   --   --   --   --   --  20  --   --   --   --  23    < > = values in this interval not displayed.       Recent Results (from the past 24 hour(s))   Cardiac Catheterization    Narrative      Right sided filling pressures are mildly elevated.    Left sided filling pressures are mildly elevated.    Mild elevated pulmonary hypertension.    Reduced cardiac output level.     IABP placement through the LCFA  [place]-renay catheter placement through the RIJ      XR Chest Port 1 View    Narrative    Exam: XR CHEST PORT 1 VIEW, 2/23/2022 9:42 PM    Indication: Confirm placement of the IABP    Comparison: 2/23/2022    Findings:   Left arm PICC tip remains in the low superior vena cava. New right IJ  [place]-Renay catheter tip in the right pulmonary artery. Intra-aortic  balloon pump tip 2 cm above the elan. Heart within normal limits in  size. Diffuse mixed opacities throughout both lungs. Blunting of both  costophrenic angles.      Impression    Impression: Intra-aortic balloon pump tip 2 cm above the elan. New  [place]-Renay catheter tip in the right pulmonary artery. Unchanged  diffuse mixed opacities throughout both lungs. Stable bilateral  pleural effusions.    WHITNEY HEALY MD         SYSTEM ID:  [id number]   XR Chest Port 1 View    Narrative    Exam: XR  CHEST PORT 1 VIEW, 2/24/2022 6:00 AM    Comparison: 2/23/2022.    History: Confirm IABP  placement    Findings:  AP view of the chest. IABP with tip 1 cm above the elan, slightly  retracted. [place]-Renay catheter with tip in the right main branch  pulmonary artery. Left upper extremity PICC with tip in the mid SVC.    Trachea is midline. Stable cardiomediastinal silhouette. Retrocardiac  and perihilar opacities. No pneumothorax. Small right and small left  pleural effusions. No pneumothorax. No acute osseous abnormalities.      Impression    Impression:   1. Intra-aortic balloon pump with tip 1 cm above the elan, slightly  retracted compared to prior.  2. Improved perihilar and bibasilar opacities likely represent  improving edema. Stable small bilateral pleural effusions.    I have personally reviewed the examination and initial interpretation  and I agree with the findings.    GISSEL MIRANDA MD         SYSTEM ID:  [id number]

## 2022-02-25 ENCOUNTER — APPOINTMENT (OUTPATIENT)
Dept: CT IMAGING | Facility: CLINIC | Age: 72
DRG: 233 | End: 2022-02-25
Attending: INTERNAL MEDICINE
Payer: MEDICARE

## 2022-02-25 ENCOUNTER — APPOINTMENT (OUTPATIENT)
Dept: GENERAL RADIOLOGY | Facility: CLINIC | Age: 72
DRG: 233 | End: 2022-02-25
Attending: NURSE PRACTITIONER
Payer: MEDICARE

## 2022-02-25 ENCOUNTER — ANESTHESIA (OUTPATIENT)
Dept: SURGERY | Facility: CLINIC | Age: 72
DRG: 233 | End: 2022-02-25
Payer: MEDICARE

## 2022-02-25 ENCOUNTER — APPOINTMENT (OUTPATIENT)
Dept: ULTRASOUND IMAGING | Facility: CLINIC | Age: 72
DRG: 233 | End: 2022-02-25
Attending: STUDENT IN AN ORGANIZED HEALTH CARE EDUCATION/TRAINING PROGRAM
Payer: MEDICARE

## 2022-02-25 ENCOUNTER — APPOINTMENT (OUTPATIENT)
Dept: GENERAL RADIOLOGY | Facility: CLINIC | Age: 72
DRG: 233 | End: 2022-02-25
Attending: STUDENT IN AN ORGANIZED HEALTH CARE EDUCATION/TRAINING PROGRAM
Payer: MEDICARE

## 2022-02-25 ENCOUNTER — ANESTHESIA EVENT (OUTPATIENT)
Dept: SURGERY | Facility: CLINIC | Age: 72
DRG: 233 | End: 2022-02-25
Payer: MEDICARE

## 2022-02-25 LAB
ALBUMIN SERPL-MCNC: 2.9 G/DL (ref 3.4–5)
ALBUMIN SERPL-MCNC: 3.1 G/DL (ref 3.4–5)
ALP SERPL-CCNC: 39 U/L (ref 40–150)
ALP SERPL-CCNC: 43 U/L (ref 40–150)
ALT SERPL W P-5'-P-CCNC: 31 U/L (ref 0–50)
ALT SERPL W P-5'-P-CCNC: 34 U/L (ref 0–50)
ANION GAP SERPL CALCULATED.3IONS-SCNC: 5 MMOL/L (ref 3–14)
ANION GAP SERPL CALCULATED.3IONS-SCNC: 6 MMOL/L (ref 3–14)
ANION GAP SERPL CALCULATED.3IONS-SCNC: 6 MMOL/L (ref 3–14)
AST SERPL W P-5'-P-CCNC: 43 U/L (ref 0–45)
AST SERPL W P-5'-P-CCNC: 43 U/L (ref 0–45)
ATRIAL RATE - MUSE: 79 BPM
BASE EXCESS BLDA CALC-SCNC: -0.1 MMOL/L (ref -9–1.8)
BASE EXCESS BLDA CALC-SCNC: 0.8 MMOL/L (ref -9.6–2)
BASE EXCESS BLDA CALC-SCNC: 1.6 MMOL/L (ref -9–1.8)
BASE EXCESS BLDA CALC-SCNC: 1.9 MMOL/L (ref -9–1.8)
BASE EXCESS BLDA CALC-SCNC: 1.9 MMOL/L (ref -9–1.8)
BASE EXCESS BLDA CALC-SCNC: 3.3 MMOL/L (ref -9–1.8)
BASE EXCESS BLDA CALC-SCNC: 3.7 MMOL/L (ref -9–1.8)
BASE EXCESS BLDV CALC-SCNC: 2.2 MMOL/L (ref -7.7–1.9)
BASE EXCESS BLDV CALC-SCNC: 2.8 MMOL/L (ref -7.7–1.9)
BASE EXCESS BLDV CALC-SCNC: 3 MMOL/L (ref -7.7–1.9)
BASE EXCESS BLDV CALC-SCNC: 3.2 MMOL/L (ref -7.7–1.9)
BASE EXCESS BLDV CALC-SCNC: 4.5 MMOL/L (ref -7.7–1.9)
BASE EXCESS BLDV CALC-SCNC: 5 MMOL/L (ref -7.7–1.9)
BILIRUB DIRECT SERPL-MCNC: 0.2 MG/DL (ref 0–0.2)
BILIRUB SERPL-MCNC: 0.6 MG/DL (ref 0.2–1.3)
BILIRUB SERPL-MCNC: 0.7 MG/DL (ref 0.2–1.3)
BLD PROD TYP BPU: NORMAL
BLOOD COMPONENT TYPE: NORMAL
BUN SERPL-MCNC: 26 MG/DL (ref 7–30)
BUN SERPL-MCNC: 27 MG/DL (ref 7–30)
BUN SERPL-MCNC: 27 MG/DL (ref 7–30)
CA-I BLD-MCNC: 4.7 MG/DL (ref 4.4–5.2)
CA-I BLD-MCNC: 4.7 MG/DL (ref 4.4–5.2)
CA-I BLD-MCNC: 4.8 MG/DL (ref 4.4–5.2)
CALCIUM SERPL-MCNC: 8.1 MG/DL (ref 8.5–10.1)
CALCIUM SERPL-MCNC: 8.3 MG/DL (ref 8.5–10.1)
CALCIUM SERPL-MCNC: 8.7 MG/DL (ref 8.5–10.1)
CHLORIDE BLD-SCNC: 116 MMOL/L (ref 94–109)
CHLORIDE BLD-SCNC: 116 MMOL/L (ref 94–109)
CHLORIDE BLD-SCNC: 117 MMOL/L (ref 94–109)
CO2 SERPL-SCNC: 25 MMOL/L (ref 20–32)
CO2 SERPL-SCNC: 25 MMOL/L (ref 20–32)
CO2 SERPL-SCNC: 26 MMOL/L (ref 20–32)
CODING SYSTEM: NORMAL
CREAT SERPL-MCNC: 0.65 MG/DL (ref 0.52–1.04)
CREAT SERPL-MCNC: 0.67 MG/DL (ref 0.52–1.04)
CREAT SERPL-MCNC: 0.67 MG/DL (ref 0.52–1.04)
CROSSMATCH: NORMAL
DIASTOLIC BLOOD PRESSURE - MUSE: NORMAL MMHG
ELLIPTOCYTES BLD QL SMEAR: SLIGHT
ERYTHROCYTE [DISTWIDTH] IN BLOOD BY AUTOMATED COUNT: 15.4 % (ref 10–15)
ERYTHROCYTE [DISTWIDTH] IN BLOOD BY AUTOMATED COUNT: 16 % (ref 10–15)
ERYTHROCYTE [DISTWIDTH] IN BLOOD BY AUTOMATED COUNT: 16.2 % (ref 10–15)
ERYTHROCYTE [DISTWIDTH] IN BLOOD BY AUTOMATED COUNT: 16.2 % (ref 10–15)
GFR SERPL CREATININE-BSD FRML MDRD: >90 ML/MIN/1.73M2
GLUCOSE BLD-MCNC: 113 MG/DL (ref 70–99)
GLUCOSE BLD-MCNC: 131 MG/DL (ref 70–99)
GLUCOSE BLD-MCNC: 135 MG/DL (ref 70–99)
GLUCOSE BLD-MCNC: 143 MG/DL (ref 70–99)
GLUCOSE BLDC GLUCOMTR-MCNC: 103 MG/DL (ref 70–99)
GLUCOSE BLDC GLUCOMTR-MCNC: 112 MG/DL (ref 70–99)
GLUCOSE BLDC GLUCOMTR-MCNC: 114 MG/DL (ref 70–99)
GLUCOSE BLDC GLUCOMTR-MCNC: 116 MG/DL (ref 70–99)
GLUCOSE BLDC GLUCOMTR-MCNC: 123 MG/DL (ref 70–99)
GLUCOSE BLDC GLUCOMTR-MCNC: 127 MG/DL (ref 70–99)
GLUCOSE BLDC GLUCOMTR-MCNC: 128 MG/DL (ref 70–99)
GLUCOSE BLDC GLUCOMTR-MCNC: 128 MG/DL (ref 70–99)
GLUCOSE BLDC GLUCOMTR-MCNC: 129 MG/DL (ref 70–99)
GLUCOSE BLDC GLUCOMTR-MCNC: 139 MG/DL (ref 70–99)
GLUCOSE BLDC GLUCOMTR-MCNC: 93 MG/DL (ref 70–99)
HCO3 BLD-SCNC: 24 MMOL/L (ref 21–28)
HCO3 BLD-SCNC: 25 MMOL/L (ref 21–28)
HCO3 BLD-SCNC: 25 MMOL/L (ref 21–28)
HCO3 BLD-SCNC: 26 MMOL/L (ref 21–28)
HCO3 BLD-SCNC: 27 MMOL/L (ref 21–28)
HCO3 BLD-SCNC: 27 MMOL/L (ref 21–28)
HCO3 BLDA-SCNC: 26 MMOL/L (ref 21–28)
HCO3 BLDV-SCNC: 27 MMOL/L (ref 21–28)
HCO3 BLDV-SCNC: 27 MMOL/L (ref 21–28)
HCO3 BLDV-SCNC: 28 MMOL/L (ref 21–28)
HCO3 BLDV-SCNC: 28 MMOL/L (ref 21–28)
HCO3 BLDV-SCNC: 29 MMOL/L (ref 21–28)
HCO3 BLDV-SCNC: 29 MMOL/L (ref 21–28)
HCT VFR BLD AUTO: 23 % (ref 35–47)
HCT VFR BLD AUTO: 23 % (ref 35–47)
HCT VFR BLD AUTO: 23.9 % (ref 35–47)
HCT VFR BLD AUTO: 25.3 % (ref 35–47)
HGB BLD-MCNC: 6.9 G/DL (ref 11.7–15.7)
HGB BLD-MCNC: 7.4 G/DL (ref 11.7–15.7)
HGB BLD-MCNC: 7.5 G/DL (ref 11.7–15.7)
HGB BLD-MCNC: 7.6 G/DL (ref 11.7–15.7)
HGB BLD-MCNC: 7.9 G/DL (ref 11.7–15.7)
HGB BLD-MCNC: 8.2 G/DL (ref 11.7–15.7)
INR PPP: 1.55 (ref 0.85–1.15)
INTERPRETATION ECG - MUSE: NORMAL
ISSUE DATE AND TIME: NORMAL
LACTATE BLD-SCNC: 1 MMOL/L
LACTATE SERPL-SCNC: 1.2 MMOL/L (ref 0.7–2)
LACTATE SERPL-SCNC: 1.2 MMOL/L (ref 0.7–2)
LACTATE SERPL-SCNC: 1.4 MMOL/L (ref 0.7–2)
LACTATE SERPL-SCNC: 1.5 MMOL/L (ref 0.7–2)
LACTATE SERPL-SCNC: 1.5 MMOL/L (ref 0.7–2)
MAGNESIUM SERPL-MCNC: 2.2 MG/DL (ref 1.6–2.3)
MCH RBC QN AUTO: 28.7 PG (ref 26.5–33)
MCH RBC QN AUTO: 29.2 PG (ref 26.5–33)
MCH RBC QN AUTO: 29.4 PG (ref 26.5–33)
MCH RBC QN AUTO: 29.4 PG (ref 26.5–33)
MCHC RBC AUTO-ENTMCNC: 31.8 G/DL (ref 31.5–36.5)
MCHC RBC AUTO-ENTMCNC: 32.2 G/DL (ref 31.5–36.5)
MCHC RBC AUTO-ENTMCNC: 32.4 G/DL (ref 31.5–36.5)
MCHC RBC AUTO-ENTMCNC: 32.6 G/DL (ref 31.5–36.5)
MCV RBC AUTO: 90 FL (ref 78–100)
MCV RBC AUTO: 90 FL (ref 78–100)
MCV RBC AUTO: 91 FL (ref 78–100)
MCV RBC AUTO: 91 FL (ref 78–100)
O2/TOTAL GAS SETTING VFR VENT: 35 %
O2/TOTAL GAS SETTING VFR VENT: 40 %
O2/TOTAL GAS SETTING VFR VENT: 50 %
OXYHGB MFR BLD: 97 % (ref 92–100)
OXYHGB MFR BLD: 98 % (ref 92–100)
OXYHGB MFR BLDA: 97 % (ref 92–100)
OXYHGB MFR BLDV: 48 % (ref 70–75)
OXYHGB MFR BLDV: 53 % (ref 70–75)
OXYHGB MFR BLDV: 53 % (ref 70–75)
OXYHGB MFR BLDV: 54 % (ref 70–75)
OXYHGB MFR BLDV: 59 % (ref 70–75)
OXYHGB MFR BLDV: 67 % (ref 70–75)
P AXIS - MUSE: 69 DEGREES
PCO2 BLD: 33 MM HG (ref 35–45)
PCO2 BLD: 34 MM HG (ref 35–45)
PCO2 BLD: 34 MM HG (ref 35–45)
PCO2 BLD: 35 MM HG (ref 35–45)
PCO2 BLD: 37 MM HG (ref 35–45)
PCO2 BLD: 37 MM HG (ref 35–45)
PCO2 BLDA: 42 MM HG (ref 35–45)
PCO2 BLDV: 39 MM HG (ref 40–50)
PCO2 BLDV: 40 MM HG (ref 40–50)
PCO2 BLDV: 44 MM HG (ref 40–50)
PH BLD: 7.43 [PH] (ref 7.35–7.45)
PH BLD: 7.45 [PH] (ref 7.35–7.45)
PH BLD: 7.47 [PH] (ref 7.35–7.45)
PH BLD: 7.5 [PH] (ref 7.35–7.45)
PH BLD: 7.5 [PH] (ref 7.35–7.45)
PH BLD: 7.51 [PH] (ref 7.35–7.45)
PH BLDA: 7.4 [PH] (ref 7.35–7.45)
PH BLDV: 7.41 [PH] (ref 7.32–7.43)
PH BLDV: 7.43 [PH] (ref 7.32–7.43)
PH BLDV: 7.45 [PH] (ref 7.32–7.43)
PH BLDV: 7.45 [PH] (ref 7.32–7.43)
PH BLDV: 7.46 [PH] (ref 7.32–7.43)
PH BLDV: 7.47 [PH] (ref 7.32–7.43)
PHOSPHATE SERPL-MCNC: 3 MG/DL (ref 2.5–4.5)
PHOSPHATE SERPL-MCNC: 3 MG/DL (ref 2.5–4.5)
PHOSPHATE SERPL-MCNC: 3.6 MG/DL (ref 2.5–4.5)
PLAT MORPH BLD: ABNORMAL
PLATELET # BLD AUTO: 75 10E3/UL (ref 150–450)
PLATELET # BLD AUTO: 82 10E3/UL (ref 150–450)
PLATELET # BLD AUTO: 88 10E3/UL (ref 150–450)
PLATELET # BLD AUTO: 90 10E3/UL (ref 150–450)
PO2 BLD: 123 MM HG (ref 80–105)
PO2 BLD: 123 MM HG (ref 80–105)
PO2 BLD: 128 MM HG (ref 80–105)
PO2 BLD: 137 MM HG (ref 80–105)
PO2 BLD: 138 MM HG (ref 80–105)
PO2 BLD: 155 MM HG (ref 80–105)
PO2 BLDA: 182 MM HG (ref 80–105)
PO2 BLDV: 26 MM HG (ref 25–47)
PO2 BLDV: 28 MM HG (ref 25–47)
PO2 BLDV: 29 MM HG (ref 25–47)
PO2 BLDV: 29 MM HG (ref 25–47)
PO2 BLDV: 33 MM HG (ref 25–47)
PO2 BLDV: 37 MM HG (ref 25–47)
POTASSIUM BLD-SCNC: 3.6 MMOL/L (ref 3.4–5.3)
POTASSIUM BLD-SCNC: 3.6 MMOL/L (ref 3.5–5)
POTASSIUM BLD-SCNC: 3.7 MMOL/L (ref 3.4–5.3)
POTASSIUM BLD-SCNC: 4.2 MMOL/L (ref 3.4–5.3)
PR INTERVAL - MUSE: 114 MS
PROT SERPL-MCNC: 4.6 G/DL (ref 6.8–8.8)
PROT SERPL-MCNC: 4.8 G/DL (ref 6.8–8.8)
QRS DURATION - MUSE: 86 MS
QT - MUSE: 420 MS
QTC - MUSE: 481 MS
R AXIS - MUSE: 9 DEGREES
RBC # BLD AUTO: 2.53 10E6/UL (ref 3.8–5.2)
RBC # BLD AUTO: 2.55 10E6/UL (ref 3.8–5.2)
RBC # BLD AUTO: 2.65 10E6/UL (ref 3.8–5.2)
RBC # BLD AUTO: 2.79 10E6/UL (ref 3.8–5.2)
RBC MORPH BLD: ABNORMAL
SODIUM BLD-SCNC: 148 MMOL/L (ref 133–144)
SODIUM SERPL-SCNC: 147 MMOL/L (ref 133–144)
SODIUM SERPL-SCNC: 147 MMOL/L (ref 133–144)
SODIUM SERPL-SCNC: 148 MMOL/L (ref 133–144)
SYSTOLIC BLOOD PRESSURE - MUSE: NORMAL MMHG
T AXIS - MUSE: 93 DEGREES
UFH PPP CHRO-ACNC: <0.1 IU/ML
UNIT ABO/RH: NORMAL
UNIT NUMBER: NORMAL
UNIT STATUS: NORMAL
UNIT TYPE ISBT: 5100
VENTRICULAR RATE- MUSE: 79 BPM
WBC # BLD AUTO: 11.1 10E3/UL (ref 4–11)
WBC # BLD AUTO: 11.6 10E3/UL (ref 4–11)
WBC # BLD AUTO: 11.9 10E3/UL (ref 4–11)
WBC # BLD AUTO: 12.6 10E3/UL (ref 4–11)

## 2022-02-25 PROCEDURE — 250N000009 HC RX 250: Performed by: SURGERY

## 2022-02-25 PROCEDURE — 75635 CT ANGIO ABDOMINAL ARTERIES: CPT

## 2022-02-25 PROCEDURE — 250N000011 HC RX IP 250 OP 636: Performed by: STUDENT IN AN ORGANIZED HEALTH CARE EDUCATION/TRAINING PROGRAM

## 2022-02-25 PROCEDURE — 85610 PROTHROMBIN TIME: CPT | Performed by: STUDENT IN AN ORGANIZED HEALTH CARE EDUCATION/TRAINING PROGRAM

## 2022-02-25 PROCEDURE — 82803 BLOOD GASES ANY COMBINATION: CPT | Performed by: NURSE PRACTITIONER

## 2022-02-25 PROCEDURE — 82805 BLOOD GASES W/O2 SATURATION: CPT | Performed by: STUDENT IN AN ORGANIZED HEALTH CARE EDUCATION/TRAINING PROGRAM

## 2022-02-25 PROCEDURE — 250N000011 HC RX IP 250 OP 636: Performed by: NURSE ANESTHETIST, CERTIFIED REGISTERED

## 2022-02-25 PROCEDURE — 84100 ASSAY OF PHOSPHORUS: CPT | Performed by: PHYSICIAN ASSISTANT

## 2022-02-25 PROCEDURE — 88304 TISSUE EXAM BY PATHOLOGIST: CPT | Mod: TC | Performed by: SURGERY

## 2022-02-25 PROCEDURE — 999N000157 HC STATISTIC RCP TIME EA 10 MIN

## 2022-02-25 PROCEDURE — P9016 RBC LEUKOCYTES REDUCED: HCPCS | Performed by: INTERNAL MEDICINE

## 2022-02-25 PROCEDURE — 85018 HEMOGLOBIN: CPT | Performed by: STUDENT IN AN ORGANIZED HEALTH CARE EDUCATION/TRAINING PROGRAM

## 2022-02-25 PROCEDURE — 84132 ASSAY OF SERUM POTASSIUM: CPT | Performed by: NURSE PRACTITIONER

## 2022-02-25 PROCEDURE — 83605 ASSAY OF LACTIC ACID: CPT | Performed by: PHYSICIAN ASSISTANT

## 2022-02-25 PROCEDURE — 250N000011 HC RX IP 250 OP 636: Performed by: SURGERY

## 2022-02-25 PROCEDURE — 82805 BLOOD GASES W/O2 SATURATION: CPT | Performed by: PHYSICIAN ASSISTANT

## 2022-02-25 PROCEDURE — 71045 X-RAY EXAM CHEST 1 VIEW: CPT

## 2022-02-25 PROCEDURE — 83735 ASSAY OF MAGNESIUM: CPT | Performed by: SURGERY

## 2022-02-25 PROCEDURE — 250N000011 HC RX IP 250 OP 636

## 2022-02-25 PROCEDURE — P9041 ALBUMIN (HUMAN),5%, 50ML: HCPCS

## 2022-02-25 PROCEDURE — 82330 ASSAY OF CALCIUM: CPT | Performed by: PHYSICIAN ASSISTANT

## 2022-02-25 PROCEDURE — 250N000013 HC RX MED GY IP 250 OP 250 PS 637: Performed by: PHYSICIAN ASSISTANT

## 2022-02-25 PROCEDURE — 250N000009 HC RX 250: Performed by: STUDENT IN AN ORGANIZED HEALTH CARE EDUCATION/TRAINING PROGRAM

## 2022-02-25 PROCEDURE — 250N000011 HC RX IP 250 OP 636: Performed by: PHYSICIAN ASSISTANT

## 2022-02-25 PROCEDURE — 85018 HEMOGLOBIN: CPT | Performed by: PHYSICIAN ASSISTANT

## 2022-02-25 PROCEDURE — 80053 COMPREHEN METABOLIC PANEL: CPT | Performed by: STUDENT IN AN ORGANIZED HEALTH CARE EDUCATION/TRAINING PROGRAM

## 2022-02-25 PROCEDURE — 83735 ASSAY OF MAGNESIUM: CPT | Performed by: STUDENT IN AN ORGANIZED HEALTH CARE EDUCATION/TRAINING PROGRAM

## 2022-02-25 PROCEDURE — 99221 1ST HOSP IP/OBS SF/LOW 40: CPT | Mod: 57 | Performed by: SURGERY

## 2022-02-25 PROCEDURE — 99291 CRITICAL CARE FIRST HOUR: CPT | Mod: 24 | Performed by: ANESTHESIOLOGY

## 2022-02-25 PROCEDURE — 71045 X-RAY EXAM CHEST 1 VIEW: CPT | Mod: 77

## 2022-02-25 PROCEDURE — 71045 X-RAY EXAM CHEST 1 VIEW: CPT | Mod: 26 | Performed by: RADIOLOGY

## 2022-02-25 PROCEDURE — 83605 ASSAY OF LACTIC ACID: CPT | Performed by: NURSE PRACTITIONER

## 2022-02-25 PROCEDURE — 84132 ASSAY OF SERUM POTASSIUM: CPT | Performed by: STUDENT IN AN ORGANIZED HEALTH CARE EDUCATION/TRAINING PROGRAM

## 2022-02-25 PROCEDURE — 94640 AIRWAY INHALATION TREATMENT: CPT

## 2022-02-25 PROCEDURE — 82330 ASSAY OF CALCIUM: CPT

## 2022-02-25 PROCEDURE — 200N000002 HC R&B ICU UMMC

## 2022-02-25 PROCEDURE — 370N000017 HC ANESTHESIA TECHNICAL FEE, PER MIN: Performed by: SURGERY

## 2022-02-25 PROCEDURE — 82810 BLOOD GASES O2 SAT ONLY: CPT

## 2022-02-25 PROCEDURE — 93005 ELECTROCARDIOGRAM TRACING: CPT

## 2022-02-25 PROCEDURE — 360N000084 HC SURGERY LEVEL 4 W/ FLUORO, PER MIN: Performed by: SURGERY

## 2022-02-25 PROCEDURE — 93925 LOWER EXTREMITY STUDY: CPT | Mod: 26 | Performed by: RADIOLOGY

## 2022-02-25 PROCEDURE — 84132 ASSAY OF SERUM POTASSIUM: CPT

## 2022-02-25 PROCEDURE — 250N000009 HC RX 250: Performed by: NURSE ANESTHETIST, CERTIFIED REGISTERED

## 2022-02-25 PROCEDURE — 84100 ASSAY OF PHOSPHORUS: CPT | Performed by: SURGERY

## 2022-02-25 PROCEDURE — 258N000003 HC RX IP 258 OP 636: Performed by: STUDENT IN AN ORGANIZED HEALTH CARE EDUCATION/TRAINING PROGRAM

## 2022-02-25 PROCEDURE — 250N000009 HC RX 250: Performed by: NURSE PRACTITIONER

## 2022-02-25 PROCEDURE — 83605 ASSAY OF LACTIC ACID: CPT | Performed by: SURGERY

## 2022-02-25 PROCEDURE — 82330 ASSAY OF CALCIUM: CPT | Performed by: STUDENT IN AN ORGANIZED HEALTH CARE EDUCATION/TRAINING PROGRAM

## 2022-02-25 PROCEDURE — 93010 ELECTROCARDIOGRAM REPORT: CPT | Mod: 59 | Performed by: INTERNAL MEDICINE

## 2022-02-25 PROCEDURE — 272N000001 HC OR GENERAL SUPPLY STERILE: Performed by: SURGERY

## 2022-02-25 PROCEDURE — 93925 LOWER EXTREMITY STUDY: CPT

## 2022-02-25 PROCEDURE — 04CL0ZZ EXTIRPATION OF MATTER FROM LEFT FEMORAL ARTERY, OPEN APPROACH: ICD-10-PCS | Performed by: SURGERY

## 2022-02-25 PROCEDURE — 250N000013 HC RX MED GY IP 250 OP 250 PS 637: Performed by: INTERNAL MEDICINE

## 2022-02-25 PROCEDURE — 85027 COMPLETE CBC AUTOMATED: CPT | Performed by: SURGERY

## 2022-02-25 PROCEDURE — C1763 CONN TISS, NON-HUMAN: HCPCS | Performed by: SURGERY

## 2022-02-25 PROCEDURE — 85520 HEPARIN ASSAY: CPT | Performed by: STUDENT IN AN ORGANIZED HEALTH CARE EDUCATION/TRAINING PROGRAM

## 2022-02-25 PROCEDURE — 94003 VENT MGMT INPAT SUBQ DAY: CPT

## 2022-02-25 PROCEDURE — 250N000025 HC SEVOFLURANE, PER MIN: Performed by: SURGERY

## 2022-02-25 PROCEDURE — 82248 BILIRUBIN DIRECT: CPT | Performed by: STUDENT IN AN ORGANIZED HEALTH CARE EDUCATION/TRAINING PROGRAM

## 2022-02-25 PROCEDURE — 93010 ELECTROCARDIOGRAM REPORT: CPT | Mod: 76 | Performed by: INTERNAL MEDICINE

## 2022-02-25 PROCEDURE — 04UL0KZ SUPPLEMENT LEFT FEMORAL ARTERY WITH NONAUTOLOGOUS TISSUE SUBSTITUTE, OPEN APPROACH: ICD-10-PCS | Performed by: SURGERY

## 2022-02-25 PROCEDURE — 258N000003 HC RX IP 258 OP 636: Performed by: SURGERY

## 2022-02-25 PROCEDURE — C1757 CATH, THROMBECTOMY/EMBOLECT: HCPCS | Performed by: SURGERY

## 2022-02-25 PROCEDURE — 250N000013 HC RX MED GY IP 250 OP 250 PS 637: Performed by: SURGERY

## 2022-02-25 PROCEDURE — 75635 CT ANGIO ABDOMINAL ARTERIES: CPT | Mod: 26 | Performed by: RADIOLOGY

## 2022-02-25 PROCEDURE — 83735 ASSAY OF MAGNESIUM: CPT

## 2022-02-25 PROCEDURE — 250N000009 HC RX 250: Performed by: PHYSICIAN ASSISTANT

## 2022-02-25 PROCEDURE — 84155 ASSAY OF PROTEIN SERUM: CPT | Performed by: STUDENT IN AN ORGANIZED HEALTH CARE EDUCATION/TRAINING PROGRAM

## 2022-02-25 PROCEDURE — 85018 HEMOGLOBIN: CPT | Performed by: NURSE PRACTITIONER

## 2022-02-25 PROCEDURE — P9016 RBC LEUKOCYTES REDUCED: HCPCS | Performed by: STUDENT IN AN ORGANIZED HEALTH CARE EDUCATION/TRAINING PROGRAM

## 2022-02-25 PROCEDURE — P9041 ALBUMIN (HUMAN),5%, 50ML: HCPCS | Performed by: STUDENT IN AN ORGANIZED HEALTH CARE EDUCATION/TRAINING PROGRAM

## 2022-02-25 DEVICE — XENOSURE BIOLOGIC PATCH, 0.8CM X 8CM, EIFU
Type: IMPLANTABLE DEVICE | Site: LEG | Status: FUNCTIONAL
Brand: XENOSURE BIOLOGIC PATCH

## 2022-02-25 RX ORDER — POTASSIUM CHLORIDE 29.8 MG/ML
20 INJECTION INTRAVENOUS ONCE
Status: COMPLETED | OUTPATIENT
Start: 2022-02-25 | End: 2022-02-26

## 2022-02-25 RX ORDER — POTASSIUM CHLORIDE 7.45 MG/ML
10 INJECTION INTRAVENOUS ONCE
Status: COMPLETED | OUTPATIENT
Start: 2022-02-25 | End: 2022-02-25

## 2022-02-25 RX ORDER — AMINO AC/PROTEIN HYDR/WHEY PRO 10G-100/30
1 LIQUID (ML) ORAL 2 TIMES DAILY
Status: DISCONTINUED | OUTPATIENT
Start: 2022-02-25 | End: 2022-02-28

## 2022-02-25 RX ORDER — VANCOMYCIN HYDROCHLORIDE 500 MG/10ML
500 INJECTION, POWDER, LYOPHILIZED, FOR SOLUTION INTRAVENOUS
Status: COMPLETED | OUTPATIENT
Start: 2022-02-25 | End: 2022-02-25

## 2022-02-25 RX ORDER — POTASSIUM CHLORIDE 7.45 MG/ML
10 INJECTION INTRAVENOUS ONCE
Status: COMPLETED | OUTPATIENT
Start: 2022-02-25 | End: 2022-02-26

## 2022-02-25 RX ORDER — HYDROXYZINE HYDROCHLORIDE 25 MG/1
25 TABLET, FILM COATED ORAL EVERY 6 HOURS PRN
Status: DISCONTINUED | OUTPATIENT
Start: 2022-02-25 | End: 2022-02-28

## 2022-02-25 RX ORDER — PROTAMINE SULFATE 10 MG/ML
INJECTION, SOLUTION INTRAVENOUS PRN
Status: DISCONTINUED | OUTPATIENT
Start: 2022-02-25 | End: 2022-02-25

## 2022-02-25 RX ORDER — CEFAZOLIN SODIUM 1 G/3ML
1 INJECTION, POWDER, FOR SOLUTION INTRAMUSCULAR; INTRAVENOUS EVERY 8 HOURS
Status: DISPENSED | OUTPATIENT
Start: 2022-02-25 | End: 2022-02-26

## 2022-02-25 RX ORDER — OXYCODONE HYDROCHLORIDE 5 MG/1
5 TABLET ORAL EVERY 4 HOURS PRN
Status: DISCONTINUED | OUTPATIENT
Start: 2022-02-25 | End: 2022-03-02

## 2022-02-25 RX ORDER — CEFAZOLIN SODIUM 1 G/50ML
2 SOLUTION INTRAVENOUS SEE ADMIN INSTRUCTIONS
Status: CANCELLED | OUTPATIENT
Start: 2022-02-25

## 2022-02-25 RX ORDER — IPRATROPIUM BROMIDE AND ALBUTEROL SULFATE 2.5; .5 MG/3ML; MG/3ML
3 SOLUTION RESPIRATORY (INHALATION)
Status: DISCONTINUED | OUTPATIENT
Start: 2022-02-25 | End: 2022-02-27

## 2022-02-25 RX ORDER — LIDOCAINE HYDROCHLORIDE 20 MG/ML
5 SOLUTION OROPHARYNGEAL ONCE
Status: DISCONTINUED | OUTPATIENT
Start: 2022-02-25 | End: 2022-03-04

## 2022-02-25 RX ORDER — CEFAZOLIN SODIUM 1 G/50ML
INJECTION, SOLUTION INTRAVENOUS PRN
Status: DISCONTINUED | OUTPATIENT
Start: 2022-02-25 | End: 2022-02-25

## 2022-02-25 RX ORDER — HEPARIN SODIUM 10000 [USP'U]/100ML
0-5000 INJECTION, SOLUTION INTRAVENOUS CONTINUOUS
Status: DISCONTINUED | OUTPATIENT
Start: 2022-02-25 | End: 2022-02-25

## 2022-02-25 RX ORDER — ALBUMIN, HUMAN INJ 5% 5 %
12.5 SOLUTION INTRAVENOUS
Status: COMPLETED | OUTPATIENT
Start: 2022-02-25 | End: 2022-02-26

## 2022-02-25 RX ORDER — PROPOFOL 10 MG/ML
INJECTION, EMULSION INTRAVENOUS CONTINUOUS PRN
Status: DISCONTINUED | OUTPATIENT
Start: 2022-02-25 | End: 2022-02-25

## 2022-02-25 RX ORDER — ALBUMIN, HUMAN INJ 5% 5 %
SOLUTION INTRAVENOUS
Status: COMPLETED
Start: 2022-02-25 | End: 2022-02-25

## 2022-02-25 RX ORDER — IOPAMIDOL 755 MG/ML
120 INJECTION, SOLUTION INTRAVASCULAR ONCE
Status: COMPLETED | OUTPATIENT
Start: 2022-02-25 | End: 2022-02-25

## 2022-02-25 RX ORDER — OXYCODONE HYDROCHLORIDE 10 MG/1
10 TABLET ORAL EVERY 4 HOURS PRN
Status: DISCONTINUED | OUTPATIENT
Start: 2022-02-25 | End: 2022-03-02

## 2022-02-25 RX ORDER — ALBUMIN, HUMAN INJ 5% 5 %
25 SOLUTION INTRAVENOUS ONCE
Status: COMPLETED | OUTPATIENT
Start: 2022-02-25 | End: 2022-02-25

## 2022-02-25 RX ORDER — PROCHLORPERAZINE MALEATE 5 MG
5 TABLET ORAL EVERY 6 HOURS PRN
Status: DISCONTINUED | OUTPATIENT
Start: 2022-02-25 | End: 2022-03-18 | Stop reason: HOSPADM

## 2022-02-25 RX ORDER — CEFAZOLIN SODIUM 1 G/50ML
2 SOLUTION INTRAVENOUS
Status: CANCELLED | OUTPATIENT
Start: 2022-02-25

## 2022-02-25 RX ORDER — FUROSEMIDE 10 MG/ML
20 INJECTION INTRAMUSCULAR; INTRAVENOUS ONCE
Status: DISCONTINUED | OUTPATIENT
Start: 2022-02-25 | End: 2022-02-26

## 2022-02-25 RX ORDER — HYDROXYZINE HYDROCHLORIDE 25 MG/1
50 TABLET, FILM COATED ORAL EVERY 6 HOURS PRN
Status: DISCONTINUED | OUTPATIENT
Start: 2022-02-25 | End: 2022-02-28

## 2022-02-25 RX ORDER — GUAIFENESIN 600 MG/1
15 TABLET, EXTENDED RELEASE ORAL DAILY
Status: DISCONTINUED | OUTPATIENT
Start: 2022-02-25 | End: 2022-02-28

## 2022-02-25 RX ORDER — SODIUM CHLORIDE, SODIUM GLUCONATE, SODIUM ACETATE, POTASSIUM CHLORIDE AND MAGNESIUM CHLORIDE 526; 502; 368; 37; 30 MG/100ML; MG/100ML; MG/100ML; MG/100ML; MG/100ML
INJECTION, SOLUTION INTRAVENOUS CONTINUOUS PRN
Status: DISCONTINUED | OUTPATIENT
Start: 2022-02-25 | End: 2022-02-25

## 2022-02-25 RX ORDER — NOREPINEPHRINE BITARTRATE 0.06 MG/ML
.01-.6 INJECTION, SOLUTION INTRAVENOUS CONTINUOUS
Status: DISCONTINUED | OUTPATIENT
Start: 2022-02-25 | End: 2022-02-27

## 2022-02-25 RX ORDER — DEXTROSE MONOHYDRATE 100 MG/ML
INJECTION, SOLUTION INTRAVENOUS CONTINUOUS PRN
Status: CANCELLED | OUTPATIENT
Start: 2022-02-25

## 2022-02-25 RX ADMIN — AMIODARONE HYDROCHLORIDE 1 MG/MIN: 50 INJECTION, SOLUTION INTRAVENOUS at 23:24

## 2022-02-25 RX ADMIN — ASPIRIN 81 MG: 81 TABLET, CHEWABLE ORAL at 08:38

## 2022-02-25 RX ADMIN — ALBUMIN HUMAN 250 ML: 0.05 INJECTION, SOLUTION INTRAVENOUS at 01:22

## 2022-02-25 RX ADMIN — AMIODARONE HYDROCHLORIDE 150 MG: 1.5 INJECTION, SOLUTION INTRAVENOUS at 22:13

## 2022-02-25 RX ADMIN — PROPOFOL 30 MCG/KG/MIN: 10 INJECTION, EMULSION INTRAVENOUS at 05:38

## 2022-02-25 RX ADMIN — IOPAMIDOL 120 ML: 755 INJECTION, SOLUTION INTRAVENOUS at 12:52

## 2022-02-25 RX ADMIN — ROCURONIUM BROMIDE 50 MG: 50 INJECTION, SOLUTION INTRAVENOUS at 14:07

## 2022-02-25 RX ADMIN — SENNOSIDES AND DOCUSATE SODIUM 1 TABLET: 50; 8.6 TABLET ORAL at 08:38

## 2022-02-25 RX ADMIN — IPRATROPIUM BROMIDE AND ALBUTEROL SULFATE 3 ML: 2.5; .5 SOLUTION RESPIRATORY (INHALATION) at 21:04

## 2022-02-25 RX ADMIN — POTASSIUM CHLORIDE 10 MEQ: 7.46 INJECTION, SOLUTION INTRAVENOUS at 21:20

## 2022-02-25 RX ADMIN — MUPIROCIN 0.5 G: 20 OINTMENT TOPICAL at 08:39

## 2022-02-25 RX ADMIN — ACETAMINOPHEN 975 MG: 325 TABLET ORAL at 08:38

## 2022-02-25 RX ADMIN — CEFAZOLIN 1 G: 1 INJECTION, POWDER, FOR SOLUTION INTRAMUSCULAR; INTRAVENOUS at 20:46

## 2022-02-25 RX ADMIN — ACETAMINOPHEN 975 MG: 325 TABLET ORAL at 01:23

## 2022-02-25 RX ADMIN — Medication 40 MG: at 08:39

## 2022-02-25 RX ADMIN — VANCOMYCIN HYDROCHLORIDE 500 MG: 500 INJECTION, POWDER, LYOPHILIZED, FOR SOLUTION INTRAVENOUS at 17:14

## 2022-02-25 RX ADMIN — PROPOFOL 30 MCG/KG/MIN: 10 INJECTION, EMULSION INTRAVENOUS at 18:34

## 2022-02-25 RX ADMIN — ALBUMIN HUMAN 12.5 G: 50 SOLUTION INTRAVENOUS at 19:52

## 2022-02-25 RX ADMIN — POLYETHYLENE GLYCOL 3350 17 G: 17 POWDER, FOR SOLUTION ORAL at 08:38

## 2022-02-25 RX ADMIN — PROPOFOL 30 MCG/KG/MIN: 10 INJECTION, EMULSION INTRAVENOUS at 12:16

## 2022-02-25 RX ADMIN — PROTAMINE SULFATE 50 MG: 10 INJECTION, SOLUTION INTRAVENOUS at 15:21

## 2022-02-25 RX ADMIN — Medication 2.4 UNITS/HR: at 10:23

## 2022-02-25 RX ADMIN — ALBUMIN HUMAN 12.5 G: 0.05 INJECTION, SOLUTION INTRAVENOUS at 19:52

## 2022-02-25 RX ADMIN — SUGAMMADEX 200 MG: 100 INJECTION, SOLUTION INTRAVENOUS at 16:10

## 2022-02-25 RX ADMIN — FENTANYL CITRATE 50 MCG/HR: 50 INJECTION INTRAVENOUS at 10:23

## 2022-02-25 RX ADMIN — ROCURONIUM BROMIDE 20 MG: 50 INJECTION, SOLUTION INTRAVENOUS at 14:41

## 2022-02-25 RX ADMIN — EPINEPHRINE 0.08 MCG/KG/MIN: 1 INJECTION PARENTERAL at 11:05

## 2022-02-25 RX ADMIN — CEFAZOLIN SODIUM 2 G: 1 INJECTION, SOLUTION INTRAVENOUS at 14:25

## 2022-02-25 RX ADMIN — SODIUM CHLORIDE, SODIUM GLUCONATE, SODIUM ACETATE, POTASSIUM CHLORIDE AND MAGNESIUM CHLORIDE: 526; 502; 368; 37; 30 INJECTION, SOLUTION INTRAVENOUS at 14:06

## 2022-02-25 RX ADMIN — POTASSIUM CHLORIDE 10 MEQ: 7.46 INJECTION, SOLUTION INTRAVENOUS at 23:06

## 2022-02-25 RX ADMIN — ALBUMIN (HUMAN) 12.5 G: 12.5 INJECTION, SOLUTION INTRAVENOUS at 05:38

## 2022-02-25 RX ADMIN — THIAMINE HCL TAB 100 MG 100 MG: 100 TAB at 08:38

## 2022-02-25 RX ADMIN — Medication 15 ML: at 08:38

## 2022-02-25 RX ADMIN — PROPOFOL 30 MG: 10 INJECTION, EMULSION INTRAVENOUS at 14:06

## 2022-02-25 RX ADMIN — Medication 0.05 MCG/KG/MIN: at 22:17

## 2022-02-25 ASSESSMENT — ACTIVITIES OF DAILY LIVING (ADL)
ADLS_ACUITY_SCORE: 13
ADLS_ACUITY_SCORE: 14
ADLS_ACUITY_SCORE: 13
ADLS_ACUITY_SCORE: 14
ADLS_ACUITY_SCORE: 13
ADLS_ACUITY_SCORE: 13
ADLS_ACUITY_SCORE: 14
ADLS_ACUITY_SCORE: 13
ADLS_ACUITY_SCORE: 14
ADLS_ACUITY_SCORE: 13
ADLS_ACUITY_SCORE: 13
ADLS_ACUITY_SCORE: 14
ADLS_ACUITY_SCORE: 13

## 2022-02-25 ASSESSMENT — COPD QUESTIONNAIRES: COPD: 1

## 2022-02-25 NOTE — PROGRESS NOTES
CLINICAL NUTRITION SERVICES - BRIEF NOTE    Received provider consult for nutrition education with comments post op cardiovascular surgery (automatic consult on post-op order set). S/p CABG x 4 on 2/24. Pt remains intubated, sedated. Nutrition education to be completed as able/appropriate (as pt s/p CABG and/or initial VAD).    RD will follow per LOS protocol or if re-consulted.     Sarah Pratt RD, LD  Pager: [phone]

## 2022-02-25 NOTE — PLAN OF CARE
Major Shift Events: Around 1100, L foot cooler than R, harder to find pulses w/ doppler. Team notified and assessed pt. Vascular consult placed. CTA ordered by vascular. CTA done and pt emergently taken to OR for a L femoral thrombectomy. Pt returned to unit around 1600. Currently sedated, on propofol and fentanyl. Decreased sedation after returning from OR to check neuro - pt able to move all extremities, opening eyes to voice, and intermittently following commands. Vent settings unchanged. MAP >65, Levo off since 1030. Continues on epi and vaso but able to titrate down. Upon return from OR pt became bradycardic, HR 50s, and dropping pressures. Pt now 100% paced. Pacer rate increased to 84. Paula numbers done, refer to flow sheets for numbers. OG to low continuous suction. BG 120s, off insulin gtt all day. Vizcaino in place, 20-25ml/hr, team aware. Awaiting post op labs to assess need for lasix. Anand Zavala, updated by team.     Plan: Continue to monitor pulses on lower extremities. Continue to titrate pressors down as tolerated. Pressure support as able tomorrow. Notify team of any changes/concerns.     For vital signs and complete assessments, please see documentation flowsheets.

## 2022-02-25 NOTE — PLAN OF CARE
Took over care of pt [phone]. Pt sedated with prop and fentanyl gtts, coughs to suction. Tmax 99, gave scheduled Tylenol. No changes to vent settings overnight, minimal secretions via inline ETT. SR 70-80s, tpm backup at 50 DDD, no pacing noted overnight. 1u pRBCs given for hgb 6.9, 500ml albumin given for hypotension and low UOP.    IABP DC'd at bedside by MD alfredo after consulting with Dr. Bhandari since pedal pulses were absent to LLE (IABP leg).  Pt tolerated procedure well, manual pressure at bedside by MD, fem stop placed, bilateral LE US done.  Minimal blood loss, able to doppler all pulses after removal of IABP.     CVP 7-8, PA 28-42/14-22, SVO2 53, CO 3.8-4.5, CHETAN CI 2.4-2.8, and SVR 9097-1205.    UOP decreasing, 20-30ml/hr now, last 250ml albumin trial minimally effective for increased UOP, pt is up 4L since admit. No BMs, OGOP 50ml total to LCS. No lytes to be replaced this AM.  Will continue to update team with any changes in condition per protocol.

## 2022-02-25 NOTE — PHARMACY
Pharmacy Tube Feeding Consult    Medication reviewed for administration by feeding tube and for potential food/drug interactions.    Recommendation: No changes are needed at this time.     Pharmacy will continue to follow as new medications are ordered.    Danna Curran, BetsyD

## 2022-02-25 NOTE — PROGRESS NOTES
Called son Anand Osman re emergent L femoral thrombectomy.Obtained consent over phone     Jenni Dodd MD  Fellow

## 2022-02-25 NOTE — ANESTHESIA PREPROCEDURE EVALUATION
Anesthesia Pre-Procedure Evaluation    Patient: Ashley Osman   MRN: [id number] : 1950        Procedure : Procedure(s):  THROMBECTOMY, LOWER EXTREMITY; Left Femeral Thrombectomy,          History reviewed. No pertinent past medical history.   History reviewed. No pertinent surgical history.   Allergies   Allergen Reactions     Wasp Venom Protein Anaphylaxis     Bee Venom Swelling and Hives     Other Drug Allergy (See Comments)      - had c-sec. Was in ICU for swelling, chills- unsure of what med it was -at [facility]. Was a Dr. Harsh Estradas Rash     Siblings are allergic to PCN        Social History     Tobacco Use     Smoking status: Not on file     Smokeless tobacco: Not on file   Substance Use Topics     Alcohol use: Not on file      Wt Readings from Last 1 Encounters:   22 56.2 kg (123 lb 14.4 oz)        Anesthesia Evaluation            ROS/MED HX  ENT/Pulmonary: Comment:   #flash pulmonary edema    (+) COPD,     Neurologic:  - neg neurologic ROS     Cardiovascular: Comment: S/p CABG yesterday now with cold leg at IABP site    (+) --CAD ---Taking blood thinners CHF Last EF: 17% pulmonary hypertension,     METS/Exercise Tolerance: 1 - Eating, dressing    Hematologic:     (+) anemia,     Musculoskeletal:  - neg musculoskeletal ROS     GI/Hepatic:  - neg GI/hepatic ROS     Renal/Genitourinary:  - neg Renal ROS     Endo:  - neg endo ROS     Psychiatric/Substance Use:  - neg psychiatric ROS     Infectious Disease:  - neg infectious disease ROS     Malignancy:  - neg malignancy ROS     Other:            Physical Exam    Airway      Comment: intubated         Respiratory Devices and Support         Dental           Cardiovascular             Pulmonary                   OUTSIDE LABS:  CBC:   Lab Results   Component Value Date    WBC 11.1 (H) 2022    WBC 23.2 (H) 2022    HGB 8.2 (L) 2022    HGB 6.9 (LL) 2022    HCT 25.3 (L) 2022    HCT 28.5 (L) 2022    PLT  88 (L) 02/25/2022     (L) 02/24/2022     BMP:   Lab Results   Component Value Date     (H) 02/25/2022     (H) 02/24/2022    POTASSIUM 4.2 02/25/2022    POTASSIUM 3.8 02/24/2022    CHLORIDE 116 (H) 02/25/2022    CHLORIDE 116 (H) 02/24/2022    CO2 25 02/25/2022    CO2 26 02/24/2022    BUN 27 02/25/2022    BUN 30 02/24/2022    CR 0.67 02/25/2022    CR 0.74 02/24/2022     (H) 02/25/2022     (H) 02/25/2022     COAGS:   Lab Results   Component Value Date    PTT 35 02/24/2022    INR 1.55 (H) 02/25/2022    FIBR 204 02/24/2022     POC: No results found for: BGM, HCG, HCGS  HEPATIC:   Lab Results   Component Value Date    ALBUMIN 2.9 (L) 02/25/2022    PROTTOTAL 4.6 (L) 02/25/2022    ALT 31 02/25/2022    AST 43 02/25/2022    ALKPHOS 39 (L) 02/25/2022    BILITOTAL 0.7 02/25/2022     OTHER:   Lab Results   Component Value Date    PH 7.50 (H) 02/25/2022    LACT 1.2 02/25/2022    A1C 5.6 02/13/2022    HEIDI 8.7 02/25/2022    PHOS 3.0 02/25/2022    MAG 2.2 02/25/2022    TSH 2.08 02/16/2022       Anesthesia Plan    ASA Status:  4, emergent    NPO Status:  NPO Appropriate    Anesthesia Type: General.     - Airway: ETT   Induction: Inhalation.   Maintenance: Balanced.   Techniques and Equipment:     Lines/Monitors: a-line, central line, PA catheter in place.     Consents    Anesthesia Plan(s) and associated risks, benefits, and realistic alternatives discussed. Questions answered and patient/representative(s) expressed understanding.    - Discussed:     - Discussed with:  Implied consent/emergency         Postoperative Care            Comments:                Tierney Sanches MD

## 2022-02-25 NOTE — CONSULTS
Vascular Surgery Attending Attestation Note: In Patient Consult    I have seen and examined this patient with the residents and fellow and reviewed vital signs, laboratory results, imaging studies, and medications. I agree with the findings noted below. Briefly, this 70 YO female with a history of CAD, CHF, COPD, and HLD is one day s/p CABG. She required an indwelling IABP via the left groin that was removed early this morning. The patient has subsequently developed coolness and discoloration in the left foot, with loss of pedal pulses. On PE, the femoral pulse on the left is absent, and weak Doppler signals are present in the femoral and PT arteries. The right femoral pulse is weak. A stat CTA confirms left femoral artery occlusion that appears acute on top of chronic inflow disease. The right external iliac artery is occluded, and the large hypogastric artery collateral circulation implies chronicity. We plan emergent left femoral exploration and thrombectomy. I have discussed this with Dr. Bhandari from CV surgery who concurs with our plans.    Michelle Jules MD          [facility] PATIENT CONSULTATION NOTE  Consulted by:Monique Gunderson NP  Reason for consultation: Cold extremity    HPI:  Ashley Osman is a 71 year old year old female who has a PMH significant for CAD, HFrEF (EF 10-15%), COPD, ICM, tobacco use who underwent CABG x4 on 2/24/22 with endoscopic harvest of left saphenous vein. Overnight of 2/24, noted to have weak pulses to bilateral lower extremities, left worse than right, with cold left lower extremity and skin mottling. She has been hypotensive, requiring pressors and given 1u pRBC for anemia. IABP was discontinued overnight due to absent pedal pulses in the left, doppler signal present to all pulses of left lower extremity following IABP removal.    Review Of Systems:   Unable to obtain, patient intubated and sedated.    PAST MEDICAL HISTORY:  HFrEF  CAD  COPD  Tobacco Use  "Disorder  HLD    PAST SURGICAL HISTORY:  History reviewed. No pertinent surgical history.    FAMILY HISTORY:  History reviewed. No pertinent family history.    SOCIAL HISTORY:   Social History     Tobacco Use     Smoking status: Not on file     Smokeless tobacco: Not on file   Substance Use Topics     Alcohol use: Not on file     TOBACCO USE: Unable to obtain, patient sedated and intubated.    HOME MEDICATIONS:  Prior to Admission medications    Medication Sig Start Date End Date Taking? Authorizing Provider   aspirin (ASA) 325 MG tablet Take 325 mg by mouth daily 2/13/22  Yes Unknown, Entered By History   albuterol (PROAIR HFA/PROVENTIL HFA/VENTOLIN HFA) 108 (90 Base) MCG/ACT inhaler Inhale 1 puff into the lungs every 6 hours as needed for shortness of breath / dyspnea or wheezing 1/26/22   Unknown, Entered By History   atorvastatin (LIPITOR) 20 MG tablet Take 30 mg by mouth daily 2/18/22   Unknown, Entered By History   carvedilol (COREG) 3.125 MG tablet Take 3.125 mg by mouth 2 times daily 2/18/22   Unknown, Entered By History   INCRUSE ELLIPTA 62.5 MCG/INH Inhaler Inhale 1 puff into the lungs daily 1/26/22   Unknown, Entered By History   isosorbide mononitrate (IMDUR) 30 MG 24 hr tablet Take 30 mg by mouth daily 2/18/22   Unknown, Entered By History   nitroGLYcerin (NITROSTAT) 0.4 MG sublingual tablet Place 0.4 mg under the tongue every 5 minutes as needed for chest pain 1/26/22   Unknown, Entered By History     VITAL SIGNS:  BP (!) 145/63   Pulse 71   Temp 98.2  F (36.8  C) (Bladder)   Resp 20   Ht 1.638 m (5' 4.5\")   Wt 56.2 kg (123 lb 14.4 oz)   SpO2 100%   BMI 20.94 kg/m    Intake/Output Summary (Last 24 hours) at 2/25/2022 1155  Last data filed at 2/25/2022 1100  Gross per 24 hour   Intake 3605.71 ml   Output 1705 ml   Net 1900.71 ml     Labs:  ROUTINE IP LABS (Last four results)  BMP  Recent Labs   Lab 02/25/22  1013 02/25/22  0809 02/25/22  0558 02/25/22  0332 02/24/22  1615 02/24/22  1602 " 02/24/22  1451 02/24/22  1413 02/24/22  0345 02/24/22  0335 02/23/22  2109 02/23/22 2108   NA  --   --   --  147*  --  148* 145* 144   < > 140  --  139   POTASSIUM  --   --   --  4.2  --  3.8 4.0 4.0   < > 4.0  --  4.1   CHLORIDE  --   --   --  116*  --  116*  --   --   --  108  --  107   HEIDI  --   --   --  8.7  --  9.7  --   --   --  8.9  --  8.7   CO2  --   --   --  25  --  26  --   --   --  25  --  24   BUN  --   --   --  27  --  30  --   --   --  33*  --  28   CR  --   --   --  0.67  --  0.74  --   --   --  0.76  --  0.82   * 114* 93 113*   < > 131* 152* 169*   < > 112*   < > 119*    < > = values in this interval not displayed.     CBC  Recent Labs   Lab 02/25/22  0332 02/25/22  0033 02/24/22 2003 02/24/22  1602 02/24/22  1348 02/24/22  1345 02/24/22  0835 02/24/22 0335   WBC 11.1*  --   --  23.2*  --  18.7*  --  14.3*   RBC 2.79*  --   --  3.09*  --  2.20*  --  3.33*   HGB 8.2* 6.9* 7.9* 9.0*   < > 6.4*   < > 9.6*   HCT 25.3*  --   --  28.5*  --  20.5*  --  30.6*   MCV 91  --   --  92  --  93  --  92   MCH 29.4  --   --  29.1  --  29.1  --  28.8   MCHC 32.4  --   --  31.6  --  31.2*  --  31.4*   RDW 15.4*  --   --  16.1*  --  15.8*  --  15.7*   PLT 88*  --   --  136*  --  123*  --  300    < > = values in this interval not displayed.     INR  Recent Labs   Lab 02/25/22  0332 02/24/22  1602 02/24/22  1345 02/24/22  0335   INR 1.55* 1.82* 2.23* 1.72*     PHYSICAL EXAM:  Constitutional: Intubated and sedated  Cardiovascular: RRR  Respiratory: Intubated, symmetric chest rise  Neck: no asymmetry  GI/Abdomen: Abdomen soft  Extremities: Left lower extremity wrapped in ACE wrap. Groin incision covered with gauze and Tegaderm. Skin feels cool to the touch. Unable to palpate femoral pulse, DP pulse, or PT pulse on the left. Palpable left popliteal artery.    Doppler exam: Monophasic signal present to DP, PT and femoral arteries on the left lower extremity.    IMAGING:  US Lower Extremities Duplex 2/24/22:  1.  RIGHT: Patent major arterial vasculature.  2. LEFT: Patent major arteries with low flow velocities in calf  arteries.   3. Monophasic waveforms throughout both lower extremities was be seen  with hyperemia, diffusely decreased systemic vascular resistance, or  exercise.    Patient Active Problem List   Diagnosis     Heart failure (H)     ASSESSMENT:  71 year old  female who has a PMH significant for CAD, HFrEF (EF 10-15%), COPD, ICM, tobacco use who underwent CABG x4 on 2/24/22 with endoscopic harvest of left saphenous vein. Vascular consulted for concern of cold left lower extremity. Palpable popliteal artery on the left, but not palpable femoral, DP or PT pulse. Doppler signal present to all left lower extremity pulses. Concern for aorto-iliac disease given diffuse monophasic waveforms on the left.    PLAN:  -STAT CTA with lower extremity runoff  -Recommend heparin bolus with initiation of heparin drip    Seen and discussed with Dr. Russell and Dr. Dhara Moeller, MS4  12:26 PM February 25, 2022

## 2022-02-25 NOTE — PROGRESS NOTES
"SPIRITUAL HEALTH SERVICES  SPIRITUAL ASSESSMENT Progress Note  [facility] ([place]) 4E     REFERRAL SOURCE: Pt/family request     Pt intubated and sedated, unable to communicate with .   spoke with son Anand via phone, who shared he is \"extremely worried\" about his mom and wants her to recover.  Anand talked about how hardworking and dedicated his mom is, and how right now he is \"putting one foot in front of the other\" to get through this.  Anand described his mom as \"very close to Home\" and very connected to her shanna.   provided support to Anand and let him know that [facility] will remain available for him and his mom throughout her hospitalization.     PLAN: [facility] will remain available     Nohemi Ram  Pager: [phone]      "

## 2022-02-25 NOTE — PROGRESS NOTES
CLINICAL NUTRITION SERVICES - BRIEF NOTE      Reason for RD note: Provider order for Cortrak FT placement and initiation of EN support. No gastric feeds per surgeon.    New Findings/Chart Review:  General: Pt went to CT early this afternoon and then emergent RTOR     Enteral Access: OGT (AXR)     Labs: Na+ 147 (H), K+ 3.6 (WNL), Phos 3.0 (WNL), Mg++ 2.2 (WNL)     Meds: Reviewed, notable for: Insulin gtt, norepi gtt, vaso gtt    Interventions:  1. Rounds: Discussed ppFT placement d/t no gastric feeds per surgeon. No extubation today.    2. Discussion with bedside RN regarding FT placement - since pt is back in OR this afternoon, writer will be unable to place ppFT. Writer asked bedside RN to pass along in report that RN tomorrow AM will need to contact MICU about Cortrak FT placement (there are 2 Cortrak-trained RNs on this weekend per writer's discussion with MICU, but their capacity to place a FT depends on their patient load).     3. EN support ordered and held (per RN request so TF wouldn't be inadvertently started via OGT):   Osmolite 1.5 Byron @ goal of  45ml/hr  (1080ml/day) + 1 pkt ProSource BID will provide: 1700 kcals (33 kcal/kg), 89 g PRO (1.7 pro/kg), 822 ml free H20, 219 g CHO, and 0 g fiber daily.   - Once/if POST-PYLORIC feeding tube placed and OK per CVTS team to use FT (NO gastric feeds per surgeon) - initiate TFs at 15 mL/hr, advancing rate by 10 mL Q 8 hrs (or per provider discretion, pending hemodynamic stability) to goal rate of 45 mL/hr.  - Do not start or advance unless K+ >/= 3, Mg++ >1.5, and phos >1.9   - 30 mL q4hr fluid flushes for tube patency. Additional fluids and/or adjustments per MD.    - Multivitamin/mineral (15 mL/day via FT) to help ensure micronutrient needs being met with suspected hypermetabolic demands and potential interruptions to TF infusions.    Future/Additional Recommendations:  Monitor ability to obtain post-pyloric enteral access and start TF.    Nutrition will continue  to follow per protocol.    Sarah Pratt RD, LD  Pager: [phone]

## 2022-02-25 NOTE — PROGRESS NOTES
[facility], Procedure Note           Intra-Aortic Balloon Pump Discontinuation:       Ashley Osman  MRN# [id number]   February 25, 2022, 0200 AM             IABP discontinued: February 25, 2022, 0200 AM   Removed by: Dr. Negrete   Catheter saved: No      Recorded by Cameron Rodriguez

## 2022-02-25 NOTE — ANESTHESIA CARE TRANSFER NOTE
Patient: Ashley Osman    Procedure: Procedure(s):  THROMBECTOMY, LOWER EXTREMITY; Emergency Left Femeral Thrombectomy,       Diagnosis: Ischemia [I99.8]  Diagnosis Additional Information: No value filed.    Anesthesia Type:   General     Note:    Oropharynx: endotracheal tube in place and ventilatory support  Level of Consciousness: unresponsive      Independent Airway: airway patency not satisfactory and stable  Dentition: dentition unchanged  Vital Signs Stable: post-procedure vital signs reviewed and stable  Report to RN Given: handoff report given  Patient transferred to: ICU    ICU Handoff: Call for PAUSE to initiate/utilize ICU HANDOFF, Identified Patient, Identified Responsible Provider, Reviewed the Pertinent Medical History, Discussed Surgical Course, Reviewed Intra-OP Anesthesia Management and Issues during Anesthesia, Set Expectations for Post Procedure Period and Allowed Opportunity for Questions and Acknowledgement of Understanding      Vitals:  Vitals Value Taken Time   BP 96/50    Temp 36.5    Pulse 62 02/25/22 1607   Resp 18 02/25/22 1607   SpO2 100    Vitals shown include unvalidated device data.    Electronically Signed By: Charles Patrick Schlatter, APRN CRNA  February 25, 2022  4:09 PM

## 2022-02-25 NOTE — PROGRESS NOTES
CV ICU PROGRESS NOTE  February 25, 2022      CO-MORBIDITIES:   S/P CABG x 4  (primary encounter diagnosis)  Systolic heart failure, unspecified HF chronicity (H)    ASSESSMENT: Ashley Osman is a 71 year old female with PMH of HFrEF (10-15%) 2/2 ICM (Hx LAD and Circumflex Mis; total occlusion of RCA and LAD), Biventricular failure (requiring inotropic support PTA to [facility]), COPD, HLD, Tobacco Use Disorder (quit 12/2021), CAD who underwent CABG x4 on 02/24 with Dr. Bhandari.    TODAY'S PROGRESS:     Update on Intraop Events:  - post-op Anesthesia report stated RV function normal, LV function severely depressed    Overnight:  - Could not feel or doppler pulse in L foot, pulled IABP with dopplerable pulses in feet afterward, however pulses remained asymmetric (R > L)  - noted some mottling of skin distal to the IABP site, but vessel bled back adequately, mottling resolved   - achieved hemostasis adequately  - Got 1u pRBC, 500 albumin overnight (1.75L LR & 500mL albumin 02/24)    Goals:  - STAT Vascular consult for L leg (still with decreased pulse compared to R)   - L pulse doppler-able this AM, but @1100 pulse again difficult to doppler   - Doppler extremity shows intact arterial flow bilaterally (but decreased distal to L calf)  - Start subcutaneous heparin (02/25)   - increase heparin per vascular  - Wean sedation as able (goal RASS 0 to -1)  - Wean pressors as able   - if UOP continues to decrease this PM to oliguria, give lasix 20    PLAN:    Neuro/ pain/ sedation:  Acute Postoperative pain  - Monitor neurological status. Notify the MD for any acute changes in exam.  - Pain:              - fentanyl gtt              - Scheduled:                          - tylenol              - PRN                           - tylenol, fentanyl, dilaudid, oxycodone  - Sedation:              - propofol gtt              - fentanyl gtt  - Goal RASS 0 to -1    Pulmonary care:   # Postoperative ventilation management  # Hx of COPD  -  CXR has stable R pneumothorax visualized post-procedure, hasn't been commented on   - CXR this PM and f/u  - Intubated, ventilated   - pressure support trials BID  - Titrate supplemental oxygen to maintain saturation above 92%.  - Pulmonary hygiene: Incentive spirometer every 15- 30 minutes when awake, flutter valve, C&DB    Cardiovascular:    # S/p CABG x 4 on 02/24 by Dr. Bhandari  # History of Biventricular HF requiring pressor support 2/2 ICM  # HLD  Recent echo on 02/11/2022 with LVEF of 15%  Postop echo appeared similar to prior (LVEF < ~20%; RV function normal)  - Goal MAP>65, SBP<140  - Hold atorvastatin, carvediliol, nitrostat, isosorbide mononitrate  - ASA: start 02/25  - Pressors and inotropes   - discontinue levo   - wean off vasopressin preferentially   - epinephrine for goal CI > 2    - per Dr. Bhandari dopamine if CI consistently < 2    - start dopamine gtt at least 5 mcg/kg/min and less than 10 mcg/kg/min to stimulate beta    receptors along with selective dilation   - vasopressin gtt  # Loss of Pulse s/p IABP removal  - has dopplerable pulse after IABP removal   - U/S shows decreased arterial flow in the L leg distal to knee   - Ordering STAT ascular consult 02/25    - call vascular surgery    GI care/ Nutrition:   - NPO              - needs NJ 02/25    - if difficult to place per Nutrition, consult XR    - backup = trickle OG if ok with Dr. Bhandari  - PPI  - Bowel regimen: miralax, senna, & dulcolax PRNs    Renal/ Fluid Balance/ Electrolytes:   BL creat appears to be ~ 0.70  - decreasing hourly UOP per RN (20/hr)   - +2.1 net 02/24   - +807 net since midnight   - f/u I/O, UOP this PM, give 20 lasix as indicated for I = O  - Avoid/limit nephrotoxins as able  - Replete Mg, K, PO4 per protocol    Endocrine:    # Stress induced hyperglycemia  Preop A1c 5.6%  - Insulin gtt   - keep until NJ in and feeds advanced  - Goal BG <180 for optimal healing  - No prior Hx DM    ID/ Antibiotics:  # Stress  induced leukocytosis  - To complete perioperative regimen      - no cefazolin (has allergy documented, does not need to be given per signout discussion with Surgery)      - vancomycin  - Continue to monitor fever curve, WBC and inflammatory markers as appropriate    Heme:     # Stress induced leukocytosis  # Acute blood loss anemia  # Acute blood loss thrombocytopenia  No s/sx active bleeding  - Hgb goal > 7  - CBC qday    MSK/ Skin:  # Sternotomy  # Surgical Incision  - Sternal precautions  - Postoperative incision management per protocol  - PT/OT/CR      Prophylaxis:    - Mechanical prophylaxis for DVT  - Chemical DVT prophylaxis - tentatively start subcutaneous heparin 02/25  - PPI     Lines/ tubes/ drains:  - Arterial Line  - ETT  - CTs x 2  - PA catheter  - RIJ  - PIV x 1  - Vizcaino     Disposition:  - CVICU     Patient seen, findings and plan discussed with CVICU staff     Eloy Flannery, MS4    Resident/Fellow Attestation   I, Bruno Cabrales, was present with the medical/EILEEN student who participated in the service and in the documentation of the note.  I have verified the history and personally performed the physical exam and medical decision making.  I agree with the assessment and plan of care as documented in the note.      Bruno Cabrales MD  Anesthesiology, CA-2, PGY3    ====================================    SUBJECTIVE:   Woke up yesterday PM extremely agitated, moving all 4 extremities. Propofol gtt was able to sedate her again.    Had lack of dopplerable pulses in L foot, IABP pulled, blood came back, held pressure. Dopplerable L foot pulse afterward this AM. Vascular consult.    OBJECTIVE:   1. VITAL SIGNS:   Temp:  [94.8  F (34.9  C)-99.5  F (37.5  C)] 98.8  F (37.1  C)  Pulse:  [72-92] 80  Resp:  [16-24] 20  MAP:  [63 mmHg-82 mmHg] 73 mmHg  Arterial Line BP: ()/(41-58) 99/56  FiO2 (%):  [40 %-60 %] 40 %  SpO2:  [99 %-100 %] 100 %  Vent Mode: CMV/AC  (Continuous Mandatory Ventilation/  Assist Control)  FiO2 (%): 40 %  Resp Rate (Set): 20 breaths/min  Tidal Volume (Set, mL): 430 mL  PEEP (cm H2O): 5 cmH2O  Resp: 20    2. INTAKE/ OUTPUT:   I/O last 3 completed shifts:  In: 4604.78 [I.V.:2989.78; Other:395; NG/GT:70]  Out: 1715 [Urine:1025; Emesis/NG output:50; Chest Tube:640]    3. PHYSICAL EXAMINATION:   General: female patient, sedated and lying flat in bed  Neuro: deeply sedated  Resp: intubated, ventilated, mechanical lung sounds that are CTAB  CV: regular S1, S2, soft pericardial rub, there is a IV/VI systolic murmur most prominent at the mitral position, upper extremity pulses are 3+ bilaterally, lower extremity pulses are difficult to palpate, L foot is more cold than right, there is now a lack of doppler-able pulse at the time of exam (~1100), the L popliteal pulse is non-palpable as compared to a 2+ pulse on R popliteal  Abdomen: Soft, non-distended, non-tender  Incisions: c/d/i there was a balloon pump inserted in the L femoral artery, there is no associated hematoma  Extremities: hands and feet are cool to touch, legs and arms are warm, pulses and capillary refill as above  CT: To suction, serosang output, there is a slight air leak around the left chest tube    4. INVESTIGATIONS:   Arterial Blood Gases   Recent Labs   Lab 02/25/22  0333 02/25/22  0032 02/24/22 2004 02/24/22  1619   PH 7.50* 7.51* 7.40 7.34*   PCO2 34* 34* 36 47*   PO2 155* 123* 157* 147*   HCO3 27 27 22 25     Complete Blood Count   Recent Labs   Lab 02/25/22  0332 02/25/22  0033 02/24/22 2003 02/24/22  1602 02/24/22  1348 02/24/22  1345 02/24/22  0835 02/24/22  0335   WBC 11.1*  --   --  23.2*  --  18.7*  --  14.3*   HGB 8.2* 6.9* 7.9* 9.0*   < > 6.4*   < > 9.6*   PLT 88*  --   --  136*  --  123*  --  300    < > = values in this interval not displayed.     Basic Metabolic Panel  Recent Labs   Lab 02/25/22  0558 02/25/22  0332 02/25/22  0314 02/25/22  0041 02/24/22  1615 02/24/22  1602 02/24/22  1451 02/24/22  1413  02/24/22 0345 02/24/22 0335 02/23/22 2109 02/23/22 2108   NA  --  147*  --   --   --  148* 145* 144   < > 140  --  139   POTASSIUM  --  4.2  --   --   --  3.8 4.0 4.0   < > 4.0  --  4.1   CHLORIDE  --  116*  --   --   --  116*  --   --   --  108  --  107   CO2  --  25  --   --   --  26  --   --   --  25  --  24   BUN  --  27  --   --   --  30  --   --   --  33*  --  28   CR  --  0.67  --   --   --  0.74  --   --   --  0.76  --  0.82   GLC 93 113* 112* 139*   < > 131* 152* 169*   < > 112*   < > 119*    < > = values in this interval not displayed.     Liver Function Tests  Recent Labs   Lab 02/25/22 0332 02/24/22  1602 02/24/22  1345 02/24/22  0335 02/23/22  0549   AST 43 66*  --  20 23   ALT 31 38  --  27 32   ALKPHOS 39* 49  --  69 87   BILITOTAL 0.7 0.6  --  0.5 0.4   ALBUMIN 2.9* 2.1*  --  2.8* 2.9*   INR 1.55* 1.82* 2.23* 1.72* 1.10     Pancreatic Enzymes  No lab results found in last 7 days.  Coagulation Profile  Recent Labs   Lab 02/25/22 0332 02/24/22  1602 02/24/22  1345 02/24/22 0335   INR 1.55* 1.82* 2.23* 1.72*   PTT  --  35 32  --          5. RADIOLOGY:   Recent Results (from the past 24 hour(s))   XR Chest Port 1 View    Narrative    Exam: XR CHEST PORT 1 VIEW, 2/24/2022 2:57 PM    Comparison: 2/24/2022    History: PORTABLE CHEST IN or 26 for possible retained object    Additional HISTORY: Concern for 2 clamps which were found after chest  x-ray was taken.    Findings:  Single AP view of the chest. Surgical chest with intact median  sternotomy wires. Endotracheal tube with tip obscured but likely near  the elan. Transthoracic echo  probe projects over the low esophagus.  [place]-Renay catheter with tip in the right interlobar pulmonary artery,  slightly advanced from prior. Left chest tube. Mediastinal drain.    Trachea is midline. Mediastinum is within normal limits.  Cardiopulmonary silhouette is within normal limits. Bilateral  perihilar and bibasilar opacities. Small left pneumothorax.  No  definite pleural effusion. No acute osseous abnormalities.  Catheter/wire projecting over the upper abdomen.      Impression    Impression:   1. Endotracheal tube with tip not well-visualized but likely  within 1  cm of elan.  2. [place]-Renay catheter with tip in the right interlobar pulmonary  artery.  3. Small left apical pneumothorax.  4. Mild bilateral bibasilar atelectasis/edema.  5. No radiopaque surgical clamp projecting over the visualized field  of view.    I have personally reviewed the examination and initial interpretation  and I agree with the findings.    FEDERICO VILLAGRAN MD         SYSTEM ID:  [id number]   XR Chest Port 1 View    Narrative    Exam: XR CHEST PORT 1 VIEW, 2/24/2022 4:18 PM    Comparison: 2/24/2022    History: Post Op CVTS Surgery    Findings:  AP view of the chest. Endotracheal tube tip obscured by the radiopaque  stripe of the enteric tube likely terminates or distal trachea.  Enteric tube with tip and sidehole in the esophagus. [place]-Renay  catheter with tip in the right main interlobar pulmonary artery. Left  chest tube. Mediastinal drain. Epicardial wires.    Trachea is midline. Mediastinum is within normal limits.  Cardiopulmonary silhouette is within normal limits. Mild persistent  perihilar and basilar pulmonary opacities. Trace left apical  pneumothorax. No acute osseous abnormality.      Impression    Impression:     1. Endotracheal tube tip not well visualized but likely within the  distal thoracic trachea, may consider repeat radiograph for better  evaluation.  2. Trace left apical pneumothorax.   3. Perihilar and basilar pulmonary opacities likely representing  atelectasis/pulmonary edema.  4. [place]-Renay catheter tip in the distal right interlobar pulmonary  artery.  5. Enteric tube projects over the expected location of the esophagus.  Recommend advancement    I have personally reviewed the examination and initial interpretation  and I agree with the findings.    FEDERICO LIEBERMAN  MD TYSHAWN         SYSTEM ID:  [id number]   XR Abdomen Port 1 View    Narrative    EXAMINATION:  XR ABDOMEN PORT 1 VIEWS 2/24/2022 6:36 PM     COMPARISON: CT 2/15/2022.    HISTORY: OG/NG tube repositioned.    FINDINGS: Frontal view of the abdomen. Enteric tube tip and sidehole  project over the left upper quadrant. Partially visualized  postoperative chest with median sternotomy wires and mediastinal  clips. Right IJ [place]-Renay catheter tip projects over right main  pulmonary artery. Left apically directed chest tube. Mediastinal  drains. The inferior marker for the intra-aortic balloon pump projects  along the left superior aspect of L3. No abnormally dilated loops of  bowel. Atherosclerosis of the splenic artery. Mild perihilar and right  basilar streaky opacities, please see dedicated same day chest x-ray.      Impression    IMPRESSION:   1. Enteric tube tip and sidehole projected over the stomach.  2. Partially visualized postoperative chest with support devices as  described above.  3. Mild perihilar and bibasilar atelectasis. Please see dedicated same  day chest x-ray.    I have personally reviewed the examination and initial interpretation  and I agree with the findings.    JARVIS LINTON MD         SYSTEM ID:  [id number]   XR Chest Port 1 View    Narrative    XR CHEST PORT 1 VIEW on 2/24/2022 6:50 PM.    INDICATION: Endotracheal tube positioning.    COMPARISON: Radiograph dated same day    FINDINGS:   Portable AP supine radiograph of the chest. Postsurgical changes of  thoracic surgery. Median sternotomy wires are intact. Mediastinal  drains. Right IJ [place]-Renay catheter tip projects 2 cm lateral to the  mediastinal border. Endotracheal tube tip projects 1 cm above the  level of the elan.    Cardiac silhouette is stable. Pulmonary vasculature is indistinct.  Aortic arch calcifications. Tiny left pneumothorax is not definitively  seen. Diffuse interstitial and airspace opacities are not  significantly changed.  Upper abdomen is unremarkable.      Impression    IMPRESSION:     1. Endotracheal tube tip projects 1 cm above the level of the elan.  Consider retracting.  2. [place]-Renay catheter tip projects 2 cm lateral to the mediastinal  border.  3. Diffuse interstitial and airspace opacities are not significantly  changed.  4. Tiny left pneumothorax is not definitively seen.    I have personally reviewed the examination and initial interpretation  and I agree with the findings.    JARVIS LINTON MD         SYSTEM ID:  [id number]   XR Chest Port 1 View    Impression    RESIDENT PRELIMINARY INTERPRETATION  IMPRESSION:   Unremarkable postoperative chest with intact median sternotomy wires  and probable postoperative effusion/atelectasis. No appreciable  pneumothorax. Heart is not enlarged. Bones and upper abdomen are  grossly unremarkable. Lines/tubes as below:     --Endotracheal tube tip is 1.5 cm above the elan.   --Right jugular [place]-Renay catheter tip projects near the distal right  main pulmonary artery.   --Intra-aortic balloon pump superior marker is 1 cm inferior to the  elan.   --Mediastinal and pleural drains.   --Partially visualized enteric tube.   US Lower Extremity Arterial Duplex Bilateral    Impression    RESIDENT PRELIMINARY INTERPRETATION  IMPRESSION:  1. RIGHT: Patent major arterial vasculature.     2. LEFT: Patent major arteries with low flow velocities in calf  arteries.     3. Monophasic waveforms throughout both lower extremities was be seen  with hyperemia, diffusely decreased systemic vascular resistance, or  exercise.        Guidelines:  [facility] duplex criteria for lower limb arterial  occlusive disease  -Percent stenosis- Normal (1-19%): Peak systolic velocity (cm/s):  <150, End-diastolic velocity (cm/s): <40, Velocity ratio (Vr): <1.5,  Distal arterial waveform: Triphasic  -Percent stenosis- 20-49%: Peak systolic velocity (cm/s): 150-200,  End-diastolic velocity (cm/s): <40,  Velocity ratio (Vr): 1.5-2.0,  Distal arterial waveform: Triphasic  -Percent stenosis- 50-75%: Peak systolic velocity (cm/s): 200-300,  End-diastolic velocity (cm/s): <90, Velocity ratio (Vr): 2.0-3.9,  Distal arterial waveform: Poststenotic turbulence distal to stenosis,  monophasic distal waveform  -Percent stenosis- >75%: Peak systolic velocity (cm/s): >300,  End-diastolic velocity (cm/s): <90, Velocity ratio (Vr): >4.0, Distal  arterial waveform: Dampened distal waveform and low PSV/EDV* in the  stenosis  -Percent stenosis- Occlusion: Absent flow by color Doppler/pulsed  Doppler spectral analysis; length of occlusion estimated from distance  between exit and reentry collateral arteries  *PSV = peak systolic velocity, EDV = end-diastolic velocity  http://link.king.com/chapter/10.1007/126-1-5324-4005-4_23/fulltext  html       =========================================

## 2022-02-25 NOTE — PLAN OF CARE
Admitted/transferred from: OR about 1540  Reason for admission/transfer: post -op  2 RN skin assessment: completed by  and Juan BUSTILLO  Result of skin assessment and interventions/actions: blanchable coccyx (mepilex applied), sternal incision, CT incsicions, and L leg grafts covered with dressings.     ?  Major Shift Events: Sedated, on propofol and fentanyl. When sedation decreased pt able to move all extremities and follow some commands. CTx2, 45-50ml/hr. IABP in place. 500ml albumin and 1.75L LR bolus given for hypotension and low CVP. Continues on pressors, vaso at straight rate; epi and levo titrating as needed. Paula done, refer to flowsheets. First vbg drawn was from cvp port as circulator was unable to draw back from PA port - MD notified. Temporary pacer wires connected, setting DDD HR 50. SR, HR 80s. On insulin gtt at 1 unit/hr. OG to low intermittent suction. Vizcaino in place, 40-50ml/hr. Potassium replaced.     Plan: Continue to monitor blood pressure and titrate pressors as needed. Notify team of any changes/concerns.     For vital signs and complete assessments, please see documentation flowsheets.

## 2022-02-25 NOTE — OP NOTE
Date of procedure: February 25, 2022    Preop Dx: Left femoral artery thrombosis with ccute left lower extremity ischemia    Postop Dx: Same      Procedure: Left femoral thrombectomy, bovine patch angioplasty     Surgeon: STEVE Jules MD     Assistant: Jenni Pierre     Anesthesia: general     Complications: None     EBL: 25 cc    Specimens: left femoral thrombus     Indications: This 70 YO female with a history of CAD, CHF, COPD, and HLD is one day s/p CABG. She required an indwelling IABP via the left groin that was removed early this morning. The patient has subsequently developed coolness and discoloration in the left foot, with loss of pedal pulses. On PE, the femoral pulse on the left is absent, and weak Doppler signals are present in the femoral and PT arteries. The right femoral pulse is weak. A stat CTA confirms left femoral artery occlusion that appears acute on top of chronic inflow disease. An emergent left femoral thrombectomy was scheduled.    Findings: Left external iliac and common femoral arteries with red thrombus associated with intimal dissection from puncture. A large calcified posterior plaque was left intact, and the small artery was patched open with bovine pericardium. The patient had a strong femoral pulse and three phase Doppler signals in the left PT at the conclusion of the operation.     Description of operation: The patient was brought to the operating room and placed in the supine position. After a satisfactory level of general anesthesia, the patient's bilateral groins and anterior thighs were prepped and draped in the usual sterile fashion. A time out was called. A vertical incision was made in the left groin and centered over the common femoral artery. The incision was deepend through the subcutaneous tissue, taking care to ligate lymphatics with 2-0 silk ties. The common femoral artery was identified deep to the femoral sheath, and the common, superficial femoral and  profunda femoris arteries were dissected circumferentially and encircled with vessiloops. Dissection proceeded superiorly under the inguinal ligament, where an anterior puncture site was identified in the distal external iliac artery. At this time 5000 units of IV heparin were administered and after 2 minutes circulation time, the arteries were occluded with vessiloops. The puncture site was extended as an anterior arteriotomy onto the common femoral artery. A #4 Selena catheter was passed proximally with extraction of a large red thrombus. This was followed by brisk bleeding. The catheter was then passed distally into the superficial femoral and profunda femoris arteries, without further clot extraction. A small and very irregular intimal flap was noted on the anterior wall,and this was gently removed. The artery appeared patent but there was a posterior calcified plaque that appeared to extend well above and below the area of repair, therefore an endarterectomy was not performed. The arteriotomy was then closed using a bovine patch that was sewn to the arterial edges using 6-0 prolene. The arteries were thoroughly flushed before tying the final sutures. After meticulous hemostasis was assured, the wound was irrigated with sterile saline and the incision was closed using 2-0 vicryl for the deep layers and 4-0 monocryl for the skin. Sterile dressings were applied and the patient was transported to the ICU in satisfactory condition. I was present, scrubbed and supervised all key and critical portions of this case.      STEVE Jules MD  Professor of Surgery  Division of Vascular Surgery

## 2022-02-26 ENCOUNTER — APPOINTMENT (OUTPATIENT)
Dept: GENERAL RADIOLOGY | Facility: CLINIC | Age: 72
DRG: 233 | End: 2022-02-26
Attending: STUDENT IN AN ORGANIZED HEALTH CARE EDUCATION/TRAINING PROGRAM
Payer: MEDICARE

## 2022-02-26 LAB
ABO/RH(D): NORMAL
ALBUMIN SERPL-MCNC: 3 G/DL (ref 3.4–5)
ALP SERPL-CCNC: 52 U/L (ref 40–150)
ALT SERPL W P-5'-P-CCNC: 34 U/L (ref 0–50)
ANION GAP SERPL CALCULATED.3IONS-SCNC: 6 MMOL/L (ref 3–14)
ANTIBODY SCREEN: NEGATIVE
AST SERPL W P-5'-P-CCNC: 37 U/L (ref 0–45)
BASE EXCESS BLDA CALC-SCNC: 2.7 MMOL/L (ref -9–1.8)
BASE EXCESS BLDA CALC-SCNC: 3.4 MMOL/L (ref -9–1.8)
BASE EXCESS BLDA CALC-SCNC: 3.7 MMOL/L (ref -9–1.8)
BASE EXCESS BLDV CALC-SCNC: 2.3 MMOL/L (ref -7.7–1.9)
BASE EXCESS BLDV CALC-SCNC: 3.4 MMOL/L (ref -7.7–1.9)
BASE EXCESS BLDV CALC-SCNC: 3.7 MMOL/L (ref -7.7–1.9)
BASE EXCESS BLDV CALC-SCNC: 3.7 MMOL/L (ref -7.7–1.9)
BASE EXCESS BLDV CALC-SCNC: 4.1 MMOL/L (ref -7.7–1.9)
BASE EXCESS BLDV CALC-SCNC: 5.9 MMOL/L (ref -7.7–1.9)
BILIRUB DIRECT SERPL-MCNC: 0.2 MG/DL (ref 0–0.2)
BILIRUB SERPL-MCNC: 0.5 MG/DL (ref 0.2–1.3)
BUN SERPL-MCNC: 24 MG/DL (ref 7–30)
CA-I BLD-MCNC: 4.6 MG/DL (ref 4.4–5.2)
CALCIUM SERPL-MCNC: 8.3 MG/DL (ref 8.5–10.1)
CHLORIDE BLD-SCNC: 117 MMOL/L (ref 94–109)
CO2 SERPL-SCNC: 25 MMOL/L (ref 20–32)
CREAT SERPL-MCNC: 0.53 MG/DL (ref 0.52–1.04)
ERYTHROCYTE [DISTWIDTH] IN BLOOD BY AUTOMATED COUNT: 16.2 % (ref 10–15)
GFR SERPL CREATININE-BSD FRML MDRD: >90 ML/MIN/1.73M2
GLUCOSE BLD-MCNC: 100 MG/DL (ref 70–99)
GLUCOSE BLDC GLUCOMTR-MCNC: 106 MG/DL (ref 70–99)
GLUCOSE BLDC GLUCOMTR-MCNC: 110 MG/DL (ref 70–99)
GLUCOSE BLDC GLUCOMTR-MCNC: 128 MG/DL (ref 70–99)
GLUCOSE BLDC GLUCOMTR-MCNC: 84 MG/DL (ref 70–99)
GLUCOSE BLDC GLUCOMTR-MCNC: 84 MG/DL (ref 70–99)
GLUCOSE BLDC GLUCOMTR-MCNC: 93 MG/DL (ref 70–99)
GLUCOSE BLDC GLUCOMTR-MCNC: 97 MG/DL (ref 70–99)
HCO3 BLD-SCNC: 26 MMOL/L (ref 21–28)
HCO3 BLD-SCNC: 27 MMOL/L (ref 21–28)
HCO3 BLD-SCNC: 27 MMOL/L (ref 21–28)
HCO3 BLDV-SCNC: 27 MMOL/L (ref 21–28)
HCO3 BLDV-SCNC: 27 MMOL/L (ref 21–28)
HCO3 BLDV-SCNC: 28 MMOL/L (ref 21–28)
HCO3 BLDV-SCNC: 28 MMOL/L (ref 21–28)
HCO3 BLDV-SCNC: 29 MMOL/L (ref 21–28)
HCO3 BLDV-SCNC: 30 MMOL/L (ref 21–28)
HCT VFR BLD AUTO: 23.4 % (ref 35–47)
HGB BLD-MCNC: 7.3 G/DL (ref 11.7–15.7)
HGB BLD-MCNC: 7.9 G/DL (ref 11.7–15.7)
INR PPP: 1.32 (ref 0.85–1.15)
LACTATE SERPL-SCNC: 0.9 MMOL/L (ref 0.7–2)
MAGNESIUM SERPL-MCNC: 2.1 MG/DL (ref 1.6–2.3)
MAGNESIUM SERPL-MCNC: 2.3 MG/DL (ref 1.6–2.3)
MCH RBC QN AUTO: 28.4 PG (ref 26.5–33)
MCHC RBC AUTO-ENTMCNC: 31.2 G/DL (ref 31.5–36.5)
MCV RBC AUTO: 91 FL (ref 78–100)
O2/TOTAL GAS SETTING VFR VENT: 30 %
OXYHGB MFR BLD: 97 % (ref 92–100)
OXYHGB MFR BLD: 98 % (ref 92–100)
OXYHGB MFR BLD: 98 % (ref 92–100)
OXYHGB MFR BLDV: 48 % (ref 70–75)
OXYHGB MFR BLDV: 51 % (ref 70–75)
OXYHGB MFR BLDV: 52 % (ref 70–75)
OXYHGB MFR BLDV: 57 % (ref 70–75)
OXYHGB MFR BLDV: 57 % (ref 70–75)
OXYHGB MFR BLDV: 97 % (ref 70–75)
PCO2 BLD: 33 MM HG (ref 35–45)
PCO2 BLD: 33 MM HG (ref 35–45)
PCO2 BLD: 34 MM HG (ref 35–45)
PCO2 BLDV: 33 MM HG (ref 40–50)
PCO2 BLDV: 39 MM HG (ref 40–50)
PCO2 BLDV: 40 MM HG (ref 40–50)
PCO2 BLDV: 44 MM HG (ref 40–50)
PH BLD: 7.5 [PH] (ref 7.35–7.45)
PH BLD: 7.51 [PH] (ref 7.35–7.45)
PH BLD: 7.52 [PH] (ref 7.35–7.45)
PH BLDV: 7.42 [PH] (ref 7.32–7.43)
PH BLDV: 7.43 [PH] (ref 7.32–7.43)
PH BLDV: 7.46 [PH] (ref 7.32–7.43)
PH BLDV: 7.46 [PH] (ref 7.32–7.43)
PH BLDV: 7.48 [PH] (ref 7.32–7.43)
PH BLDV: 7.52 [PH] (ref 7.32–7.43)
PHOSPHATE SERPL-MCNC: 1.9 MG/DL (ref 2.5–4.5)
PHOSPHATE SERPL-MCNC: 2 MG/DL (ref 2.5–4.5)
PLATELET # BLD AUTO: 83 10E3/UL (ref 150–450)
PO2 BLD: 100 MM HG (ref 80–105)
PO2 BLD: 110 MM HG (ref 80–105)
PO2 BLD: 111 MM HG (ref 80–105)
PO2 BLDV: 100 MM HG (ref 25–47)
PO2 BLDV: 27 MM HG (ref 25–47)
PO2 BLDV: 28 MM HG (ref 25–47)
PO2 BLDV: 30 MM HG (ref 25–47)
PO2 BLDV: 31 MM HG (ref 25–47)
PO2 BLDV: 32 MM HG (ref 25–47)
POTASSIUM BLD-SCNC: 3.3 MMOL/L (ref 3.4–5.3)
POTASSIUM BLD-SCNC: 3.8 MMOL/L (ref 3.4–5.3)
POTASSIUM BLD-SCNC: 3.8 MMOL/L (ref 3.4–5.3)
PROT SERPL-MCNC: 4.8 G/DL (ref 6.8–8.8)
RBC # BLD AUTO: 2.57 10E6/UL (ref 3.8–5.2)
SODIUM SERPL-SCNC: 148 MMOL/L (ref 133–144)
SPECIMEN EXPIRATION DATE: NORMAL
WBC # BLD AUTO: 11.3 10E3/UL (ref 4–11)

## 2022-02-26 PROCEDURE — 71045 X-RAY EXAM CHEST 1 VIEW: CPT | Mod: 26 | Performed by: RADIOLOGY

## 2022-02-26 PROCEDURE — 250N000011 HC RX IP 250 OP 636: Performed by: STUDENT IN AN ORGANIZED HEALTH CARE EDUCATION/TRAINING PROGRAM

## 2022-02-26 PROCEDURE — 250N000009 HC RX 250: Performed by: STUDENT IN AN ORGANIZED HEALTH CARE EDUCATION/TRAINING PROGRAM

## 2022-02-26 PROCEDURE — 82805 BLOOD GASES W/O2 SATURATION: CPT | Performed by: STUDENT IN AN ORGANIZED HEALTH CARE EDUCATION/TRAINING PROGRAM

## 2022-02-26 PROCEDURE — 82805 BLOOD GASES W/O2 SATURATION: CPT | Performed by: PHYSICIAN ASSISTANT

## 2022-02-26 PROCEDURE — 250N000009 HC RX 250: Performed by: NURSE PRACTITIONER

## 2022-02-26 PROCEDURE — 83605 ASSAY OF LACTIC ACID: CPT | Performed by: SURGERY

## 2022-02-26 PROCEDURE — 86850 RBC ANTIBODY SCREEN: CPT | Performed by: SURGERY

## 2022-02-26 PROCEDURE — 94640 AIRWAY INHALATION TREATMENT: CPT | Mod: 76

## 2022-02-26 PROCEDURE — 250N000009 HC RX 250

## 2022-02-26 PROCEDURE — 999N000155 HC STATISTIC RAPCV CVP MONITORING

## 2022-02-26 PROCEDURE — 71045 X-RAY EXAM CHEST 1 VIEW: CPT

## 2022-02-26 PROCEDURE — P9041 ALBUMIN (HUMAN),5%, 50ML: HCPCS | Performed by: STUDENT IN AN ORGANIZED HEALTH CARE EDUCATION/TRAINING PROGRAM

## 2022-02-26 PROCEDURE — 84132 ASSAY OF SERUM POTASSIUM: CPT | Performed by: SURGERY

## 2022-02-26 PROCEDURE — 82248 BILIRUBIN DIRECT: CPT | Performed by: STUDENT IN AN ORGANIZED HEALTH CARE EDUCATION/TRAINING PROGRAM

## 2022-02-26 PROCEDURE — 258N000003 HC RX IP 258 OP 636: Performed by: SURGERY

## 2022-02-26 PROCEDURE — 250N000011 HC RX IP 250 OP 636: Performed by: SURGERY

## 2022-02-26 PROCEDURE — 85027 COMPLETE CBC AUTOMATED: CPT | Performed by: STUDENT IN AN ORGANIZED HEALTH CARE EDUCATION/TRAINING PROGRAM

## 2022-02-26 PROCEDURE — 250N000013 HC RX MED GY IP 250 OP 250 PS 637: Performed by: NURSE PRACTITIONER

## 2022-02-26 PROCEDURE — 200N000002 HC R&B ICU UMMC

## 2022-02-26 PROCEDURE — 999N000185 HC STATISTIC TRANSPORT TIME EA 15 MIN

## 2022-02-26 PROCEDURE — 250N000013 HC RX MED GY IP 250 OP 250 PS 637: Performed by: STUDENT IN AN ORGANIZED HEALTH CARE EDUCATION/TRAINING PROGRAM

## 2022-02-26 PROCEDURE — 82330 ASSAY OF CALCIUM: CPT | Performed by: PHYSICIAN ASSISTANT

## 2022-02-26 PROCEDURE — 83735 ASSAY OF MAGNESIUM: CPT | Performed by: SURGERY

## 2022-02-26 PROCEDURE — 99291 CRITICAL CARE FIRST HOUR: CPT | Mod: 24 | Performed by: ANESTHESIOLOGY

## 2022-02-26 PROCEDURE — 80053 COMPREHEN METABOLIC PANEL: CPT | Performed by: STUDENT IN AN ORGANIZED HEALTH CARE EDUCATION/TRAINING PROGRAM

## 2022-02-26 PROCEDURE — 250N000013 HC RX MED GY IP 250 OP 250 PS 637: Performed by: PHYSICIAN ASSISTANT

## 2022-02-26 PROCEDURE — 85018 HEMOGLOBIN: CPT | Performed by: NURSE PRACTITIONER

## 2022-02-26 PROCEDURE — 999N000015 HC STATISTIC ARTERIAL MONITORING DAILY

## 2022-02-26 PROCEDURE — 250N000011 HC RX IP 250 OP 636: Performed by: PHYSICIAN ASSISTANT

## 2022-02-26 PROCEDURE — 84100 ASSAY OF PHOSPHORUS: CPT | Performed by: NURSE PRACTITIONER

## 2022-02-26 PROCEDURE — 250N000011 HC RX IP 250 OP 636: Performed by: NURSE PRACTITIONER

## 2022-02-26 PROCEDURE — 94003 VENT MGMT INPAT SUBQ DAY: CPT

## 2022-02-26 PROCEDURE — 250N000013 HC RX MED GY IP 250 OP 250 PS 637: Performed by: SURGERY

## 2022-02-26 PROCEDURE — 94640 AIRWAY INHALATION TREATMENT: CPT

## 2022-02-26 PROCEDURE — 258N000003 HC RX IP 258 OP 636: Performed by: STUDENT IN AN ORGANIZED HEALTH CARE EDUCATION/TRAINING PROGRAM

## 2022-02-26 PROCEDURE — 999N000157 HC STATISTIC RCP TIME EA 10 MIN

## 2022-02-26 PROCEDURE — 83735 ASSAY OF MAGNESIUM: CPT | Performed by: NURSE PRACTITIONER

## 2022-02-26 PROCEDURE — 85610 PROTHROMBIN TIME: CPT | Performed by: STUDENT IN AN ORGANIZED HEALTH CARE EDUCATION/TRAINING PROGRAM

## 2022-02-26 PROCEDURE — 250N000009 HC RX 250: Performed by: SURGERY

## 2022-02-26 PROCEDURE — 84100 ASSAY OF PHOSPHORUS: CPT | Performed by: SURGERY

## 2022-02-26 PROCEDURE — 999N000045 HC STATISTIC DAILY SWAN MONITORING

## 2022-02-26 RX ORDER — ALBUMIN, HUMAN INJ 5% 5 %
12.5 SOLUTION INTRAVENOUS ONCE
Status: COMPLETED | OUTPATIENT
Start: 2022-02-26 | End: 2022-02-26

## 2022-02-26 RX ORDER — POTASSIUM CHLORIDE 1.5 G/1.58G
20 POWDER, FOR SOLUTION ORAL ONCE
Status: COMPLETED | OUTPATIENT
Start: 2022-02-26 | End: 2022-02-26

## 2022-02-26 RX ORDER — DEXMEDETOMIDINE HYDROCHLORIDE 4 UG/ML
.1-1.2 INJECTION, SOLUTION INTRAVENOUS CONTINUOUS
Status: DISCONTINUED | OUTPATIENT
Start: 2022-02-26 | End: 2022-02-27

## 2022-02-26 RX ORDER — FUROSEMIDE 10 MG/ML
40 INJECTION INTRAMUSCULAR; INTRAVENOUS ONCE
Status: COMPLETED | OUTPATIENT
Start: 2022-02-26 | End: 2022-02-26

## 2022-02-26 RX ORDER — POTASSIUM CHLORIDE 7.45 MG/ML
10 INJECTION INTRAVENOUS ONCE
Status: COMPLETED | OUTPATIENT
Start: 2022-02-26 | End: 2022-02-26

## 2022-02-26 RX ORDER — DOBUTAMINE HYDROCHLORIDE 200 MG/100ML
2.5 INJECTION INTRAVENOUS CONTINUOUS
Status: DISCONTINUED | OUTPATIENT
Start: 2022-02-26 | End: 2022-03-01

## 2022-02-26 RX ORDER — NICOTINE POLACRILEX 4 MG
15-30 LOZENGE BUCCAL
Status: DISCONTINUED | OUTPATIENT
Start: 2022-02-26 | End: 2022-03-18 | Stop reason: HOSPADM

## 2022-02-26 RX ORDER — DEXTROSE MONOHYDRATE 25 G/50ML
25-50 INJECTION, SOLUTION INTRAVENOUS
Status: DISCONTINUED | OUTPATIENT
Start: 2022-02-26 | End: 2022-03-18 | Stop reason: HOSPADM

## 2022-02-26 RX ADMIN — ALBUMIN HUMAN 12.5 G: 0.05 INJECTION, SOLUTION INTRAVENOUS at 02:27

## 2022-02-26 RX ADMIN — POTASSIUM CHLORIDE 20 MEQ: 1.5 POWDER, FOR SOLUTION ORAL at 17:34

## 2022-02-26 RX ADMIN — PROPOFOL 30 MCG/KG/MIN: 10 INJECTION, EMULSION INTRAVENOUS at 09:48

## 2022-02-26 RX ADMIN — ALBUMIN (HUMAN) 12.5 G: 12.5 INJECTION, SOLUTION INTRAVENOUS at 06:12

## 2022-02-26 RX ADMIN — DEXMEDETOMIDINE HYDROCHLORIDE 0.7 MCG/KG/HR: 400 INJECTION INTRAVENOUS at 21:41

## 2022-02-26 RX ADMIN — ASPIRIN 81 MG: 81 TABLET, CHEWABLE ORAL at 08:49

## 2022-02-26 RX ADMIN — THIAMINE HCL TAB 100 MG 100 MG: 100 TAB at 08:49

## 2022-02-26 RX ADMIN — FUROSEMIDE 40 MG: 10 INJECTION, SOLUTION INTRAVENOUS at 11:59

## 2022-02-26 RX ADMIN — POTASSIUM CHLORIDE 20 MEQ: 29.8 INJECTION, SOLUTION INTRAVENOUS at 00:14

## 2022-02-26 RX ADMIN — Medication 15 ML: at 08:49

## 2022-02-26 RX ADMIN — IPRATROPIUM BROMIDE AND ALBUTEROL SULFATE 3 ML: 2.5; .5 SOLUTION RESPIRATORY (INHALATION) at 20:34

## 2022-02-26 RX ADMIN — POTASSIUM PHOSPHATE, MONOBASIC AND POTASSIUM PHOSPHATE, DIBASIC 9 MMOL: 224; 236 INJECTION, SOLUTION, CONCENTRATE INTRAVENOUS at 06:22

## 2022-02-26 RX ADMIN — MUPIROCIN 0.5 G: 20 OINTMENT TOPICAL at 00:17

## 2022-02-26 RX ADMIN — PROPOFOL 30 MCG/KG/MIN: 10 INJECTION, EMULSION INTRAVENOUS at 02:27

## 2022-02-26 RX ADMIN — MUPIROCIN 0.5 G: 20 OINTMENT TOPICAL at 09:00

## 2022-02-26 RX ADMIN — SENNOSIDES AND DOCUSATE SODIUM 1 TABLET: 50; 8.6 TABLET ORAL at 20:07

## 2022-02-26 RX ADMIN — Medication 1 DROP: at 20:17

## 2022-02-26 RX ADMIN — POLYETHYLENE GLYCOL 3350 17 G: 17 POWDER, FOR SOLUTION ORAL at 08:49

## 2022-02-26 RX ADMIN — IPRATROPIUM BROMIDE AND ALBUTEROL SULFATE 3 ML: 2.5; .5 SOLUTION RESPIRATORY (INHALATION) at 15:14

## 2022-02-26 RX ADMIN — DOBUTAMINE HYDROCHLORIDE 5 MCG/KG/MIN: 200 INJECTION INTRAVENOUS at 16:30

## 2022-02-26 RX ADMIN — FENTANYL CITRATE 50 MCG/HR: 50 INJECTION INTRAVENOUS at 18:07

## 2022-02-26 RX ADMIN — Medication 1 PACKET: at 20:15

## 2022-02-26 RX ADMIN — ACETAMINOPHEN 975 MG: 325 TABLET ORAL at 08:49

## 2022-02-26 RX ADMIN — SODIUM CHLORIDE, POTASSIUM CHLORIDE, SODIUM LACTATE AND CALCIUM CHLORIDE 500 ML: 600; 310; 30; 20 INJECTION, SOLUTION INTRAVENOUS at 22:26

## 2022-02-26 RX ADMIN — OXYCODONE HYDROCHLORIDE 5 MG: 5 TABLET ORAL at 20:06

## 2022-02-26 RX ADMIN — SENNOSIDES AND DOCUSATE SODIUM 1 TABLET: 50; 8.6 TABLET ORAL at 08:49

## 2022-02-26 RX ADMIN — POTASSIUM CHLORIDE 10 MEQ: 7.46 INJECTION, SOLUTION INTRAVENOUS at 21:05

## 2022-02-26 RX ADMIN — POTASSIUM CHLORIDE 10 MEQ: 7.46 INJECTION, SOLUTION INTRAVENOUS at 05:22

## 2022-02-26 RX ADMIN — MUPIROCIN 0.5 G: 20 OINTMENT TOPICAL at 23:26

## 2022-02-26 RX ADMIN — Medication 40 MG: at 08:49

## 2022-02-26 RX ADMIN — IPRATROPIUM BROMIDE AND ALBUTEROL SULFATE 3 ML: 2.5; .5 SOLUTION RESPIRATORY (INHALATION) at 03:03

## 2022-02-26 RX ADMIN — DEXMEDETOMIDINE HYDROCHLORIDE 0.2 MCG/KG/HR: 400 INJECTION INTRAVENOUS at 12:00

## 2022-02-26 RX ADMIN — IPRATROPIUM BROMIDE AND ALBUTEROL SULFATE 3 ML: 2.5; .5 SOLUTION RESPIRATORY (INHALATION) at 08:46

## 2022-02-26 RX ADMIN — ACETAMINOPHEN 975 MG: 325 TABLET ORAL at 16:42

## 2022-02-26 ASSESSMENT — ACTIVITIES OF DAILY LIVING (ADL)
ADLS_ACUITY_SCORE: 13

## 2022-02-26 NOTE — PLAN OF CARE
Major Shift Events: After turn went into Afib w/ RVR w/ maps in 50s and HR in 150s briefly. Given amio bolus. Given a total of 750mL of 5% albuin for low dPA and then low UO later in shift.   Neuro: When prop turned down pt opens eyes to voice and moves all extremities non purposefully. Prop @ 30 mcg/kg/min and fentanyl @ 50 mch/hr.   Resp: ET @ 24 at the lips. CMV 30% fio2, 20 RR, 430 tv, 5 peep. Minimal secretions.  CV: Now AV paced @ 84 % Map goal>65. Levo @ 0.05 mcg/kg/min and epi @ 0.03 mcg/kg/min, and amio @ 0.5 mg/minall pulses dopperlable. Slight difficultly doppler right Post tib pulse.   : Vizcaino in place UO has been 19 mL to 40mL. Net shift I/O +1.3  GI: OG @ 60. Low amount of output. No BM.   Plan: Team to consider rethreading A line, lenitive plan for two PS trials today. RD to place small bore Feeding tube.  For vital signs and complete assessments, please see documentation flowsheets.       Time 2000 0000 0400   CVP 10 7 10   PA 26/4 30/18 32/10   Svo2 67 57 57   CI 37 2.9 2.9   SVR 1023 1004 998     Goal Outcome Evaluation:    Plan of Care Reviewed With: Patient    Overall Patient Progress: no change    Outcome Evaluation: Adequate for transition to ICU

## 2022-02-26 NOTE — PROGRESS NOTES
CV ICU PROGRESS NOTE  February 26, 2022      CO-MORBIDITIES:   S/P CABG x 4  (primary encounter diagnosis)  Systolic heart failure, unspecified HF chronicity (H)    ASSESSMENT: Ashley Osman is a 71 year old female with PMH of HFrEF (10-15%) 2/2 ICM (Hx LAD and Circumflex Mis; total occlusion of RCA and LAD), Biventricular failure (requiring inotropic support PTA to [facility]), COPD, HLD, Tobacco Use Disorder (quit 12/2021), CAD who underwent CABG x4 on 02/24 with Dr. Bhandari. Found to have left femoral artery thrombosis with acute left lower extremity ischemia s/p emergent left femoral thrombectomy, bovine patch angioplasty on 2/25.    Plan Summary:  - Switch from propofol to Precedex gtt; RASS 0/-1  - Sliding scale insulin  - Diuresis: Lasix; goal -1 L  - PS; wean off vent as able  - Wean off pressors as able      PLAN:    Neuro/ pain/ sedation:  Acute Postoperative pain  - Monitor neurological status. Notify the MD for any acute changes in exam.  - Pain:              - fentanyl gtt              - Scheduled:                          - tylenol              - PRN                           - tylenol, fentanyl, dilaudid, oxycodone  - Sedation:              - Precedex gtt              - fentanyl gtt  - Goal RASS 0 to -1    Pulmonary care:   # Postoperative ventilation management  # Hx of COPD  - CXR has stable trace R pneumothorax visualized post-procedure  - Intubated, ventilated   - pressure support trials BID  - Titrate supplemental oxygen to maintain saturation above 92%.  - Pulmonary hygiene: Incentive spirometer every 15- 30 minutes when awake, flutter valve, C&DB    Cardiovascular:    # S/p CABG x 4 on 02/24 by Dr. Bhandari  # History of Biventricular HF requiring pressor support 2/2 ICM  #Acute a-fib with RVR  # HLD  Recent echo on 02/11/2022 with LVEF of 15%  Postop echo appeared similar to prior (LVEF < ~20%; RV function normal)  - Goal MAP>65, SBP<140  - Hold atorvastatin, carvediliol, nitrostat,  isosorbide mononitrate  - Epi/NE: wean as able   - Amio gtt  - ASA 81    # Left femoral artery thrombosis with acute left lower extremity ischemia s/p emergent left femoral thrombectomy, bovine patch angioplasty on 2/25  - Heparin gtt discontinued  - Monitor lower extremity pulses and perfusion status  - Vascular surgery following    GI care/ Nutrition:   - NPO              - needs NJ 02/25    - if difficult to place per Nutrition, consult XR    - backup = trickle OG if ok with Dr. Bhandari  - PPI  - Bowel regimen: miralax, senna, & dulcolax PRNs    Renal/ Fluid Balance/ Electrolytes:   - Diuresis: lasix (goal -1L)  - Avoid/limit nephrotoxins as able  - ICU electrolyte replacement protocol    Endocrine:    - SSI  - Goal BG <180 for optimal healing      ID/ Antibiotics:  # Stress induced leukocytosis  - Continue to monitor fever curve, WBC and inflammatory markers as appropriate    Heme:     # Stress induced leukocytosis  # Acute blood loss anemia  # Acute blood loss thrombocytopenia  No s/sx active bleeding   - Hgb goal > 7  - CBC qday    MSK/ Skin:  - PT/OT/CR      Prophylaxis:    - Mechanical prophylaxis for DVT  - Chemical DVT prophylaxis - subcutaneous heparin TID  - PPI     Lines/ tubes/ drains:  - Arterial Line  - ETT  - CTs x 2  - PA catheter  - RIJ  - PIV x 1  - Vizcaino     Disposition:  - CVICU     Patient seen, findings and plan discussed with CVICU staff, Dr. Ortiz.      Anjali Patiño MD  Anesthesiology Resident, PGY-4      ====================================    SUBJECTIVE:   Intubated, sedated, lost a-line reading overnight, new-onset afib: amio bolus + amio gtt started, currently HDS    OBJECTIVE:   1. VITAL SIGNS:   Temp:  [97.9  F (36.6  C)-99.3  F (37.4  C)] 99.3  F (37.4  C)  Pulse:  [] 83  Resp:  [11-37] 20  BP: ()/(47-64) 98/55  Cuff Mean (mmHg):  [74] 74  MAP:  [51 mmHg-83 mmHg] 80 mmHg  Arterial Line BP: ()/(43-77) 126/57  FiO2 (%):  [30 %-40 %] 30 %  SpO2:  [99 %-100  %] 100 %  Vent Mode: CMV/AC  (Continuous Mandatory Ventilation/ Assist Control)  FiO2 (%): 30 %  Resp Rate (Set): 20 breaths/min  Tidal Volume (Set, mL): 430 mL  PEEP (cm H2O): 5 cmH2O  Resp: 20    2. INTAKE/ OUTPUT:   I/O last 3 completed shifts:  In: 3007.23 [I.V.:2637.23; NG/GT:120]  Out: 1498 [Urine:1023; Emesis/NG output:100; Blood:25; Chest Tube:350]    3. PHYSICAL EXAMINATION:   General: female patient, sedated and lying flat in bed  Neuro: sedated, CN grossly intact  Resp: intubated, ventilated, mechanical lung sounds that are CTAB  CV: regular S1, S2, soft pericardial rub, there is a IV/VI systolic murmur most prominent at the mitral position, upper extremity pulses are 2+ bilaterally, bilateral dorsalis pedal pulses 2+/symmetric  Abdomen: Soft, non-distended, non-tender  Incisions: c/d/i   Extremities: warm, well perfused      4. INVESTIGATIONS:   Arterial Blood Gases   Recent Labs   Lab 02/26/22  1153 02/26/22  0402 02/25/22 2203 02/25/22 2007   PH 7.51* 7.50* 7.43 7.50*   PCO2 33* 34* 37 33*   PO2 110* 111* 123* 137*   HCO3 27 26 24 25     Complete Blood Count   Recent Labs   Lab 02/26/22  0401 02/25/22 2203 02/25/22  1623 02/25/22  1446 02/25/22  1351   WBC 11.3* 11.9* 12.6*  --  11.6*   HGB 7.3* 7.4* 7.6* 7.9* 7.5*   PLT 83* 82* 90*  --  75*     Basic Metabolic Panel  Recent Labs   Lab 02/26/22  1150 02/26/22  0803 02/26/22  0602 02/26/22  0401 02/26/22  0005 02/25/22 2203 02/25/22  1624 02/25/22  1623 02/25/22  1446 02/25/22  0558 02/25/22  0332   NA  --   --   --  148*  --  147*  --  148* 148*  --  147*   POTASSIUM  --   --   --  3.8  --  3.7  --  3.6 3.6  --  4.2   CHLORIDE  --   --   --  117*  --  117*  --  116*  --   --  116*   CO2  --   --   --  25  --  25  --  26  --   --  25   BUN  --   --   --  24  --  26  --  27  --   --  27   CR  --   --   --  0.53  --  0.65  --  0.67  --   --  0.67   * 106* 97 100*  93   < > 131*   < > 143* 135*   < > 113*    < > = values in this interval not  displayed.     Liver Function Tests  Recent Labs   Lab 02/26/22  0401 02/25/22  2203 02/25/22  0332 02/24/22  1602 02/24/22  1345   AST 37 43 43 66*  --    ALT 34 34 31 38  --    ALKPHOS 52 43 39* 49  --    BILITOTAL 0.5 0.6 0.7 0.6  --    ALBUMIN 3.0* 3.1* 2.9* 2.1*  --    INR 1.32*  --  1.55* 1.82* 2.23*     Pancreatic Enzymes  No lab results found in last 7 days.  Coagulation Profile  Recent Labs   Lab 02/26/22  0401 02/25/22  0332 02/24/22  1602 02/24/22  1345   INR 1.32* 1.55* 1.82* 2.23*   PTT  --   --  35 32         5. RADIOLOGY:   Recent Results (from the past 24 hour(s))   XR Chest Port 1 View    Narrative    Exam: XR CHEST PORT 1 VIEW, 2/25/2022 1:41 PM    Comparison: 2/25/2022    History: f/u PTX    Findings:  Portable supine tissues of the chest are obtained. Postoperative  changes of coronary artery bypass grafting, median sternotomy wires  are intact. Left-sided chest tube and mediastinal drain in place.  Endotracheal tube tip in the mid thoracic trachea. Right internal  jugular [place]-Renay catheter tip terminates within the distal right main  pulmonary.    Trachea is midline. Mediastinum is within normal limits. Heart size is  normal. Pulmonary edema. No pleural effusion. No appreciable  pneumothorax. The upper abdomen is unremarkable.      Impression    Impression:   1. No appreciable pneumothorax in this supine exam, apparent trace  pneumothoraces on CTA angiogram same date.  2. Mild pulmonary edema.    I have personally reviewed the examination and initial interpretation  and I agree with the findings.    POPEYE TIMMONS MD         SYSTEM ID:  [id number]   XR Chest Port 1 View    Narrative    EXAM: XR CHEST 1 VW 2/26/2022      HISTORY: Confirm IABP  placement    COMPARISON: Previous day.     TECHNIQUE: Frontal view of the chest.    FINDINGS/    Impression    IMPRESSION:   Postoperative chest with intact median sternotomy wires and increased  basilar opacities likely representing  postoperative  effusion/atelectasis. No appreciable pneumothorax. Heart is not  enlarged. Bones and upper abdomen are grossly unremarkable.  Lines/tubes as below:     --Endotracheal tube tip is 2.5 cm above the elan.   --Right jugular [place]-Renay catheter tip projects near the distal right  main pulmonary artery.   --Mediastinal and pleural drains.   --Partially visualized enteric tube.    I have personally reviewed the examination and initial interpretation  and I agree with the findings.    ELVIN WEN MD         SYSTEM ID:  [id number]       =========================================

## 2022-02-26 NOTE — PROGRESS NOTES
"VASCULAR SURGERY PROGRESS NOTE    Subjective:  Weaning pressors. Sedated and mechanically ventilated.     Objective:  Intake/Output Summary (Last 24 hours) at 2/26/2022 0646  Last data filed at 2/26/2022 0600  Gross per 24 hour   Intake 3007.23 ml   Output 1548 ml   Net 1459.23 ml     Labs:  ROUTINE IP LABS (Last four results)  BMP  Recent Labs   Lab 02/26/22  0602 02/26/22  0401 02/26/22  0005 02/25/22 2203 02/25/22  1624 02/25/22  1623 02/25/22  1446 02/25/22  0558 02/25/22  0332   NA  --  148*  --  147*  --  148* 148*  --  147*   POTASSIUM  --  3.8  --  3.7  --  3.6 3.6  --  4.2   CHLORIDE  --  117*  --  117*  --  116*  --   --  116*   HEIDI  --  8.3*  --  8.3*  --  8.1*  --   --  8.7   CO2  --  25  --  25  --  26  --   --  25   BUN  --  24  --  26  --  27  --   --  27   CR  --  0.53  --  0.65  --  0.67  --   --  0.67   GLC 97 100*  93 128* 131*   < > 143* 135*   < > 113*    < > = values in this interval not displayed.     CBC  Recent Labs   Lab 02/26/22  0401 02/25/22 2203 02/25/22  1623 02/25/22  1446 02/25/22  1351   WBC 11.3* 11.9* 12.6*  --  11.6*   RBC 2.57* 2.53* 2.65*  --  2.55*   HGB 7.3* 7.4* 7.6* 7.9* 7.5*   HCT 23.4* 23.0* 23.9*  --  23.0*   MCV 91 91 90  --  90   MCH 28.4 29.2 28.7  --  29.4   MCHC 31.2* 32.2 31.8  --  32.6   RDW 16.2* 16.2* 16.2*  --  16.0*   PLT 83* 82* 90*  --  75*     INR  Recent Labs   Lab 02/26/22  0401 02/25/22  0332 02/24/22  1602 02/24/22  1345   INR 1.32* 1.55* 1.82* 2.23*     PHYSICAL EXAM:  /57   Pulse 84   Temp 98.2  F (36.8  C) (Pulmonary Artery)   Resp 20   Ht 1.638 m (5' 4.5\")   Wt 61.5 kg (135 lb 9.3 oz)   SpO2 100%   BMI 22.91 kg/m    General: sedated, RASS -3  Cardio: RRR  Respiratory:Mechanically ventilated  GI:  Abdomen soft  Extremities: L foot warm to touch. Biphasic PT and DP signals. Incision C/D with intact dermabond/tegaderm dressing   Skin: Color appropriate for race, warm, dry.    Imaging:   No new imaging.    ASSESSMENT:  Ashley Osman is " a 71 year old year old female who has a PMH significant for CAD, HFrEF (EF 10-15%), COPD, ICM, tobacco use who underwent CABG x4 on 2/24/22 with left saphenous vein harvest. Overnight 2/24 lost pulses to LLE prompting discontinuation of the IABP. Arterial duplex showed monophasic flow to bilateral lower extremities. CTA showed a thrombotic occlusion of the distal left external iliac and proximal CFA. Now s/p left femoral thrombectomy with bovine patch angioplasty 2/25/22 with restoration of biphasic L PT and DP doppler signals.      PLAN:    - NO need for additional anticoagulation from surgery standpoint  - Recommend ASA when able and statin when able   - Incisional dressing to stay on for 2 weeks  - Vascular will follow peripherally with wound check     Jenni Dodd MD  Fellow    Vanessa Russell  General Surgery Resident, PGY-2

## 2022-02-27 ENCOUNTER — APPOINTMENT (OUTPATIENT)
Dept: GENERAL RADIOLOGY | Facility: CLINIC | Age: 72
DRG: 233 | End: 2022-02-27
Attending: STUDENT IN AN ORGANIZED HEALTH CARE EDUCATION/TRAINING PROGRAM
Payer: MEDICARE

## 2022-02-27 ENCOUNTER — APPOINTMENT (OUTPATIENT)
Dept: PHYSICAL THERAPY | Facility: CLINIC | Age: 72
DRG: 233 | End: 2022-02-27
Attending: INTERNAL MEDICINE
Payer: MEDICARE

## 2022-02-27 LAB
ALBUMIN SERPL-MCNC: 2.8 G/DL (ref 3.4–5)
ALP SERPL-CCNC: 57 U/L (ref 40–150)
ALT SERPL W P-5'-P-CCNC: 75 U/L (ref 0–50)
ANION GAP SERPL CALCULATED.3IONS-SCNC: 5 MMOL/L (ref 3–14)
ANION GAP SERPL CALCULATED.3IONS-SCNC: 6 MMOL/L (ref 3–14)
AST SERPL W P-5'-P-CCNC: 68 U/L (ref 0–45)
BASE EXCESS BLDA CALC-SCNC: -2.8 MMOL/L (ref -9–1.8)
BASE EXCESS BLDA CALC-SCNC: 2.1 MMOL/L (ref -9–1.8)
BASE EXCESS BLDA CALC-SCNC: 2.4 MMOL/L (ref -9–1.8)
BASE EXCESS BLDV CALC-SCNC: 2 MMOL/L (ref -7.7–1.9)
BASE EXCESS BLDV CALC-SCNC: 2.6 MMOL/L (ref -7.7–1.9)
BASE EXCESS BLDV CALC-SCNC: 2.7 MMOL/L (ref -7.7–1.9)
BILIRUB DIRECT SERPL-MCNC: 0.2 MG/DL (ref 0–0.2)
BILIRUB SERPL-MCNC: 0.8 MG/DL (ref 0.2–1.3)
BUN SERPL-MCNC: 23 MG/DL (ref 7–30)
BUN SERPL-MCNC: 31 MG/DL (ref 7–30)
CA-I BLD-MCNC: 4.5 MG/DL (ref 4.4–5.2)
CALCIUM SERPL-MCNC: 7.9 MG/DL (ref 8.5–10.1)
CALCIUM SERPL-MCNC: 8.2 MG/DL (ref 8.5–10.1)
CHLORIDE BLD-SCNC: 112 MMOL/L (ref 94–109)
CHLORIDE BLD-SCNC: 114 MMOL/L (ref 94–109)
CO2 SERPL-SCNC: 24 MMOL/L (ref 20–32)
CO2 SERPL-SCNC: 26 MMOL/L (ref 20–32)
CREAT SERPL-MCNC: 0.53 MG/DL (ref 0.52–1.04)
CREAT SERPL-MCNC: 0.66 MG/DL (ref 0.52–1.04)
ERYTHROCYTE [DISTWIDTH] IN BLOOD BY AUTOMATED COUNT: 16.1 % (ref 10–15)
GFR SERPL CREATININE-BSD FRML MDRD: >90 ML/MIN/1.73M2
GFR SERPL CREATININE-BSD FRML MDRD: >90 ML/MIN/1.73M2
GLUCOSE BLD-MCNC: 89 MG/DL (ref 70–99)
GLUCOSE BLD-MCNC: 94 MG/DL (ref 70–99)
GLUCOSE BLDC GLUCOMTR-MCNC: 100 MG/DL (ref 70–99)
GLUCOSE BLDC GLUCOMTR-MCNC: 102 MG/DL (ref 70–99)
GLUCOSE BLDC GLUCOMTR-MCNC: 102 MG/DL (ref 70–99)
GLUCOSE BLDC GLUCOMTR-MCNC: 113 MG/DL (ref 70–99)
GLUCOSE BLDC GLUCOMTR-MCNC: 76 MG/DL (ref 70–99)
GLUCOSE BLDC GLUCOMTR-MCNC: 88 MG/DL (ref 70–99)
GLUCOSE BLDC GLUCOMTR-MCNC: 93 MG/DL (ref 70–99)
HCO3 BLD-SCNC: 22 MMOL/L (ref 21–28)
HCO3 BLD-SCNC: 26 MMOL/L (ref 21–28)
HCO3 BLD-SCNC: 26 MMOL/L (ref 21–28)
HCO3 BLDV-SCNC: 27 MMOL/L (ref 21–28)
HCO3 BLDV-SCNC: 27 MMOL/L (ref 21–28)
HCO3 BLDV-SCNC: 28 MMOL/L (ref 21–28)
HCT VFR BLD AUTO: 25 % (ref 35–47)
HGB BLD-MCNC: 7.6 G/DL (ref 11.7–15.7)
INR PPP: 1.39 (ref 0.85–1.15)
MAGNESIUM SERPL-MCNC: 2.1 MG/DL (ref 1.6–2.3)
MAGNESIUM SERPL-MCNC: 2.2 MG/DL (ref 1.6–2.3)
MCH RBC QN AUTO: 28.4 PG (ref 26.5–33)
MCHC RBC AUTO-ENTMCNC: 30.4 G/DL (ref 31.5–36.5)
MCV RBC AUTO: 93 FL (ref 78–100)
O2/TOTAL GAS SETTING VFR VENT: 1 %
O2/TOTAL GAS SETTING VFR VENT: 1 %
O2/TOTAL GAS SETTING VFR VENT: 30 %
OXYHGB MFR BLD: 96 % (ref 92–100)
OXYHGB MFR BLD: 97 % (ref 92–100)
OXYHGB MFR BLD: 97 % (ref 92–100)
OXYHGB MFR BLDV: 50 % (ref 70–75)
OXYHGB MFR BLDV: 53 % (ref 70–75)
OXYHGB MFR BLDV: 54 % (ref 70–75)
PCO2 BLD: 36 MM HG (ref 35–45)
PCO2 BLD: 36 MM HG (ref 35–45)
PCO2 BLD: 37 MM HG (ref 35–45)
PCO2 BLDV: 40 MM HG (ref 40–50)
PCO2 BLDV: 41 MM HG (ref 40–50)
PCO2 BLDV: 42 MM HG (ref 40–50)
PH BLD: 7.4 [PH] (ref 7.35–7.45)
PH BLD: 7.46 [PH] (ref 7.35–7.45)
PH BLD: 7.47 [PH] (ref 7.35–7.45)
PH BLDV: 7.42 [PH] (ref 7.32–7.43)
PH BLDV: 7.43 [PH] (ref 7.32–7.43)
PH BLDV: 7.43 [PH] (ref 7.32–7.43)
PHOSPHATE SERPL-MCNC: 2.6 MG/DL (ref 2.5–4.5)
PHOSPHATE SERPL-MCNC: 2.8 MG/DL (ref 2.5–4.5)
PLATELET # BLD AUTO: 88 10E3/UL (ref 150–450)
PO2 BLD: 106 MM HG (ref 80–105)
PO2 BLD: 92 MM HG (ref 80–105)
PO2 BLD: 96 MM HG (ref 80–105)
PO2 BLDV: 28 MM HG (ref 25–47)
PO2 BLDV: 29 MM HG (ref 25–47)
PO2 BLDV: 30 MM HG (ref 25–47)
POTASSIUM BLD-SCNC: 3.8 MMOL/L (ref 3.4–5.3)
POTASSIUM BLD-SCNC: 3.9 MMOL/L (ref 3.4–5.3)
POTASSIUM BLD-SCNC: 4.1 MMOL/L (ref 3.4–5.3)
PROT SERPL-MCNC: 4.8 G/DL (ref 6.8–8.8)
RBC # BLD AUTO: 2.68 10E6/UL (ref 3.8–5.2)
SODIUM SERPL-SCNC: 141 MMOL/L (ref 133–144)
SODIUM SERPL-SCNC: 146 MMOL/L (ref 133–144)
WBC # BLD AUTO: 11.3 10E3/UL (ref 4–11)

## 2022-02-27 PROCEDURE — 94003 VENT MGMT INPAT SUBQ DAY: CPT

## 2022-02-27 PROCEDURE — 82330 ASSAY OF CALCIUM: CPT | Performed by: PHYSICIAN ASSISTANT

## 2022-02-27 PROCEDURE — 71045 X-RAY EXAM CHEST 1 VIEW: CPT

## 2022-02-27 PROCEDURE — 99232 SBSQ HOSP IP/OBS MODERATE 35: CPT | Mod: 24 | Performed by: ANESTHESIOLOGY

## 2022-02-27 PROCEDURE — 84132 ASSAY OF SERUM POTASSIUM: CPT | Performed by: STUDENT IN AN ORGANIZED HEALTH CARE EDUCATION/TRAINING PROGRAM

## 2022-02-27 PROCEDURE — 80053 COMPREHEN METABOLIC PANEL: CPT | Performed by: NURSE PRACTITIONER

## 2022-02-27 PROCEDURE — 94640 AIRWAY INHALATION TREATMENT: CPT

## 2022-02-27 PROCEDURE — 82805 BLOOD GASES W/O2 SATURATION: CPT | Performed by: STUDENT IN AN ORGANIZED HEALTH CARE EDUCATION/TRAINING PROGRAM

## 2022-02-27 PROCEDURE — 999N000157 HC STATISTIC RCP TIME EA 10 MIN

## 2022-02-27 PROCEDURE — P9041 ALBUMIN (HUMAN),5%, 50ML: HCPCS

## 2022-02-27 PROCEDURE — 82805 BLOOD GASES W/O2 SATURATION: CPT | Performed by: PHYSICIAN ASSISTANT

## 2022-02-27 PROCEDURE — 250N000013 HC RX MED GY IP 250 OP 250 PS 637: Performed by: PHYSICIAN ASSISTANT

## 2022-02-27 PROCEDURE — 250N000011 HC RX IP 250 OP 636: Performed by: PHYSICIAN ASSISTANT

## 2022-02-27 PROCEDURE — 250N000009 HC RX 250: Performed by: SURGERY

## 2022-02-27 PROCEDURE — 97530 THERAPEUTIC ACTIVITIES: CPT | Mod: GP

## 2022-02-27 PROCEDURE — 250N000009 HC RX 250: Performed by: STUDENT IN AN ORGANIZED HEALTH CARE EDUCATION/TRAINING PROGRAM

## 2022-02-27 PROCEDURE — 84100 ASSAY OF PHOSPHORUS: CPT | Performed by: STUDENT IN AN ORGANIZED HEALTH CARE EDUCATION/TRAINING PROGRAM

## 2022-02-27 PROCEDURE — 82248 BILIRUBIN DIRECT: CPT | Performed by: STUDENT IN AN ORGANIZED HEALTH CARE EDUCATION/TRAINING PROGRAM

## 2022-02-27 PROCEDURE — 250N000009 HC RX 250: Performed by: NURSE PRACTITIONER

## 2022-02-27 PROCEDURE — 999N000015 HC STATISTIC ARTERIAL MONITORING DAILY

## 2022-02-27 PROCEDURE — 250N000011 HC RX IP 250 OP 636

## 2022-02-27 PROCEDURE — 258N000003 HC RX IP 258 OP 636: Performed by: SURGERY

## 2022-02-27 PROCEDURE — 250N000013 HC RX MED GY IP 250 OP 250 PS 637: Performed by: STUDENT IN AN ORGANIZED HEALTH CARE EDUCATION/TRAINING PROGRAM

## 2022-02-27 PROCEDURE — 97164 PT RE-EVAL EST PLAN CARE: CPT | Mod: GP

## 2022-02-27 PROCEDURE — 250N000011 HC RX IP 250 OP 636: Performed by: NURSE PRACTITIONER

## 2022-02-27 PROCEDURE — 85610 PROTHROMBIN TIME: CPT | Performed by: STUDENT IN AN ORGANIZED HEALTH CARE EDUCATION/TRAINING PROGRAM

## 2022-02-27 PROCEDURE — 94640 AIRWAY INHALATION TREATMENT: CPT | Mod: 76

## 2022-02-27 PROCEDURE — 71045 X-RAY EXAM CHEST 1 VIEW: CPT | Mod: 26 | Performed by: RADIOLOGY

## 2022-02-27 PROCEDURE — 84100 ASSAY OF PHOSPHORUS: CPT | Performed by: SURGERY

## 2022-02-27 PROCEDURE — 250N000013 HC RX MED GY IP 250 OP 250 PS 637: Performed by: NURSE PRACTITIONER

## 2022-02-27 PROCEDURE — 85027 COMPLETE CBC AUTOMATED: CPT | Performed by: STUDENT IN AN ORGANIZED HEALTH CARE EDUCATION/TRAINING PROGRAM

## 2022-02-27 PROCEDURE — 250N000013 HC RX MED GY IP 250 OP 250 PS 637: Performed by: SURGERY

## 2022-02-27 PROCEDURE — 999N000045 HC STATISTIC DAILY SWAN MONITORING

## 2022-02-27 PROCEDURE — 200N000002 HC R&B ICU UMMC

## 2022-02-27 PROCEDURE — 999N000155 HC STATISTIC RAPCV CVP MONITORING

## 2022-02-27 PROCEDURE — 83735 ASSAY OF MAGNESIUM: CPT | Performed by: NURSE PRACTITIONER

## 2022-02-27 PROCEDURE — 258N000003 HC RX IP 258 OP 636: Performed by: STUDENT IN AN ORGANIZED HEALTH CARE EDUCATION/TRAINING PROGRAM

## 2022-02-27 PROCEDURE — 83735 ASSAY OF MAGNESIUM: CPT | Performed by: STUDENT IN AN ORGANIZED HEALTH CARE EDUCATION/TRAINING PROGRAM

## 2022-02-27 PROCEDURE — 250N000011 HC RX IP 250 OP 636: Performed by: STUDENT IN AN ORGANIZED HEALTH CARE EDUCATION/TRAINING PROGRAM

## 2022-02-27 RX ORDER — QUETIAPINE FUMARATE 25 MG/1
25 TABLET, FILM COATED ORAL
Status: DISCONTINUED | OUTPATIENT
Start: 2022-02-27 | End: 2022-03-18 | Stop reason: HOSPADM

## 2022-02-27 RX ORDER — AMIODARONE HYDROCHLORIDE 200 MG/1
400 TABLET ORAL 2 TIMES DAILY
Status: DISCONTINUED | OUTPATIENT
Start: 2022-02-27 | End: 2022-03-09

## 2022-02-27 RX ORDER — HEPARIN SODIUM 5000 [USP'U]/.5ML
5000 INJECTION, SOLUTION INTRAVENOUS; SUBCUTANEOUS EVERY 8 HOURS
Status: DISCONTINUED | OUTPATIENT
Start: 2022-02-27 | End: 2022-03-18 | Stop reason: HOSPADM

## 2022-02-27 RX ORDER — FUROSEMIDE 10 MG/ML
20 INJECTION INTRAMUSCULAR; INTRAVENOUS ONCE
Status: COMPLETED | OUTPATIENT
Start: 2022-02-27 | End: 2022-02-27

## 2022-02-27 RX ORDER — ALBUMIN, HUMAN INJ 5% 5 %
12.5 SOLUTION INTRAVENOUS ONCE
Status: COMPLETED | OUTPATIENT
Start: 2022-02-27 | End: 2022-02-27

## 2022-02-27 RX ORDER — IPRATROPIUM BROMIDE AND ALBUTEROL SULFATE 2.5; .5 MG/3ML; MG/3ML
3 SOLUTION RESPIRATORY (INHALATION) 2 TIMES DAILY
Status: DISCONTINUED | OUTPATIENT
Start: 2022-02-28 | End: 2022-02-28

## 2022-02-27 RX ORDER — ALBUMIN, HUMAN INJ 5% 5 %
SOLUTION INTRAVENOUS
Status: COMPLETED
Start: 2022-02-27 | End: 2022-02-27

## 2022-02-27 RX ORDER — POTASSIUM CHLORIDE 7.45 MG/ML
10 INJECTION INTRAVENOUS ONCE
Status: COMPLETED | OUTPATIENT
Start: 2022-02-27 | End: 2022-02-27

## 2022-02-27 RX ADMIN — IPRATROPIUM BROMIDE AND ALBUTEROL SULFATE 3 ML: 2.5; .5 SOLUTION RESPIRATORY (INHALATION) at 07:33

## 2022-02-27 RX ADMIN — QUETIAPINE FUMARATE 25 MG: 25 TABLET ORAL at 21:31

## 2022-02-27 RX ADMIN — ACETAMINOPHEN 975 MG: 325 TABLET ORAL at 07:37

## 2022-02-27 RX ADMIN — IPRATROPIUM BROMIDE AND ALBUTEROL SULFATE 3 ML: 2.5; .5 SOLUTION RESPIRATORY (INHALATION) at 00:57

## 2022-02-27 RX ADMIN — ALBUMIN (HUMAN) 12.5 G: 12.5 INJECTION, SOLUTION INTRAVENOUS at 05:00

## 2022-02-27 RX ADMIN — Medication 1 PACKET: at 07:47

## 2022-02-27 RX ADMIN — ONDANSETRON 4 MG: 2 INJECTION INTRAMUSCULAR; INTRAVENOUS at 16:11

## 2022-02-27 RX ADMIN — DEXMEDETOMIDINE HYDROCHLORIDE 0.7 MCG/KG/HR: 400 INJECTION INTRAVENOUS at 07:26

## 2022-02-27 RX ADMIN — ALBUMIN HUMAN 12.5 G: 50 SOLUTION INTRAVENOUS at 05:00

## 2022-02-27 RX ADMIN — HEPARIN SODIUM 5000 UNITS: 5000 INJECTION, SOLUTION INTRAVENOUS; SUBCUTANEOUS at 13:59

## 2022-02-27 RX ADMIN — ASPIRIN 81 MG: 81 TABLET, CHEWABLE ORAL at 07:38

## 2022-02-27 RX ADMIN — AMIODARONE HYDROCHLORIDE 400 MG: 200 TABLET ORAL at 19:32

## 2022-02-27 RX ADMIN — MUPIROCIN 0.5 G: 20 OINTMENT TOPICAL at 07:42

## 2022-02-27 RX ADMIN — SODIUM CHLORIDE, POTASSIUM CHLORIDE, SODIUM LACTATE AND CALCIUM CHLORIDE 500 ML: 600; 310; 30; 20 INJECTION, SOLUTION INTRAVENOUS at 01:21

## 2022-02-27 RX ADMIN — OXYCODONE HYDROCHLORIDE 5 MG: 5 TABLET ORAL at 00:05

## 2022-02-27 RX ADMIN — POTASSIUM PHOSPHATE, MONOBASIC AND POTASSIUM PHOSPHATE, DIBASIC 9 MMOL: 224; 236 INJECTION, SOLUTION, CONCENTRATE INTRAVENOUS at 05:42

## 2022-02-27 RX ADMIN — FUROSEMIDE 20 MG: 10 INJECTION, SOLUTION INTRAVENOUS at 21:31

## 2022-02-27 RX ADMIN — POTASSIUM CHLORIDE 10 MEQ: 10 INJECTION, SOLUTION INTRAVENOUS at 16:40

## 2022-02-27 RX ADMIN — POLYETHYLENE GLYCOL 3350 17 G: 17 POWDER, FOR SOLUTION ORAL at 07:37

## 2022-02-27 RX ADMIN — ACETAMINOPHEN 975 MG: 325 TABLET ORAL at 00:06

## 2022-02-27 RX ADMIN — HYDROXYZINE HYDROCHLORIDE 50 MG: 25 TABLET, FILM COATED ORAL at 19:32

## 2022-02-27 RX ADMIN — OXYCODONE HYDROCHLORIDE 5 MG: 5 TABLET ORAL at 10:49

## 2022-02-27 RX ADMIN — PANTOPRAZOLE SODIUM 40 MG: 40 TABLET, DELAYED RELEASE ORAL at 07:38

## 2022-02-27 RX ADMIN — MUPIROCIN 0.5 G: 20 OINTMENT TOPICAL at 19:44

## 2022-02-27 RX ADMIN — Medication 15 ML: at 07:37

## 2022-02-27 RX ADMIN — HEPARIN SODIUM 5000 UNITS: 5000 INJECTION, SOLUTION INTRAVENOUS; SUBCUTANEOUS at 21:32

## 2022-02-27 RX ADMIN — IPRATROPIUM BROMIDE AND ALBUTEROL SULFATE 3 ML: 2.5; .5 SOLUTION RESPIRATORY (INHALATION) at 13:30

## 2022-02-27 RX ADMIN — IPRATROPIUM BROMIDE AND ALBUTEROL SULFATE 3 ML: 2.5; .5 SOLUTION RESPIRATORY (INHALATION) at 19:04

## 2022-02-27 RX ADMIN — OXYCODONE HYDROCHLORIDE 5 MG: 5 TABLET ORAL at 17:19

## 2022-02-27 RX ADMIN — SENNOSIDES AND DOCUSATE SODIUM 1 TABLET: 50; 8.6 TABLET ORAL at 07:38

## 2022-02-27 RX ADMIN — THIAMINE HCL TAB 100 MG 100 MG: 100 TAB at 07:38

## 2022-02-27 ASSESSMENT — ACTIVITIES OF DAILY LIVING (ADL)
ADLS_ACUITY_SCORE: 13
ADLS_ACUITY_SCORE: 13
ADLS_ACUITY_SCORE: 15
ADLS_ACUITY_SCORE: 13
ADLS_ACUITY_SCORE: 15
ADLS_ACUITY_SCORE: 13
ADLS_ACUITY_SCORE: 15
ADLS_ACUITY_SCORE: 13
ADLS_ACUITY_SCORE: 15
ADLS_ACUITY_SCORE: 15
ADLS_ACUITY_SCORE: 13

## 2022-02-27 NOTE — PROGRESS NOTES
Patient was extubated at 0900 to 2LNC. Cuff leak present, BS diminished/ crackles and weak cough.     Romaine Lua, RRT

## 2022-02-27 NOTE — PROGRESS NOTES
CV ICU PROGRESS NOTE  February 27, 2022      CO-MORBIDITIES:   S/P CABG x 4  (primary encounter diagnosis)  Systolic heart failure, unspecified HF chronicity (H)    ASSESSMENT: Ashley Osman is a 71 year old female with PMH of HFrEF (10-15%) 2/2 ICM (Hx LAD and Circumflex Mis; total occlusion of RCA and LAD), Biventricular failure (requiring inotropic support PTA to [facility]), COPD, HLD, Tobacco Use Disorder (quit 12/2021), CAD who underwent CABG x4 on 02/24 with Dr. Bhandari. Found to have left femoral artery thrombosis with acute left lower extremity ischemia s/p emergent left femoral thrombectomy, bovine patch angioplasty on 2/25.    Plan Summary:  #Acute respiratory failure with hypercapnia  - Extubate following a robust PS trial and adequate blood gases  - Speech/swallow eval  - Continue diuresing  #Cardiogenic shock  - Wean off dobutamine to goal CI > 2 and MAP > 65 (off epinephrine and norepinephrine)  #A-fib  - Switch amiodarone from IV to PO  #Acute left limb ischemia s/p thrombectomy; resolved  - Vascular surgery following      PLAN:    Neuro/ pain/ sedation:  Acute Postoperative pain  - Monitor neurological status. Notify the MD for any acute changes in exam.  - Pain:              - Scheduled:                          - tylenol, oxycodone              - PRN                           - tylenol, fentanyl, dilaudid, oxycodone    Pulmonary care:   ##Acute respiratory failure with hypercapnia; resolved  # Hx of COPD  - Titrate supplemental oxygen to maintain saturation above 92%.  - Pulmonary hygiene: Incentive spirometer every 15- 30 minutes when awake, flutter valve, C&DB    Cardiovascular:    # S/p CABG x 4 on 02/24 by Dr. Bhandari  # History of Biventricular HF requiring pressor support 2/2 ICM  #Acute a-fib with RVR  # HLD  Recent echo on 02/11/2022 with LVEF of 15%  Postop echo appeared similar to prior (LVEF < ~20%; RV function normal)  - Continue dobutamine to goal CI > 2 and MAP > 65 (off  epinephrine and norepinephrine)  - Hold atorvastatin, carvediliol  - Amio gtt  - ASA 81    # Left femoral artery thrombosis with acute left lower extremity ischemia s/p emergent left femoral thrombectomy, bovine patch angioplasty on 2/25  - Heparin gtt discontinued  - Monitor lower extremity pulses and perfusion status  - Vascular surgery following    GI care/ Nutrition:   - Regular diet ADAT  - PPI  - Bowel regimen: miralax, senna, & dulcolax PRNs    Renal/ Fluid Balance/ Electrolytes:   - Diuresis: lasix (goal -1L)  - Avoid/limit nephrotoxins as able  - ICU electrolyte replacement protocol    Endocrine:    - SSI  - Goal BG <180 for optimal healing      ID/ Antibiotics:   # Stress induced leukocytosis  - Continue to monitor fever curve, WBC and inflammatory markers as appropriate    Heme:     # Stress induced leukocytosis  # Acute blood loss anemia  # Acute blood loss thrombocytopenia  No s/sx active bleeding   - Hgb goal > 7  - CBC qday    MSK/ Skin:  - PT/OT/CR      Prophylaxis:    - Mechanical prophylaxis for DVT  - Chemical DVT prophylaxis; heparin subcutaneous   - PPI  - Bowel regimen     Lines/ tubes/ drains:  - Arterial Line  - ETT  - CTs x 2  - PA catheter  - RIJ  - PIV x 1  - Vizcaino     Disposition:  - CVICU     Patient seen, findings and plan discussed with CVICU staff, Dr. Ortiz.      Anjali Patiño MD  Anesthesiology Resident, PGY-4      ====================================    SUBJECTIVE:   Extubated, feels comfortable, no complaints    OBJECTIVE:   1. VITAL SIGNS:   Temp:  [99  F (37.2  C)-101.5  F (38.6  C)] 101.5  F (38.6  C)  Pulse:  [83-84] 84  Resp:  [0-29] 26  BP: ()/(49-74) 139/61  Cuff Mean (mmHg):  [75] 75  MAP:  [63 mmHg-91 mmHg] 81 mmHg  Arterial Line BP: ()/(46-67) 133/59  FiO2 (%):  [30 %] 30 %  SpO2:  [99 %-100 %] 100 %  Vent Mode: CPAP/PS  (Continuous positive airway pressure with Pressure Support)  FiO2 (%): 30 %  Resp Rate (Set): 16 breaths/min  Tidal Volume (Set, mL):  430 mL  PEEP (cm H2O): 5 cmH2O  Pressure Support (cm H2O): 7 cmH2O  Resp: 26    2. INTAKE/ OUTPUT:   I/O last 3 completed shifts:  In: 3151.42 [I.V.:1621.42; NG/GT:280; IV Piggyback:1000]  Out: 2432 [Urine:1842; Emesis/NG output:100; Chest Tube:490]    3. PHYSICAL EXAMINATION:   General: female patient, lying comfortably in bed  Neuro: AAOx3, CN grossly intact  Resp: Breathing comfortably, non-labored, on 1L NC  CV: regular S1, S2, soft pericardial rub, there is a IV/VI systolic murmur most prominent at the mitral position, upper extremity pulses are 2+ bilaterally, bilateral dorsalis pedal pulses 2+/symmetric  Abdomen: Soft, non-distended, non-tender  Incisions: c/d/i   Extremities: warm, well perfused      4. INVESTIGATIONS:   Arterial Blood Gases   Recent Labs   Lab 02/27/22  0646 02/27/22  0351 02/26/22 1947 02/26/22  1153   PH 7.47* 7.46* 7.52* 7.51*   PCO2 36 37 33* 33*   PO2 92 96 100 110*   HCO3 26 26 27 27     Complete Blood Count   Recent Labs   Lab 02/27/22  0351 02/26/22  1558 02/26/22  0401 02/25/22 2203 02/25/22  1623   WBC 11.3*  --  11.3* 11.9* 12.6*   HGB 7.6* 7.9* 7.3* 7.4* 7.6*   PLT 88*  --  83* 82* 90*     Basic Metabolic Panel  Recent Labs   Lab 02/27/22  0746 02/27/22  0351 02/27/22  0346 02/27/22  0004 02/26/22 1947 02/26/22  1550 02/26/22  0602 02/26/22  0401 02/26/22  0005 02/25/22 2203 02/25/22  1624 02/25/22  1623   NA  --  146*  --   --   --   --   --  148*  --  147*  --  148*   POTASSIUM  --  3.9  --   --  3.8 3.3*  --  3.8  --  3.7  --  3.6   CHLORIDE  --  114*  --   --   --   --   --  117*  --  117*  --  116*   CO2  --  26  --   --   --   --   --  25  --  25  --  26   BUN  --  23  --   --   --   --   --  24  --  26  --  27   CR  --  0.53  --   --   --   --   --  0.53  --  0.65  --  0.67   * 89 88 102* 84  --    < > 100*  93   < > 131*   < > 143*    < > = values in this interval not displayed.     Liver Function Tests  Recent Labs   Lab 02/27/22  0351 02/27/22  0350  02/26/22  0401 02/25/22  2203 02/25/22  0332 02/24/22  1602   AST 68*  --  37 43 43 66*   ALT 75*  --  34 34 31 38   ALKPHOS 57  --  52 43 39* 49   BILITOTAL 0.8  --  0.5 0.6 0.7 0.6   ALBUMIN 2.8*  --  3.0* 3.1* 2.9* 2.1*   INR  --  1.39* 1.32*  --  1.55* 1.82*     Pancreatic Enzymes  No lab results found in last 7 days.  Coagulation Profile  Recent Labs   Lab 02/27/22  0350 02/26/22  0401 02/25/22  0332 02/24/22  1602 02/24/22  1345   INR 1.39* 1.32* 1.55* 1.82* 2.23*   PTT  --   --   --  35 32         5. RADIOLOGY:   Recent Results (from the past 24 hour(s))   XR Chest Port 1 View    Impression    RESIDENT PRELIMINARY INTERPRETATION  IMPRESSION:   Postoperative chest with intact median sternotomy wires and persistent  basilar opacities likely representing postoperative  effusion/atelectasis. No appreciable pneumothorax. Heart is not  enlarged. Bones and upper abdomen are grossly unremarkable.  Lines/tubes as below:     --Endotracheal tube tip is 2.0 cm above the elan.   --Right jugular [place]-Renay catheter tip projects near the distal right  main pulmonary artery.   --Mediastinal and pleural drains.   --Partially visualized enteric tube.   --Epicardial pacing wires       =========================================

## 2022-02-27 NOTE — PROGRESS NOTES
02/27/22 1400   Quick Adds   Type of Visit PT Re-evaluation   Living Environment   Living Environment Comments Please see initial PT eval dated 2/21/22 for PLOF/subjective report   General Information   Onset of Illness/Injury or Date of Surgery 02/25/22   Referring Physician Calixto Bhandari MD   Patient/Family Therapy Goals Statement (PT) get better   Pertinent History of Current Problem (include personal factors and/or comorbidities that impact the POC)  71 year old female with PMH of HFrEF (10-15%) 2/2 ICM (Hx LAD and Circumflex Mis; total occlusion of RCA and LAD), Biventricular failure (requiring inotropic support PTA to [facility]), COPD, HLD, Tobacco Use Disorder (quit 12/2021), CAD who underwent CABG x4 on 02/24 with Dr. Bhandari. Found to have left femoral artery thrombosis with acute left lower extremity ischemia s/p emergent left femoral thrombectomy, bovine patch angioplasty on 2/25.   Existing Precautions/Restrictions sternal   General Observations Pt supine, very [place] but agreeable to PT and getting up to chair   Cognition   Affect/Mental Status (Cognition) flat/blunted affect   Orientation Status (Cognition) oriented to;person;place   Safety Deficit (Cognition) moderate deficit;insight into deficits/self-awareness;safety precautions awareness;safety precautions follow-through/compliance   Posture    Posture Forward head position;Protracted shoulders;Kyphosis   Range of Motion (ROM)   ROM Comment LEs mildly decreased due to procedure, within expectations   Strength (Manual Muscle Testing)   Strength Comments LEs mildly decreased due to procedure, within expectations   Bed Mobility   Comment, (Bed Mobility) Max A x2 sup>sitting   Transfers   Comment, (Transfers) Heavy mod A sit<>stand>pivot to chair   Gait/Stairs (Locomotion)   Comment, (Gait/Stairs) Heavy mod A for pt to take 2 steps then needed to pivot to chair   Clinical Impression   Criteria for Skilled Therapeutic Intervention Yes,  treatment indicated   PT Diagnosis (PT) impaired functional mobility   Influenced by the following impairments decreased strength, range, act tolerance, balance, awareness of precs   Functional limitations due to impairments decreased I with transfers, gait, bed mob, barrier navigation, adls   Clinical Presentation (PT Evaluation Complexity) Stable/Uncomplicated   Clinical Presentation Rationale clinical reasoning   Clinical Decision Making (Complexity) low complexity   Planned Therapy Interventions (PT) gait training;home exercise program;stair training;strengthening;patient/family education;transfer training;bed mobility training   Risk & Benefits of therapy have been explained evaluation/treatment results reviewed;care plan/treatment goals reviewed;risks/benefits reviewed;current/potential barriers reviewed;participants voiced agreement with care plan;participants included;patient   PT Discharge Planning   PT Discharge Recommendation (DC Rec) Transitional Care Facility   PT Rationale for DC Rec Pt Ax2 sup<>sit and bed<>chair, will need rehab stay to maximize functional I   PT Brief overview of current status max A x2 sup>sit, bed<>chair   Total Evaluation Time   Total Evaluation Time (Minutes) 5   Physical Therapy Goals   PT Frequency 6x/week   PT Predicated Duration/Target Date for Goal Attainment 03/16/22   PT: Bed Mobility Modified independent;Supine to/from sit;Rolling;Within precautions   PT: Transfers Modified independent;Sit to/from stand;Bed to/from chair;Assistive device;Within precautions   PT: Gait Modified independent;Assistive device;Within precautions;Greater than 200 feet   PT: Stairs Modified independent;Within precautions;Greater than 10 stairs

## 2022-02-27 NOTE — PLAN OF CARE
Assumed care at 0000  Major Shift Events:  Low UOP and CVP of 7 after 500 LR given - MD Notified, additional 500ml LR given.  UOP after 18ml/hr and CVP 12 - MD notified and 250ml of albumin given; UOP now 30-35.  Patient opens eyes and moves extremities but not to commands.  Fentanyl at 25 and Dex at 0.6.  Tmax 100.9 - MD notified.  100% AV paced via temporary pacemaker DDD at 84 bpm.  MAPs 70s to low 80s.  PAP 40/20, CI 2.6, SVO2 53.  Dobutamine at 5 and amio at 0.5.  CMV/AC 30%/430/16(decreased 2/2 alkalosis)/5.  PS 7/5 at 0604; RR 14-24, tidal volumes upper 300s-400s.  Pedal pulses dopplerable - checked Q2hrs.  OG to LIS.  Phos replaced this am.    Plan: Continue to monitor how pt tolerates pressure supporting.    For vital signs and complete assessments, please see documentation flowsheets.

## 2022-02-27 NOTE — PLAN OF CARE
Major Shift Events:  RASS -1/-2, transitioned to Precedex off of Prop. Pupils 3mm, E/R/R with scleral edema. Moves all extremities purposefully, however not following commands. Tmax 99.7 (axillary), SR, no ectopy, 100% paced, DDD. Levo/Epi titrated off, able to maintain MAP>65, sys<140, Dobutamine started at straight rate 5 mcg/kg/min. CI maintained between 1.9-2.2, PA 44/19, CVP 12, SVO2 48, SVR 1620, CO 3.7. See flowsheets for all CHETAN numbers. CMV 30%/430/20/5, PS trial x 2 quickly became apneic, sedated, and difficulty following commands. CT output 0-40 q2h, known air leak, okay per CVTS. Vizcaino in place with adequate UOP, Lasix x 1 today with good results. No BM, bowel regimen in place. Will need RD to place small bore NJ, pending extubation. New left radial A-line placed. Current gtt: Dex @ 0.7, Dobutamine 5 mcg/kg/min, Amio @ 0.5, Fent @ 50.    Plan: Maintain RASS 0/-1, PS in early AM with goal to extubate, RD to place small bore NJ, continue to monitor CHETAN/CI/MAP.     For vital signs and complete assessments, please see documentation flowsheets.           Problem: Cardiac Output Decreased (Heart Failure)  Goal: Optimal Cardiac Output  Outcome: Ongoing, Progressing     Problem: Fluid Imbalance (Heart Failure)  Goal: Fluid Balance  Outcome: Ongoing, Progressing     Problem: Respiratory Compromise (Heart Failure)  Goal: Effective Oxygenation and Ventilation  Outcome: Ongoing, Progressing  Intervention: Promote Airway Secretion Clearance  Recent Flowsheet Documentation  Taken 2/26/2022 1600 by Roxanne Carlin RN  Cough And Deep Breathing: unable to perform  Taken 2/26/2022 1200 by Roxanne Carlin RN  Cough And Deep Breathing: unable to perform  Taken 2/26/2022 0800 by Roxanne Carlin RN  Cough And Deep Breathing: unable to perform

## 2022-02-27 NOTE — PLAN OF CARE
Major Shift Events: Writer took care of pt from 1900 to 0000. Pt had oliguria. Given 500 mL LR bolus with no effect on UO. Next RN to notify team.    Neuro: Pt opens eyes to voice. Pupals 3mm and brisk B/L. Moves all extremities nonpurposefuly. Dex @ 0.7 mcg/kg/kg. Fentanal @ 500 mcg/hr.   Resp: ET @ 24 at the lip. CMV 30% fio2 430 tv RR: 20->18. Peep5. Minimal secretions. Pt not over breathing vent.   CV: AV paced 100%. HR @ 84.  Map goal >65. Maps in 70/80s. CI goal greater than 2. Dobutamine on 2.5 mcg/kg/min. Amino @ 0.5 mg/min. (team wants this rate to keep running tonight) DP/PT pulsles dopplerable.   : Vizcaino in place UO 15-28 mL/hr.  GI: OG to suction. NPO expect meds until NJ placed. No BM over 4 hours.   Plan: Time permitting MICU trained RN to place NJ tonight. Plan early PS trial.  Q6 hr ficks.   For vital signs and complete assessments, please see documentation flowsheets.       Time 1600 2000   CVP 12 10   PA 44/19 40/16   SVo2 48 52   CI 2.0 2.5   SVR 1670 1219       Goal Outcome Evaluation:    Plan of Care Reviewed With: family     Overall Patient Progress: no change    Outcome Evaluation: Adequate for transition to ICU

## 2022-02-27 NOTE — PROGRESS NOTES
"VASCULAR SURGERY PROGRESS NOTE    Subjective:  Extubated. Able to follow simple commands     Objective:  Intake/Output Summary (Last 24 hours) at 2/26/2022 0646  Last data filed at 2/26/2022 0600  Gross per 24 hour   Intake 3007.23 ml   Output 1548 ml   Net 1459.23 ml     Labs:  ROUTINE IP LABS (Last four results)  BMP  Recent Labs   Lab 02/27/22  1159 02/27/22  0746 02/27/22  0351 02/27/22  0346 02/27/22  0004 02/26/22  1947 02/26/22  1550 02/26/22  0602 02/26/22  0401 02/26/22  0005 02/25/22 2203 02/25/22  1624 02/25/22  1623   NA  --   --  146*  --   --   --   --   --  148*  --  147*  --  148*   POTASSIUM  --   --  3.9  --   --  3.8 3.3*  --  3.8  --  3.7  --  3.6   CHLORIDE  --   --  114*  --   --   --   --   --  117*  --  117*  --  116*   HEIDI  --   --  7.9*  --   --   --   --   --  8.3*  --  8.3*  --  8.1*   CO2  --   --  26  --   --   --   --   --  25  --  25  --  26   BUN  --   --  23  --   --   --   --   --  24  --  26  --  27   CR  --   --  0.53  --   --   --   --   --  0.53  --  0.65  --  0.67   GLC 93 100* 89 88   < > 84  --    < > 100*  93   < > 131*   < > 143*    < > = values in this interval not displayed.     CBC  Recent Labs   Lab 02/27/22  0351 02/26/22  1558 02/26/22  0401 02/25/22 2203 02/25/22  1623   WBC 11.3*  --  11.3* 11.9* 12.6*   RBC 2.68*  --  2.57* 2.53* 2.65*   HGB 7.6* 7.9* 7.3* 7.4* 7.6*   HCT 25.0*  --  23.4* 23.0* 23.9*   MCV 93  --  91 91 90   MCH 28.4  --  28.4 29.2 28.7   MCHC 30.4*  --  31.2* 32.2 31.8   RDW 16.1*  --  16.2* 16.2* 16.2*   PLT 88*  --  83* 82* 90*     INR  Recent Labs   Lab 02/27/22  0350 02/26/22  0401 02/25/22  0332 02/24/22  1602   INR 1.39* 1.32* 1.55* 1.82*     PHYSICAL EXAM:  /61   Pulse 84   Temp 100  F (37.8  C) (Pulmonary Artery)   Resp 29   Ht 1.638 m (5' 4.5\")   Wt 61.2 kg (134 lb 14.7 oz)   SpO2 100%   BMI 22.80 kg/m    General: Alert, answers questions  Cardio: RRR  Respiratory: NLB  GI:  Abdomen soft  Extremities: L foot warm to " touch. Biphasic PT and DP signals. Incision C/D with intact dermabond/tegaderm dressing . 5/5 motor strength (ankle flexion and toe flexion)  Skin: Color appropriate for race, warm, dry.    Imaging:   No new imaging.    ASSESSMENT:  Ashley Osman is a 71 year old year old female who has a PMH significant for CAD, HFrEF (EF 10-15%), COPD, ICM, tobacco use who underwent CABG x4 on 2/24/22 with left saphenous vein harvest. Overnight 2/24 lost pulses to LLE prompting discontinuation of the IABP. Arterial duplex showed monophasic flow to bilateral lower extremities. CTA showed a thrombotic occlusion of the distal left external iliac and proximal CFA. Now s/p left femoral thrombectomy with bovine patch angioplasty 2/25/22 with restoration of biphasic L PT and DP doppler signals.      PLAN:    - NO need for additional anticoagulation from surgery standpoint  - Recommend ASA when able and statin when able   - Incisional dressing to stay on for 2 weeks  - Vascular will follow peripherally with wound check     Jenni Dodd MD  Fellow

## 2022-02-28 ENCOUNTER — APPOINTMENT (OUTPATIENT)
Dept: SPEECH THERAPY | Facility: CLINIC | Age: 72
DRG: 233 | End: 2022-02-28
Attending: NURSE PRACTITIONER
Payer: MEDICARE

## 2022-02-28 ENCOUNTER — APPOINTMENT (OUTPATIENT)
Dept: GENERAL RADIOLOGY | Facility: CLINIC | Age: 72
DRG: 233 | End: 2022-02-28
Attending: STUDENT IN AN ORGANIZED HEALTH CARE EDUCATION/TRAINING PROGRAM
Payer: MEDICARE

## 2022-02-28 ENCOUNTER — APPOINTMENT (OUTPATIENT)
Dept: PHYSICAL THERAPY | Facility: CLINIC | Age: 72
DRG: 233 | End: 2022-02-28
Attending: INTERNAL MEDICINE
Payer: MEDICARE

## 2022-02-28 ENCOUNTER — APPOINTMENT (OUTPATIENT)
Dept: OCCUPATIONAL THERAPY | Facility: CLINIC | Age: 72
DRG: 233 | End: 2022-02-28
Attending: STUDENT IN AN ORGANIZED HEALTH CARE EDUCATION/TRAINING PROGRAM
Payer: MEDICARE

## 2022-02-28 ENCOUNTER — APPOINTMENT (OUTPATIENT)
Dept: CARDIOLOGY | Facility: CLINIC | Age: 72
DRG: 233 | End: 2022-02-28
Attending: INTERNAL MEDICINE
Payer: MEDICARE

## 2022-02-28 LAB
ALBUMIN SERPL-MCNC: 3 G/DL (ref 3.4–5)
ALBUMIN SERPL-MCNC: 3.1 G/DL (ref 3.4–5)
ALBUMIN UR-MCNC: 70 MG/DL
ALP SERPL-CCNC: 62 U/L (ref 40–150)
ALP SERPL-CCNC: 68 U/L (ref 40–150)
ALT SERPL W P-5'-P-CCNC: 495 U/L (ref 0–50)
ALT SERPL W P-5'-P-CCNC: 503 U/L (ref 0–50)
ANION GAP SERPL CALCULATED.3IONS-SCNC: 5 MMOL/L (ref 3–14)
ANION GAP SERPL CALCULATED.3IONS-SCNC: 6 MMOL/L (ref 3–14)
APPEARANCE UR: ABNORMAL
AST SERPL W P-5'-P-CCNC: 527 U/L (ref 0–45)
AST SERPL W P-5'-P-CCNC: 733 U/L (ref 0–45)
ATRIAL RATE - MUSE: 65 BPM
ATRIAL RATE - MUSE: 72 BPM
BASE EXCESS BLDA CALC-SCNC: -2.1 MMOL/L (ref -9–1.8)
BILIRUB DIRECT SERPL-MCNC: 0.2 MG/DL (ref 0–0.2)
BILIRUB DIRECT SERPL-MCNC: 0.2 MG/DL (ref 0–0.2)
BILIRUB SERPL-MCNC: 0.7 MG/DL (ref 0.2–1.3)
BILIRUB SERPL-MCNC: 0.8 MG/DL (ref 0.2–1.3)
BILIRUB UR QL STRIP: NEGATIVE
BUN SERPL-MCNC: 32 MG/DL (ref 7–30)
BUN SERPL-MCNC: 42 MG/DL (ref 7–30)
CALCIUM SERPL-MCNC: 8.4 MG/DL (ref 8.5–10.1)
CALCIUM SERPL-MCNC: 8.4 MG/DL (ref 8.5–10.1)
CHLORIDE BLD-SCNC: 111 MMOL/L (ref 94–109)
CHLORIDE BLD-SCNC: 112 MMOL/L (ref 94–109)
CO2 SERPL-SCNC: 25 MMOL/L (ref 20–32)
CO2 SERPL-SCNC: 27 MMOL/L (ref 20–32)
COLOR UR AUTO: YELLOW
CREAT SERPL-MCNC: 0.65 MG/DL (ref 0.52–1.04)
CREAT SERPL-MCNC: 0.99 MG/DL (ref 0.52–1.04)
DIASTOLIC BLOOD PRESSURE - MUSE: NORMAL MMHG
DIASTOLIC BLOOD PRESSURE - MUSE: NORMAL MMHG
ERYTHROCYTE [DISTWIDTH] IN BLOOD BY AUTOMATED COUNT: 16.1 % (ref 10–15)
GFR SERPL CREATININE-BSD FRML MDRD: 60 ML/MIN/1.73M2
GFR SERPL CREATININE-BSD FRML MDRD: >90 ML/MIN/1.73M2
GLUCOSE BLD-MCNC: 128 MG/DL (ref 70–99)
GLUCOSE BLD-MCNC: 85 MG/DL (ref 70–99)
GLUCOSE BLDC GLUCOMTR-MCNC: 106 MG/DL (ref 70–99)
GLUCOSE BLDC GLUCOMTR-MCNC: 111 MG/DL (ref 70–99)
GLUCOSE BLDC GLUCOMTR-MCNC: 123 MG/DL (ref 70–99)
GLUCOSE BLDC GLUCOMTR-MCNC: 156 MG/DL (ref 70–99)
GLUCOSE BLDC GLUCOMTR-MCNC: 63 MG/DL (ref 70–99)
GLUCOSE BLDC GLUCOMTR-MCNC: 75 MG/DL (ref 70–99)
GLUCOSE BLDC GLUCOMTR-MCNC: 81 MG/DL (ref 70–99)
GLUCOSE UR STRIP-MCNC: NEGATIVE MG/DL
HCO3 BLD-SCNC: 23 MMOL/L (ref 21–28)
HCT VFR BLD AUTO: 26.5 % (ref 35–47)
HGB BLD-MCNC: 8 G/DL (ref 11.7–15.7)
HGB UR QL STRIP: ABNORMAL
HYALINE CASTS: 39 /LPF
INR PPP: 1.48 (ref 0.85–1.15)
INTERPRETATION ECG - MUSE: NORMAL
INTERPRETATION ECG - MUSE: NORMAL
KETONES UR STRIP-MCNC: NEGATIVE MG/DL
LEUKOCYTE ESTERASE UR QL STRIP: ABNORMAL
LVEF ECHO: NORMAL
MAGNESIUM SERPL-MCNC: 2.3 MG/DL (ref 1.6–2.3)
MCH RBC QN AUTO: 29.1 PG (ref 26.5–33)
MCHC RBC AUTO-ENTMCNC: 30.2 G/DL (ref 31.5–36.5)
MCV RBC AUTO: 96 FL (ref 78–100)
MUCOUS THREADS #/AREA URNS LPF: PRESENT /LPF
NITRATE UR QL: NEGATIVE
O2/TOTAL GAS SETTING VFR VENT: 1 %
OXYHGB MFR BLD: 97 % (ref 92–100)
P AXIS - MUSE: 66 DEGREES
P AXIS - MUSE: NORMAL DEGREES
PCO2 BLD: 39 MM HG (ref 35–45)
PH BLD: 7.38 [PH] (ref 7.35–7.45)
PH UR STRIP: 5 [PH] (ref 5–7)
PHOSPHATE SERPL-MCNC: 3.7 MG/DL (ref 2.5–4.5)
PLATELET # BLD AUTO: 93 10E3/UL (ref 150–450)
PO2 BLD: 119 MM HG (ref 80–105)
POTASSIUM BLD-SCNC: 4.4 MMOL/L (ref 3.4–5.3)
POTASSIUM BLD-SCNC: 4.5 MMOL/L (ref 3.4–5.3)
PR INTERVAL - MUSE: 128 MS
PR INTERVAL - MUSE: NORMAL MS
PROT SERPL-MCNC: 5.3 G/DL (ref 6.8–8.8)
PROT SERPL-MCNC: 5.4 G/DL (ref 6.8–8.8)
QRS DURATION - MUSE: 100 MS
QRS DURATION - MUSE: 96 MS
QT - MUSE: 290 MS
QT - MUSE: 476 MS
QTC - MUSE: 394 MS
QTC - MUSE: 495 MS
R AXIS - MUSE: 2 DEGREES
R AXIS - MUSE: 5 DEGREES
RBC # BLD AUTO: 2.75 10E6/UL (ref 3.8–5.2)
RBC URINE: >182 /HPF
SODIUM SERPL-SCNC: 142 MMOL/L (ref 133–144)
SODIUM SERPL-SCNC: 144 MMOL/L (ref 133–144)
SP GR UR STRIP: 1.02 (ref 1–1.03)
SQUAMOUS EPITHELIAL: 1 /HPF
SYSTOLIC BLOOD PRESSURE - MUSE: NORMAL MMHG
SYSTOLIC BLOOD PRESSURE - MUSE: NORMAL MMHG
T AXIS - MUSE: 91 DEGREES
T AXIS - MUSE: 97 DEGREES
TRANSITIONAL EPI: 1 /HPF
UROBILINOGEN UR STRIP-MCNC: NORMAL MG/DL
VENTRICULAR RATE- MUSE: 111 BPM
VENTRICULAR RATE- MUSE: 65 BPM
WBC # BLD AUTO: 11.7 10E3/UL (ref 4–11)
WBC CLUMPS #/AREA URNS HPF: PRESENT /HPF
WBC URINE: 74 /HPF

## 2022-02-28 PROCEDURE — 92526 ORAL FUNCTION THERAPY: CPT | Mod: GN

## 2022-02-28 PROCEDURE — 82248 BILIRUBIN DIRECT: CPT | Performed by: STUDENT IN AN ORGANIZED HEALTH CARE EDUCATION/TRAINING PROGRAM

## 2022-02-28 PROCEDURE — 80053 COMPREHEN METABOLIC PANEL: CPT | Performed by: NURSE PRACTITIONER

## 2022-02-28 PROCEDURE — 250N000013 HC RX MED GY IP 250 OP 250 PS 637: Performed by: STUDENT IN AN ORGANIZED HEALTH CARE EDUCATION/TRAINING PROGRAM

## 2022-02-28 PROCEDURE — 94640 AIRWAY INHALATION TREATMENT: CPT

## 2022-02-28 PROCEDURE — 93325 DOPPLER ECHO COLOR FLOW MAPG: CPT | Mod: 26 | Performed by: STUDENT IN AN ORGANIZED HEALTH CARE EDUCATION/TRAINING PROGRAM

## 2022-02-28 PROCEDURE — 97110 THERAPEUTIC EXERCISES: CPT | Mod: GP

## 2022-02-28 PROCEDURE — 97530 THERAPEUTIC ACTIVITIES: CPT | Mod: GP

## 2022-02-28 PROCEDURE — 999N000208 ECHOCARDIOGRAM LIMITED

## 2022-02-28 PROCEDURE — 71045 X-RAY EXAM CHEST 1 VIEW: CPT

## 2022-02-28 PROCEDURE — 83735 ASSAY OF MAGNESIUM: CPT | Performed by: NURSE PRACTITIONER

## 2022-02-28 PROCEDURE — 93308 TTE F-UP OR LMTD: CPT | Mod: 26 | Performed by: STUDENT IN AN ORGANIZED HEALTH CARE EDUCATION/TRAINING PROGRAM

## 2022-02-28 PROCEDURE — 97535 SELF CARE MNGMENT TRAINING: CPT | Mod: GO | Performed by: OCCUPATIONAL THERAPIST

## 2022-02-28 PROCEDURE — 97168 OT RE-EVAL EST PLAN CARE: CPT | Mod: GO | Performed by: OCCUPATIONAL THERAPIST

## 2022-02-28 PROCEDURE — 250N000013 HC RX MED GY IP 250 OP 250 PS 637: Performed by: SURGERY

## 2022-02-28 PROCEDURE — 250N000013 HC RX MED GY IP 250 OP 250 PS 637: Performed by: NURSE PRACTITIONER

## 2022-02-28 PROCEDURE — 84100 ASSAY OF PHOSPHORUS: CPT | Performed by: SURGERY

## 2022-02-28 PROCEDURE — 02HP33Z INSERTION OF INFUSION DEVICE INTO PULMONARY TRUNK, PERCUTANEOUS APPROACH: ICD-10-PCS | Performed by: INTERNAL MEDICINE

## 2022-02-28 PROCEDURE — 999N000157 HC STATISTIC RCP TIME EA 10 MIN

## 2022-02-28 PROCEDURE — 71045 X-RAY EXAM CHEST 1 VIEW: CPT | Mod: 26 | Performed by: RADIOLOGY

## 2022-02-28 PROCEDURE — 99231 SBSQ HOSP IP/OBS SF/LOW 25: CPT | Mod: 24 | Performed by: STUDENT IN AN ORGANIZED HEALTH CARE EDUCATION/TRAINING PROGRAM

## 2022-02-28 PROCEDURE — 85027 COMPLETE CBC AUTOMATED: CPT | Performed by: STUDENT IN AN ORGANIZED HEALTH CARE EDUCATION/TRAINING PROGRAM

## 2022-02-28 PROCEDURE — 250N000011 HC RX IP 250 OP 636: Performed by: STUDENT IN AN ORGANIZED HEALTH CARE EDUCATION/TRAINING PROGRAM

## 2022-02-28 PROCEDURE — 200N000002 HC R&B ICU UMMC

## 2022-02-28 PROCEDURE — 258N000001 HC RX 258: Performed by: STUDENT IN AN ORGANIZED HEALTH CARE EDUCATION/TRAINING PROGRAM

## 2022-02-28 PROCEDURE — 84155 ASSAY OF PROTEIN SERUM: CPT | Performed by: STUDENT IN AN ORGANIZED HEALTH CARE EDUCATION/TRAINING PROGRAM

## 2022-02-28 PROCEDURE — 92610 EVALUATE SWALLOWING FUNCTION: CPT | Mod: GN

## 2022-02-28 PROCEDURE — 87086 URINE CULTURE/COLONY COUNT: CPT | Performed by: STUDENT IN AN ORGANIZED HEALTH CARE EDUCATION/TRAINING PROGRAM

## 2022-02-28 PROCEDURE — 255N000002 HC RX 255 OP 636: Performed by: STUDENT IN AN ORGANIZED HEALTH CARE EDUCATION/TRAINING PROGRAM

## 2022-02-28 PROCEDURE — 250N000012 HC RX MED GY IP 250 OP 636 PS 637: Performed by: STUDENT IN AN ORGANIZED HEALTH CARE EDUCATION/TRAINING PROGRAM

## 2022-02-28 PROCEDURE — 81001 URINALYSIS AUTO W/SCOPE: CPT | Performed by: STUDENT IN AN ORGANIZED HEALTH CARE EDUCATION/TRAINING PROGRAM

## 2022-02-28 PROCEDURE — 93321 DOPPLER ECHO F-UP/LMTD STD: CPT | Mod: 26 | Performed by: STUDENT IN AN ORGANIZED HEALTH CARE EDUCATION/TRAINING PROGRAM

## 2022-02-28 PROCEDURE — 250N000011 HC RX IP 250 OP 636: Performed by: NURSE PRACTITIONER

## 2022-02-28 PROCEDURE — 250N000011 HC RX IP 250 OP 636: Performed by: PHYSICIAN ASSISTANT

## 2022-02-28 PROCEDURE — 85610 PROTHROMBIN TIME: CPT | Performed by: STUDENT IN AN ORGANIZED HEALTH CARE EDUCATION/TRAINING PROGRAM

## 2022-02-28 PROCEDURE — 250N000009 HC RX 250: Performed by: SURGERY

## 2022-02-28 PROCEDURE — 4A023N7 MEASUREMENT OF CARDIAC SAMPLING AND PRESSURE, LEFT HEART, PERCUTANEOUS APPROACH: ICD-10-PCS | Performed by: INTERNAL MEDICINE

## 2022-02-28 PROCEDURE — 82805 BLOOD GASES W/O2 SATURATION: CPT | Performed by: PHYSICIAN ASSISTANT

## 2022-02-28 PROCEDURE — 999N000015 HC STATISTIC ARTERIAL MONITORING DAILY

## 2022-02-28 PROCEDURE — 93321 DOPPLER ECHO F-UP/LMTD STD: CPT

## 2022-02-28 PROCEDURE — 5A02210 ASSISTANCE WITH CARDIAC OUTPUT USING BALLOON PUMP, CONTINUOUS: ICD-10-PCS | Performed by: INTERNAL MEDICINE

## 2022-02-28 PROCEDURE — 250N000013 HC RX MED GY IP 250 OP 250 PS 637: Performed by: PHYSICIAN ASSISTANT

## 2022-02-28 RX ORDER — FUROSEMIDE 10 MG/ML
80 INJECTION INTRAMUSCULAR; INTRAVENOUS ONCE
Status: COMPLETED | OUTPATIENT
Start: 2022-02-28 | End: 2022-02-28

## 2022-02-28 RX ORDER — IPRATROPIUM BROMIDE AND ALBUTEROL SULFATE 2.5; .5 MG/3ML; MG/3ML
3 SOLUTION RESPIRATORY (INHALATION) EVERY 4 HOURS PRN
Status: DISCONTINUED | OUTPATIENT
Start: 2022-02-28 | End: 2022-03-18 | Stop reason: HOSPADM

## 2022-02-28 RX ORDER — OXYBUTYNIN CHLORIDE 5 MG/1
5 TABLET ORAL 3 TIMES DAILY
Status: DISCONTINUED | OUTPATIENT
Start: 2022-03-01 | End: 2022-03-01

## 2022-02-28 RX ORDER — POLYETHYLENE GLYCOL 3350 17 G/17G
17 POWDER, FOR SOLUTION ORAL DAILY PRN
Status: CANCELLED | OUTPATIENT
Start: 2022-02-28

## 2022-02-28 RX ORDER — CIPROFLOXACIN 250 MG/1
250 TABLET, FILM COATED ORAL EVERY 12 HOURS SCHEDULED
Status: DISCONTINUED | OUTPATIENT
Start: 2022-03-01 | End: 2022-02-28

## 2022-02-28 RX ORDER — AMIODARONE HYDROCHLORIDE 200 MG/1
400 TABLET ORAL 2 TIMES DAILY
Status: CANCELLED | OUTPATIENT
Start: 2022-02-28

## 2022-02-28 RX ORDER — CEFTRIAXONE 1 G/1
1 INJECTION, POWDER, FOR SOLUTION INTRAMUSCULAR; INTRAVENOUS EVERY 24 HOURS
Status: COMPLETED | OUTPATIENT
Start: 2022-03-01 | End: 2022-03-01

## 2022-02-28 RX ORDER — FUROSEMIDE 10 MG/ML
20 INJECTION INTRAMUSCULAR; INTRAVENOUS ONCE
Status: COMPLETED | OUTPATIENT
Start: 2022-02-28 | End: 2022-02-28

## 2022-02-28 RX ORDER — AMOXICILLIN 250 MG
1 CAPSULE ORAL 2 TIMES DAILY PRN
Status: CANCELLED | OUTPATIENT
Start: 2022-02-28

## 2022-02-28 RX ORDER — POLYETHYLENE GLYCOL 3350 17 G/17G
17 POWDER, FOR SOLUTION ORAL DAILY PRN
Status: DISCONTINUED | OUTPATIENT
Start: 2022-02-28 | End: 2022-03-03

## 2022-02-28 RX ORDER — LANOLIN ALCOHOL/MO/W.PET/CERES
3 CREAM (GRAM) TOPICAL AT BEDTIME
Status: DISCONTINUED | OUTPATIENT
Start: 2022-02-28 | End: 2022-03-18 | Stop reason: HOSPADM

## 2022-02-28 RX ORDER — FUROSEMIDE 10 MG/ML
40 INJECTION INTRAMUSCULAR; INTRAVENOUS ONCE
Status: COMPLETED | OUTPATIENT
Start: 2022-03-01 | End: 2022-03-01

## 2022-02-28 RX ADMIN — ACETAMINOPHEN 650 MG: 325 TABLET, FILM COATED ORAL at 09:37

## 2022-02-28 RX ADMIN — HYDROMORPHONE HYDROCHLORIDE 0.2 MG: 0.2 INJECTION, SOLUTION INTRAMUSCULAR; INTRAVENOUS; SUBCUTANEOUS at 06:22

## 2022-02-28 RX ADMIN — PANTOPRAZOLE SODIUM 40 MG: 40 TABLET, DELAYED RELEASE ORAL at 07:48

## 2022-02-28 RX ADMIN — HEPARIN SODIUM 5000 UNITS: 5000 INJECTION, SOLUTION INTRAVENOUS; SUBCUTANEOUS at 21:38

## 2022-02-28 RX ADMIN — INSULIN ASPART 1 UNITS: 100 INJECTION, SOLUTION INTRAVENOUS; SUBCUTANEOUS at 17:14

## 2022-02-28 RX ADMIN — HYDROMORPHONE HYDROCHLORIDE 0.4 MG: 0.2 INJECTION, SOLUTION INTRAMUSCULAR; INTRAVENOUS; SUBCUTANEOUS at 20:18

## 2022-02-28 RX ADMIN — THIAMINE HCL TAB 100 MG 100 MG: 100 TAB at 07:48

## 2022-02-28 RX ADMIN — ASPIRIN 81 MG: 81 TABLET, CHEWABLE ORAL at 07:48

## 2022-02-28 RX ADMIN — IPRATROPIUM BROMIDE AND ALBUTEROL SULFATE 3 ML: 2.5; .5 SOLUTION RESPIRATORY (INHALATION) at 08:40

## 2022-02-28 RX ADMIN — Medication 3 MG: at 21:38

## 2022-02-28 RX ADMIN — OXYCODONE HYDROCHLORIDE 5 MG: 5 TABLET ORAL at 01:12

## 2022-02-28 RX ADMIN — DEXTROSE MONOHYDRATE 15 ML: 25 INJECTION, SOLUTION INTRAVENOUS at 09:41

## 2022-02-28 RX ADMIN — AMIODARONE HYDROCHLORIDE 400 MG: 200 TABLET ORAL at 07:48

## 2022-02-28 RX ADMIN — OXYCODONE HYDROCHLORIDE 5 MG: 5 TABLET ORAL at 12:23

## 2022-02-28 RX ADMIN — FUROSEMIDE 20 MG: 10 INJECTION, SOLUTION INTRAVENOUS at 04:26

## 2022-02-28 RX ADMIN — HUMAN ALBUMIN MICROSPHERES AND PERFLUTREN 6 ML: 10; .22 INJECTION, SOLUTION INTRAVENOUS at 10:47

## 2022-02-28 RX ADMIN — HEPARIN SODIUM 5000 UNITS: 5000 INJECTION, SOLUTION INTRAVENOUS; SUBCUTANEOUS at 13:30

## 2022-02-28 RX ADMIN — HEPARIN SODIUM 5000 UNITS: 5000 INJECTION, SOLUTION INTRAVENOUS; SUBCUTANEOUS at 04:30

## 2022-02-28 RX ADMIN — MUPIROCIN 0.5 G: 20 OINTMENT TOPICAL at 21:39

## 2022-02-28 RX ADMIN — ACETAMINOPHEN 650 MG: 325 TABLET, FILM COATED ORAL at 18:00

## 2022-02-28 RX ADMIN — FUROSEMIDE 80 MG: 10 INJECTION, SOLUTION INTRAVENOUS at 12:22

## 2022-02-28 RX ADMIN — ACETAMINOPHEN 650 MG: 325 TABLET, FILM COATED ORAL at 04:14

## 2022-02-28 ASSESSMENT — ACTIVITIES OF DAILY LIVING (ADL)
ADLS_ACUITY_SCORE: 13
ADLS_ACUITY_SCORE: 11
ADLS_ACUITY_SCORE: 13

## 2022-02-28 NOTE — PROGRESS NOTES
"   02/28/22 1643   Quick Adds   Type of Visit Occupational Therapy Re-evaluation   Living Environment   Living Environment Comments Please see initial OT evaluation from 02/19/22 for PLOF and home environment information. Pt's son present today, again endorsing plan to provide assist to patient when pt returns home, stating, \"anything she needs.\"   General Information   Onset of Illness/Injury or Date of Surgery 02/12/22   Referring Physician Carlita Kelly PA-C   Patient/Family Therapy Goal Statement (OT) To return home. To be strong enough to clean house and return to quilting as a hobby.   Additional Occupational Profile Info/Pertinent History of Current Problem Per chart: \"Ashley Osman is a 71 year old female with PMH of HFrEF (10-15%) 2/2 ICM (Hx LAD and Circumflex MIs; total occlusion of RCA and LAD), Biventricular failure (requiring inotropic support PTA to [facility]), COPD, HLD, Tobacco Use Disorder (quit 12/2021), CAD who underwent CABG x4 on 02/24 with Dr. Bhandari. Found to have left femoral artery thrombosis with acute left lower extremity ischemia s/p emergent left femoral thrombectomy, bovine patch angioplasty on 2/25. 02/28 with elevated LFTs, no worsened leg edema, More sign negative.\"   Existing Precautions/Restrictions cardiac;sternal;fall   Left Upper Extremity (Weight-bearing Status) partial weight-bearing (PWB)  (10 lb limit per sternal precautions)   Right Upper Extremity (Weight-bearing Status) partial weight-bearing (PWB)  (10 lb limit, per sternal precuations)   Left Lower Extremity (Weight-bearing Status) full weight-bearing (FWB)   Right Lower Extremity (Weight-bearing Status) full weight-bearing (FWB)   Cognitive Status Examination   Orientation Status orientation to person, place and time   Behavioral Issues withdrawn   Affect/Mental Status (Cognitive) flat/blunted affect   Follows Commands follows one-step commands;over 90% accuracy;delayed response/completion;increased processing " time needed   Safety Deficit moderate deficit;safety precautions awareness   Cognitive Status Comments Pt reporting feeling fatigued and anxious. Pt w/ quiet voice, slow to respond to questions and to initiate tasks. Pt needing repeated education in strenal precautions and current diet recommendation of mildly thick liquids.   Visual Perception   Impact of Vision Impairment on Function (Vision) No deficit noted   Sensory   Sensory Quick Adds No deficits were identified   Sensory Comments Pt's BUE and BUE cold but with normal sensation   Pain Assessment   Patient Currently in Pain No   Integumentary/Edema   Integumentary/Edema Comments 1-2+ pitting edema around R elbow. No noted edema in BLE or LUE.   Range of Motion Comprehensive   Comment, General Range of Motion WFL   Strength Comprehensive (MMT)   Comment, General Manual Muscle Testing (MMT) Assessment 3+/5 Mik  strength. BUE strength not formally assessed due to sternal precautions. BUE generally weak, pt having difficulty lifting BUE to face during g/h ADLs.   Coordination   Coordination Comments Mild fine motor coordination deficits noted during G/H.    Transfers   Transfer Comments Transfers not observed today. Defer to most recent PT eval/notes.   Bathing Assessment/Intervention   [place] Level (Bathing) moderate assist (50% patient effort)   Assistive Devices (Bathing) shower chair   Comment, (Bathing) Per clinical judgment based on performance of other ADLs   Upper Body Dressing Assessment/Training   [place] Level (Upper Body Dressing) moderate assist (50% patient effort)   Lower Body Dressing Assessment/Training   [place] Level (Lower Body Dressing) maximum assist (25% patient effort)   Clothes Fastener Management   [place] Level (Clothes Fastener Management) moderate assist (50% patient effort)   Grooming Assessment/Training   [place] Level (Grooming) moderate assist (50% patient effort)   Comment, (Grooming) Set up/Min A for  washing face and oral hygiene. Total A for grooming hair.   Eating/Self Feeding   [place] Level (Feeding) set up;supervision   Comment, (Feeding) Pt needing reminders of current diet- mildly thick liquids.   Toileting   [place] Level (Toileting) dependent (less than 25% patient effort)   Comment, (Toileting) Catheter and anticipate Total A on commode   Clinical Impression   Criteria for Skilled Therapeutic Interventions Met (OT) Yes, treatment indicated   OT Diagnosis Generalized weakness, decreased functional mobility, impaired ADL IND   OT Problem List-Impairments impacting ADL problems related to;activity tolerance impaired;cognition;fear & anxiety;mobility;strength;post-surgical precautions   Assessment of Occupational Performance 3-5 Performance Deficits   Identified Performance Deficits toileting, func mobility, IADLs, bathing   Planned Therapy Interventions (OT) ADL retraining;strengthening;progressive activity/exercise;bed mobility training;transfer training   Clinical Decision Making Complexity (OT) low complexity   Anticipated Equipment Needs Upon Discharge (OT) shower chair  (Will continue to assess)   Risk & Benefits of therapy have been explained evaluation/treatment results reviewed;care plan/treatment goals reviewed;risks/benefits reviewed;current/potential barriers reviewed;participants voiced agreement with care plan;participants included;patient;son   Clinical Impression Comments Pt presents below func baseline d/t generalized weakness, decreased func mobility, and new post-surgical precautions. Pt would benefit from skilled OT services to address above deficits and to promote ADL IND and safety within precautions.    OT Discharge Planning   OT Discharge Recommendation (DC Rec) Transitional Care Facility;home with assist;home with home care occupational therapy   OT Rationale for DC Rec Pt is well below IND baseline and will benefit from continued OT to progress activity tolerance,  strength, and ADL IND and safety. If pt were to return home, pt would require 24 assist w/ all ADLs and mobility.   OT Brief overview of current status Ax1   Total Evaluation Time (Minutes)   Total Evaluation Time (Minutes) 5   OT Goals   Therapy Frequency (OT) 5 times/wk   OT Predicated Duration/Target Date for Goal Attainment 03/18/22   OT: Hygiene/Grooming supervision/stand-by assist;within precautions   OT: Upper Body Bathing Modified independent;using adaptive equipment;within precautions   OT: Lower Body Bathing Supervision/stand-by assist;using adaptive equipment;with precautions   OT: Transfer Supervision/stand-by assist;within precautions  (shower transfer)   OT: Toilet Transfer/Toileting Supervision/stand-by assist;toilet transfer;cleaning and garment management;using adaptive equipment;within precautions

## 2022-02-28 NOTE — PROGRESS NOTES
"SPIRITUAL HEALTH SERVICES  SPIRITUAL ASSESSMENT Progress Note  [facility] ([place]) 4E     REFERRAL SOURCE: Follow up    Pt was receptive to  visit and shared she believes strongly in the \"Father, Son and Holy Spirit.\"  She expressed feeling anxious that \"something could go wrong,\" and  provided validation of her concerns and reassurance. Pt shared she was glad that  had spoken to her son previously.  Pt accepted prayer from  for healing and peace.     PLAN: [facility] will remain available for ongoing support     Nohemi Ram  Pager: [phone]    "

## 2022-02-28 NOTE — PROGRESS NOTES
Major Shift Events:   -Pt extubated at 0900 to 1L nasal cannula   -Passed bedside swallow with nurse for pills and sips of water. Official speech evaluation ordered per CVTS team.   -Pt got up to chair with PT today.  -D/C'd Dobutamine and Amiodarone gtt today.   -D/C'd [place]  -Multiple loose/watery BM's (most the time incontinent)   -Received 5 mg Oxycodone x2 for incisional and back pain today.   -Potassium replaced  -Vizcaino remains in place with minimal output. Approx 15-30/hr.   -Vascular Surgery came to assess. Okay with q4h pulse checks      Anand (son)- updated at 1840 of today.

## 2022-02-28 NOTE — PLAN OF CARE
Major Shift Events:  Disoriented to time - knows the month and day but thinks it's 1922.  Atarax and seroquel given for anxiety.  Oxy, tylenol, and dilaudid given for pain.  Liver enzymes elevated this am with  and  - MD notified; will pass on to day team.  Low UOP of 10-25/hr - MD notified, responded to first 20 of lasix but no response to second dose of 20 of lasix.  SR with HR in 60s - pacemaker set at 60 bpm DDD.  1L NC overnight.    Plan: SLP to see patient this am for swallow study, if pt does not pass she will need an NJ for nutrition.  Continue to monitor.  For vital signs and complete assessments, please see documentation flowsheets.

## 2022-02-28 NOTE — PHARMACY-CONSULT NOTE
Pharmacy Delirium Chart Review    Upon chart review, the following medications may contribute to possible patient delirium: fentanyl (stopped yesterday), hydromorphone, oxycodone.  Please consult unit pharmacist with further questions.    Mary Lou Carrington RPH  Phone/Pager: *[phone]

## 2022-02-28 NOTE — PROGRESS NOTES
CLINICAL NUTRITION SERVICES - BRIEF NOTE   (see RD note on 2/22 for full assessment)    SLP recommend diet advancement to full liquid diet with mildly thickened liquids.   FT placement no longer indicated. Will order oral supplements to promote adequate po.     Nutrition changes today:  - Magic cup, 1x/day  (290 kcal, 9 g protein)   - Vital cuisine shake, 1x/day (520 kcal, 22 g protein)    Trice Espinosa, IAM, LD  [id number]  Pgr: [phone]

## 2022-02-28 NOTE — PROGRESS NOTES
CV ICU PROGRESS NOTE  February 28, 2022      CO-MORBIDITIES:   S/P CABG x 4  (primary encounter diagnosis)  Systolic heart failure, unspecified HF chronicity (H)  Limb ischemia    ASSESSMENT: Ashley Osman is a 71 year old female with PMH of HFrEF (10-15%) 2/2 ICM (Hx LAD and Circumflex MIs; total occlusion of RCA and LAD), Biventricular failure (requiring inotropic support PTA to [facility]), COPD, HLD, Tobacco Use Disorder (quit 12/2021), CAD who underwent CABG x4 on 02/24 with Dr. Bhandari. Found to have left femoral artery thrombosis with acute left lower extremity ischemia s/p emergent left femoral thrombectomy, bovine patch angioplasty on 2/25. 02/28 with elevated LFTs, no worsened leg edema, More sign negative.    TODAY'S PROGRESS:     Overnight:  - increase in LFTs  - lasix 20 x 2 (voided ~25/hr)  - did have fever in last 24, but none since precedex discontinue  - intermittently does not know the year (oriented to person, place, situation, knows date)   - ICU CAM negative this AM    Goals:  # Elevated LFTs   - echocardiogram for potential RV dysfunction, leading to hepatic congestion   - lasix challenge today    - give 80 as a challenge (she has been getting doses of 20 at a time)   - re-check LFTs this PM   - Hold amio PO d/t LFT elevations  # GI   - SLP swallow eval f/u  # Delirium & Pain Management   - discontinue H2 blocking medications (hydroxyzine)   - delirium precautions   - melatonin at bedtime      PLAN:    Neuro/ pain/ sedation:  # Acute Postoperative pain  - Monitor neurological status. Notify the MD for any acute changes in exam.  - Pain:              - Scheduled:                          - nothing scheduled              - PRN                           - acetaminophen, fentanyl, dilaudid, oxycodone    - discontinue hydroxyzine PRN   - E/S catheters    - will stay until CTs come out  # ICU Delirium  - delirium precautions  - bedtime   - melatonin   - PRN quetiapine    Pulmonary care:   #Acute  respiratory failure with hypercapnia; resolved  # Hx of COPD  - Titrate supplemental oxygen to maintain saturation above 92%.  - Pulmonary hygiene: Incentive spirometer every 15- 30 minutes when awake, flutter valve, C&DB    Cardiovascular:    # S/p CABG x 4 on 02/24 by Dr. Bhandari  # History of Biventricular HF requiring pressor support 2/2 ICM  # Acute a-fib with RVR (02/26)  # HLD  Recent echo on 02/11/2022 with LVEF of 15%  Postop echo appeared similar to prior (LVEF < ~20%; RV function normal)  - off inotropes and pressors (02/28)  - Hold atorvastatin, carvediliol  - Amio gtt --> transitioned to PO   - Hold amio 02/28 d/t LFT elevations   - if Afib RVR, start Beta blocker or diltiazem  - ASA 81  - Complete echo for RV dysfunction per GI below  # Left femoral artery thrombosis with acute left lower extremity ischemia s/p emergent left femoral thrombectomy, bovine patch angioplasty on 2/25  - Heparin gtt discontinued, s/p thrombectomy per Vascular  - On subcutaneous heparin  - Monitor lower extremity pulses and perfusion status    GI care/ Nutrition:    - NPO    - f/u speech eval 02/28    - ADAT following eval   - PPI   # Diarrhea, bowel incontinence   - hold off on C. Diff for now (afebrile, stable WBC)   - Bowel regimen: miralax, senna with hold parameters  # Elevated LFTs   - Patient with newly elevated LFTs, More negative on exam.   - no e/o progression of leg edema, no CVP to trend so far   - complete echocardiogram scheduled 02/28, will f/u and diuresis as below   - suspect LFT elevation 2/2 amiodarone initiation, not on scheduled tylenol (holding amio)    Renal/ Fluid Balance/ Electrolytes:    - up ~8+ kg since admission, decreasing UOP   - Diuresis: lasix (goal -1L)    - lasix challenge 80 and f/u UOP   - Avoid/limit nephrotoxins as able   - ICU electrolyte replacement protocol for K, Mg, PO4    Endocrine:    - MDSSI (no prior Hx of T2DM)  - Goal BG <180 for optimal healing    ID/ Antibiotics:    # Stress induced leukocytosis  - Completed perioperative regimen  - Continue to monitor fever curve, WBC and inflammatory markers as appropriate    Heme:     # Stress induced leukocytosis  # Acute blood loss anemia  # Acute blood loss thrombocytopenia  No s/sx active bleeding. Hgb stable, plt stable, WBC stable.  - Hgb goal > 7  - CBC qday    MSK/ Skin:  - Sternal precautions  - PT/OT/CR      Prophylaxis:    - Mechanical prophylaxis for DVT  - Chemical DVT prophylaxis = heparin subcutaneous   - PPI     Lines/ tubes/ drains:  - Arterial Line (pull 02/28)  - CTs x 2  - RIJ  - PIV x 1  - Vizcaino      Disposition:  - CVICU      Patient seen, findings and plan discussed with CVICU staff, Dr. Peters    -----------------------------------  Eloy Flannery, MS4    I saw and examined the patient with the medical student. I agree with the assessment and plan.     Nithin Grossman DO  PGY-2 Anesthesiology  [phone]    ====================================    SUBJECTIVE:   Feeling okay this AM. Having some R shoulder pain. States she feels she needs to switch her weight to her other side to relieve it. Denies abdominal pain.    OBJECTIVE:   1. VITAL SIGNS:   Temp:  [97.9  F (36.6  C)-101.5  F (38.6  C)] 97.9  F (36.6  C)  Pulse:  [66-84] 66  Resp:  [16-30] 25  BP: (139)/(61) 139/61  MAP:  [66 mmHg-94 mmHg] 83 mmHg  Arterial Line BP: (106-141)/(47-68) 127/62  FiO2 (%):  [30 %] 30 %  SpO2:  [98 %-100 %] 100 %  Vent Mode: CPAP/PS  (Continuous positive airway pressure with Pressure Support)  FiO2 (%): 30 %  Resp Rate (Set): 16 breaths/min  Tidal Volume (Set, mL): 430 mL  PEEP (cm H2O): 5 cmH2O  Pressure Support (cm H2O): 7 cmH2O  Resp: 25      2. INTAKE/ OUTPUT:   I/O last 3 completed shifts:  In: 2233.61 [I.V.:1023.61; NG/GT:210; IV Piggyback:750]  Out: 1088 [Urine:478; Chest Tube:610]    3. PHYSICAL EXAMINATION:   General: female patient, lying comfortably in bed  Neuro: AAOx3, CN grossly intact; Delayed movements, delayed response  to questions, but ICU CAM negative  Resp: Breathing comfortably, non-labored, on 1L NC  CV: regular S1, S2, soft pericardial rub, there is a IV/VI systolic murmur most prominent at the mitral position, upper extremity pulses are 2+ bilaterally, bilateral dorsalis pedal pulses 2+/symmetric  Abdomen: Soft, non-distended, non-tender; More sign negative.  Incisions: c/d/i   Extremities: warm, well perfused    4. INVESTIGATIONS:   Arterial Blood Gases   Recent Labs   Lab 02/28/22 0306 02/27/22 2313 02/27/22  0646 02/27/22  0351   PH 7.38 7.40 7.47* 7.46*   PCO2 39 36 36 37   PO2 119* 106* 92 96   HCO3 23 22 26 26     Complete Blood Count   Recent Labs   Lab 02/28/22 0305 02/27/22 0351 02/26/22  1558 02/26/22  0401 02/25/22  2203   WBC 11.7* 11.3*  --  11.3* 11.9*   HGB 8.0* 7.6* 7.9* 7.3* 7.4*   PLT 93* 88*  --  83* 82*     Basic Metabolic Panel  Recent Labs   Lab 02/28/22 0305 02/28/22  0304 02/27/22 2313 02/27/22  2312 02/27/22  1940 02/27/22  1544 02/27/22  0746 02/27/22  0351 02/26/22  0602 02/26/22  0401     --  141  --   --   --   --  146*  --  148*   POTASSIUM 4.5  --  4.1  --   --  3.8  --  3.9   < > 3.8   CHLORIDE 111*  --  112*  --   --   --   --  114*  --  117*   CO2 25  --  24  --   --   --   --  26  --  25   BUN 32*  --  31*  --   --   --   --  23  --  24   CR 0.65  --  0.66  --   --   --   --  0.53  --  0.53   GLC 85 75 94 76   < > 113*   < > 89   < > 100*  93    < > = values in this interval not displayed.     Liver Function Tests  Recent Labs   Lab 02/28/22 0305 02/27/22 0351 02/27/22  0350 02/26/22  0401 02/25/22 2203 02/25/22  0332   * 68*  --  37 43 43   * 75*  --  34 34 31   ALKPHOS 62 57  --  52 43 39*   BILITOTAL 0.8 0.8  --  0.5 0.6 0.7   ALBUMIN 3.1* 2.8*  --  3.0* 3.1* 2.9*   INR 1.48*  --  1.39* 1.32*  --  1.55*     Pancreatic Enzymes  No lab results found in last 7 days.  Coagulation Profile  Recent Labs   Lab 02/28/22 0305 02/27/22 0350 02/26/22 0401  02/25/22  0332 02/24/22  1602 02/24/22  1345   INR 1.48* 1.39* 1.32* 1.55* 1.82* 2.23*   PTT  --   --   --   --  35 32         5. RADIOLOGY:   No results found for this or any previous visit (from the past 24 hour(s)).    =========================================

## 2022-03-01 ENCOUNTER — APPOINTMENT (OUTPATIENT)
Dept: PHYSICAL THERAPY | Facility: CLINIC | Age: 72
DRG: 233 | End: 2022-03-01
Attending: INTERNAL MEDICINE
Payer: MEDICARE

## 2022-03-01 DIAGNOSIS — I74.9 EMBOLISM AND THROMBOSIS OF ARTERY (H): Primary | ICD-10-CM

## 2022-03-01 LAB
ALBUMIN SERPL-MCNC: 3 G/DL (ref 3.4–5)
ALP SERPL-CCNC: 81 U/L (ref 40–150)
ALT SERPL W P-5'-P-CCNC: 500 U/L (ref 0–50)
ANION GAP SERPL CALCULATED.3IONS-SCNC: 7 MMOL/L (ref 3–14)
AST SERPL W P-5'-P-CCNC: 353 U/L (ref 0–45)
BILIRUB DIRECT SERPL-MCNC: 0.2 MG/DL (ref 0–0.2)
BILIRUB SERPL-MCNC: 0.6 MG/DL (ref 0.2–1.3)
BUN SERPL-MCNC: 45 MG/DL (ref 7–30)
CALCIUM SERPL-MCNC: 8.9 MG/DL (ref 8.5–10.1)
CHLORIDE BLD-SCNC: 109 MMOL/L (ref 94–109)
CO2 SERPL-SCNC: 28 MMOL/L (ref 20–32)
CREAT SERPL-MCNC: 0.96 MG/DL (ref 0.52–1.04)
ERYTHROCYTE [DISTWIDTH] IN BLOOD BY AUTOMATED COUNT: 17.2 % (ref 10–15)
GFR SERPL CREATININE-BSD FRML MDRD: 62 ML/MIN/1.73M2
GLUCOSE BLD-MCNC: 107 MG/DL (ref 70–99)
GLUCOSE BLDC GLUCOMTR-MCNC: 103 MG/DL (ref 70–99)
GLUCOSE BLDC GLUCOMTR-MCNC: 108 MG/DL (ref 70–99)
GLUCOSE BLDC GLUCOMTR-MCNC: 114 MG/DL (ref 70–99)
GLUCOSE BLDC GLUCOMTR-MCNC: 120 MG/DL (ref 70–99)
GLUCOSE BLDC GLUCOMTR-MCNC: 134 MG/DL (ref 70–99)
GLUCOSE BLDC GLUCOMTR-MCNC: 99 MG/DL (ref 70–99)
HCT VFR BLD AUTO: 28.2 % (ref 35–47)
HGB BLD-MCNC: 8.5 G/DL (ref 11.7–15.7)
INR PPP: 1.5 (ref 0.85–1.15)
LACTATE SERPL-SCNC: 1.2 MMOL/L (ref 0.7–2)
MAGNESIUM SERPL-MCNC: 2.3 MG/DL (ref 1.6–2.3)
MCH RBC QN AUTO: 29 PG (ref 26.5–33)
MCHC RBC AUTO-ENTMCNC: 30.1 G/DL (ref 31.5–36.5)
MCV RBC AUTO: 96 FL (ref 78–100)
PATH REPORT.COMMENTS IMP SPEC: NORMAL
PATH REPORT.COMMENTS IMP SPEC: NORMAL
PATH REPORT.FINAL DX SPEC: NORMAL
PATH REPORT.GROSS SPEC: NORMAL
PATH REPORT.MICROSCOPIC SPEC OTHER STN: NORMAL
PATH REPORT.RELEVANT HX SPEC: NORMAL
PHOSPHATE SERPL-MCNC: 4.3 MG/DL (ref 2.5–4.5)
PHOTO IMAGE: NORMAL
PLATELET # BLD AUTO: 163 10E3/UL (ref 150–450)
POTASSIUM BLD-SCNC: 4.4 MMOL/L (ref 3.4–5.3)
PROT SERPL-MCNC: 5.7 G/DL (ref 6.8–8.8)
RBC # BLD AUTO: 2.93 10E6/UL (ref 3.8–5.2)
SODIUM SERPL-SCNC: 144 MMOL/L (ref 133–144)
WBC # BLD AUTO: 12.6 10E3/UL (ref 4–11)

## 2022-03-01 PROCEDURE — 250N000013 HC RX MED GY IP 250 OP 250 PS 637: Performed by: STUDENT IN AN ORGANIZED HEALTH CARE EDUCATION/TRAINING PROGRAM

## 2022-03-01 PROCEDURE — 84100 ASSAY OF PHOSPHORUS: CPT | Performed by: SURGERY

## 2022-03-01 PROCEDURE — 85610 PROTHROMBIN TIME: CPT | Performed by: STUDENT IN AN ORGANIZED HEALTH CARE EDUCATION/TRAINING PROGRAM

## 2022-03-01 PROCEDURE — 99024 POSTOP FOLLOW-UP VISIT: CPT | Performed by: SURGERY

## 2022-03-01 PROCEDURE — 250N000013 HC RX MED GY IP 250 OP 250 PS 637: Performed by: NURSE PRACTITIONER

## 2022-03-01 PROCEDURE — 250N000013 HC RX MED GY IP 250 OP 250 PS 637: Performed by: SURGERY

## 2022-03-01 PROCEDURE — 250N000011 HC RX IP 250 OP 636: Performed by: PHYSICIAN ASSISTANT

## 2022-03-01 PROCEDURE — 36415 COLL VENOUS BLD VENIPUNCTURE: CPT | Performed by: NURSE PRACTITIONER

## 2022-03-01 PROCEDURE — 82248 BILIRUBIN DIRECT: CPT | Performed by: STUDENT IN AN ORGANIZED HEALTH CARE EDUCATION/TRAINING PROGRAM

## 2022-03-01 PROCEDURE — 97530 THERAPEUTIC ACTIVITIES: CPT | Mod: GP

## 2022-03-01 PROCEDURE — 88304 TISSUE EXAM BY PATHOLOGIST: CPT | Mod: 26 | Performed by: PATHOLOGY

## 2022-03-01 PROCEDURE — 250N000011 HC RX IP 250 OP 636: Performed by: NURSE PRACTITIONER

## 2022-03-01 PROCEDURE — 99232 SBSQ HOSP IP/OBS MODERATE 35: CPT | Mod: 24 | Performed by: STUDENT IN AN ORGANIZED HEALTH CARE EDUCATION/TRAINING PROGRAM

## 2022-03-01 PROCEDURE — 250N000011 HC RX IP 250 OP 636: Performed by: STUDENT IN AN ORGANIZED HEALTH CARE EDUCATION/TRAINING PROGRAM

## 2022-03-01 PROCEDURE — 999N000155 HC STATISTIC RAPCV CVP MONITORING

## 2022-03-01 PROCEDURE — 80053 COMPREHEN METABOLIC PANEL: CPT | Performed by: NURSE PRACTITIONER

## 2022-03-01 PROCEDURE — 214N000001 HC R&B CCU UMMC

## 2022-03-01 PROCEDURE — 83735 ASSAY OF MAGNESIUM: CPT | Performed by: NURSE PRACTITIONER

## 2022-03-01 PROCEDURE — 250N000013 HC RX MED GY IP 250 OP 250 PS 637: Performed by: PHYSICIAN ASSISTANT

## 2022-03-01 PROCEDURE — 97110 THERAPEUTIC EXERCISES: CPT | Mod: GP

## 2022-03-01 PROCEDURE — 85027 COMPLETE CBC AUTOMATED: CPT | Performed by: STUDENT IN AN ORGANIZED HEALTH CARE EDUCATION/TRAINING PROGRAM

## 2022-03-01 PROCEDURE — 83605 ASSAY OF LACTIC ACID: CPT | Performed by: NURSE PRACTITIONER

## 2022-03-01 PROCEDURE — 999N000128 HC STATISTIC PERIPHERAL IV START W/O US GUIDANCE

## 2022-03-01 RX ORDER — CARVEDILOL 3.12 MG/1
3.12 TABLET ORAL 2 TIMES DAILY WITH MEALS
Status: DISCONTINUED | OUTPATIENT
Start: 2022-03-01 | End: 2022-03-08

## 2022-03-01 RX ORDER — METHOCARBAMOL 500 MG/1
500 TABLET, FILM COATED ORAL 4 TIMES DAILY
Status: DISCONTINUED | OUTPATIENT
Start: 2022-03-01 | End: 2022-03-03

## 2022-03-01 RX ORDER — FUROSEMIDE 10 MG/ML
40 INJECTION INTRAMUSCULAR; INTRAVENOUS ONCE
Status: COMPLETED | OUTPATIENT
Start: 2022-03-01 | End: 2022-03-01

## 2022-03-01 RX ORDER — FUROSEMIDE 10 MG/ML
40 INJECTION INTRAMUSCULAR; INTRAVENOUS 2 TIMES DAILY WITH MEALS
Status: DISCONTINUED | OUTPATIENT
Start: 2022-03-01 | End: 2022-03-03

## 2022-03-01 RX ORDER — HYDROXYZINE HYDROCHLORIDE 25 MG/1
25 TABLET, FILM COATED ORAL 3 TIMES DAILY PRN
Status: DISCONTINUED | OUTPATIENT
Start: 2022-03-01 | End: 2022-03-01

## 2022-03-01 RX ADMIN — FUROSEMIDE 40 MG: 10 INJECTION, SOLUTION INTRAVENOUS at 01:10

## 2022-03-01 RX ADMIN — HEPARIN SODIUM 5000 UNITS: 5000 INJECTION, SOLUTION INTRAVENOUS; SUBCUTANEOUS at 20:30

## 2022-03-01 RX ADMIN — THIAMINE HCL TAB 100 MG 100 MG: 100 TAB at 07:57

## 2022-03-01 RX ADMIN — Medication 3 MG: at 20:31

## 2022-03-01 RX ADMIN — OXYBUTYNIN CHLORIDE 5 MG: 5 TABLET ORAL at 07:59

## 2022-03-01 RX ADMIN — OXYCODONE HYDROCHLORIDE 5 MG: 5 TABLET ORAL at 07:57

## 2022-03-01 RX ADMIN — HEPARIN SODIUM 5000 UNITS: 5000 INJECTION, SOLUTION INTRAVENOUS; SUBCUTANEOUS at 05:16

## 2022-03-01 RX ADMIN — ASPIRIN 81 MG: 81 TABLET, CHEWABLE ORAL at 07:58

## 2022-03-01 RX ADMIN — CARVEDILOL 3.12 MG: 3.12 TABLET, FILM COATED ORAL at 18:03

## 2022-03-01 RX ADMIN — PANTOPRAZOLE SODIUM 40 MG: 40 TABLET, DELAYED RELEASE ORAL at 07:58

## 2022-03-01 RX ADMIN — OXYBUTYNIN CHLORIDE 5 MG: 5 TABLET ORAL at 01:10

## 2022-03-01 RX ADMIN — CARVEDILOL 3.12 MG: 3.12 TABLET, FILM COATED ORAL at 11:44

## 2022-03-01 RX ADMIN — FUROSEMIDE 40 MG: 10 INJECTION, SOLUTION INTRAVENOUS at 15:03

## 2022-03-01 RX ADMIN — CEFTRIAXONE SODIUM 1 G: 1 INJECTION, POWDER, FOR SOLUTION INTRAMUSCULAR; INTRAVENOUS at 01:11

## 2022-03-01 RX ADMIN — HYDROMORPHONE HYDROCHLORIDE 0.4 MG: 0.2 INJECTION, SOLUTION INTRAMUSCULAR; INTRAVENOUS; SUBCUTANEOUS at 03:00

## 2022-03-01 RX ADMIN — HEPARIN SODIUM 5000 UNITS: 5000 INJECTION, SOLUTION INTRAVENOUS; SUBCUTANEOUS at 14:07

## 2022-03-01 RX ADMIN — METHOCARBAMOL 500 MG: 500 TABLET ORAL at 11:44

## 2022-03-01 RX ADMIN — METHOCARBAMOL 500 MG: 500 TABLET ORAL at 15:39

## 2022-03-01 RX ADMIN — SENNOSIDES AND DOCUSATE SODIUM 1 TABLET: 50; 8.6 TABLET ORAL at 07:58

## 2022-03-01 RX ADMIN — METHOCARBAMOL 500 MG: 500 TABLET ORAL at 20:31

## 2022-03-01 RX ADMIN — FUROSEMIDE 40 MG: 10 INJECTION, SOLUTION INTRAVENOUS at 18:03

## 2022-03-01 RX ADMIN — FUROSEMIDE 40 MG: 10 INJECTION, SOLUTION INTRAVENOUS at 06:00

## 2022-03-01 RX ADMIN — ACETAMINOPHEN 650 MG: 325 TABLET, FILM COATED ORAL at 07:57

## 2022-03-01 ASSESSMENT — ACTIVITIES OF DAILY LIVING (ADL)
ADLS_ACUITY_SCORE: 14
ADLS_ACUITY_SCORE: 13
ADLS_ACUITY_SCORE: 18
ADLS_ACUITY_SCORE: 13
ADLS_ACUITY_SCORE: 13
ADLS_ACUITY_SCORE: 11
ADLS_ACUITY_SCORE: 18
ADLS_ACUITY_SCORE: 13
ADLS_ACUITY_SCORE: 18
ADLS_ACUITY_SCORE: 13
ADLS_ACUITY_SCORE: 13
ADLS_ACUITY_SCORE: 14
ADLS_ACUITY_SCORE: 11
ADLS_ACUITY_SCORE: 14
ADLS_ACUITY_SCORE: 11
ADLS_ACUITY_SCORE: 11
ADLS_ACUITY_SCORE: 18
ADLS_ACUITY_SCORE: 13
ADLS_ACUITY_SCORE: 11
ADLS_ACUITY_SCORE: 18
ADLS_ACUITY_SCORE: 11
ADLS_ACUITY_SCORE: 14

## 2022-03-01 NOTE — PROGRESS NOTES
"VASCULAR SURGERY PROGRESS NOTE    Subjective:  No issues with movement of L foot. Sitting in chair this AM    Objective:  Intake/Output Summary (Last 24 hours) at 2/26/2022 0646  Last data filed at 2/26/2022 0600  Gross per 24 hour   Intake 3007.23 ml   Output 1548 ml   Net 1459.23 ml     Labs:  ROUTINE IP LABS (Last four results)  BMP  Recent Labs   Lab 03/01/22  0800 03/01/22  0341 03/01/22  0339 03/01/22  0119 02/28/22  2146 02/28/22  2144 02/28/22  0801 02/28/22  0305 02/28/22  0304 02/27/22  2313   NA  --  144  --   --   --  144  --  142  --  141   POTASSIUM  --  4.4  --   --   --  4.4  --  4.5  --  4.1   CHLORIDE  --  109  --   --   --  112*  --  111*  --  112*   HEIDI  --  8.9  --   --   --  8.4*  --  8.4*  --  8.2*   CO2  --  28  --   --   --  27  --  25  --  24   BUN  --  45*  --   --   --  42*  --  32*  --  31*   CR  --  0.96  --   --   --  0.99  --  0.65  --  0.66   * 107* 108* 103*   < > 128*   < > 85   < > 94    < > = values in this interval not displayed.     CBC  Recent Labs   Lab 03/01/22  0341 02/28/22  0305 02/27/22  0351 02/26/22  1558 02/26/22  0401   WBC 12.6* 11.7* 11.3*  --  11.3*   RBC 2.93* 2.75* 2.68*  --  2.57*   HGB 8.5* 8.0* 7.6* 7.9* 7.3*   HCT 28.2* 26.5* 25.0*  --  23.4*   MCV 96 96 93  --  91   MCH 29.0 29.1 28.4  --  28.4   MCHC 30.1* 30.2* 30.4*  --  31.2*   RDW 17.2* 16.1* 16.1*  --  16.2*    93* 88*  --  83*     INR  Recent Labs   Lab 03/01/22  0341 02/28/22  0305 02/27/22  0350 02/26/22  0401   INR 1.50* 1.48* 1.39* 1.32*     PHYSICAL EXAM:  /57   Pulse 72   Temp 98.8  F (37.1  C) (Oral)   Resp 23   Ht 1.638 m (5' 4.5\")   Wt 58.6 kg (129 lb 3 oz)   SpO2 100%   BMI 21.83 kg/m    General: Alert, answers questions  Cardio: RRR  Respiratory: NLB  GI:  Abdomen soft  Extremities: L foot warm to touch. Biphasic PT and DP signals. Incision C/D with intact dermabond/tegaderm dressing . 5/5 motor strength (ankle flexion and toe flexion)  Skin: Color appropriate " for race, warm, dry.        Imaging:   No new imaging.    ASSESSMENT:  Ashley Osman is a 71 year old year old female who has a PMH significant for CAD, HFrEF (EF 10-15%), COPD, ICM, tobacco use who underwent CABG x4 on 2/24/22 with left saphenous vein harvest. Overnight 2/24 lost pulses to LLE prompting discontinuation of the IABP. Arterial duplex showed monophasic flow to bilateral lower extremities. CTA showed a thrombotic occlusion of the distal left external iliac and proximal CFA. Now s/p left femoral thrombectomy with bovine patch angioplasty 2/25/22 with restoration of biphasic L PT and DP doppler signals.      PLAN:    - NO need for additional anticoagulation from surgery standpoint  - Recommend ASA when able and statin when able   - Incisional dressing to stay on for 2 weeks  - Vascular will sign off  - Will arrange followup with Vascular Surgery.   Call with Saniya Dodd MD  Fellow  [phone]

## 2022-03-01 NOTE — PROGRESS NOTES
CLINICAL NUTRITION SERVICES - REASSESSMENT NOTE     Nutrition Prescription    Recommendations:  Diet per SLP.     Malnutrition Status:    Moderate malnutrition in the context of acute on chronic illness    Interventions by Registered Dietitian (RD):  Magic cup 3x/day with meals   Vital cuisine shake 2x/day with meals.     Future Recommendations:  - Calorie counts if considering more aggressive nutrition intervention (ie FT/TF)   - If enteral nutrition indicated this admission, recommend Osmolite 1.5 Byron @ goal of  45ml/hr  (1080ml/day) + 1 pkt ProSource BID will provide: 1700 kcals (33 kcal/kg), 89 g PRO (1.7 pro/kg), 822 ml free H20, 219 g CHO, and 0 g fiber daily. .        EVALUATION OF THE PROGRESS TOWARD GOALS   Diet: Full Liquid with mildly thick liquids   Nutrition Supplements: Magic cup 1x/day, vital cuisine shake 1x/day, thiamine 100 mg     Intake: Inadequate     NEW FINDINGS   Resp: Extubated (2/27).   GI/Nutrition: Diet advanced per SLP (2/28). So far, pt eating 25-75% of oral supplements. Last BM on 2/28 with scheduled bowel regimen.    MALNUTRITION  % Intake: </= 50% for >/= 5 days (severe)  % Weight Loss: None noted  Subcutaneous Fat Loss: Facial region: Mild and Thoracic/intercostal: Mild  Muscle Loss: Temporal: Mild, Facial & jaw region: Mild, Thoracic region (clavicle, acromium bone, deltoid, trapezius, pectoral): Mild, Upper arm (bicep, tricep): Mild and Lower arm  (forearm): Mild - May be age-related, at least in part.   Fluid Accumulation/Edema: Does not meet criteria  Malnutrition Diagnosis: Moderate malnutrition in the context of acute on chronic illness    Previous Goals   Diet adv v nutrition support within 2-3 days once post-op.  Evaluation: Not met    Patient to consume % of nutritionally adequate meal trays TID, or the equivalent with supplements/snacks (or the equivalent to meet estimated needs if pt to require nutrition support).  Evaluation: Not met    Previous Nutrition  Diagnosis  Inadequate oral intake related to decreased appetite, early satiety, and possibly due to anxiety with upcoming surgery as evidenced by food/beverages sent 2/19-2/21 totaled 1074 kcals and 45 g protein daily on average which does not meet estimated needs of 3131-7957 kcals/day (30-35 kcals/kg) and 65-81+ grams protein/day (1.2 - 1.5+ grams of pro/kg).  Evaluation: No change    CURRENT NUTRITION DIAGNOSIS  Inadequate oral intake related to reduced appetite post-op and modified diet textures per SLP as evidenced by estimate pt is meeting <50% nutrition needs orally, on full liquid diet with mildly thick liquids       INTERVENTIONS  See interventions at top of progress note    Goals  Patient to consume % of nutritionally adequate meal trays TID, or the equivalent with supplements/snacks.    Monitoring/Evaluation  Progress toward goals will be monitored and evaluated per protocol.    Trice Espinosa RD, LD  [id number]  Pgr: [phone]

## 2022-03-01 NOTE — PLAN OF CARE
Major Shift Events:  hemodynamically stable. UCO at 1200. Epicardial wires pulled 1200. Worked with PT today. Pain controled with oxy and tylenol.   Transferred to  12-1  Plan: transfer pt  All questions answered  All safety checks complete  MD aware of all patient status changes  See chart for all other details     For vital signs and complete assessments, please see documentation flowsheets.     Goal Outcome Evaluation:    Plan of Care Reviewed With: patient     Overall Patient Progress: improving

## 2022-03-01 NOTE — ANESTHESIA POSTPROCEDURE EVALUATION
Patient: Ashley Osman    Procedure: Procedure(s):  THROMBECTOMY, LOWER EXTREMITY; Emergency Left Femeral Thrombectomy,       Anesthesia Type:  General    Note:  Disposition: Inpatient   Postop Pain Control: Uneventful            Sign Out: Well controlled pain   PONV: No   Neuro/Psych: Uneventful            Sign Out: Acceptable/Baseline neuro status   Airway/Respiratory: Uneventful            Sign Out: Acceptable/Baseline resp. status   CV/Hemodynamics: Uneventful            Sign Out: Acceptable CV status; No obvious hypovolemia; No obvious fluid overload   Other NRE: NONE   DID A NON-ROUTINE EVENT OCCUR? No           Last vitals:  Vitals Value Taken Time   /67 03/01/22 0800   Temp 36.5  C (97.7  F) 03/01/22 0400   Pulse 71 03/01/22 0802   Resp 23 03/01/22 0802   SpO2 100 % 03/01/22 0802   Vitals shown include unvalidated device data.    Electronically Signed By: Rigoberto Reyna MD  March 1, 2022  8:04 AM

## 2022-03-01 NOTE — PLAN OF CARE
Major Shift Events:  A&Ox4. CAM negative. Afebrile. Pain in bladder described as burning. UA sent and came back positive. Rocephin started for UTI. Urine output ~10-15/hr.Given two 40mg lasix doses. Oxybutynin ordered to help with bladder spasms. MD wanted to keep Vizcaino until after lasix dose and consult with the day team.     Plan: Transfer to stepdown unit today.     For vital signs and complete assessments, please see documentation flowsheets.

## 2022-03-01 NOTE — OP NOTE
DATE OF SERVICE: 2/24/2022.     PREOPERATIVE DIAGNOSES:  1.  Severe multivessel coronary artery disease.  2.  Severe ischemic cardiomyopathy with severely diminished left ventricular systolic and diastolic function.  3.  Heart failure with reduced ejection fraction.     POSTOPERATIVE DIAGNOSES:  1.  Severe multivessel coronary artery disease.  2.  Severe ischemic cardiomyopathy with severely diminished left ventricular systolic and diastolic function.  3.  Heart failure with reduced ejection fraction.     PROCEDURE PERFORMED:  1.  Coronary artery bypass grafting x 4 (reversed saphenous vein graft to the left posterior descending coronary artery, reversed saphenous vein graft to the obtuse marginal branch of the left circumflex coronary artery, reversed saphenous vein graft to the diagonal branch of the left anterior descending coronary artery, and pedicled left internal mammary artery to left anterior descending coronary artery).  2.  Endoscopic vein harvest of the greater saphenous vein from the left lower extremity.     SURGEON:  Calixto Bhandari MD, PhD.     FIRST ASSISTANT:  Carlita Ramirez PA-C.     ANESTHESIA:  General endotracheal anesthesia.     ANESTHESIOLOGIST:  Tata Romero MD.     ESTIMATED BLOOD LOSS:  500 mL.     SPECIMEN:  None.     INDICATIONS FOR PROCEDURE: Ms. Ashley Osman is a 71 year old female with PMH of HFrEF (10-15%) 2/2 ICM (Hx LAD and Circumflex Mis; total occlusion of RCA and LAD), Biventricular failure (requiring inotropic support PTA to [facility]), COPD, HLD, Tobacco Use Disorder (quit 12/2021) Echocardiography demonstrates severely diminished left ventricular function and no significant valvular abnormality. The patient understands the risks and benefits of the procedure and wishes to undergo the operation. A left femoral intra-aortic balloon pump was placed by Dr. Cummings the day prior to surgery.     OPERATIVE FINDINGS:  The left internal mammary artery was 1.75 mm in  diameter and had excellent flow.  The greater saphenous vein from the left lower extremity was 3-4 mm in diameter and suitable for conduit.  The ascending aorta was free of calcified plaque.  The leftt posterior descending coronary artery was 1.75 mm in diameter and free of disease at the site of anastomosis. The obtuse marginal branch of the left circumflex coronary artery was 1,75 mm in diameter and free of disease at the site of anastomosis. The diagonal branch of the left anterior descending coronary artery was 1.5 mm in diameter and free of disease at the site anastomosis.  The left anterior descending coronary artery 1.75 mm in diameter and free of disease at the site of anastomosis.  After reperfusion, sinus rhythm resumed.  Left ventricular function was severely diminished preoperatively and unchanged after bypass on moderate-high dose inotropic support.     OPERATIVE DESCRIPTION IN DETAIL:  After obtaining informed consent, the patient was brought to the operating room and placed in the supine position on the operating room table.  Appropriate lines and devices for monitoring were placed by the anesthesia team.  The patient underwent smooth induction of general anesthesia, and the trachea was intubated orally.  A right internal jugular Cordis introducer was placed, and a Kinross-Renay catheter was placed through this.  The patient was prepped and draped, and a timeout was performed to confirm the correct patient identity, as well as the procedure to be performed.  A median sternotomy was performed and the left internal mammary artery was harvested.  The greater saphenous vein was harvested from the left lower extremity using endoscopic vein harvesting by the physician assistant, Carlita Ramirez PA-C.     The patient was given full dose heparin, and after cannulation of the distal ascending aorta and the right atrium, cardiopulmonary bypass was commenced.  Antegrade and retrograde cardioplegia cannulae  were placed, and the heart was arrested with 1 liter of cold antegrade blood cardioplegia.  Subsequent maintenance doses of 300 mL of cold retrograde blood cardioplegia were given approximately every 20 minutes or after each distal anastomosis. Topical cold saline slush was applied for additional protection.     The following grafts were constructed in end-to-side fashion using running 7-0 Prolene:  A reversed saphenous vein graft was sewn to the proximal left posterior descending coronary artery (this graft was brought around the right side of the heart), a reversed saphenous vein graft was sewn to the mid obtuse marginal branch of the left circumflex coronary artery, and a reversed saphenous vein graft was sewn to the mid diagonal branch of the left anterior descending coronary artery. The pedicled left internal mammary artery was sewn to the mid left anterior descending coronary artery in end-to-side fashion using running 8-0 Prolene.  The proximal anastomoses of the 3 vein grafts were constructed on the ascending aorta in end-to-side fashion using running 6-0 Prolene under a single crossclamp.  The crossclamp was released, and the heart was resuscitated.       All bleeding points were controlled.  A bipolar ventricular pacing lead was placed and brought out through a separate stab incision. A bipolar right atrial pacing lead was placed and brought out through a separate stab incision.  The patient weaned from cardiopulmonary bypass, was given protamine and decannulated.  A 24-Frisian Lakhwinder drain was placed in the left pleural space and brought out through a separate stab incision.  A 28-Frisian chest tube was placed in the anterior mediastinum and brought out through a separate stab incision.  The sternal edges were reapposed with 3 interrupted #6 stainless steel sternal wires in the manubrium, 3 double wires in the body of the sternum and 1 additional #6 stainless steel sternal wire in figure-of-eight fashion at  the lower aspect of the sternum.  The muscle, fascia, and skin were closed in layers with absorbable suture.  Dermabond was applied.  All sponge, needle and instrument counts were correct at the end of the case.        BASILIO PADILLA MD

## 2022-03-01 NOTE — PROGRESS NOTES
Transfer    Transferred from:   Via:bed  Reason for transfer: Pt inappropriate for unit  Family: Aware of transfer  Belongings:Sent with pt (Purse, cell phone, no clothing)  Chart:Sent with pt  Medications: Meds from bin sent with pt  Report called from: IWONA Franklin  Skin assessment: Completed w/IWONA [place] (Sternal incision, left groin, left leg site, scratches and bruising on left leg)

## 2022-03-01 NOTE — PLAN OF CARE
"BP 99/66 (BP Location: Left arm, Patient Position: Semi-Lucero's, Cuff Size: Adult Regular)   Pulse 66   Temp 97.6  F (36.4  C) (Axillary)   Resp 18   Ht 1.638 m (5' 4.5\")   Wt 58.6 kg (129 lb 3 oz)   SpO2 96%   BMI 21.83 kg/m      [id number]    Admitted on 02/12/22 for CAD s/p CABG x 4 on 02/24/22. A&Ox4, anxious at times. PMH of HFrEF (10-15%) 2/2 ICM (Hx LAD and Circumflex MIs; total occlusion of RCA and LAD), Biventricular failure (requiring inotropic support PTA to [facility]), COPD, HLD, Tobacco Use Disorder (quit 12/2021), CAD. L femoral artery thrombosis with acute LLE ischemia s/p emergent L femoral thrombectomy, bovine patch angioplasty on 02/25/22.      Pt denied pain. Sternal incision LISS without s/s of infection. Chest tube to -20 suction, no air leak. LS crackles on LLL, diminished on RLL. On 0.5 LNC for comfort sating > 95%, mild SOB per pt report; could be anxiety. No bm this shift. Active bowel sound, passing gas per pt. Due to void, Vizcaino was removed in the ICU at 1200. Incontinent once and voided 125 ml using bedside commode with 2 assist, GB and walker. Doppler in the room for peripheral pulses of LE.  PIV saline locked.     "

## 2022-03-01 NOTE — PROGRESS NOTES
CV ICU PROGRESS NOTE  March 1, 2022      CO-MORBIDITIES:   S/P CABG x 4  (primary encounter diagnosis)  Systolic heart failure, unspecified HF chronicity (H)  Limb ischemia    ASSESSMENT: Ashley Osman is a 70 yo F with PMH of HFrEF (10-15%) 2/2 ICM, Biventricular failure (requiring inotropic support PTA to [facility]), COPD, HLD, Tobacco Use Disorder (quit 12/2021), CAD who underwent CABG x4 on 02/24 with Dr. Bhandari. Found to have left femoral artery thrombosis with acute left lower extremity ischemia s/p emergent left femoral thrombectomy, bovine patch angioplasty on 2/25. 02/28 with elevated LFTs, dc'd amio with downtrend of LFTs, diuresing to maintain fluid balance.    TODAY'S PROGRESS:   Overnight:  - lasix 40 x 2 (voided 100/hr or more afterward)  - treated with rocephin (c/o UTI d/t burning sensation @ [facility]; UA Nitrite (-), LE (+))  - given oxybutynin for bladder spasms     Goals:  # Elevated LFTs              - continue lasix for UOP to keep net 0 to -1L              - continue hold amio PO d/t LFT elevationsf/u  # UTI   - patient remains afebrile, treated with rocephin   - remove [facility] and bladder scan straight cath q shift   - discontinue oxybutynin d/t low UOP    PLAN:  Neuro/ pain/ sedation:  # Acute Postoperative pain  - Monitor neurological status. Notify the MD for any acute changes in exam.  - Pain:              - Scheduled:                          - add robaxin QID (03/01)              - PRN                           - acetaminophen, fentanyl, dilaudid, oxycodone              - E/S catheters                          - will stay until CTs come out  # ICU Delirium  - delirium precautions  - bedtime              - melatonin              - PRN quetiapine    Pulmonary care:   #Acute respiratory failure with hypercapnia; resolved  # Hx of COPD  - Titrate supplemental oxygen to maintain saturation above 92%.  - Pulmonary hygiene: Incentive spirometer every 15- 30 minutes when awake, flutter valve,  C&DB    Cardiovascular:    # S/p CABG x 4 on 02/24 by Dr. Bhandari  # History of Biventricular HF requiring pressor support 2/2 ICM  # Acute a-fib with RVR (02/26)  # HLD  - Hold atorvastatin, carvediliol              - continue hold amio d/t LFT elevations              - if Afib RVR, start Beta blocker or diltiazem  - ASA 81  # Left femoral artery thrombosis with acute left lower extremity ischemia s/p emergent left femoral thrombectomy, bovine patch angioplasty on 2/25  - On subcutaneous heparin  - Monitor lower extremity pulses and perfusion status    GI care/ Nutrition:               - Regular Diet              - PPI  # Diarrhea, bowel incontinence              - hold off on C. Diff (afebrile, stable WBC)              - Bowel regimen: miralax, senna with hold parameters  # Elevated LFTs  - LFTs downtrending s/p discontinuation of amiodarone   - suspect elevation d/t amiodarone   - possible component of congestive hepatopathy (new JAMISON, decreasing UOP)    - Echocardiogram without RV failure, but RAP > 15, IVC w/out variation > 50%   - continue hold amio as above   - continue diuresis as below   - CMP qday    Renal/ Fluid Balance/ Electrolytes:  # JAMISON (Cr 0.99 from 0.65)              - up ~5+ kg since admission, decreasing UOP              - Diuresis:     - lasix as needed for goal net 0 to -1L (03/01)              - Avoid/limit nephrotoxins as able              - ICU electrolyte replacement protocol for K, Mg, PO4    Endocrine:    - MDSSI (no prior Hx of T2DM)  - Goal BG <180 for optimal healing    ID/ Antibiotics:   # UTI  - s/p rocephin  - remove [facility]ning   - scan and straight cath as needed qshift  - discontinue oxybutynin (prevents UOP)  - Continue to monitor fever curve, WBC and inflammatory markers as appropriate    Heme:     # Stress induced leukocytosis  # Acute blood loss anemia  # Acute blood loss thrombocytopenia  No s/sx active bleeding. Hgb stable, plt stable, WBC stable.  - Hgb goal > 7  - CBC  qday    MSK/ Skin:  - Sternal precautions  - PT/OT/CR     Prophylaxis:    - Mechanical prophylaxis for DVT  - Chemical DVT prophylaxis = heparin subcutaneous   - PPI     Lines/ tubes/ drains:  - CTs x 2  - RIJ  - PIV x 1   - will need another PIV  - [facility]   - remove 03/01      Disposition:  - Transfer     Patient seen, findings and plan discussed with CVICU staff, Dr. Peters     -----------------------------------  Eloy Flannery, MS4    I saw and examined the patient with the medical student. I agree with the assessment and plan.     Nithin Grossman DO  PGY-2 Anesthesiology  [phone]    ====================================    SUBJECTIVE:   Had burning at [facility] last night, c/o bladder spasms. Given rocephin, oxybutynin. Also given lasix 40 x 2 with improvement in UOP.    OBJECTIVE:   1. VITAL SIGNS:   Temp:  [96.9  F (36.1  C)-99.1  F (37.3  C)] 97.7  F (36.5  C)  Pulse:  [] 73  Resp:  [11-42] 20  BP: (109-134)/(56-67) 126/65  MAP:  [70 mmHg-92 mmHg] 78 mmHg  Arterial Line BP: (111-137)/(49-75) 122/55  SpO2:  [94 %-100 %] 99 %  Resp: 20      2. INTAKE/ OUTPUT:   I/O last 3 completed shifts:  In: 264 [P.O.:100; I.V.:164]  Out: 1347 [Urine:927; Chest Tube:420]    3. PHYSICAL EXAMINATION:   General: female patient, lying comfortably in bed  Neuro: AAOx3, CN grossly intact  Resp: Breathing comfortably, non-labored, CTAB  CV: regular S1, S2, soft pericardial rub, there is a IV/VI systolic murmur most prominent at the mitral position, upper extremity pulses are 2+ bilaterally, bilateral dorsalis pedal pulses 2+/symmetric  Abdomen: Soft, non-distended, non-tender; More sign negative  Incisions: c/d/i   Extremities: warm, well perfused    4. INVESTIGATIONS:   Arterial Blood Gases   Recent Labs   Lab 02/28/22  0306 02/27/22  2313 02/27/22  0646 02/27/22  0351   PH 7.38 7.40 7.47* 7.46*   PCO2 39 36 36 37   PO2 119* 106* 92 96   HCO3 23 22 26 26     Complete Blood Count   Recent Labs   Lab 03/01/22  0341 02/28/22  0305  22  03522  1558 22  0401   WBC 12.6* 11.7* 11.3*  --  11.3*   HGB 8.5* 8.0* 7.6* 7.9* 7.3*    93* 88*  --  83*     Basic Metabolic Panel  Recent Labs   Lab 22  03322  0119 22  0801 22  0305 22  0304 22  2313     --   --   --  144  --  142  --  141   POTASSIUM 4.4  --   --   --  4.4  --  4.5  --  4.1   CHLORIDE 109  --   --   --  112*  --  111*  --  112*   CO2 28  --   --   --  27  --  25  --  24   BUN 45*  --   --   --  42*  --  32*  --  31*   CR 0.96  --   --   --  0.99  --  0.65  --  0.66   * 108* 103* 123* 128*   < > 85   < > 94    < > = values in this interval not displayed.     Liver Function Tests  Recent Labs   Lab 22  12322  03022  03522  0350 22  0401   * 527* 733* 68*  --  37   * 495* 503* 75*  --  34   ALKPHOS 81 68 62 57  --  52   BILITOTAL 0.6 0.7 0.8 0.8  --  0.5   ALBUMIN 3.0* 3.0* 3.1* 2.8*  --  3.0*   INR 1.50*  --  1.48*  --  1.39* 1.32*     Pancreatic Enzymes  No lab results found in last 7 days.  Coagulation Profile  Recent Labs   Lab 22  0305 22  0350 22  0401 22  0332 22  1602 22  1345   INR 1.50* 1.48* 1.39* 1.32*   < > 1.82* 2.23*   PTT  --   --   --   --   --  35 32    < > = values in this interval not displayed.         5. RADIOLOGY:   Recent Results (from the past 24 hour(s))   Echo Limited   Result Value    LVEF  10-15% (severely reduced)    [facility]    [id number]  [id number]  [id number]  [id number]^HU^ECHO^STU     [facility],[place]  Echocardiography Laboratory  [address] [place], [place] [address]     Name: GLADYS SHUKLA  MRN: [id number]  : 1950  Study Date: 2022 10:38 AM  Age: 71 yrs  Gender: Female  Patient Location: [facility]  Reason For Study: Heart Failure, Unspecified  Ordering Physician: ECHO LUI  STU  Referring Physician: EDDIE GRACE  Performed By: LILIA Pagan     BSA: 1.7 m2  Height: 65 in  Weight: 134 lb  BP: 125/60 mmHg  ______________________________________________________________________________  Procedure  Limited Portable Echo Adult. Contrast Optison. Optison (NDC #3948-6611-03)  given intravenously. Patient was given 6 ml mixture of 3 ml Optison and 6 ml  saline. 3 ml wasted.  ______________________________________________________________________________  Interpretation Summary  Limited TTE to assess ventricular function.  Left ventricular function is decreased. The ejection fraction is 10-15%  (severely reduced). Severe diffuse hypokinesis is present.  The right ventricle is normal size. Global right ventricular function is  normal.  No significant valvular abnormalities.  IVC diameter >2.1 cm collapsing <50% with sniff suggests a high RA pressure  estimated at 15 mmHg or greater.  This study was compared with the study from 02/13/2022. No significant changes  noted.  ______________________________________________________________________________  Left Ventricle  Left ventricular function is decreased. The ejection fraction is 10-15%  (severely reduced). Severe diffuse hypokinesis is present.     Right Ventricle  The right ventricle is normal size. Global right ventricular function is  normal.     Atria  Both atria appear normal.     Mitral Valve  Mild mitral insufficiency is present.     Aortic Valve  On Doppler interrogation, there is no significant stenosis or regurgitation.     Tricuspid Valve  Pulmonary artery systolic pressure cannot be assessed.     Pulmonic Valve  The valve leaflets are not well visualized. Trace pulmonic insufficiency is  present.     Vessels  The aorta root cannot be assessed. The thoracic aorta cannot be assessed. IVC  diameter >2.1 cm collapsing <50% with sniff suggests a high RA pressure  estimated at 15 mmHg or greater.     Pericardium  No  pericardial effusion is present.     Compared to Previous Study  This study was compared with the study from 2022 . No significant  changes noted.  ______________________________________________________________________________  MMode/2D Measurements & Calculations     EF(MOD-bp): 15.7 %     Doppler Measurements & Calculations  TR max germaine: 191.4 cm/sec  TR max P.7 mmHg     ______________________________________________________________________________  Report approved by: Andrea Flores 2022 11:33 AM             =========================================

## 2022-03-01 NOTE — PLAN OF CARE
DX: s/p cabg x4 (02/24)  PMH: HFrEF (10-15%) 2/2 ICM (Hx LAD and Circumflex MIs; total occlusion of RCA and LAD), Biventricular failure (requiring inotropic support PTA to [facility]), COPD, HLD, Tobacco Use Disorder (quit 12/2021), CAD     Code Status: full  Team: CVTS    Cardiac: SR, VSS  Respiratory: diminished lung sounds in bases. Sats 100% on 0.5 LPM, pt states that she likes having oxygen for comfort. Pt has 2 chest tubes to 1 canister   Neuro: AxO x4  Pain: denies  GI/: did not see pt urinate or pass bm on shift   Diet: nectar thick liquids. Pt states she has a hard time taking pills and likes pills crushed   Skin: pt's extremities felt cool to touch, pulses are dopplerable   Activity: assist of 2    Gtts/fluid: n/a     Plan: continue to monitor

## 2022-03-01 NOTE — PLAN OF CARE
Major Shift Events: Hemodynamically stable during shift. Afib RVR this AM in the 170s. Self converted. CT with minimal output. CVP today 13. 80mg lasix given. Vizcaino in place. Speech eval today--->new thick liquid requirement. Small BM today. Up to chair with PT.     Plan: transfer to step down.    All questions answered by family and patient  All safety checks complete  MD aware of all patient status changes   See chart for all other details   For vital signs and complete assessments, please see documentation flowsheets.     Goal Outcome Evaluation:    Plan of Care Reviewed With: patient, son     Overall Patient Progress: improving

## 2022-03-01 NOTE — PROGRESS NOTES
Cardiovascular Surgery Progress Note  3/1/2022         Assessment and Plan:     Ashley Osman is a 71 year old female with PMH of HFrEF (10-15%) 2/2 ICM (Hx LAD and Circumflex MIs; total occlusion of RCA and LAD), Biventricular failure (requiring inotropic support PTA to [facility]), COPD, HLD, Tobacco Use Disorder (quit 12/2021), CAD who underwent CABG x4 on 02/24 with Dr. hBandari. Found to have left femoral artery thrombosis with acute left lower extremity ischemia s/p emergent left femoral thrombectomy, bovine patch angioplasty on 2/25. 02/28 with elevated LFTs.    Cardiovascular:   Hx of biventricular HF requiring pressors,CAD, HFrEF 10-15%. Tobacco abuse,  s/p  CABG x4 on 2/24  Atrial fibrillation with RVR post surgery, HD stable-off pressors 2/28.  Most recent echo showed LV EF 15%  -Daily weight  -ASA 81mg,   -Hold statin  -Coreg started today, 3.125 mg BID  -Holding amio due to LFT elevation- If aftib rvr reoccurs consider beta blocker or diltiazem    Left femoral artery thrombosis with acute left lower extremity ischemia s/p emergent left femoral thrombectomy, bovine patch angioplasty on 2/25  - Heparin gtt discontinued, s/p thrombectomy per Vascular  - On subcutaneous heparin  - Monitor lower extremity pulses and perfusion status  - Per vascular recs 3/1:    - NO need for additional anticoagulation from surgery standpoint   - Recommend ASA when able and statin when able    - Incisional dressing to stay on for 2 weeks   - Vascular will sign off   - Will arrange followup with Vascular Surgery.     Elevated LFTs  -Holding amio  -PRN tylenol available, not scheduled  -Daily LFT's    Chest tubes: 432 mL in 24 hours  TPW: removed without difficulty    Pulmonary:  Extubated POD #5; Saturating well on 1L.   Supplemental O2 PRN to keep sats > 92%. Wean off as tolerated.  Pulm toilet, IS, activity and deep breathing      Neurology / MSK:  Neuro intact  Acute post-operative pain; pain well controlled   -PO oxycodone  5mg PRN  -IV dilaudid PRN  -Scheduled Robaxin  -Tylenol PRN  -ES catheters will remain in place until CT are removed     / Renal:  No Hx of renal disease. Most recent creatinine 0.96, adequate UOP.   Pre-op weight 56.2kg  - Diuresis -40mg IV Lasix BID again today  - Vizcaino removed today  - Covered with 1g Rocephin for positive UA 3/1    GI / FEN:   SLP following: recommend Full liquid diet- mildly thick  - continue bowel regimen  Replace electrolytes as needed, hepatic enzymes elevated ALT increased 500 (495), AST decreased to 353 (527).  -BM 2/28    Endocrine:  Stress induced hyperglycemia; initially managed on insulin drip postop, transitioned to sliding scale goal BG <180.  - Required sliding scale dose one time in the last 24/hrs, will continue one more day to assess insulin needs with patients increased diet.    Infectious Disease:  Stress induced leukocytosis  UTI    WBC 12.6, low grade fever (99.1) 2/28  -Completed perioperative antibiotics  -Positive UA, see above   - CBC in am     Hematology:   Stress induced leukocytosis  Acute blood loss anemia  Acute blood loss thrombocytopenia  Hgb 8.5; Plt 163, no signs or symptoms of active bleeding  -Hgb goal >7  -Daily CBC    Anticoagulation:   -ASA 81 mg   -Subcutaneous Heparin     Prophylaxis:   Stress ulcer prophylaxis:   -Pantoprazole 40 mg daily for 30 days  -DVT prophylaxis: Subcutaneous heparin, SCD    Disposition:   Transferred to  on 3/1  Rehab recommending discharge to TCU       Discussed with CVTS Fellow as needed.  Staff surgeons have been informed of changes through both verbal and written communication.      Ashley KUMAR student    Resident/Fellow Attestation   I, Kylie Haas, was present with the medical/EILEEN student, Ashley KUMAR student, who participated in the service and in the documentation of the note.  I have verified the history and personally performed the physical exam and medical decision making.  I agree with the assessment  "and plan of care as documented in the note.      Kylie Haas, DNP, CNP  [facility] Cardiothoracic Surgery  O: [phone]  Pgr [phone]          Interval History:     Complained of bladder pain overnight- + UA  States pain is well managed on current regimen. Slept well overnight.  Tolerating diet , is passing flatus, +BM 2/28. No nausea or vomiting.  Breathing well without complaints.   Working with therapies with assistance.   Denies chest pain, palpitations, dizziness, syncopal symptoms, fevers, chills, myalgias, or sternal popping/clicking         Physical Exam:   Blood pressure 124/58, pulse 72, temperature 98.8  F (37.1  C), temperature source Oral, resp. rate 26, height 1.638 m (5' 4.5\"), weight 58.6 kg (129 lb 3 oz), SpO2 99 %.  Vitals:    02/27/22 0400 02/28/22 0200 03/01/22 0000   Weight: 61.2 kg (134 lb 14.7 oz) 61.1 kg (134 lb 11.2 oz) 58.6 kg (129 lb 3 oz)      Weight; + 2.4 kg since admit (56.2kg), weight today 58.6kg but trending down   24 hr Fluid status; net loss -1 L.   MAPs: 70's    Gen: A&Ox4, NAD  Neuro: no focal deficits. Slow to respond, anxious but cooperative  CV: RRR, normal S1 S2, no murmurs, rubs or gallops. No JVD  Pulm: CTA, no wheezing or rhonchi, normal breathing on 1 lpm  Abd: nondistended, normal BS, soft, nontender  Ext: mild periperal edema, non- pitting trace edema  Incision: clean, dry, intact, no erythema, sternum stable  Tubes/drain sites: dressing clean and dry, serosanguinous output, no air leak. 24 hr output 432 mL.   Lines: CVC and epicardial wires removed today         Data:    Imaging:  reviewed recent imaging, no acute concerns    Labs:  [facility]  Recent Labs   Lab 03/01/22  0800 03/01/22  0341 03/01/22  0339 03/01/22  0119 02/28/22  2146 02/28/22  2144 02/28/22  0801 02/28/22  0305 02/28/22  0304 02/27/22  2313   NA  --  144  --   --   --  144  --  142  --  141   POTASSIUM  --  4.4  --   --   --  4.4  --  4.5  --  4.1   CHLORIDE  --  109  --   --   --  112*  --  111*  --  " 112*   HEIDI  --  8.9  --   --   --  8.4*  --  8.4*  --  8.2*   CO2  --  28  --   --   --  27  --  25  --  24   BUN  --  45*  --   --   --  42*  --  32*  --  31*   CR  --  0.96  --   --   --  0.99  --  0.65  --  0.66   * 107* 108* 103*   < > 128*   < > 85   < > 94    < > = values in this interval not displayed.     CBC  Recent Labs   Lab 03/01/22 0341 02/28/22  0305 02/27/22  0351 02/26/22  1558 02/26/22  0401   WBC 12.6* 11.7* 11.3*  --  11.3*   RBC 2.93* 2.75* 2.68*  --  2.57*   HGB 8.5* 8.0* 7.6* 7.9* 7.3*   HCT 28.2* 26.5* 25.0*  --  23.4*   MCV 96 96 93  --  91   MCH 29.0 29.1 28.4  --  28.4   MCHC 30.1* 30.2* 30.4*  --  31.2*   RDW 17.2* 16.1* 16.1*  --  16.2*    93* 88*  --  83*     INR  Recent Labs   Lab 03/01/22 0341 02/28/22 0305 02/27/22  0350 02/26/22  0401   INR 1.50* 1.48* 1.39* 1.32*      Liver Function Studies -   Recent Labs   Lab Test 03/01/22 0341   PROTTOTAL 5.7*   ALBUMIN 3.0*   BILITOTAL 0.6   ALKPHOS 81   *   *     GLUCOSE:   Recent Labs   Lab 03/01/22  0800 03/01/22 0341 03/01/22  0339 03/01/22  0119 02/28/22 2146 02/28/22 2144   * 107* 108* 103* 123* 128*

## 2022-03-02 ENCOUNTER — APPOINTMENT (OUTPATIENT)
Dept: GENERAL RADIOLOGY | Facility: CLINIC | Age: 72
DRG: 233 | End: 2022-03-02
Attending: PHYSICIAN ASSISTANT
Payer: MEDICARE

## 2022-03-02 ENCOUNTER — APPOINTMENT (OUTPATIENT)
Dept: INTERVENTIONAL RADIOLOGY/VASCULAR | Facility: CLINIC | Age: 72
DRG: 233 | End: 2022-03-02
Attending: PHYSICIAN ASSISTANT
Payer: MEDICARE

## 2022-03-02 ENCOUNTER — APPOINTMENT (OUTPATIENT)
Dept: SPEECH THERAPY | Facility: CLINIC | Age: 72
DRG: 233 | End: 2022-03-02
Attending: INTERNAL MEDICINE
Payer: MEDICARE

## 2022-03-02 LAB
ALBUMIN SERPL-MCNC: 2.9 G/DL (ref 3.4–5)
ALBUMIN UR-MCNC: 10 MG/DL
ALP SERPL-CCNC: 83 U/L (ref 40–150)
ALT SERPL W P-5'-P-CCNC: 486 U/L (ref 0–50)
ANION GAP SERPL CALCULATED.3IONS-SCNC: 9 MMOL/L (ref 3–14)
APPEARANCE UR: CLEAR
AST SERPL W P-5'-P-CCNC: 277 U/L (ref 0–45)
BACTERIA UR CULT: NO GROWTH
BILIRUB SERPL-MCNC: 0.6 MG/DL (ref 0.2–1.3)
BILIRUB UR QL STRIP: NEGATIVE
BUN SERPL-MCNC: 44 MG/DL (ref 7–30)
CALCIUM SERPL-MCNC: 8.9 MG/DL (ref 8.5–10.1)
CHLORIDE BLD-SCNC: 106 MMOL/L (ref 94–109)
CO2 SERPL-SCNC: 26 MMOL/L (ref 20–32)
COLOR UR AUTO: YELLOW
CREAT SERPL-MCNC: 0.9 MG/DL (ref 0.52–1.04)
ERYTHROCYTE [DISTWIDTH] IN BLOOD BY AUTOMATED COUNT: 17.7 % (ref 10–15)
GFR SERPL CREATININE-BSD FRML MDRD: 67 ML/MIN/1.73M2
GLUCOSE BLD-MCNC: 94 MG/DL (ref 70–99)
GLUCOSE BLDC GLUCOMTR-MCNC: 100 MG/DL (ref 70–99)
GLUCOSE BLDC GLUCOMTR-MCNC: 90 MG/DL (ref 70–99)
GLUCOSE UR STRIP-MCNC: NEGATIVE MG/DL
HCT VFR BLD AUTO: 29.7 % (ref 35–47)
HGB BLD-MCNC: 8.6 G/DL (ref 11.7–15.7)
HGB UR QL STRIP: NEGATIVE
HYALINE CASTS: 4 /LPF
INR PPP: 1.31 (ref 0.85–1.15)
KETONES UR STRIP-MCNC: NEGATIVE MG/DL
LEUKOCYTE ESTERASE UR QL STRIP: ABNORMAL
MAGNESIUM SERPL-MCNC: 2.2 MG/DL (ref 1.6–2.3)
MCH RBC QN AUTO: 29.2 PG (ref 26.5–33)
MCHC RBC AUTO-ENTMCNC: 29 G/DL (ref 31.5–36.5)
MCV RBC AUTO: 101 FL (ref 78–100)
MUCOUS THREADS #/AREA URNS LPF: PRESENT /LPF
NITRATE UR QL: NEGATIVE
PH UR STRIP: 5.5 [PH] (ref 5–7)
PHOSPHATE SERPL-MCNC: 3.6 MG/DL (ref 2.5–4.5)
PLATELET # BLD AUTO: 199 10E3/UL (ref 150–450)
POTASSIUM BLD-SCNC: 3.5 MMOL/L (ref 3.4–5.3)
PROCALCITONIN SERPL-MCNC: 0.36 NG/ML
PROT SERPL-MCNC: 5.6 G/DL (ref 6.8–8.8)
RADIOLOGIST FLAGS: ABNORMAL
RBC # BLD AUTO: 2.95 10E6/UL (ref 3.8–5.2)
RBC URINE: <1 /HPF
SARS-COV-2 RNA RESP QL NAA+PROBE: NEGATIVE
SODIUM SERPL-SCNC: 141 MMOL/L (ref 133–144)
SP GR UR STRIP: 1.02 (ref 1–1.03)
SQUAMOUS EPITHELIAL: 1 /HPF
UROBILINOGEN UR STRIP-MCNC: NORMAL MG/DL
WBC # BLD AUTO: 13 10E3/UL (ref 4–11)
WBC URINE: 4 /HPF

## 2022-03-02 PROCEDURE — 81001 URINALYSIS AUTO W/SCOPE: CPT | Performed by: PHYSICIAN ASSISTANT

## 2022-03-02 PROCEDURE — 84145 PROCALCITONIN (PCT): CPT | Performed by: PHYSICIAN ASSISTANT

## 2022-03-02 PROCEDURE — 80053 COMPREHEN METABOLIC PANEL: CPT | Performed by: SURGERY

## 2022-03-02 PROCEDURE — 84100 ASSAY OF PHOSPHORUS: CPT | Performed by: SURGERY

## 2022-03-02 PROCEDURE — C1769 GUIDE WIRE: HCPCS

## 2022-03-02 PROCEDURE — 250N000011 HC RX IP 250 OP 636: Performed by: NURSE PRACTITIONER

## 2022-03-02 PROCEDURE — 250N000013 HC RX MED GY IP 250 OP 250 PS 637: Performed by: NURSE PRACTITIONER

## 2022-03-02 PROCEDURE — 85027 COMPLETE CBC AUTOMATED: CPT | Performed by: SURGERY

## 2022-03-02 PROCEDURE — 87040 BLOOD CULTURE FOR BACTERIA: CPT | Performed by: PHYSICIAN ASSISTANT

## 2022-03-02 PROCEDURE — 250N000013 HC RX MED GY IP 250 OP 250 PS 637: Performed by: PHYSICIAN ASSISTANT

## 2022-03-02 PROCEDURE — 76000 FLUOROSCOPY <1 HR PHYS/QHP: CPT | Mod: 26 | Performed by: PHYSICIAN ASSISTANT

## 2022-03-02 PROCEDURE — 250N000011 HC RX IP 250 OP 636: Performed by: PHYSICIAN ASSISTANT

## 2022-03-02 PROCEDURE — 71045 X-RAY EXAM CHEST 1 VIEW: CPT

## 2022-03-02 PROCEDURE — 272N000500 HC NEEDLE CR2

## 2022-03-02 PROCEDURE — 85610 PROTHROMBIN TIME: CPT | Performed by: SURGERY

## 2022-03-02 PROCEDURE — 83735 ASSAY OF MAGNESIUM: CPT | Performed by: SURGERY

## 2022-03-02 PROCEDURE — 36415 COLL VENOUS BLD VENIPUNCTURE: CPT | Performed by: SURGERY

## 2022-03-02 PROCEDURE — 92526 ORAL FUNCTION THERAPY: CPT | Mod: GN

## 2022-03-02 PROCEDURE — 250N000013 HC RX MED GY IP 250 OP 250 PS 637: Performed by: SURGERY

## 2022-03-02 PROCEDURE — 999N000065 XR CHEST 2 VW

## 2022-03-02 PROCEDURE — 36415 COLL VENOUS BLD VENIPUNCTURE: CPT | Performed by: PHYSICIAN ASSISTANT

## 2022-03-02 PROCEDURE — 214N000001 HC R&B CCU UMMC

## 2022-03-02 PROCEDURE — 999N000128 HC STATISTIC PERIPHERAL IV START W/O US GUIDANCE

## 2022-03-02 PROCEDURE — 71046 X-RAY EXAM CHEST 2 VIEWS: CPT | Mod: 26 | Performed by: RADIOLOGY

## 2022-03-02 PROCEDURE — 76000 FLUOROSCOPY <1 HR PHYS/QHP: CPT

## 2022-03-02 PROCEDURE — 71045 X-RAY EXAM CHEST 1 VIEW: CPT | Mod: 26 | Performed by: RADIOLOGY

## 2022-03-02 PROCEDURE — U0003 INFECTIOUS AGENT DETECTION BY NUCLEIC ACID (DNA OR RNA); SEVERE ACUTE RESPIRATORY SYNDROME CORONAVIRUS 2 (SARS-COV-2) (CORONAVIRUS DISEASE [COVID-19]), AMPLIFIED PROBE TECHNIQUE, MAKING USE OF HIGH THROUGHPUT TECHNOLOGIES AS DESCRIBED BY CMS-2020-01-R: HCPCS | Performed by: PHYSICIAN ASSISTANT

## 2022-03-02 RX ORDER — OXYCODONE HCL 5 MG/5 ML
10 SOLUTION, ORAL ORAL EVERY 4 HOURS PRN
Status: DISCONTINUED | OUTPATIENT
Start: 2022-03-02 | End: 2022-03-03

## 2022-03-02 RX ORDER — PANTOPRAZOLE SODIUM 40 MG/1
40 TABLET, DELAYED RELEASE ORAL
Status: DISCONTINUED | OUTPATIENT
Start: 2022-03-02 | End: 2022-03-18 | Stop reason: HOSPADM

## 2022-03-02 RX ORDER — MAGNESIUM SULFATE HEPTAHYDRATE 40 MG/ML
2 INJECTION, SOLUTION INTRAVENOUS ONCE
Status: COMPLETED | OUTPATIENT
Start: 2022-03-02 | End: 2022-03-02

## 2022-03-02 RX ORDER — SPIRONOLACTONE 25 MG/1
25 TABLET ORAL DAILY
Status: DISCONTINUED | OUTPATIENT
Start: 2022-03-02 | End: 2022-03-07

## 2022-03-02 RX ORDER — OXYCODONE HCL 5 MG/5 ML
5 SOLUTION, ORAL ORAL EVERY 4 HOURS PRN
Status: DISCONTINUED | OUTPATIENT
Start: 2022-03-02 | End: 2022-03-03

## 2022-03-02 RX ORDER — POTASSIUM CHLORIDE 1.5 G/1.58G
20 POWDER, FOR SOLUTION ORAL 2 TIMES DAILY
Status: DISCONTINUED | OUTPATIENT
Start: 2022-03-02 | End: 2022-03-04

## 2022-03-02 RX ADMIN — POTASSIUM CHLORIDE 20 MEQ: 1.5 POWDER, FOR SOLUTION ORAL at 09:51

## 2022-03-02 RX ADMIN — THIAMINE HCL TAB 100 MG 100 MG: 100 TAB at 10:00

## 2022-03-02 RX ADMIN — POTASSIUM CHLORIDE 20 MEQ: 1.5 POWDER, FOR SOLUTION ORAL at 19:43

## 2022-03-02 RX ADMIN — SPIRONOLACTONE 25 MG: 25 TABLET ORAL at 10:00

## 2022-03-02 RX ADMIN — MAGNESIUM SULFATE 2 G: 2 INJECTION INTRAVENOUS at 09:52

## 2022-03-02 RX ADMIN — CARVEDILOL 3.12 MG: 3.12 TABLET, FILM COATED ORAL at 10:00

## 2022-03-02 RX ADMIN — PANTOPRAZOLE SODIUM 40 MG: 40 TABLET, DELAYED RELEASE ORAL at 10:00

## 2022-03-02 RX ADMIN — OXYCODONE HYDROCHLORIDE 5 MG: 5 SOLUTION ORAL at 21:57

## 2022-03-02 RX ADMIN — FUROSEMIDE 40 MG: 10 INJECTION, SOLUTION INTRAVENOUS at 09:52

## 2022-03-02 RX ADMIN — METHOCARBAMOL 500 MG: 500 TABLET ORAL at 19:41

## 2022-03-02 RX ADMIN — ASPIRIN 81 MG: 81 TABLET, CHEWABLE ORAL at 09:59

## 2022-03-02 RX ADMIN — HEPARIN SODIUM 5000 UNITS: 5000 INJECTION, SOLUTION INTRAVENOUS; SUBCUTANEOUS at 19:46

## 2022-03-02 RX ADMIN — OXYCODONE HYDROCHLORIDE 5 MG: 5 SOLUTION ORAL at 11:23

## 2022-03-02 RX ADMIN — CARVEDILOL 3.12 MG: 3.12 TABLET, FILM COATED ORAL at 19:41

## 2022-03-02 RX ADMIN — AMIODARONE HYDROCHLORIDE 400 MG: 200 TABLET ORAL at 10:00

## 2022-03-02 RX ADMIN — METHOCARBAMOL 500 MG: 500 TABLET ORAL at 09:59

## 2022-03-02 RX ADMIN — AMIODARONE HYDROCHLORIDE 400 MG: 200 TABLET ORAL at 19:41

## 2022-03-02 RX ADMIN — OXYCODONE HYDROCHLORIDE 5 MG: 5 SOLUTION ORAL at 16:14

## 2022-03-02 RX ADMIN — HEPARIN SODIUM 5000 UNITS: 5000 INJECTION, SOLUTION INTRAVENOUS; SUBCUTANEOUS at 04:29

## 2022-03-02 RX ADMIN — FUROSEMIDE 40 MG: 10 INJECTION, SOLUTION INTRAVENOUS at 18:47

## 2022-03-02 ASSESSMENT — ACTIVITIES OF DAILY LIVING (ADL)
ADLS_ACUITY_SCORE: 18

## 2022-03-02 NOTE — PROGRESS NOTES
Cardiovascular Surgery Progress Note  03/02/2022         Assessment and Plan:     Ashley Osman is a 71 year old female with PMH of HFrEF (10-15%) 2/2 ICM (Hx LAD and Circumflex MIs; total occlusion of RCA and LAD), Biventricular failure (requiring inotropic support PTA to [facility]), COPD, HLD, Tobacco Use Disorder (quit 12/2021), CAD who underwent CABG x4 on 02/24 with Dr. Bhandari. Found to have left femoral artery thrombosis with acute left lower extremity ischemia s/p emergent left femoral thrombectomy, bovine patch angioplasty on 2/25. 02/28 with elevated LFTs.    Cardiovascular:   # Hx of Severe multivessel coronary artery disease- s/p CABG x 4  # Severe ischemic cardiomyopathy with severely diminished left ventricular systolic and diastolic function  # Heart failure with reduced ejection fraction  # Tobacco use Disorder  - PTA Medications: Carvedilol 3.125 mg bid  Imdur 30mg daily  Atorvastatin 20mg daily  ASA 365mg  - Current medications: Carvedilol 3.125 mg bid    mg  Atorvastatin 40mg daily  - Most recent echo showed LV EF 10-15%    # Post-operative Atrial Fibrillation with RVR  - HD stable, currently in NSR  - s/p amiodarone bolus and gtt (2/25). 400mg bid Oral taper started 2/27    Chest tubes: Bilateral pneumothoraces after air leak noted 3/2/22. Keep to suction. IR consulted for consideration     Pulmonary:  # COPD  # Hx of Tobacco use   - Extubated POD 5 to 2 lpm via NC. Now saturating well on 1-2 L/m.   - Supplemental O2 PRN to keep sats > 92%. Wean off as tolerated.  - Pulm toilet, IS, activity and deep breathing    Bilateral Pneumothoraces    Neurology / MSK:  - Neuro intact  - Acute post-operative pain well controlled with acetaminophen on hold, PO oxycodone PRN, IV dilaudid PRN     / Renal:  - No Hx of renal disease. Baseline Creatinine 0.8-1.0.   - Most recent Creatinine: 0.9, adequate UOP.   - Diuresis: Furosemide 40mg IV bid    GI / FEN:   # Transaminitis, improving  -  "Atorvastatin held, Acetaminophen held until LFTs trend towards normal  - Cardiac diet, continue bowel regimen  - Replace electrolytes as needed.    Endocrine:  # DM Type II  - Most Recent Hemoglobin A1c: 5.6  - Stress induced hyperglycemia initially managed on insulin drip postop, transitioned to sliding scale.    Infectious Disease:  # Stress induced leukocytosis  # UTI s/p 1g Ceftriaxone IM  - WBC 13.0 and trending up, remains afebrile, no signs or symptoms of infection  - Completed perioperative antibiotics  - UA  Procalcitonin  Sputum culture  Blood culture obtained 3/2  - Chest x-ray not showing consolidation    Hematology:   # Acute blood loss anemia   # Thrombocytopenia  - Hgb 8.6 Stable; Plt 199, no signs or symptoms of active bleeding    Antithrombotics:   -  mg    Prophylaxis:   - Stress ulcer prophylaxis: Pantoprazole 40 mg daily for 30 days  - DVT prophylaxis: Subcutaneous heparin, SCD    Disposition:   - Transferred to  on 3/1  - Therapies recommending discharge to TCU  - Needs Cards II referral at discharge    Discussed with Surgeon, Dr. Bhandari via written and verbal commnication.     Amita Smith PA-c  Pager: [phone]  7:26 AM March 2, 2022           Interval History:     No overnight events.  States pain is well managed on current regimen. Slept well overnight.  Tolerating diet, is passing flatus, BM x 1. No nausea or vomiting.  Breathing well without complaints.   Working with therapies and ambulating in halls with assistance.   Denies chest pain, palpitations, dizziness, syncopal symptoms, fevers, chills, myalgias, or sternal popping/clicking.         Physical Exam:   Blood pressure 106/56, pulse 61, temperature 97.5  F (36.4  C), temperature source Axillary, resp. rate 16, height 1.638 m (5' 4.5\"), weight 58.6 kg (129 lb 3 oz), SpO2 100 %.  Vitals:    02/27/22 0400 02/28/22 0200 03/01/22 0000   Weight: 61.2 kg (134 lb 14.7 oz) 61.1 kg (134 lb 11.2 oz) 58.6 kg (129 lb 3 oz)    "   Current Weight: 58.6 Kg Trend: - 2.5 Kg  Admit weight: 54.0 Kg    Daily Fluid status; net loss: -514 (1200 O)  mL    Net loss SA: +5447 mL  MAPs: 51-87  LVEF: 10-15%    Gen: A&Ox4, NAD  Neuro: no focal deficits   CV: RRR, normal S1 S2, no murmurs, rubs or gallops. No appreciable JVD  Vascular: Peripheral pulses present Radial 2+, Dorsalis Pedis 2+, Posterior Tibialis 2+.   Pulm: CTA, no wheezing or rhonchi, normal breathing on 1-2 lpm  Abd: nondistended, normal BS, soft, nontender  Ext: positive peripheral edema, 2+ pitting. Warm.   Incision: clean, dry, intact, no erythema, sternum stable  Tubes/drain sites: dressing clean and dry, serosanguinous output, no air leak. 24 hr output 190 mL.          Data:    Imaging:  reviewed recent imaging    Chest x-ray  Interpretation:    Echocardiogram  Interpretation:    CT scan:    Labs:  CBC  Recent Labs   Lab 03/02/22  0552 03/01/22  0341 02/28/22  0305 02/27/22  0351   WBC 13.0* 12.6* 11.7* 11.3*   RBC 2.95* 2.93* 2.75* 2.68*   HGB 8.6* 8.5* 8.0* 7.6*   HCT 29.7* 28.2* 26.5* 25.0*   * 96 96 93   MCH 29.2 29.0 29.1 28.4   MCHC 29.0* 30.1* 30.2* 30.4*   RDW 17.7* 17.2* 16.1* 16.1*    163 93* 88*     CMP:  Last Comprehensive Metabolic Panel:  Sodium   Date Value Ref Range Status   03/02/2022 141 133 - 144 mmol/L Final     Potassium   Date Value Ref Range Status   03/02/2022 3.5 3.4 - 5.3 mmol/L Final     Chloride   Date Value Ref Range Status   03/02/2022 106 94 - 109 mmol/L Final     Carbon Dioxide (CO2)   Date Value Ref Range Status   03/02/2022 26 20 - 32 mmol/L Final     Anion Gap   Date Value Ref Range Status   03/02/2022 9 3 - 14 mmol/L Final     Glucose   Date Value Ref Range Status   03/02/2022 94 70 - 99 mg/dL Final     GLUCOSE BY METER POCT   Date Value Ref Range Status   03/02/2022 90 70 - 99 mg/dL Final     Urea Nitrogen   Date Value Ref Range Status   03/02/2022 44 (H) 7 - 30 mg/dL Final     Creatinine   Date Value Ref Range Status   03/02/2022 0.90  0.52 - 1.04 mg/dL Final     GFR Estimate   Date Value Ref Range Status   03/02/2022 67 >60 mL/min/1.73m2 Final     Comment:     Effective December 21, 2021 eGFRcr in adults is calculated using the 2021 CKD-EPI creatinine equation which includes age and gender (Marleen et al., [place], DOI: 10.1056/LRVUgr4133155)     Calcium   Date Value Ref Range Status   03/02/2022 8.9 8.5 - 10.1 mg/dL Final     Bilirubin Total   Date Value Ref Range Status   03/02/2022 0.6 0.2 - 1.3 mg/dL Final     Alkaline Phosphatase   Date Value Ref Range Status   03/02/2022 83 40 - 150 U/L Final     ALT   Date Value Ref Range Status   03/02/2022 486 (H) 0 - 50 U/L Final     AST   Date Value Ref Range Status   03/02/2022 277 (H) 0 - 45 U/L Final     BMP  Recent Labs   Lab 03/02/22  0552 03/02/22  0431 03/02/22  0016 03/01/22 2042 03/01/22  0800 03/01/22 0341 02/28/22 2146 02/28/22  2144 02/28/22  0801 02/28/22  0305     --   --   --   --  144  --  144  --  142   POTASSIUM 3.5  --   --   --   --  4.4  --  4.4  --  4.5   CHLORIDE 106  --   --   --   --  109  --  112*  --  111*   HEIDI 8.9  --   --   --   --  8.9  --  8.4*  --  8.4*   CO2 26  --   --   --   --  28  --  27  --  25   BUN 44*  --   --   --   --  45*  --  42*  --  32*   CR 0.90  --   --   --   --  0.96  --  0.99  --  0.65   GLC 94 90 100* 120*   < > 107*   < > 128*   < > 85    < > = values in this interval not displayed.     INR  Recent Labs   Lab 03/02/22  0552 03/01/22  0341 02/28/22  0305 02/27/22  0350   INR 1.31* 1.50* 1.48* 1.39*      Hepatic Panel  Recent Labs   Lab 03/02/22  0552 03/01/22  0341 02/28/22  1232 02/28/22  0305   * 353* 527* 733*   * 500* 495* 503*   ALKPHOS 83 81 68 62   BILITOTAL 0.6 0.6 0.7 0.8   ALBUMIN 2.9* 3.0* 3.0* 3.1*     GLUCOSE:   Recent Labs   Lab 03/02/22  0552 03/02/22  0431 03/02/22  0016 03/01/22  2042 03/01/22  1544 03/01/22  1146   GLC 94 90 100* 120* 99 134*

## 2022-03-02 NOTE — PLAN OF CARE
D: Pt admit 2/12/22 s/p CABG x4 2/24. Found L femoral artery thrombus with acute left lower extremity ischemia s/p emergent left femoral thrombectomy, bovine patch angioplasty 2/25. PMH HFrEF (10-15%) 2/2 ICM, biventricular failure requiring inotropic support PTA to [facility], COPD, HLD, tobacco use disorder (quit 12/2021), CAD    I/A:   Neuro: A&Ox4. Forgetful at times. Bed alarm on for increased safety  VS: Pt significantly more hypotensive in RUE than LUE. RUE SBP 70's, pt asymptomatic. LUE SBP . MD notified, fluids encouraged. Continue to monitor at this time. 0.5L NC for comfort  Tele: SR. Pulses with doppler. Cool extremities at shift start  Pain: back pain/generalzed pain controlled with repositioning. Pt declining oral analgesics overnight.   GI/: Urinating adequately into purewick/bedside commode. Small watery BM at shift start  Diet: full liquid mildly thick level 2. Pt takes pills crushed in pudding, tolerated poorly.   BG/insulin: q4hr BG check WNL no corrective insulin needed  IV/Drips: L PIV SL  Activity: A x1-2 w/GB and walker  Skin/drains: CT x2 draining to one atrium to -20 suction. New air leak noted this AM, see provider notification note for additional details. Dressing changed with occlusive gauze, CDI. midline incision, L groin site, LLE graft site CDI, LISS. Turns & weight shifting performed q2hr and PRN as allowed by pt    P: Plan to Continue to monitor pt status and report changes to CVTS.     Dianne Sheikh RN

## 2022-03-02 NOTE — CONSULTS
Patient is a 71 year old female with COPD and heart failure, status post CABG (2/24), with left sided surgical chest tube in place. Chest X-ray notable for bilateral pneumothoraces. Per report, left-sided chest tube was stripped, with some air return. Team requesting bilateral apical chest tube placement. Team will obtain portable CXR to assess for interval improvement in left pneumothorax. Patient will be added to IR schedule on 3/2/22 for bilateral apical chest tube placement. IR may defer left-sided chest tube placement if significant improvement on X ray.     Preprocedural orders have been entered.  Consent will be done prior to procedure.     Please contact the IR control at [phone] for estimated time of procedure.     Case discussed with primary team and IR attending physician (Dr. Sandra).    Josias Lunsford PA-C  Interventional Radiology  [phone] sk[place].

## 2022-03-02 NOTE — PROGRESS NOTES
CLINICAL NUTRITION SERVICES     Nutrition Prescription    RECOMMENDATIONS FOR MDs/PROVIDERS TO ORDER:  If pt is consuming less than 1050 kcals and 44 g protein daily on average, then rec TFs. Per previous RD note: Recommend Osmolite 1.5 Byron @ goal of  45ml/hr  (1080ml/day) + 1 pkt ProSource BID will provide: 1700 kcals (33 kcal/kg), 89 g PRO (1.7 pro/kg), 822 ml free H20, 219 g CHO, and 0 g fiber daily. TF may be adjusted pending oral intake    Malnutrition Status:    See prior RD notes.    Recommendations already ordered by Registered Dietitian (RD):  Kcal counts 3/2-3/4  Modified oral supplements    Future/Additional Recommendations:  See prior RD notes.     Diet: Full liquid diet, mildly thick (level 2)  Intake: Poor appetite so far. Pt consumed 25% with a poor appetite on 2/28 and then 75% with a fair appetite 3/1. On 3/1, pt had one oral supplement ordered at a meal and a very small meal of iced tea and yogurt for another.  Oral supplements: Magic cup 1x/day with dinner (orange flavor) and vital cuisine shake 1x/day with breakfast.    INTERVENTIONS:  Implementation:  Enteral Nutrition: Discontinued signed and held TF order (feeding tube out with extubation).    Ordered kcal counts 3/2-3/4  Medical food supplement therapy: Modified oral supplements to either strawberry Ensure Enlive and butter pecan Glucerna, thickened at all meals.     Follow up/Monitoring:  Will continue to follow pt.    Asha Dykes, MS, RD, LD, CNSC   6C Pgr: [phone]

## 2022-03-02 NOTE — PROCEDURES
Ashley Osman  [id number]    Patient in IR for requested bilateral apical chest tubes for pneumothoraces noted on CXR 3/2/22.    Fluoroscopy used to visualize chest and no pneumothorax appreciated in either LEFT or RIGHT chest.     No procedure attempted. Patient respiratory function stable. RN reports functional existing chest tube system. Report of recent unit chest tube manipulation prior to IR arrival that may have resolved air leak and eliminated pneumothoraces.

## 2022-03-02 NOTE — PLAN OF CARE
CXR this morning showed new bilateral PTxs along with new air leak. CVTS did chest tube manipulation and stripping of CTs. New vaseline gauze drsgs placed around tubes. Pt went to IR for further evaluation and pneumothoraces found to have resolved. No need for new CT placement.   WBC up to 13.0. Blood cxs sent, UA sent; unable to obtain sputum cx yet.   Mg 2.2-replaced with 2g.   K 3.5-replaced per protocol.   Pt does not like taking pills-changed oral pain med to solution and pt tolerated well. Lateral pain improving after oral oxy.   SLP advanced pt diet to soft with thin liquids. Calorie count initiated today. Needs assist to order food.   Purewick in place. No BM today.

## 2022-03-02 NOTE — PROVIDER NOTIFICATION
New air leak noted on mediastinal and L pleural CT. MD Nithin Khan assessed pt at bedside. Occlusive dressing placed. Pt denies new shortness of breath/discomfort. Continue to monitor. 2 view xray already in place for 0700 today.     Dianne ADLER RN

## 2022-03-03 ENCOUNTER — APPOINTMENT (OUTPATIENT)
Dept: GENERAL RADIOLOGY | Facility: CLINIC | Age: 72
DRG: 233 | End: 2022-03-03
Attending: SURGERY
Payer: MEDICARE

## 2022-03-03 ENCOUNTER — APPOINTMENT (OUTPATIENT)
Dept: OCCUPATIONAL THERAPY | Facility: CLINIC | Age: 72
DRG: 233 | End: 2022-03-03
Attending: INTERNAL MEDICINE
Payer: MEDICARE

## 2022-03-03 ENCOUNTER — APPOINTMENT (OUTPATIENT)
Dept: GENERAL RADIOLOGY | Facility: CLINIC | Age: 72
DRG: 233 | End: 2022-03-03
Attending: PHYSICIAN ASSISTANT
Payer: MEDICARE

## 2022-03-03 ENCOUNTER — APPOINTMENT (OUTPATIENT)
Dept: SPEECH THERAPY | Facility: CLINIC | Age: 72
DRG: 233 | End: 2022-03-03
Attending: INTERNAL MEDICINE
Payer: MEDICARE

## 2022-03-03 ENCOUNTER — APPOINTMENT (OUTPATIENT)
Dept: PHYSICAL THERAPY | Facility: CLINIC | Age: 72
DRG: 233 | End: 2022-03-03
Attending: INTERNAL MEDICINE
Payer: MEDICARE

## 2022-03-03 ENCOUNTER — APPOINTMENT (OUTPATIENT)
Dept: CARDIOLOGY | Facility: CLINIC | Age: 72
DRG: 233 | End: 2022-03-03
Attending: SURGERY
Payer: MEDICARE

## 2022-03-03 LAB
ALBUMIN SERPL-MCNC: 2.8 G/DL (ref 3.4–5)
ALBUMIN SERPL-MCNC: 3 G/DL (ref 3.4–5)
ALP SERPL-CCNC: 93 U/L (ref 40–150)
ALP SERPL-CCNC: 97 U/L (ref 40–150)
ALT SERPL W P-5'-P-CCNC: 591 U/L (ref 0–50)
ALT SERPL W P-5'-P-CCNC: 596 U/L (ref 0–50)
ANION GAP SERPL CALCULATED.3IONS-SCNC: 5 MMOL/L (ref 3–14)
ANION GAP SERPL CALCULATED.3IONS-SCNC: 6 MMOL/L (ref 3–14)
ANION GAP SERPL CALCULATED.3IONS-SCNC: 7 MMOL/L (ref 3–14)
AST SERPL W P-5'-P-CCNC: 400 U/L (ref 0–45)
AST SERPL W P-5'-P-CCNC: 407 U/L (ref 0–45)
BILIRUB SERPL-MCNC: 0.7 MG/DL (ref 0.2–1.3)
BILIRUB SERPL-MCNC: 0.8 MG/DL (ref 0.2–1.3)
BUN SERPL-MCNC: 45 MG/DL (ref 7–30)
BUN SERPL-MCNC: 46 MG/DL (ref 7–30)
BUN SERPL-MCNC: 46 MG/DL (ref 7–30)
CALCIUM SERPL-MCNC: 8.2 MG/DL (ref 8.5–10.1)
CALCIUM SERPL-MCNC: 8.3 MG/DL (ref 8.5–10.1)
CALCIUM SERPL-MCNC: 8.6 MG/DL (ref 8.5–10.1)
CHLORIDE BLD-SCNC: 100 MMOL/L (ref 94–109)
CHLORIDE BLD-SCNC: 101 MMOL/L (ref 94–109)
CHLORIDE BLD-SCNC: 101 MMOL/L (ref 94–109)
CO2 SERPL-SCNC: 26 MMOL/L (ref 20–32)
CO2 SERPL-SCNC: 26 MMOL/L (ref 20–32)
CO2 SERPL-SCNC: 27 MMOL/L (ref 20–32)
CREAT SERPL-MCNC: 0.87 MG/DL (ref 0.52–1.04)
CREAT SERPL-MCNC: 0.89 MG/DL (ref 0.52–1.04)
CREAT SERPL-MCNC: 0.91 MG/DL (ref 0.52–1.04)
ERYTHROCYTE [DISTWIDTH] IN BLOOD BY AUTOMATED COUNT: 18.3 % (ref 10–15)
GFR SERPL CREATININE-BSD FRML MDRD: 66 ML/MIN/1.73M2
GFR SERPL CREATININE-BSD FRML MDRD: 68 ML/MIN/1.73M2
GFR SERPL CREATININE-BSD FRML MDRD: 70 ML/MIN/1.73M2
GLUCOSE BLD-MCNC: 101 MG/DL (ref 70–99)
GLUCOSE BLD-MCNC: 110 MG/DL (ref 70–99)
GLUCOSE BLD-MCNC: 117 MG/DL (ref 70–99)
GLUCOSE BLDC GLUCOMTR-MCNC: 117 MG/DL (ref 70–99)
HCT VFR BLD AUTO: 28.2 % (ref 35–47)
HGB BLD-MCNC: 8.6 G/DL (ref 11.7–15.7)
INR PPP: 1.32 (ref 0.85–1.15)
LACTATE SERPL-SCNC: 1.4 MMOL/L (ref 0.7–2)
LVEF ECHO: NORMAL
MAGNESIUM SERPL-MCNC: 2.1 MG/DL (ref 1.6–2.3)
MAGNESIUM SERPL-MCNC: 2.4 MG/DL (ref 1.6–2.3)
MCH RBC QN AUTO: 29.7 PG (ref 26.5–33)
MCHC RBC AUTO-ENTMCNC: 30.5 G/DL (ref 31.5–36.5)
MCV RBC AUTO: 97 FL (ref 78–100)
PHOSPHATE SERPL-MCNC: 3.6 MG/DL (ref 2.5–4.5)
PLATELET # BLD AUTO: 216 10E3/UL (ref 150–450)
POTASSIUM BLD-SCNC: 4.1 MMOL/L (ref 3.4–5.3)
POTASSIUM BLD-SCNC: 4.2 MMOL/L (ref 3.4–5.3)
POTASSIUM BLD-SCNC: 4.3 MMOL/L (ref 3.4–5.3)
PROT SERPL-MCNC: 5.4 G/DL (ref 6.8–8.8)
PROT SERPL-MCNC: 5.6 G/DL (ref 6.8–8.8)
RBC # BLD AUTO: 2.9 10E6/UL (ref 3.8–5.2)
SODIUM SERPL-SCNC: 133 MMOL/L (ref 133–144)
WBC # BLD AUTO: 15.1 10E3/UL (ref 4–11)

## 2022-03-03 PROCEDURE — 83605 ASSAY OF LACTIC ACID: CPT | Performed by: SURGERY

## 2022-03-03 PROCEDURE — 71045 X-RAY EXAM CHEST 1 VIEW: CPT

## 2022-03-03 PROCEDURE — 71045 X-RAY EXAM CHEST 1 VIEW: CPT | Mod: 26 | Performed by: RADIOLOGY

## 2022-03-03 PROCEDURE — 71045 X-RAY EXAM CHEST 1 VIEW: CPT | Mod: 77

## 2022-03-03 PROCEDURE — 93325 DOPPLER ECHO COLOR FLOW MAPG: CPT

## 2022-03-03 PROCEDURE — 36415 COLL VENOUS BLD VENIPUNCTURE: CPT | Performed by: STUDENT IN AN ORGANIZED HEALTH CARE EDUCATION/TRAINING PROGRAM

## 2022-03-03 PROCEDURE — 999N000127 HC STATISTIC PERIPHERAL IV START W US GUIDANCE

## 2022-03-03 PROCEDURE — 250N000009 HC RX 250: Performed by: SURGERY

## 2022-03-03 PROCEDURE — 82040 ASSAY OF SERUM ALBUMIN: CPT | Performed by: SURGERY

## 2022-03-03 PROCEDURE — 93325 DOPPLER ECHO COLOR FLOW MAPG: CPT | Mod: 26 | Performed by: INTERNAL MEDICINE

## 2022-03-03 PROCEDURE — 214N000001 HC R&B CCU UMMC

## 2022-03-03 PROCEDURE — 80053 COMPREHEN METABOLIC PANEL: CPT | Performed by: SURGERY

## 2022-03-03 PROCEDURE — 92526 ORAL FUNCTION THERAPY: CPT | Mod: GN

## 2022-03-03 PROCEDURE — 250N000013 HC RX MED GY IP 250 OP 250 PS 637: Performed by: SURGERY

## 2022-03-03 PROCEDURE — 82310 ASSAY OF CALCIUM: CPT | Performed by: SURGERY

## 2022-03-03 PROCEDURE — 84100 ASSAY OF PHOSPHORUS: CPT | Performed by: SURGERY

## 2022-03-03 PROCEDURE — 93308 TTE F-UP OR LMTD: CPT | Mod: 26 | Performed by: INTERNAL MEDICINE

## 2022-03-03 PROCEDURE — 250N000013 HC RX MED GY IP 250 OP 250 PS 637: Performed by: PHYSICIAN ASSISTANT

## 2022-03-03 PROCEDURE — 36415 COLL VENOUS BLD VENIPUNCTURE: CPT | Performed by: SURGERY

## 2022-03-03 PROCEDURE — 87040 BLOOD CULTURE FOR BACTERIA: CPT | Performed by: SURGERY

## 2022-03-03 PROCEDURE — 97535 SELF CARE MNGMENT TRAINING: CPT | Mod: GO

## 2022-03-03 PROCEDURE — 97530 THERAPEUTIC ACTIVITIES: CPT | Mod: GP

## 2022-03-03 PROCEDURE — 85027 COMPLETE CBC AUTOMATED: CPT | Performed by: SURGERY

## 2022-03-03 PROCEDURE — 85610 PROTHROMBIN TIME: CPT | Performed by: SURGERY

## 2022-03-03 PROCEDURE — 250N000011 HC RX IP 250 OP 636: Performed by: NURSE PRACTITIONER

## 2022-03-03 PROCEDURE — 83735 ASSAY OF MAGNESIUM: CPT | Performed by: SURGERY

## 2022-03-03 PROCEDURE — 97110 THERAPEUTIC EXERCISES: CPT | Mod: GO

## 2022-03-03 PROCEDURE — 99232 SBSQ HOSP IP/OBS MODERATE 35: CPT | Mod: 25 | Performed by: INTERNAL MEDICINE

## 2022-03-03 PROCEDURE — 258N000003 HC RX IP 258 OP 636: Performed by: SURGERY

## 2022-03-03 PROCEDURE — 250N000011 HC RX IP 250 OP 636: Performed by: STUDENT IN AN ORGANIZED HEALTH CARE EDUCATION/TRAINING PROGRAM

## 2022-03-03 PROCEDURE — 93321 DOPPLER ECHO F-UP/LMTD STD: CPT

## 2022-03-03 PROCEDURE — 250N000011 HC RX IP 250 OP 636: Performed by: SURGERY

## 2022-03-03 PROCEDURE — 93321 DOPPLER ECHO F-UP/LMTD STD: CPT | Mod: 26 | Performed by: INTERNAL MEDICINE

## 2022-03-03 PROCEDURE — 84450 TRANSFERASE (AST) (SGOT): CPT | Performed by: STUDENT IN AN ORGANIZED HEALTH CARE EDUCATION/TRAINING PROGRAM

## 2022-03-03 RX ORDER — OXYCODONE HCL 5 MG/5 ML
5 SOLUTION, ORAL ORAL EVERY 4 HOURS PRN
Status: DISCONTINUED | OUTPATIENT
Start: 2022-03-03 | End: 2022-03-17

## 2022-03-03 RX ORDER — METHOCARBAMOL 500 MG/1
250-500 TABLET ORAL EVERY 6 HOURS PRN
Status: DISCONTINUED | OUTPATIENT
Start: 2022-03-03 | End: 2022-03-18 | Stop reason: HOSPADM

## 2022-03-03 RX ORDER — BISACODYL 10 MG
10 SUPPOSITORY, RECTAL RECTAL ONCE
Status: COMPLETED | OUTPATIENT
Start: 2022-03-03 | End: 2022-03-03

## 2022-03-03 RX ORDER — ASPIRIN 81 MG/1
162 TABLET, CHEWABLE ORAL DAILY
Status: DISCONTINUED | OUTPATIENT
Start: 2022-03-04 | End: 2022-03-07

## 2022-03-03 RX ORDER — FUROSEMIDE 10 MG/ML
40 INJECTION INTRAMUSCULAR; INTRAVENOUS
Status: DISCONTINUED | OUTPATIENT
Start: 2022-03-03 | End: 2022-03-03

## 2022-03-03 RX ORDER — FUROSEMIDE 10 MG/ML
60 INJECTION INTRAMUSCULAR; INTRAVENOUS ONCE
Status: DISCONTINUED | OUTPATIENT
Start: 2022-03-03 | End: 2022-03-03

## 2022-03-03 RX ORDER — FUROSEMIDE 10 MG/ML
80 INJECTION INTRAMUSCULAR; INTRAVENOUS ONCE
Status: COMPLETED | OUTPATIENT
Start: 2022-03-03 | End: 2022-03-03

## 2022-03-03 RX ORDER — POLYETHYLENE GLYCOL 3350 17 G/17G
17 POWDER, FOR SOLUTION ORAL DAILY
Status: DISCONTINUED | OUTPATIENT
Start: 2022-03-03 | End: 2022-03-07

## 2022-03-03 RX ORDER — FUROSEMIDE 10 MG/ML
80 INJECTION INTRAMUSCULAR; INTRAVENOUS EVERY 12 HOURS
Status: DISCONTINUED | OUTPATIENT
Start: 2022-03-03 | End: 2022-03-03

## 2022-03-03 RX ADMIN — SPIRONOLACTONE 25 MG: 25 TABLET ORAL at 09:10

## 2022-03-03 RX ADMIN — VANCOMYCIN HYDROCHLORIDE 1500 MG: 100 INJECTION, POWDER, LYOPHILIZED, FOR SOLUTION INTRAVENOUS at 12:08

## 2022-03-03 RX ADMIN — OXYCODONE HYDROCHLORIDE 5 MG: 5 SOLUTION ORAL at 09:13

## 2022-03-03 RX ADMIN — POTASSIUM CHLORIDE 20 MEQ: 1.5 POWDER, FOR SOLUTION ORAL at 09:12

## 2022-03-03 RX ADMIN — FUROSEMIDE 80 MG: 10 INJECTION, SOLUTION INTRAVENOUS at 17:43

## 2022-03-03 RX ADMIN — HEPARIN SODIUM 5000 UNITS: 5000 INJECTION, SOLUTION INTRAVENOUS; SUBCUTANEOUS at 03:34

## 2022-03-03 RX ADMIN — POLYETHYLENE GLYCOL 3350 17 G: 17 POWDER, FOR SOLUTION ORAL at 09:14

## 2022-03-03 RX ADMIN — FUROSEMIDE 40 MG: 10 INJECTION, SOLUTION INTRAVENOUS at 09:12

## 2022-03-03 RX ADMIN — Medication 10 MG/HR: at 22:12

## 2022-03-03 RX ADMIN — FUROSEMIDE 80 MG: 10 INJECTION, SOLUTION INTRAVENOUS at 10:59

## 2022-03-03 RX ADMIN — FUROSEMIDE 80 MG: 10 INJECTION, SOLUTION INTRAVENOUS at 22:06

## 2022-03-03 RX ADMIN — ASPIRIN 81 MG: 81 TABLET, CHEWABLE ORAL at 09:12

## 2022-03-03 RX ADMIN — THIAMINE HCL TAB 100 MG 100 MG: 100 TAB at 09:10

## 2022-03-03 RX ADMIN — PANTOPRAZOLE SODIUM 40 MG: 40 TABLET, DELAYED RELEASE ORAL at 09:15

## 2022-03-03 RX ADMIN — BISACODYL 10 MG: 10 SUPPOSITORY RECTAL at 19:00

## 2022-03-03 RX ADMIN — CEFEPIME HYDROCHLORIDE 2 G: 2 INJECTION, POWDER, FOR SOLUTION INTRAVENOUS at 11:00

## 2022-03-03 ASSESSMENT — ACTIVITIES OF DAILY LIVING (ADL)
ADLS_ACUITY_SCORE: 14
DEPENDENT_IADLS:: INDEPENDENT
ADLS_ACUITY_SCORE: 14
ADLS_ACUITY_SCORE: 16
ADLS_ACUITY_SCORE: 16
ADLS_ACUITY_SCORE: 18
ADLS_ACUITY_SCORE: 14
ADLS_ACUITY_SCORE: 16
ADLS_ACUITY_SCORE: 14
ADLS_ACUITY_SCORE: 16
ADLS_ACUITY_SCORE: 14
ADLS_ACUITY_SCORE: 18
ADLS_ACUITY_SCORE: 14
ADLS_ACUITY_SCORE: 14
ADLS_ACUITY_SCORE: 16
ADLS_ACUITY_SCORE: 14
ADLS_ACUITY_SCORE: 14
ADLS_ACUITY_SCORE: 16

## 2022-03-03 NOTE — PROGRESS NOTES
Pt triggered sepsis protocol this morning. Pt afebrile, SBP in the 80s, HR 50-60s. Pt asymptomatic and resting. Provider notified and aware. Lactic acid drawn, result 1.4. Blood cultures and abx ordered.

## 2022-03-03 NOTE — PLAN OF CARE
71 year old female with PMH of HFrEF (10-15%) 2/2 ICM (Hx LAD and Circumflex MIs; total occlusion of RCA and LAD), Biventricular failure (requiring inotropic support PTA to [facility]), COPD, HLD, Tobacco Use Disorder (quit 12/2021), CAD who underwent CABG x4 on 02/24 with Dr. Bhandari. Found to have left femoral artery thrombosis with acute left lower extremity ischemia s/p emergent left femoral thrombectomy, bovine patch angioplasty on 2/25. 02/28 with elevated LFTs.    [id number]  Neuro: AOx4, anxious, slow with communicating. Denies headache, reports episodes of lightheadedness during day with turning - emphasizing slow turns and repositions. No reports of numbness/tingling  Resp: Sating >92% on 1L NC. Denies SOB  Cardiac: SR 60s, denies chest pain. VSS on 1L  GI/: Soft and Bite Sized Diet Level 6, Thin Liquids level 0. Pt has stomach discomfort and is feeling constipated, refused Pepto Bismol. Smear BM 3/3 AM. Denies N/V, abdominal tenderness with palpation. Purewick in place with zero output overnight. Bladder scan revealed 145 mL and pt was reminded that she can urinate in bed, CVTS notified.   Skin: Incisions cleaned and WDL, UTV CT sites. Perineal cares done  Pain: Located on pt sides and stomach discomfort, managed with oxycodone solution 5mg.  Activity: Assist of 2 with transfers/repositioning  Labs: WBC 15.1 - trending upwards.  LDAs: CT x2 to 1 cannister -20 suction.    Repositioned q2-3 hours when able and PRN per patient request. Was unable to produce sputum and still needs collection. Will continue to monitor and report changes to team.

## 2022-03-03 NOTE — PHARMACY-VANCOMYCIN DOSING SERVICE
Pharmacy Vancomycin Initial Note  Date of Service March 3, 2022  Patient's  1950  71 year old, female    Indication: Sepsis    Current estimated CrCl = Estimated Creatinine Clearance: 49.7 mL/min (based on SCr of 0.91 mg/dL).    Creatinine for last 3 days  2022:  9:44 PM Creatinine 0.99 mg/dL  3/1/2022:  3:41 AM Creatinine 0.96 mg/dL  3/2/2022:  5:52 AM Creatinine 0.90 mg/dL  3/3/2022:  6:17 AM Creatinine 0.91 mg/dL    Recent Vancomycin Level(s) for last 3 days  No results found for requested labs within last 72 hours.      Vancomycin IV Administrations (past 72 hours)      No vancomycin orders with administrations in past 72 hours.                Nephrotoxins and other renal medications (From now, onward)    Start     Dose/Rate Route Frequency Ordered Stop    22 0900  furosemide (LASIX) injection 40 mg         40 mg  over 1-3 Minutes Intravenous 3 TIMES DAILY WITH MEALS 22 0837            Contrast Orders - past 72 hours (72h ago, onward)            Start     Dose/Rate Route Frequency Ordered Stop    22 1100  perflutren diluted 1mL to 2mL with saline (OPTISON) diluted injection 6 mL         6 mL Intravenous ONCE 22 1047 22 1047          Insight[employer] Prediction of Planned Initial Vancomycin Regimen  Dosing regimen: 1500 mg (26 mg/kg) IV x1 followed by 1250 mg (21.9 mg/kg) IV Q24 hr  AUC24, ss: 540 mg/L.hr  Ctrough, ss: 15.7 mg/L  Pauc: 82 % (probability that AUC is >400 - efficacy)  Pconc: 28 % (probability that Ctrough is above 20 mcg/mL - toxicity)  Toxicity: 11 % (probability of nephrotoxicity)        Plan:  1. Start vancomycin 1500 mg IV x1 followed by 1250 mg q24h.   2. Vancomycin monitoring method: AUC  3. Vancomycin therapeutic monitoring goal: 400-600 mg*h/L  4. Pharmacy will check vancomycin levels as appropriate in 1-3 Days.    5. Serum creatinine levels will be ordered daily for the first week of therapy and at least twice weekly for subsequent weeks.      Stacey DIALLO  Garry PharmD, BCPS

## 2022-03-03 NOTE — CONSULTS
[facility]  Cardiology II Service / Advanced Heart Failure  Daily Progress Note    Patient: Ashley Osman      : 1950      MRN: [id number]    Assessment/Plan:   72yo female w/ PMHx of newly-diagnosed HFrEF (10-15% 22) 2/2 ICM, CAD (diagnosed 2022), COPD (diagnosed 2022), hyperlipidemia, and cigarette smoking (quit in 2021) who presented to [facility]  as a transfer from OSH for consideration of advanced therapies in the setting of cardiogenic shock requiring inotropic support. IABP placed on  and she was taken to the OR for CABG x4 on  with Dr. Bhandari. Found to have left femoral artery thrombosis with acute left lower extremity ischemia s/p emergent left femoral thrombectomy, bovine patch angioplasty on . Bilateral pneumothoraces after air leak noted 3/2/22.    She is floridly volume overloaded on exam. Her dry weight is likely close to 114 lbs (or less) and she weighs 126 lbs today. Lactic is normal. Cr is technically normal but is 2x her baseline so she likely has an JAMISON/CRS from volume.    TTE today 3/3 showed: LVEF 10-15%; global right ventricular function is normal. IVC diameter >2.1 cm collapsing <50% with sniff suggests a high RA pressur estimated at 15 mmHg or greater.    Today's changes:   - lasix 40IV this am will redose 80 IVx1 (ordered for you) and monitor UOP  - will redose this afternoon based on UOP  - bladder scan  - strict I/Os and daily weights  - BID CMP, for lytes and LFTs  - hold coreg  - hold amiodarone and statin     # 3VCAD s/p CABGx4 (22)  # CAD (mLAD , severe prox and distal LAD, LCx and D1 disease)  # Cardiogenic shock  # Acute on Chronic HFrEF 2/2 ICM (EF 10-15%) AHA Stage D, NYHA Class III-IV  # LV thrombus   CMRI () showing viability of 50% in LAD territory (intermediate likelihood of recovery) and 75% in dominant LCx territory (good likelihood of recovery). Planned to assess feasibility of  revascularization w/ PCI with Interventional Cardiology vs CABG w/ CVTS - opting for CAB based on review of prior angiogram and cMRI. S/p 3VCABG on 2/24.    - ASA 81mg daily   - Holding atorvastatin 40mg daily and amiodarone 200 mg daily until LFTs start to downtrend    Staffed w/ Dr. Gilman.    Eloisa Romo MD  Cardiology Fellow  03/03/2022       Pt's condition and care plan discussed with fellow but patient not seen personally by me today.    Priscilla Gilman MD  Section Head - Advanced Heart Failure, Transplantation and Mechanical Circulatory Support  Director - [facility]  Associate Professor of Medicine, [facility]      ==================================    Subjective:   Cardiology reconsulted for rising LFTs. Chest tubes pulled today    Objective:     Vitals:    03/01/22 0000 03/02/22 1200 03/03/22 0600   Weight: 58.6 kg (129 lb 3 oz) 58 kg (127 lb 12.8 oz) 57.2 kg (126 lb 1.7 oz)     Vital Signs with Ranges  Temp:  [96.4  F (35.8  C)-98  F (36.7  C)] 97.5  F (36.4  C)  Pulse:  [58-69] 59  Resp:  [18-19] 18  BP: ()/(46-62) 106/52  SpO2:  [97 %-100 %] 100 %  I/O last 3 completed shifts:  In: 600 [P.O.:600]  Out: 900 [Urine:600; Chest Tube:300]    Gen: A&Ox4, NAD  Neuro: no focal deficits   CV: distant heart tones, RRR - sinus on telemetry monitor, normal S1 S2, no murmurs, rub present  Pulm: no wheezing, bilateral crackles,  normal breathing on RA - 2 lpm  Abd: nondistended, normal BS, soft, nontender  Ext: mild peripheral edema - LLE>RLE  Incision: clean, dry, intact, no erythema, sternum stable  Tubes/drain sites: dressing clean and dry, intact    Medications     dextrose       ACE/ARB/ARNI NOT PRESCRIBED       BETA BLOCKER NOT PRESCRIBED         [Held by provider] amiodarone  400 mg Oral BID     aspirin  81 mg Oral or NG Tube Daily     [Held by provider] atorvastatin  40 mg Oral QAM     [Held by provider] carvedilol  3.125 mg Oral BID w/meals      ceFEPIme (MAXIPIME) IV  2 g Intravenous Q12H     furosemide  40 mg Intravenous TID w/meals     furosemide  60 mg Intravenous Once     [Held by provider] heparin ANTICOAGULANT  5,000 Units Subcutaneous Q8H     influenza vac high-dose quad  0.7 mL Intramuscular Prior to discharge     lidocaine (viscous)  5 mL Topical Once     melatonin  3 mg Oral At Bedtime     pantoprazole  40 mg Oral QAM AC     polyethylene glycol  17 g Oral or Feeding Tube Daily     potassium chloride  20 mEq Oral BID     spironolactone  25 mg Oral Daily     thiamine  100 mg Oral or Feeding Tube Daily     umeclidinium-vilanterol  1 puff Inhalation Daily     [START ON 3/4/2022] vancomycin  1,250 mg Intravenous Q24H     vancomycin  1,500 mg Intravenous Once       Data   Recent Labs   Lab 03/03/22  0617 03/02/22  0552 03/02/22  0431 03/01/22  0800 03/01/22  0341   WBC 15.1* 13.0*  --   --  12.6*   HGB 8.6* 8.6*  --   --  8.5*   MCV 97 101*  --   --  96    199  --   --  163   INR 1.32* 1.31*  --   --  1.50*    141  --   --  144   POTASSIUM 4.3 3.5  --   --  4.4   CHLORIDE 101 106  --   --  109   CO2 26 26  --   --  28   BUN 46* 44*  --   --  45*   CR 0.91 0.90  --   --  0.96   ANIONGAP 6 9  --   --  7   HEIDI 8.6 8.9  --   --  8.9   * 94 90   < > 107*   ALBUMIN 3.0* 2.9*  --   --  3.0*   PROTTOTAL 5.6* 5.6*  --   --  5.7*   BILITOTAL 0.8 0.6  --   --  0.6   ALKPHOS 93 83  --   --  81   * 486*  --   --  500*   * 277*  --   --  353*    < > = values in this interval not displayed.       Recent Results (from the past 24 hour(s))   XR Chest 2 Views   Result Value    Radiologist flags Increase in bilateral pneumothoraces. Chest tubes (Urgent)    Narrative    Exam: XR CHEST 2 VW, 3/2/2022 11:04 AM    Indication: s/p CVTS Surgery    Comparison: 2/28/2022    Findings:   Left chest tube remains in place. Mediastinal chest tube remains in  place. Increase in the bilateral apical pneumothoraces. Stable  cardiomegaly. Perihilar and  lower lobe hazy opacity. Blunting of both  costophrenic angles.       Impression    Impression:  1. Increasing bilateral pneumothoraces. Left chest and mediastinal  chest tubes remain in place.  2. Perihilar and lower lobe hazy opacity suggests edema. Small pleural  effusions remain.    [Access Center: Increase in bilateral pneumothoraces. Chest tubes  remain in place.]    This report will be copied to the [facility] to ensure a  provider acknowledges the finding. Access Center is available Monday  through Friday 8am-3:30 pm.     WHITNEY HEALY MD         SYSTEM ID:  [id number]   IR Fluoro 0-1 Hour    Narrative    Ashley Osman  [id number]    Patient in IR for requested bilateral apical chest tubes for  pneumothoraces noted on CXR 3/2/22.    Fluoroscopy used to visualize chest and no pneumothorax appreciated in  either LEFT or RIGHT chest.     No procedure attempted. Patient respiratory function stable. RN  reports functional existing chest tube system. Report of recent unit  chest tube manipulation prior to IR arrival that may have resolved air  leak and eliminated pneumothoraces.         XR Chest Port 1 View    Narrative    Exam: XR CHEST PORT 1 VIEW, 3/2/2022 2:43 PM    Comparison: 3/2/2022 at 11:00 AM    History: pneumothorax follow up    Findings:  AP view of the chest. Left chest tube in stable position. Mediastinal  drain. Intact median sternotomy wires.    Trachea is midline. Stable cardiomegaly. Aortic knob calcifications.  Mild interstitial opacities bilaterally. Small right and left apical  pneumothoraces. Small pleural effusions bilaterally. The upper abdomen  is unremarkable.      Impression    Impression:   1. Interval decrease in bilateral pneumothoraces.  2. Bilateral opacities likely represent pulmonary edema.  3. Stable small pleural effusions bilaterally.    I have personally reviewed the examination and initial interpretation  and I agree with the findings.    GISSEL MIRANDA MD          SYSTEM ID:  [id number]

## 2022-03-03 NOTE — PROGRESS NOTES
Cardiovascular Surgery Progress Note  03/03/2022         Assessment and Plan:     Ashley Osman is a 71 year old female with PMH of HFrEF (10-15%) 2/2 ICM (Hx LAD and Circumflex MIs; total occlusion of RCA and LAD), Biventricular failure (requiring inotropic support PTA to [facility]), COPD, HLD, Tobacco Use Disorder (quit 12/2021), CAD who underwent CABG x4 on 02/24 with Dr. Bhandari. Found to have left femoral artery thrombosis with acute left lower extremity ischemia s/p emergent left femoral thrombectomy, bovine patch angioplasty on 2/25. 02/28 with elevated LFTs.    3/3 PLAN:   - Stat limited echo to look at right heart function   - Repeat serial blood cultures   - Begin empiric abx:  Cefepime/vanc   - Increase IV Lasix to TID (+4L) - follow volume status and consider escalation of diuretic therapy as indicated   - Consult HF Cardiology   - Chest tubes to water seal this AM   - Repeat CXR in PM and if stable, pull chest tubes        Cardiovascular:   Hx of biventricular HF requiring pressors, CAD, HFrEF 10-15%. Tobacco abuse, s/p CABG x4 on 2/24  Atrial fibrillation with RVR post surgery, HD stable-off pressors 2/28.  Most recent echo showed LV EF 15%  - Daily weights  - ASA 81mg  - Hold statin d/t LFT elevation  - Coreg held in context of bradycardia and concern for developing RHF  - Holding amio due to LFT elevation - If afib rvr reoccurs consider beta blocker or diltiazem  - [facility] consulted 2/22 however pt not scheduled as declining specialty care. May need to revisit this prior to discharge     Left femoral artery thrombosis with acute left lower extremity ischemia s/p emergent left femoral thrombectomy, bovine patch angioplasty on 2/25  Occurred in setting of recent perioperative IABP  - Heparin gtt discontinued, s/p thrombectomy per Vascular  - On subcutaneous heparin  - Monitor lower extremity pulses and perfusion status  - Per vascular recs 3/1:               - NO need for additional  anticoagulation from surgery standpoint              - Recommend ASA when able and statin when able               - Incisional dressing to stay on for 2 weeks              - Vascular will sign off              - Will arrange followup with Vascular Surgery.      Volume Overload  Continues to have signs of volume overload despite BID IV diuresis   - Increase IV Lasix from 40mg BID to TID, monitor output closely and escalate as needed with goal of 1.5-2L negative if HD will tolerate    Possible RHF (Elevated LFTs)  Had been downtrending, now acutely back up   - Stat echo pending   - HF Cardiology consulted, appreciate recs   - Continue holding amio   - Discontinue PRN tylenol   - Daily LFTs     Chest tubes: 170 mL in 24 hours - water seal this AM, repeat CXR this PM, remove if no significant recurrent PTX  TPW: removed without difficulty     Pulmonary:  Extubated POD #5; Saturating well on 1L - RA  Supplemental O2 PRN to keep sats > 92%. Wean as tolerated.  Pulm toilet, IS, activity/OOB and deep breathing    #Bilateral PTX  IR consulted 3/2 for chest tube placement however PTX resolved prior to intervention when chest tubes returned to suction.   - AM CXR pending   - If no significant PTX, will place on water seal with plan for follow-up XR at 1400.  If no recurrent PTX, CT may be removed.     Neurology / MSK:  Acute post-operative pain; pain well controlled   -PO oxycodone 5mg PRN  -Modify Robaxin to PRN  -Tylenol discontinued d/t elevation in LFT      / Renal:  No Hx of renal disease, baseline creatinine ~0.6  Volume overloaded, as noted above  - Diuresis - increase to 40mg IV Lasix TID with goal of net negative 1.5-2L as HD will tolerate    #Equivocal UA   - Covered with 1g Rocephin x1 on 3/1   - UC negative      GI / FEN:   #Elevated LFT  Suspect venous congestion as likely source.  Plans as above; continue to trend daily, avoid hepatotoxic agents as able.    #Moderate malnutrition in the context of acute on  chronic illness   - Dietitian consulted, appreciate recs   - Calorie counts ongoing, reflecting poor PO intake so may need to consider supplemental feedings if not improving    #SLP following: recommend Full liquid diet- mildly thick    #Constipation  Last BM 2/28   - Increase daily bowel regimen, one-time supp today     Endocrine:  #Stress-induced hyperglycemia - resolved; initially managed on insulin drip postop, transitioned to sliding scale goal BG <180 and since discontinued after demonstrated euglycemia without exogenous insulin     Infectious Disease:  Stress-induced leukocytosis  WBC downtrended but never completely resolved post-operatively, low grade fever (99.1) 2/28.  WBC back up today 15.1 (13); procal 0.36 3/2  - Completed perioperative antibiotics  - Pan cultured 3/2, NGTD  - Serial blood cultures today  - Begin empiric cefepime and vanco 3/3  - Daily AM CBC, recheck procal in AM  - Avoid invasive lines, as able     Hematology:   Stress-induced leukocytosis, as above  Acute blood loss anemia, Hgb stable ~8   Acute blood loss thrombocytopenia, resolved  Left femoral artery thrombosis with acute extremity ischemia, resolved s/p thrombectomy and bovine patch per Vascular Surgery    No signs or symptoms of active bleeding  - Transfuse prn for Hgb<7  - Daily CBC     Anticoagulation:   - ASA increased from 81 mg to 162 mg on 3/3 s/p CABG  - Subcutaneous Heparin TID  - Not currently anticoagulated for post-op afib as has been maintaining SR     Prophylaxis:   Stress ulcer prophylaxis:   -Pantoprazole 40 mg daily for 30 days  -DVT prophylaxis: Subcutaneous heparin, SCD, OOB/activity/ambulation     Disposition:   Transferred to  on 3/1  Rehab recommending discharge to TCU     Seen and discussed with Dr. Bhandari.    Nyla Colorado, CNP, ACNPC-AG  Cardiothoracic Surgery  Pager [phone]        Interval History:     No overnight events.  LFT & WBC up on this morning's labs.  States pain is  "adequately-managed on current regimen. Complains of poor sleep throughout her hospitalization as well as a feeling of constipation.  Tolerating PO intake but notes early satiety, is passing flatus, had several BM ~3 days ago but nothing since. No nausea or vomiting.  Breathing well without complaints except pain with coughing.   Denies LE numbness and tingling.  Endorses a couple of spells of dizziness with bed turns - none with standing.  No history of vertigo by report.  Denies chest pain, syncopal symptoms, and sternal popping/clicking.         Physical Exam:   Blood pressure 108/58, pulse 59, temperature 97.5  F (36.4  C), temperature source Oral, resp. rate 18, height 1.638 m (5' 4.5\"), weight 57.2 kg (126 lb 1.7 oz), SpO2 100 %.  Vitals:    03/01/22 0000 03/02/22 1200 03/03/22 0600   Weight: 58.6 kg (129 lb 3 oz) 58 kg (127 lb 12.8 oz) 57.2 kg (126 lb 1.7 oz)      Weight: +3.2 kg since admit and trending down   24 hr Fluid status:  +4.2 L   MAPs: 70-79    Gen: A&Ox4, NAD  Neuro: no focal deficits   CV: distant heart tones, RRR - sinus on telemetry monitor, normal S1 S2, no murmurs, rub present  Pulm: no wheezing, bilateral crackles,  normal breathing on RA - 2 lpm  Abd: nondistended, normal BS, soft, nontender  Ext: mild peripheral edema - LLE>RLE  Incision: clean, dry, intact, no erythema, sternum stable  Tubes/drain sites: dressing clean and dry, serosanguinous output, no air leak         Data:    Imaging:  reviewed recent imaging, additional imaging pending    Recent Results (from the past 24 hour(s))   XR Chest 2 Views   Result Value    Radiologist flags Increase in bilateral pneumothoraces. Chest tubes (Urgent)    Narrative    Exam: XR CHEST 2 VW, 3/2/2022 11:04 AM    Indication: s/p CVTS Surgery    Comparison: 2/28/2022    Findings:   Left chest tube remains in place. Mediastinal chest tube remains in  place. Increase in the bilateral apical pneumothoraces. Stable  cardiomegaly. Perihilar and lower lobe " hazy opacity. Blunting of both  costophrenic angles.       Impression    Impression:  1. Increasing bilateral pneumothoraces. Left chest and mediastinal  chest tubes remain in place.  2. Perihilar and lower lobe hazy opacity suggests edema. Small pleural  effusions remain.    [Access Center: Increase in bilateral pneumothoraces. Chest tubes  remain in place.]    This report will be copied to the [facility] to ensure a  provider acknowledges the finding. Access Center is available Monday  through Friday 8am-3:30 pm.     WHITNEY HEALY MD         SYSTEM ID:  [id number]   IR Fluoro 0-1 Hour    Narrative    Ashley Osman  [id number]    Patient in IR for requested bilateral apical chest tubes for  pneumothoraces noted on CXR 3/2/22.    Fluoroscopy used to visualize chest and no pneumothorax appreciated in  either LEFT or RIGHT chest.     No procedure attempted. Patient respiratory function stable. RN  reports functional existing chest tube system. Report of recent unit  chest tube manipulation prior to IR arrival that may have resolved air  leak and eliminated pneumothoraces.         XR Chest Port 1 View    Narrative    Exam: XR CHEST PORT 1 VIEW, 3/2/2022 2:43 PM    Comparison: 3/2/2022 at 11:00 AM    History: pneumothorax follow up    Findings:  AP view of the chest. Left chest tube in stable position. Mediastinal  drain. Intact median sternotomy wires.    Trachea is midline. Stable cardiomegaly. Aortic knob calcifications.  Mild interstitial opacities bilaterally. Small right and left apical  pneumothoraces. Small pleural effusions bilaterally. The upper abdomen  is unremarkable.      Impression    Impression:   1. Interval decrease in bilateral pneumothoraces.  2. Bilateral opacities likely represent pulmonary edema.  3. Stable small pleural effusions bilaterally.    I have personally reviewed the examination and initial interpretation  and I agree with the findings.    GISSEL MIRANDA MD         SYSTEM ID:   [id number]       Labs:  BMP  Recent Labs   Lab 03/03/22 0617 03/02/22  0552 03/02/22  0431 03/02/22  0016 03/01/22  0800 03/01/22 0341 02/28/22  2146 02/28/22  2144    141  --   --   --  144  --  144   POTASSIUM 4.3 3.5  --   --   --  4.4  --  4.4   CHLORIDE 101 106  --   --   --  109  --  112*   HEIDI 8.6 8.9  --   --   --  8.9  --  8.4*   CO2 26 26  --   --   --  28  --  27   BUN 46* 44*  --   --   --  45*  --  42*   CR 0.91 0.90  --   --   --  0.96  --  0.99   * 94 90 100*   < > 107*   < > 128*    < > = values in this interval not displayed.     CBC  Recent Labs   Lab 03/03/22 0617 03/02/22 0552 03/01/22 0341 02/28/22  0305   WBC 15.1* 13.0* 12.6* 11.7*   RBC 2.90* 2.95* 2.93* 2.75*   HGB 8.6* 8.6* 8.5* 8.0*   HCT 28.2* 29.7* 28.2* 26.5*   MCV 97 101* 96 96   MCH 29.7 29.2 29.0 29.1   MCHC 30.5* 29.0* 30.1* 30.2*   RDW 18.3* 17.7* 17.2* 16.1*    199 163 93*     INR  Recent Labs   Lab 03/03/22 0617 03/02/22  0552 03/01/22 0341 02/28/22  0305   INR 1.32* 1.31* 1.50* 1.48*      Hepatic Panel  Recent Labs   Lab 03/03/22 0617 03/02/22 0552 03/01/22 0341 02/28/22  1232   * 277* 353* 527*   * 486* 500* 495*   ALKPHOS 93 83 81 68   BILITOTAL 0.8 0.6 0.6 0.7   ALBUMIN 3.0* 2.9* 3.0* 3.0*     GLUCOSE:   Recent Labs   Lab 03/03/22  0617 03/02/22  0552 03/02/22  0431 03/02/22  0016 03/01/22  2042 03/01/22  1544   * 94 90 100* 120* 99

## 2022-03-03 NOTE — PROGRESS NOTES
Patient refusing IV, She wants lines and blood draws to go in her feet. Spoke with IWONA Joy and advised patient that feet are not being considered at this time. She still refuses and wants some time to think.

## 2022-03-03 NOTE — PROGRESS NOTES
CLINICAL NUTRITION SERVICES     Per RN, pt would like different oral supplements than ordered and what she was previously receiving earlier this admission.     INTERVENTIONS:  Implementation:  Medical food supplement therapy: Modified oral supplements to strawberry Ensure Enlive shakes made with ice cream (chocolate is her second choice) at 10:00 and 14:00 and Gelatein Plus at supper    Follow up/Monitoring:  Will continue to follow pt.    Asha Dykes, MS, RD, LD, [facility]   6C Pgr: [phone]

## 2022-03-03 NOTE — PROGRESS NOTES
Calorie Count  Intake recorded for: 3/2  Total Kcals: 600 Total Protein: 10g  Kcals from Hospital Food: 600  Protein: 10g  Kcals from Outside Food (average):0 Protein: 0g  # Meals Ordered from Kitchen: 3 small meals   # Meals Recorded: 2 meals (First - 100% 1.75 puddings, apple juice, tea w/ sugar)      (Second - 100% sherbet, pudding, 50% mashed potatoes w/ gravy)  # Supplements Recorded: 0

## 2022-03-03 NOTE — CONSULTS
Care Management Initial Consult    General Information  Assessment completed with: Patient,    Type of CM/SW Visit: Initial Assessment    Primary Care Provider verified and updated as needed: Yes   Readmission within the last 30 days: unable to assess, current reason for admission unrelated to previous admission   Reason for Consult: discharge planning  Advance Care Planning: Advance Care Planning Reviewed: verified with patient, other (see comments) (pt identified it was done, but no documents are on file )          Communication Assessment  Patient's communication style: spoken language (English or Bilingual)    Hearing Difficulty or Deaf: yes, have to speak up and clearly when speaking with her  Wear Glasses or Blind: yes    Cognitive  Cognitive/Neuro/Behavioral: WDL  Level of Consciousness: alert, lethargic  Arousal Level: opens eyes spontaneously  Orientation: oriented x 4  Mood/Behavior: anxious  Best Language: 0 - No aphasia  Speech: logical, slow    Living Environment:   People in home: alone, child(rm), adult (son, Anand, who works from home)  Anand (son)  Current living Arrangements: house      Able to return to prior arrangements: yes       Family/Social Support:  Care provided by: self  Provides care for: no one  Marital Status:   Children, Other (specify) (friends)          Description of Support System: Supportive, Involved    Support Assessment: Adequate family and caregiver support, Adequate social supports    Current Resources:   Patient receiving home care services: No     Community Resources: None  Equipment currently used at home: grab bar, toilet  Supplies currently used at home:      Employment/Financial:  Employment Status: retired        Financial Concerns: No concerns identified   Referral to Financial Worker: No       Lifestyle & Psychosocial Needs:  Social Determinants of Health     Tobacco Use: Not on file   Alcohol Use: Not on file   Financial Resource Strain: Not on file    Food Insecurity: Not on file   Transportation Needs: Not on file   Physical Activity: Not on file   Stress: Not on file   Social Connections: Not on file   Intimate Partner Violence: Not on file   Depression: Not on file   Housing Stability: Not on file       Functional Status:  Prior to admission patient needed assistance:   Dependent ADLs:: Independent  Dependent IADLs:: Independent  Assesssment of Functional Status: Not at  functional baseline    Mental Health Status:  Mental Health Status: No Current Concerns       Chemical Dependency Status:  Chemical Dependency Status: No Current Concerns             Values/Beliefs:  Spiritual, Cultural Beliefs, Latter day Practices, Values that affect care:                 Additional Information:  SW met with Pt while a nurse was in the room. Pt seemed rather lethargic and asked Pt if it was okay for them to meet. Pt agreed. Pt was sitting in her chair next to her bed and directed the SW to sit on the edge of her bed. SW agreed. Throughout the conversation, Pt was rather lethargic. To note, Pt is hard of hearing and was eating ice chips throughout the conversation today with SW.    MARVIN asked Pt about her home situation and her supports. Pt indicated that she lives in a home with 40 acres of land that she was maintaining independently. Her adult son, Anand, lives with her at home while he works from home. She is currently retired and her source of income is her pension and she indicated that she had Social Security, which covers her hospital bills. With that, SW asked if she has any financial concerns currently and Pt said she does not. SW asked about her social supports and Pt said that she has two best friends that she often sees. They (Pt and two best friends) see each other often at each others homes. Pt also indicated that post discharge, her two best friends would be able to visit her if they are able to. Prior to admission, Pt indicated that she was very independent  with most/all of her daily tasks. Pt also indicates no mental health or chemical dependency concerns.     SW gave Pt a list of TCU's that were close to her home as potentially recommended by OT. Pt indicated that after discharge, she wants to go into hospice and her son, Anand ([phone]- mobile #), had two choices that they were looking at that're located in [place], which Pt indicated is closest to her home currently.     PLAN: SW will call Anand, Pt's son, and talk with him later on about this hospice option that they might want. SW will also look at TCU choices that Pt picks out (if need be).     ___________________    BARAK Morse  6C   [facility]  Pager [phone]  Phone [phone]

## 2022-03-03 NOTE — PROGRESS NOTES
Chest tubes x2 pulled without immediate complications.    Occlusive dressing must remain in place for 24 hrs; reinforce prn.  Post-pull CXR ordered; will follow for result.    Urine output suboptimal following Lasix challenge (500 ml).  Discussed with HF Cards and if no evidence of urinary retention, will proceed with initiating Lasix gtt.    Nyla Colorado CNP, ACN-AG  Cardiothoracic Surgery  Pager [phone]

## 2022-03-04 ENCOUNTER — APPOINTMENT (OUTPATIENT)
Dept: ULTRASOUND IMAGING | Facility: CLINIC | Age: 72
DRG: 233 | End: 2022-03-04
Attending: SURGERY
Payer: MEDICARE

## 2022-03-04 ENCOUNTER — APPOINTMENT (OUTPATIENT)
Dept: PHYSICAL THERAPY | Facility: CLINIC | Age: 72
DRG: 233 | End: 2022-03-04
Attending: INTERNAL MEDICINE
Payer: MEDICARE

## 2022-03-04 ENCOUNTER — APPOINTMENT (OUTPATIENT)
Dept: GENERAL RADIOLOGY | Facility: CLINIC | Age: 72
DRG: 233 | End: 2022-03-04
Attending: PHYSICIAN ASSISTANT
Payer: MEDICARE

## 2022-03-04 LAB
ALBUMIN SERPL-MCNC: 2.8 G/DL (ref 3.4–5)
ALBUMIN SERPL-MCNC: 3 G/DL (ref 3.4–5)
ALP SERPL-CCNC: 103 U/L (ref 40–150)
ALP SERPL-CCNC: 99 U/L (ref 40–150)
ALT SERPL W P-5'-P-CCNC: 415 U/L (ref 0–50)
ALT SERPL W P-5'-P-CCNC: 508 U/L (ref 0–50)
ANION GAP SERPL CALCULATED.3IONS-SCNC: 6 MMOL/L (ref 3–14)
ANION GAP SERPL CALCULATED.3IONS-SCNC: 9 MMOL/L (ref 3–14)
AST SERPL W P-5'-P-CCNC: 138 U/L (ref 0–45)
AST SERPL W P-5'-P-CCNC: 236 U/L (ref 0–45)
BILIRUB SERPL-MCNC: 0.7 MG/DL (ref 0.2–1.3)
BILIRUB SERPL-MCNC: 0.9 MG/DL (ref 0.2–1.3)
BUN SERPL-MCNC: 33 MG/DL (ref 7–30)
BUN SERPL-MCNC: 36 MG/DL (ref 7–30)
CALCIUM SERPL-MCNC: 8.4 MG/DL (ref 8.5–10.1)
CALCIUM SERPL-MCNC: 8.6 MG/DL (ref 8.5–10.1)
CHLORIDE BLD-SCNC: 95 MMOL/L (ref 94–109)
CHLORIDE BLD-SCNC: 97 MMOL/L (ref 94–109)
CO2 SERPL-SCNC: 28 MMOL/L (ref 20–32)
CO2 SERPL-SCNC: 32 MMOL/L (ref 20–32)
CREAT SERPL-MCNC: 0.7 MG/DL (ref 0.52–1.04)
CREAT SERPL-MCNC: 0.76 MG/DL (ref 0.52–1.04)
ERYTHROCYTE [DISTWIDTH] IN BLOOD BY AUTOMATED COUNT: 19.4 % (ref 10–15)
GFR SERPL CREATININE-BSD FRML MDRD: 82 ML/MIN/1.73M2
GFR SERPL CREATININE-BSD FRML MDRD: >90 ML/MIN/1.73M2
GLUCOSE BLD-MCNC: 104 MG/DL (ref 70–99)
GLUCOSE BLD-MCNC: 89 MG/DL (ref 70–99)
GLUCOSE BLDC GLUCOMTR-MCNC: 114 MG/DL (ref 70–99)
GLUCOSE BLDC GLUCOMTR-MCNC: 98 MG/DL (ref 70–99)
HCT VFR BLD AUTO: 30.5 % (ref 35–47)
HGB BLD-MCNC: 9.2 G/DL (ref 11.7–15.7)
INR PPP: 1.33 (ref 0.85–1.15)
MAGNESIUM SERPL-MCNC: 2 MG/DL (ref 1.6–2.3)
MCH RBC QN AUTO: 29.1 PG (ref 26.5–33)
MCHC RBC AUTO-ENTMCNC: 30.2 G/DL (ref 31.5–36.5)
MCV RBC AUTO: 97 FL (ref 78–100)
MRSA DNA SPEC QL NAA+PROBE: NEGATIVE
PHOSPHATE SERPL-MCNC: 2.5 MG/DL (ref 2.5–4.5)
PHOSPHATE SERPL-MCNC: 3.3 MG/DL (ref 2.5–4.5)
PLATELET # BLD AUTO: 269 10E3/UL (ref 150–450)
POTASSIUM BLD-SCNC: 3.4 MMOL/L (ref 3.4–5.3)
POTASSIUM BLD-SCNC: 3.8 MMOL/L (ref 3.4–5.3)
PROCALCITONIN SERPL-MCNC: 0.15 NG/ML
PROT SERPL-MCNC: 5.7 G/DL (ref 6.8–8.8)
PROT SERPL-MCNC: 6 G/DL (ref 6.8–8.8)
RBC # BLD AUTO: 3.16 10E6/UL (ref 3.8–5.2)
SA TARGET DNA: NEGATIVE
SODIUM SERPL-SCNC: 133 MMOL/L (ref 133–144)
SODIUM SERPL-SCNC: 134 MMOL/L (ref 133–144)
WBC # BLD AUTO: 12 10E3/UL (ref 4–11)

## 2022-03-04 PROCEDURE — 85027 COMPLETE CBC AUTOMATED: CPT | Performed by: SURGERY

## 2022-03-04 PROCEDURE — 71045 X-RAY EXAM CHEST 1 VIEW: CPT

## 2022-03-04 PROCEDURE — 250N000013 HC RX MED GY IP 250 OP 250 PS 637: Performed by: SURGERY

## 2022-03-04 PROCEDURE — 84100 ASSAY OF PHOSPHORUS: CPT | Performed by: SURGERY

## 2022-03-04 PROCEDURE — 93971 EXTREMITY STUDY: CPT | Mod: 26 | Performed by: RADIOLOGY

## 2022-03-04 PROCEDURE — 999N000248 HC STATISTIC IV INSERT WITH US BY RN

## 2022-03-04 PROCEDURE — 71045 X-RAY EXAM CHEST 1 VIEW: CPT | Mod: 26 | Performed by: RADIOLOGY

## 2022-03-04 PROCEDURE — 83735 ASSAY OF MAGNESIUM: CPT | Performed by: SURGERY

## 2022-03-04 PROCEDURE — 80053 COMPREHEN METABOLIC PANEL: CPT | Performed by: STUDENT IN AN ORGANIZED HEALTH CARE EDUCATION/TRAINING PROGRAM

## 2022-03-04 PROCEDURE — 0W9930Z DRAINAGE OF RIGHT PLEURAL CAVITY WITH DRAINAGE DEVICE, PERCUTANEOUS APPROACH: ICD-10-PCS | Performed by: PHYSICIAN ASSISTANT

## 2022-03-04 PROCEDURE — 36415 COLL VENOUS BLD VENIPUNCTURE: CPT | Performed by: STUDENT IN AN ORGANIZED HEALTH CARE EDUCATION/TRAINING PROGRAM

## 2022-03-04 PROCEDURE — 258N000003 HC RX IP 258 OP 636: Performed by: SURGERY

## 2022-03-04 PROCEDURE — 250N000009 HC RX 250: Performed by: SURGERY

## 2022-03-04 PROCEDURE — 85610 PROTHROMBIN TIME: CPT | Performed by: SURGERY

## 2022-03-04 PROCEDURE — 214N000001 HC R&B CCU UMMC

## 2022-03-04 PROCEDURE — 250N000013 HC RX MED GY IP 250 OP 250 PS 637: Performed by: PHYSICIAN ASSISTANT

## 2022-03-04 PROCEDURE — 93971 EXTREMITY STUDY: CPT | Mod: LT

## 2022-03-04 PROCEDURE — 87641 MR-STAPH DNA AMP PROBE: CPT | Performed by: PHYSICIAN ASSISTANT

## 2022-03-04 PROCEDURE — 84145 PROCALCITONIN (PCT): CPT | Performed by: SURGERY

## 2022-03-04 PROCEDURE — 250N000011 HC RX IP 250 OP 636: Performed by: SURGERY

## 2022-03-04 PROCEDURE — 84450 TRANSFERASE (AST) (SGOT): CPT | Performed by: STUDENT IN AN ORGANIZED HEALTH CARE EDUCATION/TRAINING PROGRAM

## 2022-03-04 PROCEDURE — 97530 THERAPEUTIC ACTIVITIES: CPT | Mod: GP

## 2022-03-04 PROCEDURE — 0W9B30Z DRAINAGE OF LEFT PLEURAL CAVITY WITH DRAINAGE DEVICE, PERCUTANEOUS APPROACH: ICD-10-PCS | Performed by: PHYSICIAN ASSISTANT

## 2022-03-04 RX ORDER — MAGNESIUM SULFATE HEPTAHYDRATE 40 MG/ML
2 INJECTION, SOLUTION INTRAVENOUS ONCE
Status: COMPLETED | OUTPATIENT
Start: 2022-03-04 | End: 2022-03-04

## 2022-03-04 RX ORDER — POTASSIUM CHLORIDE 1.5 G/1.58G
60 POWDER, FOR SOLUTION ORAL 2 TIMES DAILY
Status: DISCONTINUED | OUTPATIENT
Start: 2022-03-04 | End: 2022-03-04

## 2022-03-04 RX ORDER — POTASSIUM CHLORIDE 1.5 G/1.58G
40 POWDER, FOR SOLUTION ORAL 2 TIMES DAILY
Status: DISCONTINUED | OUTPATIENT
Start: 2022-03-04 | End: 2022-03-04

## 2022-03-04 RX ORDER — ALBUTEROL SULFATE 90 UG/1
1 AEROSOL, METERED RESPIRATORY (INHALATION) EVERY 6 HOURS PRN
Status: DISCONTINUED | OUTPATIENT
Start: 2022-03-04 | End: 2022-03-18 | Stop reason: HOSPADM

## 2022-03-04 RX ORDER — POTASSIUM CHLORIDE 20MEQ/15ML
40 LIQUID (ML) ORAL 2 TIMES DAILY
Status: DISCONTINUED | OUTPATIENT
Start: 2022-03-04 | End: 2022-03-06

## 2022-03-04 RX ORDER — POTASSIUM CHLORIDE 750 MG/1
20 TABLET, EXTENDED RELEASE ORAL ONCE
Status: COMPLETED | OUTPATIENT
Start: 2022-03-04 | End: 2022-03-04

## 2022-03-04 RX ORDER — POTASSIUM CHLORIDE 750 MG/1
40 TABLET, EXTENDED RELEASE ORAL 2 TIMES DAILY
Status: DISCONTINUED | OUTPATIENT
Start: 2022-03-04 | End: 2022-03-04

## 2022-03-04 RX ADMIN — PANTOPRAZOLE SODIUM 40 MG: 40 TABLET, DELAYED RELEASE ORAL at 09:10

## 2022-03-04 RX ADMIN — CEFEPIME HYDROCHLORIDE 2 G: 2 INJECTION, POWDER, FOR SOLUTION INTRAVENOUS at 12:25

## 2022-03-04 RX ADMIN — POTASSIUM CHLORIDE 40 MEQ: 40 SOLUTION ORAL at 21:06

## 2022-03-04 RX ADMIN — SPIRONOLACTONE 25 MG: 25 TABLET ORAL at 09:10

## 2022-03-04 RX ADMIN — CEFEPIME HYDROCHLORIDE 2 G: 2 INJECTION, POWDER, FOR SOLUTION INTRAVENOUS at 00:19

## 2022-03-04 RX ADMIN — POTASSIUM CHLORIDE 40 MEQ: 750 TABLET, EXTENDED RELEASE ORAL at 12:30

## 2022-03-04 RX ADMIN — POTASSIUM CHLORIDE 20 MEQ: 750 TABLET, EXTENDED RELEASE ORAL at 09:16

## 2022-03-04 RX ADMIN — POTASSIUM PHOSPHATE, MONOBASIC AND POTASSIUM PHOSPHATE, DIBASIC 9 MMOL: 224; 236 INJECTION, SOLUTION, CONCENTRATE INTRAVENOUS at 13:33

## 2022-03-04 RX ADMIN — MAGNESIUM SULFATE HEPTAHYDRATE 2 G: 2 INJECTION, SOLUTION INTRAVENOUS at 09:17

## 2022-03-04 RX ADMIN — ASPIRIN 162 MG: 81 TABLET, CHEWABLE ORAL at 09:16

## 2022-03-04 RX ADMIN — THIAMINE HCL TAB 100 MG 100 MG: 100 TAB at 09:10

## 2022-03-04 RX ADMIN — VANCOMYCIN HYDROCHLORIDE 1250 MG: 10 INJECTION, POWDER, LYOPHILIZED, FOR SOLUTION INTRAVENOUS at 10:30

## 2022-03-04 RX ADMIN — POLYETHYLENE GLYCOL 3350 17 G: 17 POWDER, FOR SOLUTION ORAL at 09:10

## 2022-03-04 RX ADMIN — CEFEPIME HYDROCHLORIDE 2 G: 2 INJECTION, POWDER, FOR SOLUTION INTRAVENOUS at 23:51

## 2022-03-04 ASSESSMENT — ACTIVITIES OF DAILY LIVING (ADL)
ADLS_ACUITY_SCORE: 16
ADLS_ACUITY_SCORE: 15
ADLS_ACUITY_SCORE: 16

## 2022-03-04 NOTE — PROGRESS NOTES
2026- Writer paged onc all providder Dr. Jani Bateman regarding hypotensive. Message below.    6C 12.1 [address]. [place]. BP 75/48, MAP 58, - 93/50 MP 60. HR 60-63. Pt is asymptomatic. Lasix drip has not started. Plan?  Pt also refused KCl 20 meq PO. Thank you, Soren Walters [phone].    2029- Writer received return dale from provider Dr. SANGITA Sykes. Per provider CARDS ordered lasix drip, f/u with CARDS regarding hypotension and Lasix infusion. Dr. Bateman requested bladder scan. .      2042- Writer paged CARDS, Dr. Faisal Borja regarding Lasix drip and hypotension.     6C 12.1 S. [place]. BP 75/48, MAP 58, - 93/50 MP 60. HR 60-63. Pt is asymptomatic at rest. Lasix drip was not started. Plan? Bladder scan results-108. Pt also refused KCl 20 meq PO. Thank you, Soren Walters [phone].     Writer received a call from Dr. Eloisa Romo. Per Dr. Glynn ok to start lasix infusion and Lasix bolus 80 mg if MPA is greater than 60 and per provider ok with SBP 80.

## 2022-03-04 NOTE — PROGRESS NOTES
Care Management Follow Up    Length of Stay (days): 19    Expected Discharge Date: 03/05/2022     Concerns to be Addressed:       Patient plan of care discussed at interdisciplinary rounds: Yes    Anticipated Discharge Disposition:  Transitional care facility     Anticipated Discharge Services:    Anticipated Discharge DME:      Patient/family educated on Medicare website which has current facility and service quality ratings:    Education Provided on the Discharge Plan:    Patient/Family in Agreement with the Plan:      Referrals Placed by CM/SW:    [facility]   - [address] [place] [address]   - [phone]   [facility]   - [address] [place] [address]   - [phone]   [facility]   - [address] [place] [address]   - [phone]   [facility]   - [address] [place] [address]   - [phone]   Private pay costs discussed: Not applicable    Additional Information:  Per son (Anand)'s request via phone conversation yesterday, SW sent referrals to the facilities listed above. Per rounds today, Pt changed her code status to DNR/pre arrest intubation OK.     PLAN: SW will follow up on Monday for the referrals.    ___________________    BARAK Morse  6C   [facility]  Pager [phone]  Phone [phone]

## 2022-03-04 NOTE — PROGRESS NOTES
[facility]  Cardiology II Service / Advanced Heart Failure  Daily Progress Note    Patient: Ashley Osman      : 1950      MRN: [id number]    Assessment/Plan:   72yo female w/ PMHx of newly-diagnosed HFrEF (10-15% 22) 2/2 ICM, CAD (diagnosed 2022), COPD (diagnosed 2022), hyperlipidemia, and cigarette smoking (quit in 2021) who presented to [facility]  as a transfer from OSH for consideration of advanced therapies in the setting of cardiogenic shock requiring inotropic support. IABP placed on  and she was taken to the OR for CABG x4 on  with Dr. Bhandari. Found to have left femoral artery thrombosis with acute left lower extremity ischemia s/p emergent left femoral thrombectomy, bovine patch angioplasty on . Bilateral pneumothoraces after air leak noted 3/2/22.    She is still volume overloaded on exam. Her dry weight is likely close to 114 lbs (or less) and she weighs 126 lbs-> 120 lbs today.    TTE today 3/3 showed: LVEF 10-15%; global right ventricular function is normal. IVC diameter >2.1 cm collapsing <50% with sniff suggests a high RA pressur estimated at 15 mmHg or greater.    Today's changes:   - continue lasix ggt at 10 mg/hr. Goal K>4, Mg>2  - ok to restart amiodarone and statin  - strict I/Os and daily weights  - BID CMP, for lytes and LFTs  - hold coreg     # 3VCAD s/p CABGx4 (22)  # CAD (mLAD , severe prox and distal LAD, LCx and D1 disease)  # Cardiogenic shock  # Acute on Chronic HFrEF 2/2 ICM (EF 10-15%) AHA Stage D, NYHA Class III-IV  # LV thrombus   CMRI () showing viability of 50% in LAD territory (intermediate likelihood of recovery) and 75% in dominant LCx territory (good likelihood of recovery). Planned to assess feasibility of revascularization w/ PCI with Interventional Cardiology vs CABG w/ CVTS - opting for CAB based on review of prior angiogram and cMRI. S/p 3VCABG on .    - continue lasix ggt at  10 mg/hr. Dry weight around 114 lbs.  K>4, Mg>2  - ASA 81mg daily   - Restart atorvastatin 40mg daily and amiodarone 200 mg daily    Staffed w/ Dr. Marks.    Eloisa Romo MD  Cardiology Fellow  03/04/2022       Subjective:   Lasix ggt started overnight around 10 PM. Good UOP since    Objective:     Vitals:    03/02/22 1200 03/03/22 0600 03/04/22 0446   Weight: 58 kg (127 lb 12.8 oz) 57.2 kg (126 lb 1.7 oz) 54.6 kg (120 lb 6.4 oz)     Vital Signs with Ranges  Temp:  [96  F (35.6  C)-97.5  F (36.4  C)] 97.3  F (36.3  C)  Pulse:  [58-63] 62  Resp:  [16-18] 16  BP: ()/(43-75) 96/75  SpO2:  [92 %-100 %] 94 %  I/O last 3 completed shifts:  In: 1316.57 [P.O.:960; I.V.:356.57]  Out: 3455 [Urine:3375; Chest Tube:80]    Gen: A&Ox4, NAD  Neuro: no focal deficits   CV: distant heart tones, RRR - sinus on telemetry monitor, normal S1 S2, no murmurs, rub present, JVD mid neck at 60 degrees  Pulm: no wheezing, bilateral crackles,  normal breathing on RA - 2 lpm  Abd: nondistended, normal BS, soft, nontender  Ext: mild peripheral edema - LLE>RLE  Incision: clean, dry, intact, no erythema, sternum stable  Tubes/drain sites: dressing clean and dry, intact    Medications     dextrose       furosemide 10 mg/hr (03/04/22 0446)     ACE/ARB/ARNI NOT PRESCRIBED       BETA BLOCKER NOT PRESCRIBED         [Held by provider] amiodarone  400 mg Oral BID     aspirin  162 mg Oral or NG Tube Daily     [Held by provider] atorvastatin  40 mg Oral QAM     [Held by provider] carvedilol  3.125 mg Oral BID w/meals     ceFEPIme (MAXIPIME) IV  2 g Intravenous Q12H     [Held by provider] heparin ANTICOAGULANT  5,000 Units Subcutaneous Q8H     influenza vac high-dose quad  0.7 mL Intramuscular Prior to discharge     lidocaine (viscous)  5 mL Topical Once     melatonin  3 mg Oral At Bedtime     pantoprazole  40 mg Oral QAM AC     polyethylene glycol  17 g Oral or Feeding Tube Daily     potassium chloride  20 mEq Oral BID     spironolactone  25 mg  Oral Daily     thiamine  100 mg Oral or Feeding Tube Daily     umeclidinium-vilanterol  1 puff Inhalation Daily     vancomycin  1,250 mg Intravenous Q24H       Data   Recent Labs   Lab 03/04/22  0552 03/04/22  0432 03/04/22  0120 03/03/22 2046 03/03/22  1539 03/03/22  1400 03/03/22  0617 03/02/22  0552   WBC 12.0*  --   --   --   --   --  15.1* 13.0*   HGB 9.2*  --   --   --   --   --  8.6* 8.6*   MCV 97  --   --   --   --   --  97 101*     --   --   --   --   --  216 199   INR 1.33*  --   --   --   --   --  1.32* 1.31*     --   --   --  133 133 133 141   POTASSIUM 3.4  --   --   --  4.1 4.2 4.3 3.5   CHLORIDE 97  --   --   --  100 101 101 106   CO2  --   --   --   --  26 27 26 26   BUN  --   --   --   --  46* 45* 46* 44*   CR  --   --   --   --  0.89 0.87 0.91 0.90   ANIONGAP  --   --   --   --  7 5 6 9   HEIDI  --   --   --   --  8.3* 8.2* 8.6 8.9   GLC  --  114* 98 117* 110* 117* 101* 94   ALBUMIN  --   --   --   --   --  2.8* 3.0* 2.9*   PROTTOTAL  --   --   --   --   --  5.4* 5.6* 5.6*   BILITOTAL  --   --   --   --   --  0.7 0.8 0.6   ALKPHOS  --   --   --   --   --  97 93 83   ALT  --   --   --   --   --  591* 596* 486*   AST  --   --   --   --   --  400* 407* 277*       Recent Results (from the past 24 hour(s))   XR Chest Port 1 View    Narrative    Exam: XR CHEST PORT 1 VIEW, 3/3/2022 8:45 AM    Comparison: 3/2/2022.    History: chest tube interval    Findings:  AP view of the chest. Intact median sternotomy wires. Left chest tube  in place. Mediastinal drain.    Trachea is midline. Stable cardiomegaly. Aortic knob calcifications.  Mild bilateral interstitial opacities. Small bilateral pneumothoraces.  Small bilateral pleural effusions. The upper abdomen is unremarkable.      Impression    Impression:   1. Stable small bilateral pneumothoraces.  2. Stable bilateral interstitial opacities likely represent pulmonary  edema.  3. Stable small bilateral pleural effusions.    I have personally  reviewed the examination and initial interpretation  and I agree with the findings.    GISSEL MIRANDA MD         SYSTEM ID:  [id number]   Echocardiogram Limited   Result Value    LVEF  10-15% (severely reduced)    Narrative    [id number]  [id number]  [id number]  [id number]^ELIGIO^CHRIS^YOEL     [facility]  Echocardiography Laboratory  [address] [place] [address]     Name: GLADYS SHUKLA  MRN: [id number]  : 1950  Study Date: 2022 09:19 AM  Age: 71 yrs  Gender: Female  [place] [facility] [place] For Study: Heart Failure, limited for RV function  Ordering Physician: CHRIS DEL VALLE  Referring Physician: EDDIE GRACE  Performed By: Asaf Darling RDCS     BSA: 1.6 m2  Height: 64 in  Weight: 126 lb  HR: 57  BP: 96/55 mmHg  ______________________________________________________________________________  Procedure  Limited Portable Echo Adult.  ______________________________________________________________________________  Interpretation Summary  Left ventricular function is severely reduced; LVEF 10-15%.  Global right ventricular function is normal.  Estimated pulmonary artery systolic pressure is 30 mmHg plus right atrial  pressure.  IVC diameter >2.1 cm collapsing <50% with sniff suggests a high RA pressure  estimated at 15 mmHg or greater.  This study was compared with the study from 22. There has been no change.  ______________________________________________________________________________  Left Ventricle  Left ventricular function is decreased. The ejection fraction is 10-15%  (severely reduced). Severe diffuse hypokinesis is present.     Right Ventricle  The right ventricle is normal size. Global right ventricular function is  normal.     Mitral Valve  Mild to moderate mitral insufficiency is present.     Aortic Valve  Trace aortic insufficiency is present.     Tricuspid Valve  Mild tricuspid insufficiency is present. Estimated pulmonary  artery systolic  pressure is 30 mmHg plus right atrial pressure.     Vessels  IVC diameter >2.1 cm collapsing <50% with sniff suggests a high RA pressure  estimated at 15 mmHg or greater.     Compared to Previous Study  This study was compared with the study from 22 . There has been no  change.  ______________________________________________________________________________  Doppler Measurements & Calculations  TR max germaine: 275.2 cm/sec  TR max P.3 mmHg     ______________________________________________________________________________  Report approved by: Andrea De Souza 2022 10:58 AM         XR Chest Port 1 View    Narrative    Exam: XR CHEST PORT 1 VIEW, 3/3/2022 2:55 PM    Comparison: 3/30/2022    History: f/u chest tube water seal trial    Findings:  AP view of the chest. Left chest tube. Mediastinal drain. Intact  median sternotomy wires.    Trachea is midline. Stable cardiomegaly. Aortic knob calcifications.  Mild bilateral interstitial opacities. Increasing small left  pneumothorax. Small right pneumothorax. Small bilateral pleural  effusions. The upper abdomen is unremarkable.      Impression    Impression:   1. Slightly increasing small left apical pneumothorax. Stable small  right pneumothorax.  2. Stable small bilateral interstitial opacities likely represent  pulmonary edema.  3. Stable small bilateral pleural effusions.    I have personally reviewed the examination and initial interpretation  and I agree with the findings.    WHITNEY HEALY MD         SYSTEM ID:  [id number]   XR Chest Port 1 View    Narrative    Chest one view portable    HISTORY: Chest tube removal small pneumothorax    COMPARISON STUDY: Same day at 1410    FINDINGS: Left chest tube is been removed. Decreased trace left  pneumothorax. Median sternotomy wires. Mediastinal clips. Cardiac  silhouette is nonenlarged. Interstitial opacity bilaterally. Trace  right pneumothorax.      Impression    IMPRESSION: Trace  biapical pneumothoraces, decreased from previous  post chest tube removal    GISSEL MIRANDA MD         SYSTEM ID:  [id number]

## 2022-03-04 NOTE — PLAN OF CARE
Pt alert and oriented x4. Pt [place], reports having no hearing aids. Pt SB/SR with HRs 50-60s. BPs soft this morning, pt asymptomatic. Providers aware. Echo done at the bedside; EF 10-15%. Afebrile. On RA with O2 sats >92%. Pt dyspneic with activity but improved with rest. Pt reported some chest/incisional pain this morning, PRN oxy given 1x with some relief. Tolerating level 6: soft and bite sized diet, poor appetite however. Intake encouraged. Calorie counts continued. Writer discussed with pt and dietician other snack options that pt may like. Pt reporting constipation, last BM couple days ago. Miralax given this AM, suppository given this evening. Pt with small BM. IVP lasix given today with minimal output. Bladder scan x1, PVR 29 ml. Per primary team and Cards 2, pt to start on lasix gtt. Up with +1-2 assist, GB and walker in room. Pt also worked with OT and PT today. All CTs pulled this afternoon, dressing CDI. Followup CXR done. Midsternal, L groin and LLE graft sites WNL and LISS. WBC trending up, IV abx started and given as scheduled. LFTs remain high, provider aware.     PLAN: Start lasix gtt once pharmacy sends it up. Continue course of IV abx. Continue to monitor and assess pt with every encounter. Notify CVTS with any changes or concerns.     Duration of Care: 4218-6655  Benita SEVILLA RN

## 2022-03-04 NOTE — PROGRESS NOTES
Cardiovascular Surgery Progress Note  03/04/2022         Assessment and Plan:     Ashley Osman is a 71 year old female with PMH of HFrEF (10-15%) 2/2 ICM (Hx LAD and Circumflex MIs; total occlusion of RCA and LAD), Biventricular failure (requiring inotropic support PTA to [facility]), COPD, HLD, Tobacco Use Disorder (quit 12/2021), CAD who underwent CABG x4 on 02/24 with Dr. Bhandari. Found to have left femoral artery thrombosis with acute left lower extremity ischemia s/p emergent left femoral thrombectomy, bovine patch angioplasty on 2/25. 02/28 with elevated LFTs.    3/4 PLAN:   - Continue Lasix gtt as tolerated to euvolemia   - Replete lytes prn per protocol   - Resume calorie counts for another 3 days beginning tomorrow   - If PO intake fails to improve over the next 24-48 hrs, consider initiation of enteral feeding support   - BLE duplex US to r/o DVT (LLE swelling)   - Code status changed to DNR/OK to intubate per pt request. This was her code status prior to surgery.      Cardiovascular:   Hx of biventricular HF requiring pressors, CAD, HFrEF 10-15%. Tobacco abuse, s/p CABG x4 on 2/24  Atrial fibrillation with RVR post surgery, HD stable-off pressors 2/28.  2/28 echo showed LV EF 15%, stable on 3/3 echo with LVEF 10-15% - no evidence of RH dysfunction but elevated RA/PA pressures  - Daily weights  - ASA 162mg  - Hold statin d/t LFT elevation  - Coreg held in context of bradycardia, soft BPs  - Holding amio due to LFT elevation - If afib rvr reoccurs consider beta blocker or diltiazem  - [facility] consulted 2/22 however pt not scheduled as declining specialty care. CORE team to revisit today.     Left femoral artery thrombosis with acute left lower extremity ischemia s/p emergent left femoral thrombectomy, bovine patch angioplasty on 2/25  Occurred in setting of recent perioperative IABP  - Heparin gtt discontinued, s/p thrombectomy per Vascular  - On subcutaneous heparin  - Monitor lower extremity pulses  and perfusion status  - Per vascular recs 3/1:               - NO need for additional anticoagulation from surgery standpoint              - Recommend ASA when able and statin when able               - Incisional dressing to stay on for 2 weeks              - Vascular will sign off              - Will arrange followup with Vascular Surgery.      Volume Overload  3/3: IV Lasix increased from 40mg BID to TID > 120mg Lasix challenge with minimal response > 80mg Lasix bolus followed by 10mg/hr Lasix drip with good response.  2.5L diuresis overnight however continues to be fluid positive so will continue to euvolemia or until HD will not tolerate.  Appreciate Cards 2 involvement.  BID BMP, replete lytes prn per protocol  Will get BLE duplex US to r/o DVT given LLE swelling>RLE    Possible RHF (Elevated LFTs)  Ruled out on 3/3 echo, elevated LFT attributed to volume overload   - HF Cardiology consulted, appreciate recs   - Continue holding amio   - PRN tylenol discontinued 3/3   - Daily LFTs     Chest tubes: removed 3/3  TPW: removed without difficulty     Pulmonary:  Extubated POD #5; Saturating well on RA  Supplemental O2 PRN to keep sats > 92%.  Pulm toilet, IS, activity/OOB and deep breathing    Bilateral PTX, stable  IR consulted 3/2 for chest tube placement however PTX resolved prior to intervention when chest tubes returned to suction.  Small bilateral PTX stable on water seal, chest tubes removed 3/3.  Very small bilat apical PTX stable post-chest tube pull and again this morning; no intervention indicated.     Neurology / MSK:  Acute post-operative pain  - PO oxycodone 5mg PRN  - PRN Robaxin  - 3/3: Tylenol discontinued d/t elevation in LFT    Sternotomy  PT/OT/CR consulted  Sternal precautions     Physical deconditioning  Therapies consulted and recommending TCU at discharge      / Renal:  No Hx of renal disease, baseline creatinine ~0.6  Volume overloaded, diuresis as above    Equivocal UA   - Covered with 1g  Rocephin x1 on 3/1   - UC negative      GI / FEN:   Elevated LFT  Suspect venous congestion as likely source.  Plans as above; continue to trend daily, avoid hepatotoxic agents as able.    Moderate malnutrition in the context of acute on chronic illness   - Dietitian consulted, appreciate recs; supplements ordered   - Poor PO intake - calorie counts reordered to resume 3/5. May need to consider supplemental feedings if intake not improved over weekend    Dysphagia  SLP following: recommend Full liquid diet- mildly thick    Constipation, improved  Last BM 3/3   - 3/3 Miralax increased to daily, one-time supp with results     Endocrine:  Stress-induced hyperglycemia - resolved: initially managed on insulin drip postop, transitioned to sliding scale goal BG <180 and since discontinued after demonstrated euglycemia without exogenous insulin     Infectious Disease:  Stress-induced leukocytosis  WBC downtrended but never completely resolved post-operatively, low grade fever (99.1) 2/28.  WBC receding today 12 (15.1); procal 0.15 (0.36)  - Completed perioperative antibiotics  - Pan cultured 3/2 NGTD  - Serial blood cultures 3/2 with NGTD  - Empiric cefepime and vanco started 3/3; can transition to PO abx once WBC < 12 per Dr. Bhandari  - Daily AM CBC  - Avoid invasive lines, as able     Hematology:   Stress-induced leukocytosis, as above  Acute blood loss anemia, Hgb stable ~8-9   Acute blood loss thrombocytopenia, resolved  Left femoral artery thrombosis with acute extremity ischemia, resolved s/p thrombectomy and bovine patch per Vascular Surgery    No signs or symptoms of active bleeding  - Transfuse prn for Hgb<7  - Daily CBC     Anticoagulation:   - ASA increased from 81 mg to 162 mg on 3/3 s/p CABG  - Subcutaneous Heparin TID  - Not currently anticoagulated for thrombosis or post-op afib (has been maintaining SR)     Prophylaxis:   Stress ulcer prophylaxis:   -Pantoprazole 40 mg daily for 30 days  -DVT prophylaxis:  "Subcutaneous heparin, SCD, OOB/activity/ambulation     Disposition:   Transferred to  on 3/1  Rehab recommending discharge to TCU pending medical stability -- likely early next week    Discussed with Dr. Bhandari via written communication.    Nyla Colorado, CNP, [facility]-  Cardiothoracic Surgery  Pager [phone]        Interval History:     Good response to initiation of Lasix gtt with 2.5L UOP. HD stable.  LFT, WBC, and procal all trending down slightly.  Pain is adequately-managed on current regimen -- still with some sternal discomfort. Slept better overnight after a suppository and BM.  Tolerating PO but doesn't care for food options and complains of kitchen being out of multiple items. Passing flatus, having BM. No nausea or vomiting.  Breathing well without complaints.  Denies LE numbness and tingling but notes LLE swelling and subjective weakness.    Denies chest pain, syncopal symptoms, dizziness/lightheadedness and sternal popping/clicking         Physical Exam:   Blood pressure 108/52, pulse 65, temperature 97.9  F (36.6  C), temperature source Oral, resp. rate 16, height 1.638 m (5' 4.5\"), weight 54.6 kg (120 lb 6.4 oz), SpO2 95 %.  Vitals:    03/02/22 1200 03/03/22 0600 03/04/22 0446   Weight: 58 kg (127 lb 12.8 oz) 57.2 kg (126 lb 1.7 oz) 54.6 kg (120 lb 6.4 oz)      Weight: +0.6 kg since admit and trending down   24 hr Fluid status:  +575 mL   MAPs: 56-76    Gen: A&Ox4, NAD  Neuro: no focal deficits   CV: RRR - sinus on telemetry monitor, normal S1 S2, no murmurs, no rubs/gallops/murmurs  Pulm: no wheezing, LLL crackles,  normal breathing on RA  Abd: nondistended, soft, nontender  Ext: mild peripheral edema - LLE>RLE  Incision: clean, dry, intact, no erythema, sternum stable  Tubes/drain sites: small amount of old serosanguinous drainage on dressing         Data:    Imaging:  reviewed recent imaging including this morning's CXR, no concerns    I personally reviewed and interpreted the CXR from " 3/4/22: Trace bilateral apical pneumothoraces, small pleural effusions bilaterally, grossly stable interstitial opacities throughout, post-surgical sternotomy wires    Recent Results (from the past 24 hour(s))   Echocardiogram Limited   Result Value    LVEF  10-15% (severely reduced)    formerly [facility]    [id number]  [id number]  [id number]  [id number]^ELIGIO^CHRIS^YOEL     [facility]  Echocardiography Laboratory  [address] [place], [place] [address]     Name: GLADYS SHUKLA  MRN: [id number]  : 1950  Study Date: 2022 09:19 AM  Age: 71 yrs  Gender: Female  [place] [facility] [place] For Study: Heart Failure, limited for RV function  Ordering Physician: CHRIS DEL VALLE  Referring Physician: EDDIE GRACE  Performed By: Asaf Darling RDCS     BSA: 1.6 m2  Height: 64 in  Weight: 126 lb  HR: 57  BP: 96/55 mmHg  ______________________________________________________________________________  Procedure  Limited Portable Echo Adult.  ______________________________________________________________________________  Interpretation Summary  Left ventricular function is severely reduced; LVEF 10-15%.  Global right ventricular function is normal.  Estimated pulmonary artery systolic pressure is 30 mmHg plus right atrial  pressure.  IVC diameter >2.1 cm collapsing <50% with sniff suggests a high RA pressure  estimated at 15 mmHg or greater.  This study was compared with the study from 22. There has been no change.  ______________________________________________________________________________  Left Ventricle  Left ventricular function is decreased. The ejection fraction is 10-15%  (severely reduced). Severe diffuse hypokinesis is present.     Right Ventricle  The right ventricle is normal size. Global right ventricular function is  normal.     Mitral Valve  Mild to moderate mitral insufficiency is present.     Aortic Valve  Trace aortic insufficiency is present.      Tricuspid Valve  Mild tricuspid insufficiency is present. Estimated pulmonary artery systolic  pressure is 30 mmHg plus right atrial pressure.     Vessels  IVC diameter >2.1 cm collapsing <50% with sniff suggests a high RA pressure  estimated at 15 mmHg or greater.     Compared to Previous Study  This study was compared with the study from 22 . There has been no  change.  ______________________________________________________________________________  Doppler Measurements & Calculations  TR max germaine: 275.2 cm/sec  TR max P.3 mmHg     ______________________________________________________________________________  Report approved by: Andrea De Souza 2022 10:58 AM         XR Chest Port 1 View    Narrative    Exam: XR CHEST PORT 1 VIEW, 3/3/2022 2:55 PM    Comparison: 3/30/2022    History: f/u chest tube water seal trial    Findings:  AP view of the chest. Left chest tube. Mediastinal drain. Intact  median sternotomy wires.    Trachea is midline. Stable cardiomegaly. Aortic knob calcifications.  Mild bilateral interstitial opacities. Increasing small left  pneumothorax. Small right pneumothorax. Small bilateral pleural  effusions. The upper abdomen is unremarkable.      Impression    Impression:   1. Slightly increasing small left apical pneumothorax. Stable small  right pneumothorax.  2. Stable small bilateral interstitial opacities likely represent  pulmonary edema.  3. Stable small bilateral pleural effusions.    I have personally reviewed the examination and initial interpretation  and I agree with the findings.    WHITNEY HEALY MD         SYSTEM ID:  [id number]   XR Chest Port 1 View    Narrative    Chest one view portable    HISTORY: Chest tube removal small pneumothorax    COMPARISON STUDY: Same day at 1410    FINDINGS: Left chest tube is been removed. Decreased trace left  pneumothorax. Median sternotomy wires. Mediastinal clips. Cardiac  silhouette is nonenlarged. Interstitial opacity  bilaterally. Trace  right pneumothorax.      Impression    IMPRESSION: Trace biapical pneumothoraces, decreased from previous  post chest tube removal    GISSEL MIRANDA MD         SYSTEM ID:  [id number]       Labs:  BMP  Recent Labs   Lab 03/04/22  0552 03/04/22  0432 03/04/22  0120 03/03/22  2046 03/03/22  1539 03/03/22  1400 03/03/22  0617     --   --   --  133 133 133   POTASSIUM 3.4  --   --   --  4.1 4.2 4.3   CHLORIDE 97  --   --   --  100 101 101   HEIDI 8.6  --   --   --  8.3* 8.2* 8.6   CO2 28  --   --   --  26 27 26   BUN 36*  --   --   --  46* 45* 46*   CR 0.76  --   --   --  0.89 0.87 0.91   GLC 89 114* 98 117* 110* 117* 101*     CBC  Recent Labs   Lab 03/04/22  0552 03/03/22  0617 03/02/22  0552 03/01/22  0341   WBC 12.0* 15.1* 13.0* 12.6*   RBC 3.16* 2.90* 2.95* 2.93*   HGB 9.2* 8.6* 8.6* 8.5*   HCT 30.5* 28.2* 29.7* 28.2*   MCV 97 97 101* 96   MCH 29.1 29.7 29.2 29.0   MCHC 30.2* 30.5* 29.0* 30.1*   RDW 19.4* 18.3* 17.7* 17.2*    216 199 163     INR  Recent Labs   Lab 03/04/22  0552 03/03/22  0617 03/02/22  0552 03/01/22  0341   INR 1.33* 1.32* 1.31* 1.50*      Hepatic Panel  Recent Labs   Lab 03/04/22  0552 03/03/22  1400 03/03/22  0617 03/02/22  0552   * 400* 407* 277*   * 591* 596* 486*   ALKPHOS 103 97 93 83   BILITOTAL 0.9 0.7 0.8 0.6   ALBUMIN 3.0* 2.8* 3.0* 2.9*     GLUCOSE:   Recent Labs   Lab 03/04/22  0552 03/04/22  0432 03/04/22  0120 03/03/22  2046 03/03/22  1539 03/03/22  1400   GLC 89 114* 98 117* 110* 117*

## 2022-03-04 NOTE — PLAN OF CARE
Pt alert and oriented x4. Pt [place]. Neuros done and WNL. Pt sinus rhythm with HRs in the 60s. Low BP x1 this afternoon but within team parameters (MAP >60). Pt asymptomatic. Pt afebrile. On RA with O2 sats >92%. Pt dyspneic with activity but improved with rest. Pt denies any pain, lightheadedness or dizziness. Tolerating level 6: soft and bite sized diet, appetite better today but still poor. Intake encouraged. Calorie counts extended and to continue until 3/7. No BM this shift, BM meds continued. Lasix gtt continued at 10 mg/hr, pt voiding. Purewick intermittently in place and working. Pt up with +2 assist, GB and walker in room. Old CT incisions covered with dry gauze, dressing still to be changed today. Midsternal, L groin and LLE graft sites WNL and LISS. WBC trending down, IV abx given as scheduled. K+ 3.4, magnesium 2.0 and phosphorus 2.5 this morning, all replaced per protocol. K+ recheck this afternoon with labs. Magnesium and phosphorus rechecks tomorrow morning. Pt with BLE edema, L > R. US of LLE done, negative for clots.     PLAN: Continue diuresis. Continue IV abx. Continue to encourage intake. Continue to monitor and assess pt with every encounter. Notify CVTS with any changes or concerns.      Duration of Care: [id number]  Benita SEVILLA RN

## 2022-03-04 NOTE — PROGRESS NOTES
Ashley Osman is a 71 year old female presenting with SOB. She is currently admitted with heart failure.    The patient had previously denied use of [facility] but was agreeable to the idea today. She says that she lives with her son who would be able to drive her to and from appointments made in [place].    When discussing HF signs and symptoms such as swelling in her lower extremities, Ashley alerted us that her left foot is swollen. We assessed the foot, it is swollen, no redness and not painful. We called Ashley's bedside nurse Benita to notify her of this which she was aware of.     Appointment made in [facility] on 3/16/2022 at 2:45pm with labs at 2:20pm at [facility] with Alex Meyers.  I will follow-up with the patient at that time, sooner if needed.  Thank you for the consult.          Leopoldo Moya   Nursing Student     Supervised by Trinity Chiang RN

## 2022-03-04 NOTE — PROGRESS NOTES
"Time: 1900-0730.  Reason for admission: CABG x4 on 02/24.  VS: BP 96/75 (Cuff Size: Adult Small)   Pulse 62   Temp 97.3  F (36.3  C) (Axillary)   Resp 16   Ht 1.638 m (5' 4.5\")   Wt 54.6 kg (120 lb 6.4 oz)   SpO2 94%   BMI 20.35 kg/m    Activity: A2. Generalized weakness.   Neuros: A/Ox3. Intermittent forgetful. Denied N/T.   Cardiac: Tele- sinus iban. Soft BP. BP to be taken on L arm. BLE edema +2. BLE elevated. Lasix drip infusing at 10 mg/hr. Pt is tolerating infusion well. Denied chest pain.   Respiratory: LS clear/ diminished. Sating mid 90's on RA. Denied cough or SOB.   GI/: UOP 2200 ml since lasix infusion initiated. Clear yellow. Abdomen flat, rounded, reports constipation. + bs +flatus.  Diet: 2 g NA diet and dale counts. Level 6 soft and bite sized. tray set up.   Skin: Sternum incision liquid bandage. No drainage. LISS. Chest tube sites covered. Chest tubes removed 3/3. Sacrum redness, barrier paste and mepi applied   Lines: L PIV intermittent ABX. R PIV lasix drip.  Labs:Am results pending.   New changes this shift/Plan: pt is down 6 lbs from yesterday. Continue with POC and notify MD with changes.   Will continue to monitor & follow POC.     "

## 2022-03-04 NOTE — PROGRESS NOTES
Calorie Count  Intake recorded for: 3/3  Total Kcals: 277 Total Protein: 24g  Kcals from Hospital Food: 277  Protein: 24g  Kcals from Outside Food (average):0 Protein: 0g  # Meals Ordered from Kitchen: 2 meals   # Meals Recorded: 1 meal (First - 100% 2 milks, 25% scrambled eggs)  # Supplements Recorded: 25% 1 Gelatein Plus

## 2022-03-05 ENCOUNTER — APPOINTMENT (OUTPATIENT)
Dept: GENERAL RADIOLOGY | Facility: CLINIC | Age: 72
DRG: 233 | End: 2022-03-05
Attending: SURGERY
Payer: MEDICARE

## 2022-03-05 ENCOUNTER — APPOINTMENT (OUTPATIENT)
Dept: PHYSICAL THERAPY | Facility: CLINIC | Age: 72
DRG: 233 | End: 2022-03-05
Attending: INTERNAL MEDICINE
Payer: MEDICARE

## 2022-03-05 LAB
ALBUMIN SERPL-MCNC: 2.9 G/DL (ref 3.4–5)
ALBUMIN SERPL-MCNC: 3 G/DL (ref 3.4–5)
ALP SERPL-CCNC: 102 U/L (ref 40–150)
ALP SERPL-CCNC: 116 U/L (ref 40–150)
ALT SERPL W P-5'-P-CCNC: 315 U/L (ref 0–50)
ALT SERPL W P-5'-P-CCNC: 356 U/L (ref 0–50)
ANION GAP SERPL CALCULATED.3IONS-SCNC: 10 MMOL/L (ref 3–14)
ANION GAP SERPL CALCULATED.3IONS-SCNC: 9 MMOL/L (ref 3–14)
AST SERPL W P-5'-P-CCNC: 74 U/L (ref 0–45)
AST SERPL W P-5'-P-CCNC: 98 U/L (ref 0–45)
BILIRUB SERPL-MCNC: 0.8 MG/DL (ref 0.2–1.3)
BILIRUB SERPL-MCNC: 0.9 MG/DL (ref 0.2–1.3)
BUN SERPL-MCNC: 24 MG/DL (ref 7–30)
BUN SERPL-MCNC: 27 MG/DL (ref 7–30)
CALCIUM SERPL-MCNC: 8.7 MG/DL (ref 8.5–10.1)
CALCIUM SERPL-MCNC: 8.9 MG/DL (ref 8.5–10.1)
CHLORIDE BLD-SCNC: 91 MMOL/L (ref 94–109)
CHLORIDE BLD-SCNC: 92 MMOL/L (ref 94–109)
CO2 SERPL-SCNC: 31 MMOL/L (ref 20–32)
CO2 SERPL-SCNC: 33 MMOL/L (ref 20–32)
CREAT SERPL-MCNC: 0.66 MG/DL (ref 0.52–1.04)
CREAT SERPL-MCNC: 0.77 MG/DL (ref 0.52–1.04)
ERYTHROCYTE [DISTWIDTH] IN BLOOD BY AUTOMATED COUNT: 19.6 % (ref 10–15)
GFR SERPL CREATININE-BSD FRML MDRD: 81 ML/MIN/1.73M2
GFR SERPL CREATININE-BSD FRML MDRD: >90 ML/MIN/1.73M2
GLUCOSE BLD-MCNC: 128 MG/DL (ref 70–99)
GLUCOSE BLD-MCNC: 171 MG/DL (ref 70–99)
HCT VFR BLD AUTO: 31.3 % (ref 35–47)
HGB BLD-MCNC: 9.8 G/DL (ref 11.7–15.7)
MAGNESIUM SERPL-MCNC: 2.2 MG/DL (ref 1.6–2.3)
MAGNESIUM SERPL-MCNC: 2.2 MG/DL (ref 1.6–2.3)
MCH RBC QN AUTO: 29.6 PG (ref 26.5–33)
MCHC RBC AUTO-ENTMCNC: 31.3 G/DL (ref 31.5–36.5)
MCV RBC AUTO: 95 FL (ref 78–100)
PHOSPHATE SERPL-MCNC: 2.4 MG/DL (ref 2.5–4.5)
PHOSPHATE SERPL-MCNC: 2.9 MG/DL (ref 2.5–4.5)
PLATELET # BLD AUTO: 316 10E3/UL (ref 150–450)
POTASSIUM BLD-SCNC: 3.9 MMOL/L (ref 3.4–5.3)
POTASSIUM BLD-SCNC: 4.4 MMOL/L (ref 3.4–5.3)
PROT SERPL-MCNC: 5.6 G/DL (ref 6.8–8.8)
PROT SERPL-MCNC: 6.3 G/DL (ref 6.8–8.8)
RBC # BLD AUTO: 3.31 10E6/UL (ref 3.8–5.2)
SODIUM SERPL-SCNC: 132 MMOL/L (ref 133–144)
SODIUM SERPL-SCNC: 134 MMOL/L (ref 133–144)
VANCOMYCIN SERPL-MCNC: 15.1 MG/L
WBC # BLD AUTO: 15.6 10E3/UL (ref 4–11)

## 2022-03-05 PROCEDURE — 80202 ASSAY OF VANCOMYCIN: CPT | Performed by: SURGERY

## 2022-03-05 PROCEDURE — 214N000001 HC R&B CCU UMMC

## 2022-03-05 PROCEDURE — 250N000013 HC RX MED GY IP 250 OP 250 PS 637: Performed by: SURGERY

## 2022-03-05 PROCEDURE — 250N000013 HC RX MED GY IP 250 OP 250 PS 637: Performed by: PHYSICIAN ASSISTANT

## 2022-03-05 PROCEDURE — 36415 COLL VENOUS BLD VENIPUNCTURE: CPT | Performed by: SURGERY

## 2022-03-05 PROCEDURE — 97530 THERAPEUTIC ACTIVITIES: CPT | Mod: GP

## 2022-03-05 PROCEDURE — 250N000011 HC RX IP 250 OP 636: Performed by: SURGERY

## 2022-03-05 PROCEDURE — 258N000003 HC RX IP 258 OP 636: Performed by: SURGERY

## 2022-03-05 PROCEDURE — 999N000128 HC STATISTIC PERIPHERAL IV START W/O US GUIDANCE

## 2022-03-05 PROCEDURE — 250N000009 HC RX 250: Performed by: SURGERY

## 2022-03-05 PROCEDURE — 84100 ASSAY OF PHOSPHORUS: CPT | Performed by: SURGERY

## 2022-03-05 PROCEDURE — 71046 X-RAY EXAM CHEST 2 VIEWS: CPT | Mod: 26 | Performed by: RADIOLOGY

## 2022-03-05 PROCEDURE — 85014 HEMATOCRIT: CPT | Performed by: SURGERY

## 2022-03-05 PROCEDURE — 36415 COLL VENOUS BLD VENIPUNCTURE: CPT | Performed by: STUDENT IN AN ORGANIZED HEALTH CARE EDUCATION/TRAINING PROGRAM

## 2022-03-05 PROCEDURE — 83735 ASSAY OF MAGNESIUM: CPT | Performed by: SURGERY

## 2022-03-05 PROCEDURE — 80053 COMPREHEN METABOLIC PANEL: CPT | Performed by: STUDENT IN AN ORGANIZED HEALTH CARE EDUCATION/TRAINING PROGRAM

## 2022-03-05 PROCEDURE — 71046 X-RAY EXAM CHEST 2 VIEWS: CPT

## 2022-03-05 PROCEDURE — 84155 ASSAY OF PROTEIN SERUM: CPT | Performed by: STUDENT IN AN ORGANIZED HEALTH CARE EDUCATION/TRAINING PROGRAM

## 2022-03-05 RX ADMIN — THIAMINE HCL TAB 100 MG 100 MG: 100 TAB at 07:58

## 2022-03-05 RX ADMIN — Medication 250 MG: at 16:21

## 2022-03-05 RX ADMIN — VANCOMYCIN HYDROCHLORIDE 1250 MG: 10 INJECTION, POWDER, LYOPHILIZED, FOR SOLUTION INTRAVENOUS at 09:43

## 2022-03-05 RX ADMIN — ASPIRIN 162 MG: 81 TABLET, CHEWABLE ORAL at 07:58

## 2022-03-05 RX ADMIN — Medication 10 MG/HR: at 09:35

## 2022-03-05 RX ADMIN — CEFEPIME HYDROCHLORIDE 2 G: 2 INJECTION, POWDER, FOR SOLUTION INTRAVENOUS at 23:56

## 2022-03-05 RX ADMIN — POTASSIUM CHLORIDE 40 MEQ: 40 SOLUTION ORAL at 07:58

## 2022-03-05 RX ADMIN — SPIRONOLACTONE 25 MG: 25 TABLET ORAL at 07:59

## 2022-03-05 RX ADMIN — POTASSIUM CHLORIDE 40 MEQ: 40 SOLUTION ORAL at 21:01

## 2022-03-05 RX ADMIN — PANTOPRAZOLE SODIUM 40 MG: 40 TABLET, DELAYED RELEASE ORAL at 07:58

## 2022-03-05 RX ADMIN — CEFEPIME HYDROCHLORIDE 2 G: 2 INJECTION, POWDER, FOR SOLUTION INTRAVENOUS at 13:13

## 2022-03-05 RX ADMIN — Medication 10 MG/HR: at 08:02

## 2022-03-05 RX ADMIN — POTASSIUM PHOSPHATE, MONOBASIC AND POTASSIUM PHOSPHATE, DIBASIC 9 MMOL: 224; 236 INJECTION, SOLUTION, CONCENTRATE INTRAVENOUS at 13:00

## 2022-03-05 ASSESSMENT — ACTIVITIES OF DAILY LIVING (ADL)
ADLS_ACUITY_SCORE: 15
ADLS_ACUITY_SCORE: 13
ADLS_ACUITY_SCORE: 15
ADLS_ACUITY_SCORE: 13
ADLS_ACUITY_SCORE: 15

## 2022-03-05 NOTE — PROGRESS NOTES
Calorie Count  Intake recorded for: 3/4  Total Kcals: 886 Total Protein: 29g  Kcals from Hospital Food: 886   Protein: 29g  Kcals from Outside Food (average):0 Protein: 0g  # Meals Ordered from Kitchen: 2 meals  # Meals Recorded: 2 meals (first - 100% pears, herbal tea, 75% cream of wheat w/ brown sugar)      (second - 100% herbal tea, pudding)  # Supplements Recorded: 100% Ensure Enlive w/ ice cream

## 2022-03-05 NOTE — PHARMACY-VANCOMYCIN DOSING SERVICE
"Pharmacy Vancomycin Note  Date of Service 2022  Patient's  1950   71 year old, female    Indication: leukocytosis, sepsis  Day of Therapy: 3  Current vancomycin regimen:  1250 mg IV q24h  Current vancomycin monitoring method: AUC  Current vancomycin therapeutic monitoring goal: 400-600 mg*h/L    InsightRX Prediction of Current Vancomycin Regimen  Regimen: 1250 mg IV every 24 hours.  Start time: 10:30 on 2022  Exposure target: AUC24 (range)400-600 mg/L.hr   AUC24,ss: 471 mg/L.hr  Probability of AUC24 > 400: 81 %  Ctrough,ss: 12.7 mg/L  Probability of Ctrough,ss > 20: 5 %  Probability of nephrotoxicity (Lodise JUNIOR ): 8 %      Creatinine for last 3 days  3/3/2022:  6:17 AM Creatinine 0.91 mg/dL;  2:00 PM Creatinine 0.87 mg/dL;  3:39 PM Creatinine 0.89 mg/dL  3/4/2022:  5:52 AM Creatinine 0.76 mg/dL;  4:35 PM Creatinine 0.70 mg/dL  3/5/2022:  5:38 AM Creatinine 0.66 mg/dL  Current estimated CrCl = Estimated Creatinine Clearance: 64.2 mL/min (based on SCr of 0.66 mg/dL).    Recent Vancomycin Levels (past 3 days)  3/5/2022:  5:38 AM Vancomycin 15.1 mg/L    Vancomycin IV Administrations (past 72 hours)                   vancomycin 1250 mg in 0.9% NaCl 250 mL intermittent infusion 1,250 mg (mg) 1,250 mg New Bag 22 1030    vancomycin 1500 mg in 0.9% NaCl 250 ml intermittent infusion 1,500 mg (mg) 1,500 mg New Bag 22 1208                Nephrotoxins and other renal medications (From now, onward)    Start     Dose/Rate Route Frequency Ordered Stop    22 1000  vancomycin 1250 mg in 0.9% NaCl 250 mL intermittent infusion 1,250 mg         1,250 mg  over 90 Minutes Intravenous EVERY 24 HOURS 22 0858      22 1630  furosemide (LASIX) 500 mg/50mL infusion ADULT MAX CONC        \"Followed by\" Linked Group Details    10 mg/hr  1 mL/hr  Intravenous CONTINUOUS 22 1548               Contrast Orders - past 72 hours (72h ago, onward)            None          Interpretation of " levels and current regimen:  Vancomycin level is reflective of -600    Has serum creatinine changed greater than 50% in last 72 hours: No    Urine output:  good urine output    Renal Function: Stable    Plan:  1. Continue Current Dose  2. Vancomycin monitoring method: AUC  3. Vancomycin therapeutic monitoring goal: 400-600 mg*h/L  4. Pharmacy will check vancomycin levels as appropriate in 1-3 Days.  5. Serum creatinine levels will be ordered every 48 hours.    Leilani Florentino, Pharm.D., [facility]  Pager [phone]

## 2022-03-05 NOTE — PLAN OF CARE
PT admitted on 2/12/2022 with with PMH of HFrEF (10-15%) 2/2 ICM (Hx LAD and Circumflex MIs; total occlusion of RCA and LAD), Biventricular failure (requiring inotropic support PTA to [facility]), COPD, HLD, Tobacco Use Disorder (quit 12/2021), CAD who underwent CABG x4 on 02/24 with Dr. Bhandari. Found to have left femoral artery thrombosis with acute left lower extremity ischemia s/p emergent left femoral thrombectomy, bovine patch angioplasty on 2/25.   Neuro: A&Ox4. Forgetful.  Cardiac: SR. VSS.   Respiratory: Sating WNL on RA.  GI/: Last BM 3/4. Large urine output. Incontinent of urine x1.  Diet/appetite: Soft bite size diet ordered.  Activity:  Assist of 1 to the commode and the chair.  Pain: Denies pain.  Skin: No new deficits noted.Pt's incision's CDI along with groin sites  LDA's: PIV with lasix 10 mg/hr.    Plan: Continue with POC. Notify primary team with changes.

## 2022-03-05 NOTE — PROGRESS NOTES
Cardiovascular Surgery Progress Note  03/05/2022         Assessment and Plan:     Ashley Osman is a 71 year old female with PMH of HFrEF (10-15%) 2/2 ICM (Hx LAD and Circumflex MIs; total occlusion of RCA and LAD), Biventricular failure (requiring inotropic support PTA to [facility]), COPD, HLD, Tobacco Use Disorder (quit 12/2021), CAD who underwent CABG x4 on 02/24 with Dr. Bhandari. Found to have left femoral artery thrombosis with acute left lower extremity ischemia s/p emergent left femoral thrombectomy, bovine patch angioplasty on 2/25. 02/28 with elevated LFTs.    Cardiovascular:   Hx of biventricular HF requiring pressors, CAD, HFrEF 10-15%. Tobacco abuse, s/p CABG x4 on 2/24  Atrial fibrillation with RVR post surgery, HD stable-off pressors 2/28.  2/28 echo showed LV EF 15%, stable on 3/3 echo with LVEF 10-15% - no evidence of RH dysfunction but elevated RA/PA pressures  - Daily weights  - ASA 162mg  - Hold statin d/t LFT elevation  - Coreg held in context of bradycardia, soft BPs  - Holding amio due to LFT elevation  - [facility] consulted 2/22 however pt not scheduled as declining specialty care. 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   - BLE duplex US to r/o DVT given LLE swelling>RLE - negative     Volume Overload  3/3: IV Lasix increased from 40mg BID to TID > 120mg Lasix  challenge with minimal response > 80mg Lasix bolus followed by 10mg/hr Lasix drip with good response. Weight trending down and net negative 2.2L, continue to euvolemia or until HD will not tolerate. CO2 slightly elevated, will consider diamox vs decreasing gtt to 5/hr, will discuss with cardiology  - Appreciate Cards 2 involvement.  - BID BMP, replete lytes prn per protocol    Possible RHF (Elevated LFTs)  Ruled out on 3/3 echo, elevated LFT attributed to volume overload   - HF Cardiology consulted, appreciate recs   - Continue holding amio   - PRN tylenol discontinued 3/3   - Daily LFTs     Chest tubes: removed 3/3  TPW: removed without difficulty     Pulmonary:  Extubated POD #5; Saturating well on RA  Supplemental O2 PRN to keep sats > 92%.  Pulm toilet, IS, activity/OOB and deep breathing    Bilateral PTX, stable  IR consulted 3/2 for chest tube placement however PTX resolved prior to intervention when chest tubes returned to suction.  Small bilateral PTX stable on water seal, chest tubes removed 3/3.  Very small bilat apical PTX stable post-chest tube pull and again this morning; no intervention indicated.     Neurology / MSK:  Acute post-operative pain  - PO oxycodone 5mg PRN  - PRN Robaxin  - 3/3: Tylenol discontinued d/t elevation in LFT    Sternotomy  PT/OT/CR consulted  Sternal precautions     Physical deconditioning  Therapies consulted and recommending TCU at discharge      / Renal:  No Hx of renal disease, baseline creatinine ~0.6  Volume overloaded, diuresis as above    Equivocal UA   - Covered with 1g Rocephin x1 on 3/1   - UC negative      GI / FEN:   Elevated LFT  Suspect venous congestion as likely source.  Plans as above; continue to trend daily, avoid hepatotoxic agents as able.  - LFTs improving    Moderate malnutrition in the context of acute on chronic illness   - Dietitian consulted, appreciate recs; supplements ordered   - Poor PO intake - calorie counts reordered to resume 3/5. May need  to consider supplemental feedings if intake not improved over weekend    Dysphagia  SLP following: recommend Full liquid diet- mildly thick    Constipation, improved  Last BM 3/3   - 3/3 Miralax increased to daily, one-time supp with results     Endocrine:  Stress-induced hyperglycemia - resolved: initially managed on insulin drip postop, transitioned to sliding scale goal BG <180 and since discontinued after demonstrated euglycemia without exogenous insulin     Infectious Disease:  Stress-induced leukocytosis  WBC 15.1->12.0->15.6, afebrile. procal 3/4 0.15 (0.36)  - Completed perioperative antibiotics  - Pan cultured 3/2 NGTD  - Serial blood cultures 3/2 with NGTD  - Empiric cefepime and vanco started 3/3; can transition to PO abx once WBC < 12 per Dr. Bhandari  - Daily AM CBC  - Avoid invasive lines, as able     Hematology:   Stress-induced leukocytosis, as above  Acute blood loss anemia, Hgb stable ~8-9   Acute blood loss thrombocytopenia, resolved  Left femoral artery thrombosis with acute extremity ischemia, resolved s/p thrombectomy and bovine patch per Vascular Surgery    No signs or symptoms of active bleeding  - Transfuse prn for Hgb<7  - Daily CBC     Anticoagulation:   - ASA increased from 81 mg to 162 mg on 3/3 s/p CABG  - Subcutaneous Heparin TID  - Not currently anticoagulated for thrombosis or post-op afib (has been maintaining SR)     Prophylaxis:   Stress ulcer prophylaxis:   -Pantoprazole 40 mg daily for 30 days  -DVT prophylaxis: Subcutaneous heparin, SCD, OOB/activity/ambulation     Disposition:   Transferred to  on 3/1  Rehab recommending discharge to TCU pending medical stability -- likely early next week    Discussed with Dr. Bhandari via written communication.    Boni Aragon PA-C  Cardiothoracic Surgery  p: [phone]        Interval History:   No acute overnight events.  Good response to initiation of Lasix gtt with 2.5L UOP. HD stable.  LFT, WBC, and procal all trending down  "slightly.  Pain is adequately-managed on current regimen -- still with some sternal discomfort, but improving  Tolerating PO   Passing flatus, having BM. No nausea or vomiting.  Breathing well at rest, only SOB when moving.  Denies LE numbness and tingling but notes LLE swelling   Denies chest pain, syncopal symptoms, dizziness/lightheadedness and sternal popping/clicking         Physical Exam:   Blood pressure 100/55, pulse 74, temperature 97.4  F (36.3  C), temperature source Axillary, resp. rate 16, height 1.638 m (5' 4.5\"), weight 53.6 kg (118 lb 3.2 oz), SpO2 94 %.  Vitals:    03/03/22 0600 03/04/22 0446 03/05/22 0500   Weight: 57.2 kg (126 lb 1.7 oz) 54.6 kg (120 lb 6.4 oz) 53.6 kg (118 lb 3.2 oz)        Gen: A&Ox4, NAD  Neuro: no focal deficits   CV: RRR - sinus on telemetry monitor, normal S1 S2, no murmurs, no rubs/gallops/murmurs  Pulm: no wheezing, LLL crackles,  normal breathing on RA  Abd: nondistended, soft, nontender  Ext: mild peripheral edema - LLE>RLE  Incision: clean, dry, intact, no erythema, sternum stable  Tubes/drain sites: small amount of old serosanguinous drainage on dressing         Data:    Imaging:  reviewed recent imaging including this morning's CXR, no concerns  Recent Results (from the past 24 hour(s))   US Lower Extremity Venous Duplex Left    Narrative    Exam: Ultrasound of the deep venous system of left leg dated 3/4/2022  11:21 AM    Clinical information: Left lower extremity swelling, rule out DVT    Comparison: 2/22/2022 venous mapping    Ordering provider: EDDIE GRACE    Technique: Gray-scale evaluation with compression and Doppler  assessment of deep venous system for spontaneous and phasic flow, as  well as the presence of distal augmentation. Color flow images  obtained as needed. Gray-scale images with compression of the great  saphenous vein obtained as needed.    Findings:    Right leg:    CFV: Thrombus: No, Phasic: Yes    Left leg:    CFV: Thrombus: No, " "Phasic: Yes  Femoral vein, mid: Thrombus: No, Phasic: Yes  Popliteal vein: Thrombus: No, Phasic: Yes  PTV: Thrombus: Thrombus: No      Impression    Impression:    Left leg: No deep venous thrombosis.     Reference: \"Duplex Ultrasound in the Diagnosis of Lower-Extremity Deep  Venous Thrombosis\"- Muna Neri MD, S; Siddhartha Suarez MD  (Circulation. 2014;129:917-921. http://circ.ahajournals.org )    I have personally reviewed the examination and initial interpretation  and I agree with the findings.    CAROLINE KELLEY MD         SYSTEM ID:  [id number]         Recent Results (from the past 24 hour(s))   Echocardiogram Limited   Result Value    LVEF  10-15% (severely reduced)    Narrative    [id number]  [id number]  [id number]  [id number]^ELIGIO^CHRIS^YOEL     [facility],[place]  Echocardiography Laboratory  [address] [place] [id number]     Name: GLADYS SHUKLA  MRN: [id number]  : 1950  Study Date: 2022 09:19 AM  Age: 71 yrs  Gender: Female  Patient Location: [facility] [place]  Reason For Study: Heart Failure, limited for RV function  Ordering Physician: CHRIS DEL VALLE  Referring Physician: EDDIE GRACE  Performed By: Asaf Darling RDCS     BSA: 1.6 m2  Height: 64 in  Weight: 126 lb  HR: 57  BP: 96/55 mmHg  ______________________________________________________________________________  Procedure  Limited Portable Echo Adult.  ______________________________________________________________________________  Interpretation Summary  Left ventricular function is severely reduced; LVEF 10-15%.  Global right ventricular function is normal.  Estimated pulmonary artery systolic pressure is 30 mmHg plus right atrial  pressure.  IVC diameter >2.1 cm collapsing <50% with sniff suggests a high RA pressure  estimated at 15 mmHg or greater.  This study was compared with the study from 22. There has been no " change.  ______________________________________________________________________________  Left Ventricle  Left ventricular function is decreased. The ejection fraction is 10-15%  (severely reduced). Severe diffuse hypokinesis is present.     Right Ventricle  The right ventricle is normal size. Global right ventricular function is  normal.     Mitral Valve  Mild to moderate mitral insufficiency is present.     Aortic Valve  Trace aortic insufficiency is present.     Tricuspid Valve  Mild tricuspid insufficiency is present. Estimated pulmonary artery systolic  pressure is 30 mmHg plus right atrial pressure.     Vessels  IVC diameter >2.1 cm collapsing <50% with sniff suggests a high RA pressure  estimated at 15 mmHg or greater.     Compared to Previous Study  This study was compared with the study from 22 . There has been no  change.  ______________________________________________________________________________  Doppler Measurements & Calculations  TR max germaine: 275.2 cm/sec  TR max P.3 mmHg     ______________________________________________________________________________  Report approved by: Andrea De Souza 2022 10:58 AM         XR Chest Port 1 View    Narrative    Exam: XR CHEST PORT 1 VIEW, 3/3/2022 2:55 PM    Comparison: 3/30/2022    History: f/u chest tube water seal trial    Findings:  AP view of the chest. Left chest tube. Mediastinal drain. Intact  median sternotomy wires.    Trachea is midline. Stable cardiomegaly. Aortic knob calcifications.  Mild bilateral interstitial opacities. Increasing small left  pneumothorax. Small right pneumothorax. Small bilateral pleural  effusions. The upper abdomen is unremarkable.      Impression    Impression:   1. Slightly increasing small left apical pneumothorax. Stable small  right pneumothorax.  2. Stable small bilateral interstitial opacities likely represent  pulmonary edema.  3. Stable small bilateral pleural effusions.    I have personally  reviewed the examination and initial interpretation  and I agree with the findings.    WHITNEY HEALY MD         SYSTEM ID:  [id number]   XR Chest Port 1 View    Narrative    Chest one view portable    HISTORY: Chest tube removal small pneumothorax    COMPARISON STUDY: Same day at 1410    FINDINGS: Left chest tube is been removed. Decreased trace left  pneumothorax. Median sternotomy wires. Mediastinal clips. Cardiac  silhouette is nonenlarged. Interstitial opacity bilaterally. Trace  right pneumothorax.      Impression    IMPRESSION: Trace biapical pneumothoraces, decreased from previous  post chest tube removal    GISSEL MIRANDA MD         SYSTEM ID:  [id number]       Labs:  BMP  Recent Labs   Lab 03/05/22  0538 03/04/22  1635 03/04/22  0552 03/04/22  0432 03/03/22  2046 03/03/22  1539    133 134  --   --  133   POTASSIUM 3.9 3.8 3.4  --   --  4.1   CHLORIDE 92* 95 97  --   --  100   HEIDI 8.7 8.4* 8.6  --   --  8.3*   CO2 33* 32 28  --   --  26   BUN 24 33* 36*  --   --  46*   CR 0.66 0.70 0.76  --   --  0.89   * 104* 89 114*   < > 110*    < > = values in this interval not displayed.     CBC  Recent Labs   Lab 03/05/22 0538 03/04/22  0552 03/03/22  0617 03/02/22  0552   WBC 15.6* 12.0* 15.1* 13.0*   RBC 3.31* 3.16* 2.90* 2.95*   HGB 9.8* 9.2* 8.6* 8.6*   HCT 31.3* 30.5* 28.2* 29.7*   MCV 95 97 97 101*   MCH 29.6 29.1 29.7 29.2   MCHC 31.3* 30.2* 30.5* 29.0*   RDW 19.6* 19.4* 18.3* 17.7*    269 216 199     INR  Recent Labs   Lab 03/04/22  0552 03/03/22  0617 03/02/22  0552 03/01/22  0341   INR 1.33* 1.32* 1.31* 1.50*      Hepatic Panel  Recent Labs   Lab 03/05/22  0538 03/04/22  1635 03/04/22  0552 03/03/22  1400   AST 98* 138* 236* 400*   * 415* 508* 591*   ALKPHOS 102 99 103 97   BILITOTAL 0.9 0.7 0.9 0.7   ALBUMIN 2.9* 2.8* 3.0* 2.8*     GLUCOSE:   Recent Labs   Lab 03/05/22  0538 03/04/22  1635 03/04/22  0552 03/04/22  0432 03/04/22  0120 03/03/22  2046   * 104* 89 114* 98 117*  no

## 2022-03-05 NOTE — PLAN OF CARE
Goal Outcome Evaluation:      Shift summary [id number]    D:Pt is POD 8 of CABG x 4.    A:Pt has been in SR 60-80, other VSS. Pt  quiet and withdrawn but cooperative. Pt has 2 PIV, 1 with lasix gtt infusing at 10 mg /hr and 1 SL.Pt was up in chair x 1 and ambulated to BR x1 with assist of 1 and IV pole. Pt very SOB and fatigued with any activity, Sating well on RA. Pt's incision's CDI along with groin sites. CT's and TPW out. Pt has LE edema , Lymph stocking applied. Pt's appeite very poor on dale counts if not improved over next couple days likely FT to be placed. Bowel sounds hypoactive, no BM post surgery, on bowel regime. Pt using pure wick currently but pt encouraged to use commode during day. Pt declines any pain medication. Pt's LFT's elevated but trending down.     P; continue current POC anticipate discharge to TCU or rehab when bed available

## 2022-03-05 NOTE — PROGRESS NOTES
[id number]:     Neuro: A/O x 4; forgetful. Call light appropriate during this shift. Bed and chair alarm still utilized.    Cardiac:  SR on tele. Other VSS.           Respiratory:  RA; ELLER  GI/:  Adequate UO via purewick. LBM 3/4.  Diet/appetite:  Poor appetite on level 6 diet. Poor appetite. Pt needs a lot of encouragement to increase PO intake and activity. Byron counts.   Activity:  2 assist with gait belt. Pt up in chair x 2 during this shift. PT/OT this afternoon.   Pain:  Pt c/o back pain this afternoon; awaiting Robaxin from pharmacy.   Skin:  Generalized bruising. Old CT sites dressing changed. Sternal incision LISS.   LDA's:  R PIV x 2 (replaced during this shift) Lasix @ 10 mg/hr. IV abx.   Plan:  Phosphorus replaced. PO intake and activity encouraged. Will continue to monitor.

## 2022-03-06 LAB
ALBUMIN SERPL-MCNC: 2.6 G/DL (ref 3.4–5)
ALBUMIN SERPL-MCNC: 2.9 G/DL (ref 3.4–5)
ALP SERPL-CCNC: 103 U/L (ref 40–150)
ALP SERPL-CCNC: 105 U/L (ref 40–150)
ALT SERPL W P-5'-P-CCNC: 229 U/L (ref 0–50)
ALT SERPL W P-5'-P-CCNC: 269 U/L (ref 0–50)
ANION GAP SERPL CALCULATED.3IONS-SCNC: 8 MMOL/L (ref 3–14)
ANION GAP SERPL CALCULATED.3IONS-SCNC: 8 MMOL/L (ref 3–14)
AST SERPL W P-5'-P-CCNC: 52 U/L (ref 0–45)
AST SERPL W P-5'-P-CCNC: 53 U/L (ref 0–45)
BILIRUB SERPL-MCNC: 0.7 MG/DL (ref 0.2–1.3)
BILIRUB SERPL-MCNC: 1 MG/DL (ref 0.2–1.3)
BUN SERPL-MCNC: 22 MG/DL (ref 7–30)
BUN SERPL-MCNC: 30 MG/DL (ref 7–30)
CALCIUM SERPL-MCNC: 8.7 MG/DL (ref 8.5–10.1)
CALCIUM SERPL-MCNC: 9.4 MG/DL (ref 8.5–10.1)
CHLORIDE BLD-SCNC: 92 MMOL/L (ref 94–109)
CHLORIDE BLD-SCNC: 94 MMOL/L (ref 94–109)
CO2 SERPL-SCNC: 28 MMOL/L (ref 20–32)
CO2 SERPL-SCNC: 33 MMOL/L (ref 20–32)
CREAT SERPL-MCNC: 0.68 MG/DL (ref 0.52–1.04)
CREAT SERPL-MCNC: 0.74 MG/DL (ref 0.52–1.04)
ERYTHROCYTE [DISTWIDTH] IN BLOOD BY AUTOMATED COUNT: 20.1 % (ref 10–15)
GFR SERPL CREATININE-BSD FRML MDRD: 85 ML/MIN/1.73M2
GFR SERPL CREATININE-BSD FRML MDRD: >90 ML/MIN/1.73M2
GLUCOSE BLD-MCNC: 140 MG/DL (ref 70–99)
GLUCOSE BLD-MCNC: 96 MG/DL (ref 70–99)
HCT VFR BLD AUTO: 33.2 % (ref 35–47)
HGB BLD-MCNC: 10.1 G/DL (ref 11.7–15.7)
LACTATE SERPL-SCNC: 1.5 MMOL/L (ref 0.7–2)
MAGNESIUM SERPL-MCNC: 2.1 MG/DL (ref 1.6–2.3)
MCH RBC QN AUTO: 28.7 PG (ref 26.5–33)
MCHC RBC AUTO-ENTMCNC: 30.4 G/DL (ref 31.5–36.5)
MCV RBC AUTO: 94 FL (ref 78–100)
PHOSPHATE SERPL-MCNC: 4 MG/DL (ref 2.5–4.5)
PLATELET # BLD AUTO: 361 10E3/UL (ref 150–450)
POTASSIUM BLD-SCNC: 3.8 MMOL/L (ref 3.4–5.3)
POTASSIUM BLD-SCNC: 4.9 MMOL/L (ref 3.4–5.3)
PROCALCITONIN SERPL-MCNC: 0.08 NG/ML
PROT SERPL-MCNC: 6.1 G/DL (ref 6.8–8.8)
PROT SERPL-MCNC: 6.4 G/DL (ref 6.8–8.8)
RBC # BLD AUTO: 3.52 10E6/UL (ref 3.8–5.2)
SODIUM SERPL-SCNC: 130 MMOL/L (ref 133–144)
SODIUM SERPL-SCNC: 133 MMOL/L (ref 133–144)
WBC # BLD AUTO: 17.4 10E3/UL (ref 4–11)

## 2022-03-06 PROCEDURE — 250N000013 HC RX MED GY IP 250 OP 250 PS 637: Performed by: PHYSICIAN ASSISTANT

## 2022-03-06 PROCEDURE — 250N000013 HC RX MED GY IP 250 OP 250 PS 637: Performed by: SURGERY

## 2022-03-06 PROCEDURE — 258N000003 HC RX IP 258 OP 636: Performed by: SURGERY

## 2022-03-06 PROCEDURE — 36415 COLL VENOUS BLD VENIPUNCTURE: CPT | Performed by: STUDENT IN AN ORGANIZED HEALTH CARE EDUCATION/TRAINING PROGRAM

## 2022-03-06 PROCEDURE — 214N000001 HC R&B CCU UMMC

## 2022-03-06 PROCEDURE — 84460 ALANINE AMINO (ALT) (SGPT): CPT | Performed by: STUDENT IN AN ORGANIZED HEALTH CARE EDUCATION/TRAINING PROGRAM

## 2022-03-06 PROCEDURE — 85014 HEMATOCRIT: CPT | Performed by: SURGERY

## 2022-03-06 PROCEDURE — 84100 ASSAY OF PHOSPHORUS: CPT | Performed by: SURGERY

## 2022-03-06 PROCEDURE — 84145 PROCALCITONIN (PCT): CPT | Performed by: PHYSICIAN ASSISTANT

## 2022-03-06 PROCEDURE — 250N000013 HC RX MED GY IP 250 OP 250 PS 637: Performed by: STUDENT IN AN ORGANIZED HEALTH CARE EDUCATION/TRAINING PROGRAM

## 2022-03-06 PROCEDURE — 84155 ASSAY OF PROTEIN SERUM: CPT | Performed by: STUDENT IN AN ORGANIZED HEALTH CARE EDUCATION/TRAINING PROGRAM

## 2022-03-06 PROCEDURE — 83735 ASSAY OF MAGNESIUM: CPT | Performed by: SURGERY

## 2022-03-06 PROCEDURE — 83605 ASSAY OF LACTIC ACID: CPT | Performed by: SURGERY

## 2022-03-06 PROCEDURE — 36415 COLL VENOUS BLD VENIPUNCTURE: CPT | Performed by: SURGERY

## 2022-03-06 PROCEDURE — 250N000011 HC RX IP 250 OP 636: Performed by: SURGERY

## 2022-03-06 PROCEDURE — 250N000011 HC RX IP 250 OP 636: Performed by: NURSE PRACTITIONER

## 2022-03-06 RX ORDER — FUROSEMIDE 10 MG/ML
80 INJECTION INTRAMUSCULAR; INTRAVENOUS ONCE
Status: DISCONTINUED | OUTPATIENT
Start: 2022-03-06 | End: 2022-03-08

## 2022-03-06 RX ORDER — POTASSIUM CHLORIDE 750 MG/1
40 TABLET, EXTENDED RELEASE ORAL 2 TIMES DAILY
Status: DISCONTINUED | OUTPATIENT
Start: 2022-03-06 | End: 2022-03-07

## 2022-03-06 RX ORDER — POTASSIUM CHLORIDE 750 MG/1
10 TABLET, EXTENDED RELEASE ORAL ONCE
Status: COMPLETED | OUTPATIENT
Start: 2022-03-06 | End: 2022-03-06

## 2022-03-06 RX ORDER — FUROSEMIDE 10 MG/ML
80 INJECTION INTRAMUSCULAR; INTRAVENOUS ONCE
Status: DISCONTINUED | OUTPATIENT
Start: 2022-03-06 | End: 2022-03-06

## 2022-03-06 RX ADMIN — THIAMINE HCL TAB 100 MG 100 MG: 100 TAB at 08:58

## 2022-03-06 RX ADMIN — SPIRONOLACTONE 25 MG: 25 TABLET ORAL at 08:58

## 2022-03-06 RX ADMIN — HEPARIN SODIUM 5000 UNITS: 5000 INJECTION, SOLUTION INTRAVENOUS; SUBCUTANEOUS at 18:08

## 2022-03-06 RX ADMIN — CEFEPIME HYDROCHLORIDE 2 G: 2 INJECTION, POWDER, FOR SOLUTION INTRAVENOUS at 13:07

## 2022-03-06 RX ADMIN — VANCOMYCIN HYDROCHLORIDE 1250 MG: 10 INJECTION, POWDER, LYOPHILIZED, FOR SOLUTION INTRAVENOUS at 10:19

## 2022-03-06 RX ADMIN — POTASSIUM CHLORIDE 10 MEQ: 750 TABLET, EXTENDED RELEASE ORAL at 12:36

## 2022-03-06 RX ADMIN — POTASSIUM CHLORIDE 40 MEQ: 750 TABLET, EXTENDED RELEASE ORAL at 12:36

## 2022-03-06 RX ADMIN — POTASSIUM CHLORIDE 40 MEQ: 750 TABLET, EXTENDED RELEASE ORAL at 20:50

## 2022-03-06 RX ADMIN — ASPIRIN 162 MG: 81 TABLET, CHEWABLE ORAL at 08:57

## 2022-03-06 RX ADMIN — PANTOPRAZOLE SODIUM 40 MG: 40 TABLET, DELAYED RELEASE ORAL at 08:57

## 2022-03-06 RX ADMIN — Medication 3 MG: at 00:52

## 2022-03-06 RX ADMIN — Medication 3 MG: at 20:50

## 2022-03-06 ASSESSMENT — ACTIVITIES OF DAILY LIVING (ADL)
ADLS_ACUITY_SCORE: 12
ADLS_ACUITY_SCORE: 13
ADLS_ACUITY_SCORE: 12
ADLS_ACUITY_SCORE: 13
ADLS_ACUITY_SCORE: 12
ADLS_ACUITY_SCORE: 13
ADLS_ACUITY_SCORE: 12
ADLS_ACUITY_SCORE: 13
ADLS_ACUITY_SCORE: 12
ADLS_ACUITY_SCORE: 12
ADLS_ACUITY_SCORE: 13

## 2022-03-06 NOTE — PLAN OF CARE
"D: s/p CABG x4 2/24. L femoral thrombectomy with bovine patch angioplasty 2/25.  PMH of HF, ICM, CAD, COPD, HLD     I: Monitored vitals and assessed pt status.   Changes during this shift: Discontinued lasix gtt    Running: NaCl 5ml/hr TKO       Vitals:  Blood pressure 95/50, pulse 82, temperature 97.7  F (36.5  C), temperature source Oral, resp. rate 18, height 1.638 m (5' 4.5\"), weight 51.3 kg (113 lb), SpO2 95 %.    A:   Neuro: Ax4 with some confusion. Denies Headache, dizziness,  Lightheadedness, numbness and tingling. calls appropriately   Cardiac: SR  Afebrile, VSS.  Reports incisional pain with movement.   Respiratory: sating >95 ON RA. Dyspnea on exertion.   Diet/appetite: mechanical soft bite sized cardiac diet, calorie count. Poor appetite, must be encouraged to eat.   GI/:  BM this shift. Denies abdominal pain. Good urine output.   Activity:  Moves assist of 1 with GB  Pain: Reports incisional pain only with movement, declines PRN medication.  Skin: Redness blanchable IT/buttocks, RPIV, bruising bilateral forearms.  Plan: Continue to monitor and follow plan of care. Report changes in status to CVTS.                      "

## 2022-03-06 NOTE — PROGRESS NOTES
Brief Cardiology Note:    Chart Reviewed this AM. Diuresing well on 10mg/hr lasix gtt. Net negative 2.2 L yesterday. Wt 118 (dry weight 114-116). LFTs and creatinine continue to trend down. Likely can tolerate another day of IV diuresis. Suspect we will be able to transition her to orals tomorrow.     Discussed with Dr. Selina Reardon MD  Cardiology Fellow  [phone]

## 2022-03-06 NOTE — PLAN OF CARE
"Goal Outcome Evaluation:    Plan of Care Reviewed With: patient     Overall Patient Progress: no change     Shift summary [id number]    D: Pt is s/p CABG x 4'    A/I: Pt doing better this evening. Pt has been in SR , other VSS. Pt's LFT's trending down daily. Pt alert and oriented but forgetful. Pt was not wanting to get up to void or walk but wanting purewick  and reported \"my dr wants me to stay in bed at night\" this said at 1800. I explained to pt that she needs to be up in chair for all meals and to be up and ambulate in [place] 4 times a day. Pt then encouraged to get up and shower with writers assist. Pt showered with all incisions cleansed and hair de tangled and braided. Pt instructed that she needed to get up and use commode during day and evening and at night (after 2200) may have purewick placed, Pt continues to struggle with appetite and intake, having only a 1/2 ensure shake this evening. Pt was amble to ambulate to br with assist of 1 but did become very SOB requiring short rest 1/2 to shower. Pt encouraged to use IS and flutter valve but pt declines. Pt  c/o back pain at beginning of shift and was medicated with robaxin x 1, which appeared to be effective as pt had no further c/o pain.Pt noted to be very anxious through out shift calling multiple multiple times during each hour.Pt getting up to commode with greater ease each void. Pt continues on lasix gtt at 10 mg/hr current bag expires at 0900 tomorrow am.Pt has had 2 large loose stools this shift. Incisions healing well, old CT sites left open to air.  P: Continue current POC, monitoring labs , PT/OT and nutritional status. Anticipate discharge to TCU early next week.      "

## 2022-03-06 NOTE — PROGRESS NOTES
Cardiovascular Surgery Progress Note  03/06/2022         Assessment and Plan:     Ashley Osman is a 71 year old female with PMH of HFrEF (10-15%) 2/2 ICM (Hx LAD and Circumflex MIs; total occlusion of RCA and LAD), Biventricular failure (requiring inotropic support PTA to [facility]), COPD, HLD, Tobacco Use Disorder (quit 12/2021), CAD who underwent CABG x4 on 02/24 with Dr. Bhandari. Found to have left femoral artery thrombosis with acute left lower extremity ischemia s/p emergent left femoral thrombectomy, bovine patch angioplasty on 2/25. 02/28 with elevated LFTs.    Cardiovascular:   Hx of biventricular HF requiring pressors, CAD, HFrEF 10-15%. Tobacco abuse, s/p CABG x4 on 2/24  Atrial fibrillation with RVR post surgery, HD stable-off pressors 2/28.  2/28 echo showed LV EF 15%, stable on 3/3 echo with LVEF 10-15% - no evidence of RH dysfunction but elevated RA/PA pressures  - Daily weights  - ASA 162mg  - Hold statin d/t LFT elevation  - Coreg held in context of bradycardia, soft BPs, consider trial of Toprol or Coreg Monday 3/7  - Holding amio due to LFT elevation  - [facility] consulted 2/22 however pt not scheduled as declining specialty care. 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   - BLE duplex US to r/o DVT given LLE swelling>RLE - negative     Volume Overload  3/3: IV Lasix  increased from 40mg BID to TID > 120mg Lasix challenge with minimal response > 80mg Lasix bolus followed by 10mg/hr Lasix drip with good response. Weight trending down and net negative 2.2L, weight down to 113 lbs (dry weight 114 lbs), will discuss transitioning to IV boluses vs PO with cardiology today, give lasix 80 mg IV x1 this afternoon, reassess in am per cards  - Appreciate Cards 2 involvement.  - BID BMP, replete lytes prn per protocol    Possible RHF (Elevated LFTs)  Ruled out on 3/3 echo, elevated LFT attributed to volume overload   - HF Cardiology consulted, appreciate recs   - Continue holding amio   - PRN tylenol discontinued 3/3   - Daily LFTs     Chest tubes: removed 3/3  TPW: removed without difficulty     Pulmonary:  Extubated POD #5; Saturating well on RA  Supplemental O2 PRN to keep sats > 92%.  Pulm toilet, IS, activity/OOB and deep breathing    Bilateral PTX, stable  IR consulted 3/2 for chest tube placement however PTX resolved prior to intervention when chest tubes returned to suction.  Small bilateral PTX stable on water seal, chest tubes removed 3/3.  Very small bilat apical PTX stable post-chest tube pull and again this morning; no intervention indicated.     Neurology / MSK:  Acute post-operative pain  - PO oxycodone 5mg PRN  - PRN Robaxin  - 3/3: Tylenol discontinued d/t elevation in LFT    Sternotomy  PT/OT/CR consulted  Sternal precautions     Physical deconditioning  Therapies consulted and recommending TCU at discharge      / Renal:  No Hx of renal disease, baseline creatinine ~0.6  Volume overloaded, diuresis as above    Equivocal UA   - Covered with 1g Rocephin x1 on 3/1   - UC negative      GI / FEN:   Elevated LFT  Suspect venous congestion as likely source.  Plans as above; continue to trend daily, avoid hepatotoxic agents as able.  - LFTs improving    Moderate malnutrition in the context of acute on chronic illness   - Dietitian consulted, appreciate recs; supplements  ordered   - Poor PO intake - calorie counts reordered to resume 3/5. May need to consider supplemental feedings if intake not improved over weekend    Dysphagia  SLP following: recommend Full liquid diet- mildly thick    Constipation, improved  Last BM 3/5   - 3/3 Miralax increased to daily, one-time supp with results     Endocrine:  Stress-induced hyperglycemia - resolved: initially managed on insulin drip postop, transitioned to sliding scale goal BG <180 and since discontinued after demonstrated euglycemia without exogenous insulin     Infectious Disease:  Stress-induced leukocytosis  WBC 12.0->15.6->17.4, afebrile. But procal 3/6 0.08 (0.15) (0.36)  - Completed perioperative antibiotics  - Pan cultured 3/2 NGTD  - Serial blood cultures 3/2 with NGTD  - Empiric cefepime and vanco started 3/3; can transition to PO abx once WBC < 12 per Dr. Bhandari, will discuss increasing WBC with Dr. Bhandari  - Daily AM CBC  - Avoid invasive lines, as able  - Check cdiff given frequent stools and increasing leukocytosis     Hematology:   Stress-induced leukocytosis, as above  Acute blood loss anemia, Hgb stable ~8-9   Acute blood loss thrombocytopenia, resolved  Left femoral artery thrombosis with acute extremity ischemia, resolved s/p thrombectomy and bovine patch per Vascular Surgery  No signs or symptoms of active bleeding  - Transfuse prn for Hgb<7  - Daily CBC     Anticoagulation:   - ASA increased from 81 mg to 162 mg on 3/3 s/p CABG  - Subcutaneous Heparin TID  - Not currently anticoagulated for thrombosis or post-op afib (has been maintaining SR)     Prophylaxis:   Stress ulcer prophylaxis:   -Pantoprazole 40 mg daily for 30 days  -DVT prophylaxis: Subcutaneous heparin, SCD, OOB/activity/ambulation     Disposition:   Transferred to  on 3/1  Rehab recommending discharge to TCU pending medical stability -- likely early this week    Discussed with Dr. Bhandari via written communication.    Boni Aragon  "MARYANA  Cardiothoracic Surgery  p: [phone]        Interval History:   No acute overnight events.   Good response to initiation of Lasix gtt with 2.2L UOP. HD stable  Feels good today, no acute complaints.   Pain is adequately-managed on current regimen   Tolerating PO   Passing flatus, having BMs. No nausea or vomiting.  Breathing well at rest, only SOB when moving.  Denies LE numbness and tingling but notes LLE swelling   Denies chest pain, syncopal symptoms, dizziness/lightheadedness and sternal popping/clicking         Physical Exam:   Blood pressure 105/58, pulse 84, temperature (!) 96.7  F (35.9  C), temperature source Axillary, resp. rate 16, height 1.638 m (5' 4.5\"), weight 51.3 kg (113 lb), SpO2 94 %.  Vitals:    03/04/22 0446 03/05/22 0500 03/06/22 0900   Weight: 54.6 kg (120 lb 6.4 oz) 53.6 kg (118 lb 3.2 oz) 51.3 kg (113 lb)        Gen: A&Ox4, NAD  Neuro: no focal deficits   CV: RRR - sinus on telemetry monitor, normal S1 S2, no murmurs, no rubs/gallops/murmurs  Pulm: no wheezing, LLL crackles,  normal breathing on RA  Abd: nondistended, soft, nontender  Ext: mild peripheral edema - LLE>RLE  Incision: clean, dry, intact, no erythema, sternum stable  Tubes/drain sites: small amount of old serosanguinous drainage on dressing         Data:    Imaging:  reviewed recent imaging including this morning's CXR, no concerns  Recent Results (from the past 24 hour(s))   XR Chest 2 Views    Narrative    Exam: XR CHEST 2 VW, 3/5/2022 2:30 PM    Indication: f/u effusions & PTX    Comparison: 3/4/2022    Findings:   Surgical changes of the chest with intact median sternotomy wires.  Trace pleural effusions. Continued trace biapical pneumothoraces.  Improved aeration in the left lung base. No focal airspace opacities.      Impression    Impression:   1. Continued trace effusions and bilateral apical pneumothoraces  2. Improved left basilar aeration.    I have personally reviewed the examination and initial " interpretation  and I agree with the findings.    VANESSA ZHONG DO         SYSTEM ID:  [id number]         Recent Results (from the past 24 hour(s))   Echocardiogram Limited   Result Value    LVEF  10-15% (severely reduced)    [facility]    [id number]  [id number]  [id number]  [id number]^ELIGIO^CHRIS^YOEL     [facility],[place]  Echocardiography Laboratory  [address] [place] [id number]     Name: GLADYS SHUKLA  MRN: [id number]  : 1950  Study Date: 2022 09:19 AM  Age: 71 yrs  Gender: Female  [place] [facility] [place]  Reason For Study: Heart Failure, limited for RV function  Ordering Physician: CHRIS DEL VALLE  Referring Physician: EDDIE GRACE  Performed By: Asaf Darling RDCS     BSA: 1.6 m2  Height: 64 in  Weight: 126 lb  HR: 57  BP: 96/55 mmHg  ______________________________________________________________________________  Procedure  Limited Portable Echo Adult.  ______________________________________________________________________________  Interpretation Summary  Left ventricular function is severely reduced; LVEF 10-15%.  Global right ventricular function is normal.  Estimated pulmonary artery systolic pressure is 30 mmHg plus right atrial  pressure.  IVC diameter >2.1 cm collapsing <50% with sniff suggests a high RA pressure  estimated at 15 mmHg or greater.  This study was compared with the study from 22. There has been no change.  ______________________________________________________________________________  Left Ventricle  Left ventricular function is decreased. The ejection fraction is 10-15%  (severely reduced). Severe diffuse hypokinesis is present.     Right Ventricle  The right ventricle is normal size. Global right ventricular function is  normal.     Mitral Valve  Mild to moderate mitral insufficiency is present.     Aortic Valve  Trace aortic insufficiency is present.     Tricuspid Valve  Mild tricuspid insufficiency is present.  Estimated pulmonary artery systolic  pressure is 30 mmHg plus right atrial pressure.     Vessels  IVC diameter >2.1 cm collapsing <50% with sniff suggests a high RA pressure  estimated at 15 mmHg or greater.     Compared to Previous Study  This study was compared with the study from 22 . There has been no  change.  ______________________________________________________________________________  Doppler Measurements & Calculations  TR max germaine: 275.2 cm/sec  TR max P.3 mmHg     ______________________________________________________________________________  Report approved by: Andrea De Souza 2022 10:58 AM         XR Chest Port 1 View    Narrative    Exam: XR CHEST PORT 1 VIEW, 3/3/2022 2:55 PM    Comparison: 3/30/2022    History: f/u chest tube water seal trial    Findings:  AP view of the chest. Left chest tube. Mediastinal drain. Intact  median sternotomy wires.    Trachea is midline. Stable cardiomegaly. Aortic knob calcifications.  Mild bilateral interstitial opacities. Increasing small left  pneumothorax. Small right pneumothorax. Small bilateral pleural  effusions. The upper abdomen is unremarkable.      Impression    Impression:   1. Slightly increasing small left apical pneumothorax. Stable small  right pneumothorax.  2. Stable small bilateral interstitial opacities likely represent  pulmonary edema.  3. Stable small bilateral pleural effusions.    I have personally reviewed the examination and initial interpretation  and I agree with the findings.    WHITNEY HEALY MD         SYSTEM ID:  [id number]   XR Chest Port 1 View    Narrative    Chest one view portable    HISTORY: Chest tube removal small pneumothorax    COMPARISON STUDY: Same day at 1410    FINDINGS: Left chest tube is been removed. Decreased trace left  pneumothorax. Median sternotomy wires. Mediastinal clips. Cardiac  silhouette is nonenlarged. Interstitial opacity bilaterally. Trace  right pneumothorax.      Impression     IMPRESSION: Trace biapical pneumothoraces, decreased from previous  post chest tube removal    GISSEL MIRANDA MD         SYSTEM ID:  [id number]     Labs:  BMP  Recent Labs   Lab 03/06/22  0704 03/05/22  1703 03/05/22  0538 03/04/22  1635    132* 134 133   POTASSIUM 3.8 4.4 3.9 3.8   CHLORIDE 92* 91* 92* 95   HEIDI 9.4 8.9 8.7 8.4*   CO2 33* 31 33* 32   BUN 22 27 24 33*   CR 0.68 0.77 0.66 0.70   GLC 96 171* 128* 104*     CBC  Recent Labs   Lab 03/06/22  0704 03/05/22  0538 03/04/22  0552 03/03/22  0617   WBC 17.4* 15.6* 12.0* 15.1*   RBC 3.52* 3.31* 3.16* 2.90*   HGB 10.1* 9.8* 9.2* 8.6*   HCT 33.2* 31.3* 30.5* 28.2*   MCV 94 95 97 97   MCH 28.7 29.6 29.1 29.7   MCHC 30.4* 31.3* 30.2* 30.5*   RDW 20.1* 19.6* 19.4* 18.3*    316 269 216     INR  Recent Labs   Lab 03/04/22  0552 03/03/22  0617 03/02/22  0552 03/01/22  0341   INR 1.33* 1.32* 1.31* 1.50*      Hepatic Panel  Recent Labs   Lab 03/06/22  0704 03/05/22  1703 03/05/22  0538 03/04/22  1635   AST 53* 74* 98* 138*   * 315* 356* 415*   ALKPHOS 103 116 102 99   BILITOTAL 1.0 0.8 0.9 0.7   ALBUMIN 2.9* 3.0* 2.9* 2.8*     GLUCOSE:   Recent Labs   Lab 03/06/22  0704 03/05/22  1703 03/05/22  0538 03/04/22  1635 03/04/22  0552 03/04/22  0432   GLC 96 171* 128* 104* 89 114*

## 2022-03-06 NOTE — PLAN OF CARE
Dx: s/p CABG x4 2/24 found to have L femoral artery thrombosis with acute L lower extremity ischemia s/p emergent L femoral thrombectomy with bovine patch angioplasty 2/25    Neuro: A&O x3-4, disoriented to situation at times. Forgetful and [place]. Restless and Melatonin given at HS; pt slept through rest of the night after melatonin given.  Cardiac: SR HR 70-80s. AVSS.   Respiratory: Tolerating RA. Normal breathing at rest. LS dim at bases.  GI/: Denied nausea, bowel sounds audible. No BM this shift. Voiding via external catheter; good UO.  Diet: Soft/bite sized (Level 6); combination with cardiac and 2g Na+. Calorie counting continue.  Skin: Sternal incision, graft sites and old CT LISS.   Pain: Denied  Drips:  Lasix gtt continued at 10mg/hr.   LDAs: 2 R PIVs  Labs: Await AM Labs.  Mobility: up with 2A walker and GB. Bed alarm maintained.      Plan:  Possible transition diuretic to PO today. Continue to monitor and follow POC.

## 2022-03-06 NOTE — PROGRESS NOTES
[facility]   Cardiology Heart Failure Consults  Progress Note     Interval History:  Reports she is feeling better, still having some dyspnea while walking. Feels like her swelling has significantly improved.    Physical Exam:  Temp:  [96.7  F (35.9  C)-98.2  F (36.8  C)] 97.7  F (36.5  C)  Pulse:  [56-89] 82  Resp:  [16-18] 18  BP: ()/(39-62) 95/50  Cuff Mean (mmHg):  [74] 74  SpO2:  [91 %-95 %] 95 %    I/O:    Intake/Output Summary (Last 24 hours) at 3/6/2022 1712  Last data filed at 3/6/2022 1100  Gross per 24 hour   Intake 233.95 ml   Output 2300 ml   Net -2066.05 ml         Wt:   Wt Readings from Last 5 Encounters:   03/06/22 51.3 kg (113 lb)       GEN: NAD  Pulm: CTAB  Cardiac: RRR, no m/r/g. JVP not visualized sitting upright  Vascular: No lower extremity edema and palpable pulses  GI: soft, non distended  Neuro: CN II-XII grossly intact    Medications:    [Held by provider] amiodarone  400 mg Oral BID     aspirin  162 mg Oral or NG Tube Daily     [Held by provider] atorvastatin  40 mg Oral QAM     [Held by provider] carvedilol  3.125 mg Oral BID w/meals     ceFEPIme (MAXIPIME) IV  2 g Intravenous Q12H     [Held by provider] furosemide  80 mg Intravenous Once     heparin ANTICOAGULANT  5,000 Units Subcutaneous Q8H     influenza vac high-dose quad  0.7 mL Intramuscular Prior to discharge     melatonin  3 mg Oral At Bedtime     pantoprazole  40 mg Oral QAM AC     polyethylene glycol  17 g Oral or Feeding Tube Daily     potassium chloride  40 mEq Oral BID     spironolactone  25 mg Oral Daily     thiamine  100 mg Oral or Feeding Tube Daily     umeclidinium  1 puff Inhalation Daily     vancomycin  1,250 mg Intravenous Q24H       dextrose       furosemide Stopped (03/06/22 0900)     ACE/ARB/ARNI NOT PRESCRIBED       BETA BLOCKER NOT PRESCRIBED         Labs:   CMP  Recent Labs   Lab 03/06/22  1635 03/06/22  0704 03/05/22  1703 03/05/22  0538 03/04/22  1635 03/04/22  0552  03/03/22  1539 03/03/22  1400   * 133 132* 134 133 134   < > 133   POTASSIUM 4.9 3.8 4.4 3.9 3.8 3.4   < > 4.2   CHLORIDE 94 92* 91* 92* 95 97   < > 101   CO2 28 33* 31 33* 32 28   < > 27   ANIONGAP 8 8 10 9 6 9   < > 5   * 96 171* 128* 104* 89   < > 117*   BUN 30 22 27 24 33* 36*   < > 45*   CR 0.74 0.68 0.77 0.66 0.70 0.76   < > 0.87   GFRESTIMATED 85 >90 81 >90 >90 82   < > 70   HEIDI 8.7 9.4 8.9 8.7 8.4* 8.6   < > 8.2*   MAG  --  2.1  --  2.2  2.2  --  2.0  --  2.1   PHOS  --  4.0  --  2.4* 2.9 2.5   < >  --    PROTTOTAL 6.1* 6.4* 6.3* 5.6* 5.7* 6.0*  --  5.4*   ALBUMIN 2.6* 2.9* 3.0* 2.9* 2.8* 3.0*  --  2.8*   BILITOTAL 0.7 1.0 0.8 0.9 0.7 0.9  --  0.7   ALKPHOS 105 103 116 102 99 103  --  97   AST 52* 53* 74* 98* 138* 236*  --  400*   * 269* 315* 356* 415* 508*  --  591*    < > = values in this interval not displayed.     CBC  Recent Labs   Lab 03/06/22  0704 03/05/22  0538 03/04/22  0552 03/03/22  0617   WBC 17.4* 15.6* 12.0* 15.1*   RBC 3.52* 3.31* 3.16* 2.90*   HGB 10.1* 9.8* 9.2* 8.6*   HCT 33.2* 31.3* 30.5* 28.2*   MCV 94 95 97 97   MCH 28.7 29.6 29.1 29.7   MCHC 30.4* 31.3* 30.2* 30.5*   RDW 20.1* 19.6* 19.4* 18.3*    316 269 216     INR  Recent Labs   Lab 03/04/22  0552 03/03/22  0617 03/02/22  0552 03/01/22  0341   INR 1.33* 1.32* 1.31* 1.50*     Arterial Blood Gas  Recent Labs   Lab 02/28/22  0306 02/27/22  2313   PH 7.38 7.40   PCO2 39 36   PO2 119* 106*   HCO3 23 22   O2PER 1 1       ASSESSMENT/PLAN:  70yo female w/ PMHx of newly-diagnosed HFrEF (10-15% 2/11/22) 2/2 ICM, CAD (diagnosed 1/2022), COPD (diagnosed 1/2022), hyperlipidemia, and cigarette smoking (quit in 12/2021) who presented to [facility] 2/12 as a transfer from OSH for consideration of advanced therapies in the setting of cardiogenic shock requiring inotropic support. IABP placed on 2/23 and she was taken to the OR for CABG x4 on 2/24 with Dr. Bhandari. Found to have left femoral artery thrombosis with acute left  lower extremity ischemia s/p emergent left femoral thrombectomy, bovine patch angioplasty on 2/25. Bilateral pneumothoraces after air leak noted 3/2/22.     # 3VCAD s/p CABGx4 (2/24/22)  # CAD (mLAD , severe prox and distal LAD, LCx and D1 disease)  # Cardiogenic shock  # Acute on Chronic HFrEF 2/2 ICM (EF 10-15%) AHA Stage D, NYHA Class III-IV  # LV thrombus     She appears much closer to euvolemia. Wt 113 today (dry weight thought to be 114-116lbs). She feels symptomatically improved. Cr and LFTs continue to trend down. Would be reasonable to switch to intermittent dosing and PO diuretics in the coming days. Also would benefit from restarting low dose metoprolol prior to discharge.      Today's changes:   - reasonable to transition to intermittent lasix dosing, with a plan to transition to PO in the next day or two  - Could consider restarting statin given downtrending LFTs  - Consider re-introducing some low dose Metoprolol XL prior to discharge for GDMT     The heart failure service will sign off. Please feel free to reach out with any new questions or concerns.    Patient seen and discussed with Dr. Marks, who agrees with above plan.    Ludwin Reardon MD  Cardiology Fellow  Pager: [phone]

## 2022-03-07 ENCOUNTER — APPOINTMENT (OUTPATIENT)
Dept: PHYSICAL THERAPY | Facility: CLINIC | Age: 72
DRG: 233 | End: 2022-03-07
Attending: INTERNAL MEDICINE
Payer: MEDICARE

## 2022-03-07 ENCOUNTER — APPOINTMENT (OUTPATIENT)
Dept: SPEECH THERAPY | Facility: CLINIC | Age: 72
DRG: 233 | End: 2022-03-07
Attending: INTERNAL MEDICINE
Payer: MEDICARE

## 2022-03-07 ENCOUNTER — APPOINTMENT (OUTPATIENT)
Dept: OCCUPATIONAL THERAPY | Facility: CLINIC | Age: 72
DRG: 233 | End: 2022-03-07
Attending: INTERNAL MEDICINE
Payer: MEDICARE

## 2022-03-07 LAB
ALBUMIN SERPL-MCNC: 2.7 G/DL (ref 3.4–5)
ALBUMIN SERPL-MCNC: 2.7 G/DL (ref 3.4–5)
ALP SERPL-CCNC: 90 U/L (ref 40–150)
ALP SERPL-CCNC: 98 U/L (ref 40–150)
ALT SERPL W P-5'-P-CCNC: 172 U/L (ref 0–50)
ALT SERPL W P-5'-P-CCNC: 186 U/L (ref 0–50)
ANION GAP SERPL CALCULATED.3IONS-SCNC: 6 MMOL/L (ref 3–14)
ANION GAP SERPL CALCULATED.3IONS-SCNC: 7 MMOL/L (ref 3–14)
AST SERPL W P-5'-P-CCNC: 38 U/L (ref 0–45)
AST SERPL W P-5'-P-CCNC: 40 U/L (ref 0–45)
BACTERIA BLD CULT: NO GROWTH
BACTERIA BLD CULT: NO GROWTH
BILIRUB SERPL-MCNC: 0.6 MG/DL (ref 0.2–1.3)
BILIRUB SERPL-MCNC: 0.9 MG/DL (ref 0.2–1.3)
BUN SERPL-MCNC: 22 MG/DL (ref 7–30)
BUN SERPL-MCNC: 26 MG/DL (ref 7–30)
CALCIUM SERPL-MCNC: 8.9 MG/DL (ref 8.5–10.1)
CALCIUM SERPL-MCNC: 9.2 MG/DL (ref 8.5–10.1)
CHLORIDE BLD-SCNC: 100 MMOL/L (ref 94–109)
CHLORIDE BLD-SCNC: 100 MMOL/L (ref 94–109)
CO2 SERPL-SCNC: 27 MMOL/L (ref 20–32)
CO2 SERPL-SCNC: 28 MMOL/L (ref 20–32)
CREAT SERPL-MCNC: 0.66 MG/DL (ref 0.52–1.04)
CREAT SERPL-MCNC: 0.66 MG/DL (ref 0.52–1.04)
ERYTHROCYTE [DISTWIDTH] IN BLOOD BY AUTOMATED COUNT: 19.7 % (ref 10–15)
GFR SERPL CREATININE-BSD FRML MDRD: >90 ML/MIN/1.73M2
GFR SERPL CREATININE-BSD FRML MDRD: >90 ML/MIN/1.73M2
GLUCOSE BLD-MCNC: 87 MG/DL (ref 70–99)
GLUCOSE BLD-MCNC: 93 MG/DL (ref 70–99)
HCT VFR BLD AUTO: 31 % (ref 35–47)
HGB BLD-MCNC: 9.3 G/DL (ref 11.7–15.7)
LACTATE SERPL-SCNC: 1.6 MMOL/L (ref 0.7–2)
MAGNESIUM SERPL-MCNC: 2.3 MG/DL (ref 1.6–2.3)
MCH RBC QN AUTO: 28.9 PG (ref 26.5–33)
MCHC RBC AUTO-ENTMCNC: 30 G/DL (ref 31.5–36.5)
MCV RBC AUTO: 96 FL (ref 78–100)
PHOSPHATE SERPL-MCNC: 2.9 MG/DL (ref 2.5–4.5)
PLATELET # BLD AUTO: 348 10E3/UL (ref 150–450)
POTASSIUM BLD-SCNC: 4.5 MMOL/L (ref 3.4–5.3)
POTASSIUM BLD-SCNC: 5.8 MMOL/L (ref 3.4–5.3)
PROT SERPL-MCNC: 5.8 G/DL (ref 6.8–8.8)
PROT SERPL-MCNC: 6 G/DL (ref 6.8–8.8)
RBC # BLD AUTO: 3.22 10E6/UL (ref 3.8–5.2)
SODIUM SERPL-SCNC: 134 MMOL/L (ref 133–144)
SODIUM SERPL-SCNC: 134 MMOL/L (ref 133–144)
WBC # BLD AUTO: 17.6 10E3/UL (ref 4–11)

## 2022-03-07 PROCEDURE — 84100 ASSAY OF PHOSPHORUS: CPT | Performed by: SURGERY

## 2022-03-07 PROCEDURE — 250N000013 HC RX MED GY IP 250 OP 250 PS 637: Performed by: STUDENT IN AN ORGANIZED HEALTH CARE EDUCATION/TRAINING PROGRAM

## 2022-03-07 PROCEDURE — 97530 THERAPEUTIC ACTIVITIES: CPT | Mod: GO

## 2022-03-07 PROCEDURE — 97530 THERAPEUTIC ACTIVITIES: CPT | Mod: GP

## 2022-03-07 PROCEDURE — 80053 COMPREHEN METABOLIC PANEL: CPT | Performed by: STUDENT IN AN ORGANIZED HEALTH CARE EDUCATION/TRAINING PROGRAM

## 2022-03-07 PROCEDURE — 92526 ORAL FUNCTION THERAPY: CPT | Mod: GN

## 2022-03-07 PROCEDURE — 272N000201 ZZ HC ADHESIVE SKIN CLOSURE, DERMABOND

## 2022-03-07 PROCEDURE — 36415 COLL VENOUS BLD VENIPUNCTURE: CPT | Performed by: SURGERY

## 2022-03-07 PROCEDURE — 36415 COLL VENOUS BLD VENIPUNCTURE: CPT | Performed by: STUDENT IN AN ORGANIZED HEALTH CARE EDUCATION/TRAINING PROGRAM

## 2022-03-07 PROCEDURE — 250N000011 HC RX IP 250 OP 636: Performed by: SURGERY

## 2022-03-07 PROCEDURE — 36569 INSJ PICC 5 YR+ W/O IMAGING: CPT

## 2022-03-07 PROCEDURE — 97116 GAIT TRAINING THERAPY: CPT | Mod: GP

## 2022-03-07 PROCEDURE — 250N000011 HC RX IP 250 OP 636: Performed by: PHYSICIAN ASSISTANT

## 2022-03-07 PROCEDURE — 214N000001 HC R&B CCU UMMC

## 2022-03-07 PROCEDURE — 83605 ASSAY OF LACTIC ACID: CPT | Performed by: SURGERY

## 2022-03-07 PROCEDURE — 250N000013 HC RX MED GY IP 250 OP 250 PS 637: Performed by: SURGERY

## 2022-03-07 PROCEDURE — 250N000013 HC RX MED GY IP 250 OP 250 PS 637: Performed by: PHYSICIAN ASSISTANT

## 2022-03-07 PROCEDURE — 85014 HEMATOCRIT: CPT | Performed by: SURGERY

## 2022-03-07 PROCEDURE — 83735 ASSAY OF MAGNESIUM: CPT | Performed by: SURGERY

## 2022-03-07 PROCEDURE — 84450 TRANSFERASE (AST) (SGOT): CPT | Performed by: STUDENT IN AN ORGANIZED HEALTH CARE EDUCATION/TRAINING PROGRAM

## 2022-03-07 PROCEDURE — 250N000011 HC RX IP 250 OP 636: Performed by: NURSE PRACTITIONER

## 2022-03-07 PROCEDURE — 250N000009 HC RX 250: Performed by: PHYSICIAN ASSISTANT

## 2022-03-07 PROCEDURE — 272N000451 HC KIT SHRLOCK 5FR POWER PICC DOUBLE LUMEN

## 2022-03-07 PROCEDURE — 258N000003 HC RX IP 258 OP 636: Performed by: SURGERY

## 2022-03-07 RX ORDER — ASPIRIN 81 MG/1
162 TABLET, CHEWABLE ORAL DAILY
Status: DISCONTINUED | OUTPATIENT
Start: 2022-03-08 | End: 2022-03-18 | Stop reason: HOSPADM

## 2022-03-07 RX ORDER — LIDOCAINE 40 MG/G
CREAM TOPICAL
Status: ACTIVE | OUTPATIENT
Start: 2022-03-07 | End: 2022-03-10

## 2022-03-07 RX ORDER — HEPARIN SODIUM,PORCINE 10 UNIT/ML
5-20 VIAL (ML) INTRAVENOUS
Status: DISCONTINUED | OUTPATIENT
Start: 2022-03-07 | End: 2022-03-14

## 2022-03-07 RX ORDER — POLYETHYLENE GLYCOL 3350 17 G/17G
17 POWDER, FOR SOLUTION ORAL DAILY
Status: DISCONTINUED | OUTPATIENT
Start: 2022-03-08 | End: 2022-03-09

## 2022-03-07 RX ORDER — DRONABINOL 5 MG/1
5 CAPSULE ORAL 2 TIMES DAILY
Status: DISCONTINUED | OUTPATIENT
Start: 2022-03-07 | End: 2022-03-09

## 2022-03-07 RX ORDER — HEPARIN SODIUM,PORCINE 10 UNIT/ML
5-20 VIAL (ML) INTRAVENOUS EVERY 24 HOURS
Status: DISCONTINUED | OUTPATIENT
Start: 2022-03-07 | End: 2022-03-14

## 2022-03-07 RX ADMIN — SPIRONOLACTONE 25 MG: 25 TABLET ORAL at 08:45

## 2022-03-07 RX ADMIN — Medication 250 MG: at 01:44

## 2022-03-07 RX ADMIN — DRONABINOL 5 MG: 5 CAPSULE ORAL at 14:52

## 2022-03-07 RX ADMIN — CEFEPIME HYDROCHLORIDE 2 G: 2 INJECTION, POWDER, FOR SOLUTION INTRAVENOUS at 08:28

## 2022-03-07 RX ADMIN — HEPARIN SODIUM 5000 UNITS: 5000 INJECTION, SOLUTION INTRAVENOUS; SUBCUTANEOUS at 08:47

## 2022-03-07 RX ADMIN — CEFEPIME HYDROCHLORIDE 2 G: 2 INJECTION, POWDER, FOR SOLUTION INTRAVENOUS at 00:15

## 2022-03-07 RX ADMIN — HEPARIN SODIUM 5000 UNITS: 5000 INJECTION, SOLUTION INTRAVENOUS; SUBCUTANEOUS at 17:02

## 2022-03-07 RX ADMIN — VANCOMYCIN HYDROCHLORIDE 1250 MG: 10 INJECTION, POWDER, LYOPHILIZED, FOR SOLUTION INTRAVENOUS at 09:44

## 2022-03-07 RX ADMIN — THIAMINE HCL TAB 100 MG 100 MG: 100 TAB at 08:45

## 2022-03-07 RX ADMIN — CEFEPIME HYDROCHLORIDE 2 G: 2 INJECTION, POWDER, FOR SOLUTION INTRAVENOUS at 16:54

## 2022-03-07 RX ADMIN — HEPARIN SODIUM 5000 UNITS: 5000 INJECTION, SOLUTION INTRAVENOUS; SUBCUTANEOUS at 00:19

## 2022-03-07 RX ADMIN — ASPIRIN 162 MG: 81 TABLET, CHEWABLE ORAL at 08:45

## 2022-03-07 RX ADMIN — POTASSIUM CHLORIDE 40 MEQ: 750 TABLET, EXTENDED RELEASE ORAL at 10:43

## 2022-03-07 RX ADMIN — Medication 250 MG: at 08:46

## 2022-03-07 RX ADMIN — Medication 250 MG: at 14:52

## 2022-03-07 RX ADMIN — Medication 250 MG: at 21:11

## 2022-03-07 RX ADMIN — LIDOCAINE HYDROCHLORIDE ANHYDROUS 1 ML: 10 INJECTION, SOLUTION INFILTRATION at 12:21

## 2022-03-07 RX ADMIN — OXYCODONE HYDROCHLORIDE 5 MG: 5 SOLUTION ORAL at 21:23

## 2022-03-07 RX ADMIN — DRONABINOL 5 MG: 5 CAPSULE ORAL at 21:11

## 2022-03-07 RX ADMIN — PANTOPRAZOLE SODIUM 40 MG: 40 TABLET, DELAYED RELEASE ORAL at 08:49

## 2022-03-07 RX ADMIN — Medication 5 ML: at 13:09

## 2022-03-07 RX ADMIN — Medication 3 MG: at 22:00

## 2022-03-07 ASSESSMENT — ACTIVITIES OF DAILY LIVING (ADL)
ADLS_ACUITY_SCORE: 12
ADLS_ACUITY_SCORE: 12
ADLS_ACUITY_SCORE: 13
ADLS_ACUITY_SCORE: 12
ADLS_ACUITY_SCORE: 13
ADLS_ACUITY_SCORE: 12
ADLS_ACUITY_SCORE: 12
ADLS_ACUITY_SCORE: 13
ADLS_ACUITY_SCORE: 13
ADLS_ACUITY_SCORE: 12
ADLS_ACUITY_SCORE: 13
ADLS_ACUITY_SCORE: 12
ADLS_ACUITY_SCORE: 13
ADLS_ACUITY_SCORE: 12
ADLS_ACUITY_SCORE: 13
ADLS_ACUITY_SCORE: 13

## 2022-03-07 NOTE — PROGRESS NOTES
Calorie Count  Intake recorded for: 3/5  Total Kcals: 463 Total Protein: 27g  Kcals from Hospital Food: 463   Protein: 27g  Kcals from Outside Food (average):0 Protein: 0g  # Meals Ordered from Kitchen: 2 meals  # Meals Recorded: 2 Meals  (First - 50% Cream of Wheat w/ brown sugar)  (Second - 25% chicken)  # Supplements Recorded: 100% 1 Ensure Enlive

## 2022-03-07 NOTE — PROGRESS NOTES
CLINICAL NUTRITION SERVICES     Nutrition Prescription    RECOMMENDATIONS FOR MDs/PROVIDERS TO ORDER:  Rec liberalize diet order (temporarily remove sodium and saturated fat restrictions) to help encourage oral intake.     Malnutrition Status:    See prior RD notes.    Recommendations already ordered by Registered Dietitian (RD):  None additionally at this time.     Future/Additional Recommendations:  See prior RD notes.     Diet: Soft and bite-sized diet (level 6) with thin liquids (level 0), 2 g sodium, low saturated fat diet since 3/3. Ordered to receive Gelatein Plus at supper and strawberry Ensure Enlive shakes with ice cream at 10:00 and 14:00.   Intake: Poor appetite overall, consuming 0-100% of meals. Some meals are small, such as tea and sherbet.   Kcal counts:   3/2         Total Kcals: 600       Total Protein: 10g. # Meals Ordered from Kitchen: 3 small meals. # Meals Recorded: 2 meals. # Supplements Recorded: 0   3/3         Total Kcals: 277       Total Protein: 24g. # Meals Ordered from Kitchen: 2 meals  # Meals Recorded: 1 meal (First - 100% 2 milks, 25% scrambled eggs) # Supplements Recorded: 25% 1 Gelatein Plus    3/4         Total Kcals: 886       Total Protein: 29g. # Meals Ordered from Kitchen: 2 meals # Meals Recorded: 2 meals. # Supplements Recorded: 100% Ensure Enlive w/ ice cream   3/5         Total Kcals: 463       Total Protein: 27g. # Meals Ordered from Kitchen: 2 meals # Meals Recorded: 2 Meals. # Supplements Recorded: 100% 1 Ensure Enlive   3/6         # Meals Ordered from Kitchen: 2 meals. # Meals Recorded: No food intake recorded. # Supplements Recorded: No food intake recorded   3/7         Pending   * Pt consumed a four-day average of 557 kcals and 23 g protein daily. Not meeting estimated needs of 9729-7187 kcals/day (30-35 kcals/kg) and 65-81+ grams protein/day (1.2 - 1.5+ grams of pro/kg). Not all data recorded 3/2 and 3/3.        INTERVENTIONS:  Implementation:  Collaboration with  other providers: Discussed results of kcal counts with team. Rec TFs, if able, due to kcal count results. Team agreeable to appetite stimulant (team ordered marinol).     Follow up/Monitoring:  Will continue to follow pt.    Asha Dykes, MS, RD, LD, CNSC   6C Pgr: [phone]

## 2022-03-07 NOTE — PLAN OF CARE
Goal Outcome Evaluation:    Plan of Care Reviewed With: patient, son     Overall Patient Progress: improving       Shift summary [id number]    D:Pt is s/p CABG x 4.    A/I: Pt doing much better today. Pt moving much better, with SBA. Still needing sternal precaution reminders. Pt getting up to commode and voiding good amounts, Pt reports appetite better since lasix gtt stopped as mouth is not so dry and food tasting better. Pt had a ensure shake and 1 piece of peanut butter toast and some tea this shift. Pt's incisions CDI healing well. Pt has had  1 loose stool/ soft stool. Pt using IS and obtaining only 500 ml volumes, continue encouraging use. Pt has been in SR, other VSS. Pt has declined any pain medications. Pt visited with her son and her spirits were good  P: Anticipate discharge to TCU early this week. Continue current POC

## 2022-03-07 NOTE — PLAN OF CARE
Patient is s/p CABG x4 on 02/24. She was found to have left femoral artery thrombosis with acute left lower extremity ischemia s/p emergent left femoral thrombectomy, bovine patch angioplasty on 2/25. 02/28 with elevated LFTs. PMHx of HFrEF (10-15%) 2/2 ICM ,Biventricular failure (requiring inotropic support PTA to [facility]), COPD, HLD, Tobacco Use Disorder (quit 12/2021), CAD.    Neuro: AOx4, Calm and cooperative with cares    Cardiac/VSS:  SR on tele. Other VSS.(see flowsheets)           Respiratory:  RA tolerates well. ELLER  GI/: Voids without difficulty. Uses the bedside commode. BM x1  Diet/appetite: Soft bite sized food. Calorie counts. Poor appetite  Activity: Assist of 1 and a walker  Pain: Complained of some pain. Robaxin x1 given.   Skin:  Generalized bruising. Old CT sites dressing changed. Sternal incision LISS. Groin sites intact   LDA's:  R PIV saline locked    Plan: Continue POC and monitor

## 2022-03-07 NOTE — PROGRESS NOTES
Care Management Follow Up    Length of Stay (days): 22    Expected Discharge Date: 03/08/2022     Concerns to be Addressed: Discharge planning  Patient plan of care discussed at interdisciplinary rounds: Yes    Anticipated Discharge Disposition: TCU     Anticipated Discharge Services: TCU   Anticipated Discharge DME: N/A    Patient/family educated on Medicare website which has current facility and service quality ratings: Yes  Education Provided on the Discharge Plan: Yes    Patient/Family in Agreement with the Plan: Yes, per discussion with SW    Referrals Placed by CM/SW:      [facility]- NO longer accepting SNF/TCU referrals as they are transitioning to an JOSH. Referral discontinued.             - [address] [place] [address]             - [phone]     [facility]- Spoke with Brittany in admissions and she anticipates they will have a bed and she will review referral.              - [address] [place] [address]             - [phone]     [facility]- Left a voicemail             - [address] [place] [address]             - [phone]     [facility]- Left a voicemail             - [address] [place] [address]             - [phone]     Private pay costs discussed: Not applicable    Additional Information:  Per PA, pt likely medically ready for discharge in 1-2 days. See above referrals for follow up calls and pending TCU referrals.    CC will continue to monitor patient's medical condition and progress towards discharge.  Francia Gonzalez RN BSN  6C Unit Care Coordinator  Phone number: [phone]  Pager: [phone]

## 2022-03-07 NOTE — PROGRESS NOTES
Calorie Count  Intake recorded for: 3/6  Total Kcals: 0 Total Protein: 0g  Kcals from Hospital Food: 0   Protein: 0g  Kcals from Outside Food (average):0 Protein: 0g  # Meals Ordered from Kitchen: 2 meals  # Meals Recorded: No food intake recorded  # Supplements Recorded: No food intake recorded

## 2022-03-07 NOTE — PROVIDER NOTIFICATION
03/07/22 1224   PICC Double Lumen 03/07/22 Right Brachial vein lateral   Placement Date/Time: 03/07/22 (c) 1227   Catheter Brand: Bard  Size (Fr): 5 Fr  Lot #: [id number]  Full barrier precautions done: Yes, hand hygiene, sterile gown, sterile gloves, mask, cap, full body drape, chlorhexidine scrub  Consent Signed: Yes  Time...   Site Assessment WDL   External Cath Length (cm) 1 cm   Extremity Circumference (cm) 23.5 cm   Dressing Intervention Chlorhexidine patch;Transparent;Securing device;New dressing   Dressing Change Due 03/14/22   Purple - Status blood return noted;saline locked   Purple - Cap Change Due 03/11/22   Red - Status blood return noted;saline locked   Red - Cap Change Due 03/11/22   PICC Comment PICC inserted   Extravasation? No   Line Necessity Yes, meets criteria

## 2022-03-07 NOTE — PROGRESS NOTES
Cardiovascular Surgery Progress Note  03/07/2022         Assessment and Plan:     Ashley Osman is a 71 year old female with PMH of HFrEF (10-15%) 2/2 ICM (Hx LAD and Circumflex MIs; total occlusion of RCA and LAD), Biventricular failure (requiring inotropic support PTA to [facility]), COPD, HLD, Tobacco Use Disorder (quit 12/2021), CAD who underwent CABG x4 on 02/24 with Dr. Bhandari. Found to have left femoral artery thrombosis with acute left lower extremity ischemia s/p emergent left femoral thrombectomy, bovine patch angioplasty on 2/25. 02/28 with elevated LFTs.     Cardiovascular:   Hx of biventricular HF requiring pressors, CAD, HFrEF 10-15%. Tobacco abuse, s/p CABG x4 on 2/24  Atrial fibrillation with RVR post surgery, HD stable-off pressors 2/28.  2/28 echo showed LV EF 15%, stable on 3/3 echo with LVEF 10-15% - no evidence of RH dysfunction but elevated RA/PA pressures  - Daily weights  - ASA 162mg  - Hold statin d/t LFT elevation  - Coreg held in context of bradycardia, soft BPs, consider trial of Toprol or Coreg Monday 3/7  - Holding amio due to LFT elevation  - [facility] consulted 2/22 however pt not scheduled as declining specialty care. 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   - BLE duplex US to r/o DVT given LLE swelling>RLE - negative     Volume Overload  3/3: IV Lasix  increased from 40mg BID to TID > 120mg Lasix challenge with minimal response > 80mg Lasix bolus followed by 10mg/hr Lasix drip with good response. Weight trending down and net negative 2.2L, weight down to 113 lbs (dry weight 114 lbs)  - Appreciate Cards 2 involvement. Diuresis held due to low MAPs 3/7/22  - BID BMP, replete lytes prn per protocol     Possible RHF (Elevated LFTs)  Ruled out on 3/3 echo, elevated LFT attributed to volume overload   - HF Cardiology consulted, appreciate recs   - Continue holding amio   - PRN tylenol discontinued 3/3   - Daily LFTs     Chest tubes: removed 3/3  TPW: removed without difficulty     Pulmonary:  Extubated POD #5; Saturating well on RA  Supplemental O2 PRN to keep sats > 92%.  Pulm toilet, IS, activity/OOB and deep breathing     Bilateral PTX, stable  IR consulted 3/2 for chest tube placement however PTX resolved prior to intervention when chest tubes returned to suction.  Small bilateral PTX stable on water seal, chest tubes removed 3/3.  Very small bilat apical PTX stable post-chest tube pull and again this morning; no intervention indicated.     Neurology / MSK:  Acute post-operative pain  - PO oxycodone 5mg PRN  - PRN Robaxin  - 3/3: Tylenol discontinued d/t elevation in LFT     Sternotomy  PT/OT/CR consulted  Sternal precautions      Physical deconditioning  Therapies consulted and recommending TCU at discharge      / Renal:  No Hx of renal disease, baseline creatinine ~0.6  Volume overloaded, diuresis as above     Equivocal UA   - Covered with 1g Rocephin x1 on 3/1   - UC negative      GI / FEN:   Elevated LFT  Suspect venous congestion as likely source.  Plans as above; continue to trend daily, avoid hepatotoxic agents as able.  - LFTs improving     Moderate malnutrition in the context of acute on chronic illness   - Dietitian consulted, appreciate recs; supplements ordered   - Poor PO intake - calorie counts reordered to resume 3/5. May need to consider  supplemental feedings if intake not improved over weekend.  - Opted for marinol for appetite stimulation  - May try NJ tube if she is amenable in time     Dysphagia  SLP following: recommend Full liquid diet- mildly thick     Endocrine:  Stress-induced hyperglycemia - resolved: initially managed on insulin drip postop, transitioned to sliding scale goal BG <180 and since discontinued after demonstrated euglycemia without exogenous insulin     Infectious Disease:  Stress-induced leukocytosis  WBC 12.0->15.6->17.4, afebrile. But procal 3/6 0.08 (0.15) (0.36)  - Completed perioperative antibiotics  - Pan cultured 3/2 NGTD  - Serial blood cultures 3/2 with NGTD  - Empiric cefepime and vanco started 3/3; can transition to PO abx once WBC < 12 per Dr. Bhandari  - Daily AM CBC  - Avoid invasive lines, as able  - Check cdiff 3/7/22 given frequent stools and increasing leukocytosis. Diarrhea improving after stopping the Miralax and Senna-docusate.     Hematology:   Stress-induced leukocytosis, as above  Acute blood loss anemia, Hgb stable ~8-9   Acute blood loss thrombocytopenia, resolved  Left femoral artery thrombosis with acute extremity ischemia, resolved s/p thrombectomy and bovine patch per Vascular Surgery  No signs or symptoms of active bleeding  - Transfuse prn for Hgb<7  - Daily CBC     Anticoagulation:   -  mg  - Subcutaneous Heparin TID  - Not currently anticoagulated for thrombosis or post-op afib (has been maintaining SR)     Prophylaxis:   Stress ulcer prophylaxis:   -Pantoprazole 40 mg daily for 30 days  -DVT prophylaxis: Subcutaneous heparin, SCD, OOB/activity/ambulation     Disposition:   - Transferred to  on 3/1  - May need life vest/ICD. Consult with Cards II  - Rehab recommending discharge to TCU pending medical stability -- likely early this week    Discussed with Surgeon, Dr. Bhandari via written and verbal commnication.     Amita Smith PA-c  Pager: [phone]  8:59 AM March 7, 2022  "          Interval History:     No overnight events.  States pain is well managed on current regimen. Slept well overnight.  Tolerating diet, is passing flatus, BM x 1. No nausea or vomiting.  Breathing well without complaints.   Working with therapies and ambulating in halls with assistance.   Denies chest pain, palpitations, dizziness, syncopal symptoms, fevers, chills, myalgias, or sternal popping/clicking.         Physical Exam:   Blood pressure 104/54, pulse 80, temperature 97.6  F (36.4  C), temperature source Axillary, resp. rate 18, height 1.638 m (5' 4.5\"), weight 51.9 kg (114 lb 8 oz), SpO2 97 %.  Vitals:    03/05/22 0500 03/06/22 0900 03/07/22 0600   Weight: 53.6 kg (118 lb 3.2 oz) 51.3 kg (113 lb) 51.9 kg (114 lb 8 oz)      Current Weight: 51.9 Kg Trend: +0.6 Kg  Admit weight: 54.0 Kg    Daily Fluid status; net loss: -1316  mL    Net loss SA: -2677 mL  MAPs: 58-75  LVEF: 10-15%    Gen: A&Ox4, NAD  Neuro: no focal deficits   CV: RRR, normal S1 S2, no murmurs, rubs or gallops. No appreciable JVD   Vascular: Peripheral pulses present Radial 2+, Dorsalis Pedis 2+, Posterior Tibialis 2+.   Pulm: CTA, no wheezing or rhonchi, normal breathing on RA  Abd: nondistended, normal BS, soft, nontender  Ext: Negative peripheral edema, 0+ pitting. Warm.   Incision: clean, dry, intact, no erythema, sternum stable  Tubes/drain sites: dressing clean and dry         Data:    Imaging:  reviewed recent imaging    Chest x-ray  Interpretation:    Echocardiogram  Interpretation:    CT scan:    Labs:  CBC  Recent Labs   Lab 03/07/22  0539 03/06/22  0704 03/05/22  0538 03/04/22  0552   WBC 17.6* 17.4* 15.6* 12.0*   RBC 3.22* 3.52* 3.31* 3.16*   HGB 9.3* 10.1* 9.8* 9.2*   HCT 31.0* 33.2* 31.3* 30.5*   MCV 96 94 95 97   MCH 28.9 28.7 29.6 29.1   MCHC 30.0* 30.4* 31.3* 30.2*   RDW 19.7* 20.1* 19.6* 19.4*    361 316 162     CMP:  Last Comprehensive Metabolic Panel:  Sodium   Date Value Ref Range Status   03/07/2022 134 133 - " 144 mmol/L Final     Potassium   Date Value Ref Range Status   03/07/2022 4.5 3.4 - 5.3 mmol/L Final     Chloride   Date Value Ref Range Status   03/07/2022 100 94 - 109 mmol/L Final     Carbon Dioxide (CO2)   Date Value Ref Range Status   03/07/2022 27 20 - 32 mmol/L Final     Anion Gap   Date Value Ref Range Status   03/07/2022 7 3 - 14 mmol/L Final     Glucose   Date Value Ref Range Status   03/07/2022 93 70 - 99 mg/dL Final     Urea Nitrogen   Date Value Ref Range Status   03/07/2022 22 7 - 30 mg/dL Final     Creatinine   Date Value Ref Range Status   03/07/2022 0.66 0.52 - 1.04 mg/dL Final     GFR Estimate   Date Value Ref Range Status   03/07/2022 >90 >60 mL/min/1.73m2 Final     Comment:     Effective December 21, 2021 eGFRcr in adults is calculated using the 2021 CKD-EPI creatinine equation which includes age and gender (Marleen et al., [place], DOI: 10.1056/RBNCrn6332943)     Calcium   Date Value Ref Range Status   03/07/2022 9.2 8.5 - 10.1 mg/dL Final     Bilirubin Total   Date Value Ref Range Status   03/07/2022 0.9 0.2 - 1.3 mg/dL Final     Alkaline Phosphatase   Date Value Ref Range Status   03/07/2022 90 40 - 150 U/L Final     ALT   Date Value Ref Range Status   03/07/2022 186 (H) 0 - 50 U/L Final     AST   Date Value Ref Range Status   03/07/2022 40 0 - 45 U/L Final     BMP  Recent Labs   Lab 03/07/22  0539 03/06/22  1635 03/06/22  0704 03/05/22  1703    130* 133 132*   POTASSIUM 4.5 4.9 3.8 4.4   CHLORIDE 100 94 92* 91*   HEIDI 9.2 8.7 9.4 8.9   CO2 27 28 33* 31   BUN 22 30 22 27   CR 0.66 0.74 0.68 0.77   GLC 93 140* 96 171*     INR  Recent Labs   Lab 03/04/22  0552 03/03/22  0617 03/02/22  0552 03/01/22  0341   INR 1.33* 1.32* 1.31* 1.50*      Hepatic Panel  Recent Labs   Lab 03/07/22  0539 03/06/22  1635 03/06/22  0704 03/05/22  1703   AST 40 52* 53* 74*   * 229* 269* 315*   ALKPHOS 90 105 103 116   BILITOTAL 0.9 0.7 1.0 0.8   ALBUMIN 2.7* 2.6* 2.9* 3.0*     GLUCOSE:   Recent Labs   Lab  03/07/22  0539 03/06/22  1635 03/06/22  0704 03/05/22  1703 03/05/22  0538 03/04/22  1635   GLC 93 140* 96 171* 128* 104*

## 2022-03-07 NOTE — PROCEDURES
[facility]    Double Lumen PICC Placement    Date/Time: 3/7/2022 12:24 PM  Performed by: Lalo Luciano RN  Authorized by: Amita Smith PA-C   Indications: Antibiotic.      UNIVERSAL PROTOCOL   Site Marked: Yes  Prior Images Obtained and Reviewed:  Yes  Required items: Required blood products, implants, devices and special equipment available    Patient identity confirmed:  Verbally with patient, arm band, provided demographic data and hospital-assigned identification number  NA - No sedation, light sedation, or local anesthesia  Confirmation Checklist:  Patient's identity using two indicators, relevant allergies, procedure was appropriate and matched the consent or emergent situation and correct equipment/implants were available  Time out: Immediately prior to the procedure a time out was called    Universal Protocol: the Joint Replaced by [facility] Universal Protocol was followed    Preparation: Patient was prepped and draped in usual sterile fashion       ANESTHESIA    Anesthesia: See MAR for details  Local Anesthetic:  Lidocaine 1% without epinephrine  Anesthetic Total (mL):  1      SEDATION    Patient Sedated: No        Preparation: skin prepped with ChloraPrep  Skin prep agent: skin prep agent completely dried prior to procedure  Sterile barriers: maximum sterile barriers were used: cap, mask, sterile gown, sterile gloves, and large sterile sheet  Hand hygiene: hand hygiene performed prior to central venous catheter insertion  Type of line used: Power PICC  Catheter type: double lumen  Lumen type: non-valved  Catheter size: 5 Fr  Brand: Bard  Lot number: [id number]  Placement method: MST, venipuncture, ultrasound and tip confirmation system  Number of attempts: 1  Successful placement: yes  Orientation: right  Location: brachial vein (lateral) (vein diameter - 0.30 cm)  Arm circumference: adults 10 cm  Extremity circumference: 23.5  Visible catheter length:  1  Total catheter length: 43  Dressing and securement: alcohol impregnated caps, chlorhexidine patch applied, glue, sterile dressing applied, statlock and site cleaned  Post procedure assessment: blood return through all ports and free fluid flow (placement verified by [facility]lock 3CG)  PROCEDURE   Patient Tolerance:  Patient tolerated the procedure well with no immediate complicationsDescribe Procedure: PICC tip is in satisfactory location as verified by Bard Sherlock 3CG Tip Confirmation System. PICC is OK to use.

## 2022-03-07 NOTE — PLAN OF CARE
"D: s/p CABG x4 2/24. L femoral thrombectomy with bovine patch angioplasty 2/25.  PMH of HF, ICM, CAD, COPD, HLD     I: Monitored vitals and assessed pt status.   Changes during this shift: R DL PICC placed      PRN: Robaxin     Vitals:Blood pressure 98/54, pulse 85, temperature 97.7  F (36.5  C), temperature source Oral, resp. rate 18, height 1.638 m (5' 4.5\"), weight 51.9 kg (114 lb 8 oz), SpO2 96 %.    A:   Neuro: Ax4 with some confusion. Denies Headache, dizziness,  Lightheadedness, numbness and tingling. calls appropriately   Cardiac: SR  Afebrile, VSS.   Respiratory: sating >95 ON RA. Dyspnea on exertion.   Diet/appetite: mechanical soft bite sized cardiac diet, calorie count.Better appetite today.  GI/:  No BM this shift. Denies abdominal pain. Good urine output.   Activity:  Moves assist of 1 with GB  Pain: Incisional pain with movement. PRN robaxin given.   Skin: Redness blanchable IT/buttocks, bruising bilateral forearms, R DL PICC SL.  Plan: Continue to monitor and follow plan of care. Report changes in status to CVTS.                      "

## 2022-03-07 NOTE — ANESTHESIA POSTPROCEDURE EVALUATION
Patient: Ashley Osman    Procedure: Procedure(s):  median sternotomy, cardiopulmonary bypass, Coronary Artery Bypass Graft (CABG) x4 using left internal mammary artery and endoscopically harvested left saphenous vein, transesophageal echocardiogram per anesthesia       Anesthesia Type:  General    Note:  Disposition: ICU            ICU Sign Out: Anesthesiologist/ICU physician sign out WAS performed   Postop Pain Control:    PONV:    Neuro/Psych:             Sign Out: PLANNED postop sedation   Airway/Respiratory:             Sign Out: Acceptable/Baseline resp. status   CV/Hemodynamics:             Sign Out: Acceptable CV status   Other NRE:    DID A NON-ROUTINE EVENT OCCUR?            Last vitals:  Vitals Value Taken Time   BP 98/54 03/07/22 1552   Temp 36.5  C (97.7  F) 03/07/22 1552   Pulse 85 03/07/22 1552   Resp 18 03/07/22 1552   SpO2 98 % 03/07/22 1553   Vitals shown include unvalidated device data.    Electronically Signed By: Tata Romero MD  March 7, 2022  5:03 PM

## 2022-03-08 ENCOUNTER — APPOINTMENT (OUTPATIENT)
Dept: SPEECH THERAPY | Facility: CLINIC | Age: 72
DRG: 233 | End: 2022-03-08
Attending: INTERNAL MEDICINE
Payer: MEDICARE

## 2022-03-08 DIAGNOSIS — I50.22 CHRONIC SYSTOLIC HEART FAILURE (H): Primary | ICD-10-CM

## 2022-03-08 LAB
ALBUMIN SERPL-MCNC: 2.4 G/DL (ref 3.4–5)
ALBUMIN SERPL-MCNC: 2.6 G/DL (ref 3.4–5)
ALP SERPL-CCNC: 82 U/L (ref 40–150)
ALP SERPL-CCNC: 97 U/L (ref 40–150)
ALT SERPL W P-5'-P-CCNC: 132 U/L (ref 0–50)
ALT SERPL W P-5'-P-CCNC: 134 U/L (ref 0–50)
ANION GAP SERPL CALCULATED.3IONS-SCNC: 6 MMOL/L (ref 3–14)
ANION GAP SERPL CALCULATED.3IONS-SCNC: 7 MMOL/L (ref 3–14)
AST SERPL W P-5'-P-CCNC: 30 U/L (ref 0–45)
AST SERPL W P-5'-P-CCNC: 31 U/L (ref 0–45)
BACTERIA BLD CULT: NO GROWTH
BACTERIA BLD CULT: NO GROWTH
BILIRUB SERPL-MCNC: 0.6 MG/DL (ref 0.2–1.3)
BILIRUB SERPL-MCNC: 0.7 MG/DL (ref 0.2–1.3)
BUN SERPL-MCNC: 24 MG/DL (ref 7–30)
BUN SERPL-MCNC: 27 MG/DL (ref 7–30)
CALCIUM SERPL-MCNC: 8.8 MG/DL (ref 8.5–10.1)
CALCIUM SERPL-MCNC: 8.9 MG/DL (ref 8.5–10.1)
CHLORIDE BLD-SCNC: 103 MMOL/L (ref 94–109)
CHLORIDE BLD-SCNC: 104 MMOL/L (ref 94–109)
CO2 SERPL-SCNC: 24 MMOL/L (ref 20–32)
CO2 SERPL-SCNC: 25 MMOL/L (ref 20–32)
CREAT SERPL-MCNC: 0.57 MG/DL (ref 0.52–1.04)
CREAT SERPL-MCNC: 0.64 MG/DL (ref 0.52–1.04)
ERYTHROCYTE [DISTWIDTH] IN BLOOD BY AUTOMATED COUNT: 19.3 % (ref 10–15)
GFR SERPL CREATININE-BSD FRML MDRD: >90 ML/MIN/1.73M2
GFR SERPL CREATININE-BSD FRML MDRD: >90 ML/MIN/1.73M2
GLUCOSE BLD-MCNC: 132 MG/DL (ref 70–99)
GLUCOSE BLD-MCNC: 98 MG/DL (ref 70–99)
HCT VFR BLD AUTO: 27.6 % (ref 35–47)
HGB BLD-MCNC: 8.3 G/DL (ref 11.7–15.7)
MAGNESIUM SERPL-MCNC: 2.3 MG/DL (ref 1.6–2.3)
MCH RBC QN AUTO: 29.5 PG (ref 26.5–33)
MCHC RBC AUTO-ENTMCNC: 30.1 G/DL (ref 31.5–36.5)
MCV RBC AUTO: 98 FL (ref 78–100)
PHOSPHATE SERPL-MCNC: 3.6 MG/DL (ref 2.5–4.5)
PLATELET # BLD AUTO: 321 10E3/UL (ref 150–450)
POTASSIUM BLD-SCNC: 4.3 MMOL/L (ref 3.4–5.3)
POTASSIUM BLD-SCNC: 4.9 MMOL/L (ref 3.4–5.3)
PROT SERPL-MCNC: 5.6 G/DL (ref 6.8–8.8)
PROT SERPL-MCNC: 6.1 G/DL (ref 6.8–8.8)
RBC # BLD AUTO: 2.81 10E6/UL (ref 3.8–5.2)
SODIUM SERPL-SCNC: 133 MMOL/L (ref 133–144)
SODIUM SERPL-SCNC: 136 MMOL/L (ref 133–144)
VANCOMYCIN SERPL-MCNC: 15.3 MG/L
WBC # BLD AUTO: 15.9 10E3/UL (ref 4–11)

## 2022-03-08 PROCEDURE — 250N000011 HC RX IP 250 OP 636: Performed by: NURSE PRACTITIONER

## 2022-03-08 PROCEDURE — 250N000011 HC RX IP 250 OP 636: Performed by: PHYSICIAN ASSISTANT

## 2022-03-08 PROCEDURE — 84155 ASSAY OF PROTEIN SERUM: CPT | Performed by: STUDENT IN AN ORGANIZED HEALTH CARE EDUCATION/TRAINING PROGRAM

## 2022-03-08 PROCEDURE — 250N000013 HC RX MED GY IP 250 OP 250 PS 637: Performed by: SURGERY

## 2022-03-08 PROCEDURE — 214N000001 HC R&B CCU UMMC

## 2022-03-08 PROCEDURE — 85027 COMPLETE CBC AUTOMATED: CPT | Performed by: SURGERY

## 2022-03-08 PROCEDURE — 250N000011 HC RX IP 250 OP 636: Performed by: SURGERY

## 2022-03-08 PROCEDURE — 80053 COMPREHEN METABOLIC PANEL: CPT | Performed by: STUDENT IN AN ORGANIZED HEALTH CARE EDUCATION/TRAINING PROGRAM

## 2022-03-08 PROCEDURE — 258N000003 HC RX IP 258 OP 636: Performed by: SURGERY

## 2022-03-08 PROCEDURE — 250N000013 HC RX MED GY IP 250 OP 250 PS 637: Performed by: PHYSICIAN ASSISTANT

## 2022-03-08 PROCEDURE — 250N000013 HC RX MED GY IP 250 OP 250 PS 637: Performed by: STUDENT IN AN ORGANIZED HEALTH CARE EDUCATION/TRAINING PROGRAM

## 2022-03-08 PROCEDURE — 92526 ORAL FUNCTION THERAPY: CPT | Mod: GN

## 2022-03-08 PROCEDURE — 250N000013 HC RX MED GY IP 250 OP 250 PS 637: Performed by: NURSE PRACTITIONER

## 2022-03-08 PROCEDURE — 36592 COLLECT BLOOD FROM PICC: CPT | Performed by: SURGERY

## 2022-03-08 PROCEDURE — 84100 ASSAY OF PHOSPHORUS: CPT | Performed by: SURGERY

## 2022-03-08 PROCEDURE — 80202 ASSAY OF VANCOMYCIN: CPT | Performed by: SURGERY

## 2022-03-08 PROCEDURE — 83735 ASSAY OF MAGNESIUM: CPT | Performed by: SURGERY

## 2022-03-08 PROCEDURE — 36592 COLLECT BLOOD FROM PICC: CPT | Performed by: STUDENT IN AN ORGANIZED HEALTH CARE EDUCATION/TRAINING PROGRAM

## 2022-03-08 RX ORDER — FUROSEMIDE 20 MG
20 TABLET ORAL DAILY
Status: DISCONTINUED | OUTPATIENT
Start: 2022-03-08 | End: 2022-03-09

## 2022-03-08 RX ORDER — FUROSEMIDE 20 MG
20 TABLET ORAL
Status: DISCONTINUED | OUTPATIENT
Start: 2022-03-08 | End: 2022-03-08

## 2022-03-08 RX ORDER — MULTIPLE VITAMINS W/ MINERALS TAB 9MG-400MCG
1 TAB ORAL DAILY
Status: DISCONTINUED | OUTPATIENT
Start: 2022-03-08 | End: 2022-03-18 | Stop reason: HOSPADM

## 2022-03-08 RX ADMIN — Medication 1 TABLET: at 10:41

## 2022-03-08 RX ADMIN — Medication 3 ML: at 06:54

## 2022-03-08 RX ADMIN — DRONABINOL 5 MG: 5 CAPSULE ORAL at 19:32

## 2022-03-08 RX ADMIN — FUROSEMIDE 20 MG: 20 TABLET ORAL at 08:34

## 2022-03-08 RX ADMIN — OXYCODONE HYDROCHLORIDE 5 MG: 5 SOLUTION ORAL at 21:16

## 2022-03-08 RX ADMIN — DRONABINOL 5 MG: 5 CAPSULE ORAL at 08:33

## 2022-03-08 RX ADMIN — THIAMINE HCL TAB 100 MG 100 MG: 100 TAB at 08:33

## 2022-03-08 RX ADMIN — VANCOMYCIN HYDROCHLORIDE 1250 MG: 10 INJECTION, POWDER, LYOPHILIZED, FOR SOLUTION INTRAVENOUS at 10:45

## 2022-03-08 RX ADMIN — CEFEPIME HYDROCHLORIDE 2 G: 2 INJECTION, POWDER, FOR SOLUTION INTRAVENOUS at 00:34

## 2022-03-08 RX ADMIN — HEPARIN SODIUM 5000 UNITS: 5000 INJECTION, SOLUTION INTRAVENOUS; SUBCUTANEOUS at 08:34

## 2022-03-08 RX ADMIN — Medication 3 MG: at 21:16

## 2022-03-08 RX ADMIN — Medication 250 MG: at 05:23

## 2022-03-08 RX ADMIN — PANTOPRAZOLE SODIUM 40 MG: 40 TABLET, DELAYED RELEASE ORAL at 08:34

## 2022-03-08 RX ADMIN — HEPARIN SODIUM 5000 UNITS: 5000 INJECTION, SOLUTION INTRAVENOUS; SUBCUTANEOUS at 16:07

## 2022-03-08 RX ADMIN — CEFEPIME HYDROCHLORIDE 2 G: 2 INJECTION, POWDER, FOR SOLUTION INTRAVENOUS at 16:07

## 2022-03-08 RX ADMIN — CEFEPIME HYDROCHLORIDE 2 G: 2 INJECTION, POWDER, FOR SOLUTION INTRAVENOUS at 08:26

## 2022-03-08 RX ADMIN — ASPIRIN 81 MG CHEWABLE TABLET 162 MG: 81 TABLET CHEWABLE at 08:33

## 2022-03-08 RX ADMIN — Medication 250 MG: at 10:42

## 2022-03-08 RX ADMIN — OXYCODONE HYDROCHLORIDE 5 MG: 5 SOLUTION ORAL at 13:14

## 2022-03-08 RX ADMIN — Medication 250 MG: at 21:16

## 2022-03-08 RX ADMIN — HEPARIN SODIUM 5000 UNITS: 5000 INJECTION, SOLUTION INTRAVENOUS; SUBCUTANEOUS at 00:34

## 2022-03-08 RX ADMIN — OXYCODONE HYDROCHLORIDE 5 MG: 5 SOLUTION ORAL at 17:30

## 2022-03-08 RX ADMIN — Medication 10 ML: at 14:18

## 2022-03-08 ASSESSMENT — ACTIVITIES OF DAILY LIVING (ADL)
ADLS_ACUITY_SCORE: 10
ADLS_ACUITY_SCORE: 12
ADLS_ACUITY_SCORE: 10
ADLS_ACUITY_SCORE: 10
ADLS_ACUITY_SCORE: 12
ADLS_ACUITY_SCORE: 12
ADLS_ACUITY_SCORE: 10
ADLS_ACUITY_SCORE: 10
ADLS_ACUITY_SCORE: 12
ADLS_ACUITY_SCORE: 10
ADLS_ACUITY_SCORE: 10
ADLS_ACUITY_SCORE: 12
ADLS_ACUITY_SCORE: 10
ADLS_ACUITY_SCORE: 12
ADLS_ACUITY_SCORE: 12
ADLS_ACUITY_SCORE: 10
ADLS_ACUITY_SCORE: 10
ADLS_ACUITY_SCORE: 12
ADLS_ACUITY_SCORE: 10

## 2022-03-08 NOTE — PHARMACY-VANCOMYCIN DOSING SERVICE
Pharmacy Vancomycin Note  Date of Service 2022  Patient's  1950   71 year old, female    Indication: Sepsis  Day of Therapy: 6  Current vancomycin regimen:  1250 mg IV q24h  Current vancomycin monitoring method: AUC  Current vancomycin therapeutic monitoring goal: 400-600 mg*h/L    InsightRX Prediction of Current Vancomycin Regimen  Regimen: 1250 mg IV every 24 hours.  Start time: 10:19 on 2022  Exposure target: AUC24 (range)400-600 mg/L.hr   AUC24,ss: 444 mg/L.hr  Probability of AUC24 > 400: 77 %  Ctrough,ss: 12 mg/L  Probability of Ctrough,ss > 20: 0 %  Probability of nephrotoxicity (Lodise JUNIOR ): 7 %    Current estimated CrCl = Estimated Creatinine Clearance: 73.3 mL/min (based on SCr of 0.57 mg/dL).    Creatinine for last 3 days  3/5/2022:  5:03 PM Creatinine 0.77 mg/dL  3/6/2022:  7:04 AM Creatinine 0.68 mg/dL;  4:35 PM Creatinine 0.74 mg/dL  3/7/2022:  5:39 AM Creatinine 0.66 mg/dL;  4:31 PM Creatinine 0.66 mg/dL  3/8/2022:  6:59 AM Creatinine 0.57 mg/dL    Recent Vancomycin Levels (past 3 days)  3/8/2022:  9:03 AM Vancomycin 15.3 mg/L    Vancomycin IV Administrations (past 72 hours)                   vancomycin 1250 mg in 0.9% NaCl 250 mL intermittent infusion 1,250 mg (mg) 1,250 mg New Bag 22 0944     1,250 mg New Bag 22 1019                Nephrotoxins and other renal medications (From now, onward)    Start     Dose/Rate Route Frequency Ordered Stop    22 0830  furosemide (LASIX) tablet 20 mg         20 mg Oral DAILY 22 0754      22 1000  vancomycin 1250 mg in 0.9% NaCl 250 mL intermittent infusion 1,250 mg         1,250 mg  over 90 Minutes Intravenous EVERY 24 HOURS 22 0858               Contrast Orders - past 72 hours (72h ago, onward)            None          Interpretation of levels and current regimen:  Vancomycin level is reflective of -600    Has serum creatinine changed greater than 50% in last 72 hours: No    Urine output:  good  urine output    Renal Function: Stable    InsightRX Prediction of Planned New Vancomycin Regimen  Regimen: 1250 mg IV every 24 hours.  Start time: 10:19 on 03/08/2022  Exposure target: AUC24 (range)400-600 mg/L.hr   AUC24,ss: 444 mg/L.hr  Probability of AUC24 > 400: 77 %  Ctrough,ss: 12 mg/L  Probability of Ctrough,ss > 20: 0 %  Probability of nephrotoxicity (Lodise JUNIOR 2009): 7 %      Plan:  1. Continue Current Dose  2. Vancomycin monitoring method: AUC  3. Vancomycin therapeutic monitoring goal: 400-600 mg*h/L  4. Pharmacy will check vancomycin levels as appropriate in if vancomycin therapy continues beyond 10 days or if renal function worsens..  5. Serum creatinine levels will be ordered every 48 hours.    Savannah Medina, PharmD, BCCP, BCPS

## 2022-03-08 NOTE — PLAN OF CARE
"D: s/p CABG x4 2/24. L femoral thrombectomy with bovine patch angioplasty 2/25.  PMH of HF, ICM, CAD, COPD, HLD     I: Monitored vitals and assessed pt status.         PRN: Robaxin and oxy     Vitals:Blood pressure 94/53, pulse 78, temperature 97.9  F (36.6  C), temperature source Oral, resp. rate 18, height 1.638 m (5' 4.5\"), weight 52.8 kg (116 lb 8 oz), SpO2 95 %.    A:   Neuro: Ax4 with some confusion. Denies Headache, dizziness,  Lightheadedness, numbness and tingling. calls appropriately   Cardiac: SR  Afebrile, VSS.   Respiratory: sating >95 ON RA. Dyspnea on exertion.   Diet/appetite: Regular diet. Better appetite today.  GI/:  No BM this shift. Denies abdominal pain. Good urine output.   Activity:  Moves assist of 1 with GB  Pain: Incisional pain with movement. PRN meds given.   Skin: Redness blanchable IT/buttocks, bruising bilateral forearms, R DL PICC SL.  Plan: Continue to monitor and follow plan of care. Report changes in status to CVTS.    "

## 2022-03-08 NOTE — PROGRESS NOTES
Care Management Follow Up    Length of Stay (days): 23    Expected Discharge Date: 03/09/2022     Concerns to be Addressed:       Patient plan of care discussed at interdisciplinary rounds: Yes    Anticipated Discharge Disposition:  TCU     Anticipated Discharge Services:  TCU  Anticipated Discharge DME:  N/A    Patient/family educated on Medicare website which has current facility and service quality ratings:  Yes   Education Provided on the Discharge Plan:  Yes   Patient/Family in Agreement with the Plan:  Yes, per discussion with SW     Referrals Placed by CM/SW:    [facility]- NO longer accepting SNF/TCU referrals as they are transitioning to an JOSH. Referral discontinued.             - [address] [place] [address]             - [phone]      [facility]- Spoke with Brittany in admissions and she anticipates they will have a bed and she will review referral.              - [address] [place] [address]             - [phone]    - 03/08: admissions, SW left a voicemail to call back      [facility]- Left a voicemail             - [address] [place] [address]             - [phone]    - 03/08: admissions, SW left a voicemail to call back     [facility]- Left a voicemail             - [address] [place] [address]             - [phone]    - 03/08: extension #4, spoke with admissions and they are currently running her insurance but so far, Pt looks okay for admission on paper. The number to call back is [phone].   Private pay costs discussed: Not applicable    Additional Information:  Per PA pt likely medically ready for discharge in 1-2 days. See above in the referrals section for follow up calls and pending TCU referrals.     ___________________    BARAK Morse  6C   [facility]  Pager [phone]  Phone  [phone]

## 2022-03-08 NOTE — PROGRESS NOTES
Calorie Count  Intake recorded for: 3/7  Total Kcals: 409 Total Protein: 17g  Kcals from Hospital Food: 409  Protein: 17g  Kcals from Outside Food (average):0 Protein: 0g  # Meals Ordered from Kitchen: 2 meals + food from nursing unit  # Meals Recorded: 100% peanut butter sandwich from nursing unit  # Supplements Recorded: 0

## 2022-03-08 NOTE — PROGRESS NOTES
Cardiovascular Surgery Progress Note  03/08/2022         Assessment and Plan:     Ashley Osman is a 71 year old female with PMH of HFrEF (10-15%) 2/2 ICM (Hx LAD and Circumflex MIs; total occlusion of RCA and LAD), Biventricular failure (requiring inotropic support PTA to [facility]), COPD, HLD, Tobacco Use Disorder (quit 12/2021), CAD who underwent CABG x4 on 02/24 with Dr. Bhandari. Found to have left femoral artery thrombosis with acute left lower extremity ischemia s/p emergent left femoral thrombectomy, bovine patch angioplasty on 2/25. 02/28 with elevated LFTs.     Cardiovascular:   Hx of biventricular HF requiring pressors, CAD, HFrEF 10-15%. Tobacco abuse, s/p CABG x4 on 2/24  Atrial fibrillation with RVR post surgery, HD stable-off pressors 2/28.  2/28 echo showed LV EF 15%, stable on 3/3 echo with LVEF 10-15% - no evidence of RH dysfunction but elevated RA/PA pressures  - Daily weights  - ASA 162mg  - Hold statin d/t LFT elevation  - Possibly start Metoprolol succinate 3/9 if MAPs allow.  - Holding amio due to LFT elevation  - [facility] consulted 2/22 however pt not scheduled as declining specialty care. 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   - BLE duplex US to r/o DVT given LLE swelling>RLE - negative     Volume Overload  - Furosemide 20 mg daily po. Assess response.  -  Held Lasix IV 3/7 due to low MAPs       Possible RHF (Elevated LFTs)  Ruled out on 3/3 echo, elevated LFT attributed to volume overload   - HF Cardiology consulted, appreciate recs   - Continue holding amio   - PRN tylenol discontinued 3/3   - Daily LFTs     Chest tubes: removed 3/3  TPW: removed without difficulty     Pulmonary:  Extubated POD #5; Saturating well on RA  Supplemental O2 PRN to keep sats > 92%.  Pulm toilet, IS, activity/OOB and deep breathing     Bilateral PTX, stable  IR consulted 3/2 for chest tube placement however PTX resolved prior to intervention when chest tubes returned to suction.  Small bilateral PTX stable on water seal, chest tubes removed 3/3.  Very small bilat apical PTX stable post-chest tube pull and again this morning; no intervention indicated.     Neurology / MSK:  Acute post-operative pain  - PO oxycodone 5mg PRN  - PRN Robaxin  - 3/3: Tylenol discontinued d/t elevation in LFT     Sternotomy  PT/OT/CR consulted  Sternal precautions      Physical deconditioning  Therapies consulted and recommending TCU at discharge      / Renal:  No Hx of renal disease, baseline creatinine ~0.6  Volume overloaded, diuresis as above    Hyperkalemia, resolved  - Lasix drip was discontinued with continuing potassium supplementation  - Patient remained asymptomatic with stable MAPs and no arrhythmias  - Discontinued potassium and Spironolactone.    Equivocal UA   - Covered with 1g Rocephin x1 on 3/1   - UC negative      GI / FEN:   Elevated LFT  Suspect venous congestion as likely source.  Plans as above; continue to trend daily, avoid hepatotoxic agents as able.  - LFTs improving     Moderate malnutrition in the context of acute on chronic illness   - Dietitian consulted, appreciate recs; supplements ordered   - Poor PO intake - calorie counts reordered to resume 3/5. May need to consider supplemental feedings if intake not improved over weekend.  - Opted for marinol for appetite stimulation  - May  try [place] tube if she is amenable in time     Dysphagia  SLP following: recommend Full liquid diet- mildly thick     Endocrine:  Stress-induced hyperglycemia - resolved: initially managed on insulin drip postop, transitioned to sliding scale goal BG <180 and since discontinued after demonstrated euglycemia without exogenous insulin     Infectious Disease:  Stress-induced leukocytosis  WBC 15.6->17.4-->15.9 afebrile. But procal 3/6 0.08 (0.15) (0.36)  - Completed perioperative antibiotics  - Pan cultured 3/2 NGTD  - Serial blood cultures 3/2 with NGTD  - Empiric cefepime and vanco started 3/3; can transition to PO abx once WBC < 12 per Dr. Bhandari  - Daily AM CBC  - Avoid invasive lines, as able  - Check cdiff 3/7/22 given frequent stools and increasing leukocytosis. Diarrhea improving after stopping the Miralax and Senna-docusate.     Hematology:   Stress-induced leukocytosis, as above  Acute blood loss anemia, Hgb stable ~8-9   Acute blood loss thrombocytopenia, resolved  Left femoral artery thrombosis with acute extremity ischemia, resolved s/p thrombectomy and bovine patch per Vascular Surgery  No signs or symptoms of active bleeding  - Transfuse prn for Hgb<7  - Daily CBC     Anticoagulation:   -  mg  - Subcutaneous Heparin TID  - Not currently anticoagulated for thrombosis or post-op afib (has been maintaining SR)     Prophylaxis:   Stress ulcer prophylaxis:   -Pantoprazole 40 mg daily for 30 days  -DVT prophylaxis: Subcutaneous heparin, SCD, OOB/activity/ambulation     Disposition:   - Transferred to  on 3/1  - No life-vest or ICD inpatient. May consider with Cardiology in outpatient  - Cards HF referral order is already placed.  - followup with Vascular Surgery established  - Rehab recommending discharge to TCU pending medical stability -- likely early this week    Discussed with Surgeon, Dr. Bhandari via written and verbal commnication.     Amita Smith PA-c  Pager: [phone]  7:46 AM March 8,  "2022           Interval History:     No overnight events.  States pain is well managed on current regimen. Slept well overnight.  Tolerating diet, is passing flatus, BM x 1. No nausea or vomiting.  Breathing well without complaints.   Working with therapies and ambulating in halls with assistance.   Denies chest pain, palpitations, dizziness, syncopal symptoms, fevers, chills, myalgias, or sternal popping/clicking.         Physical Exam:   Blood pressure (!) 89/53, pulse 72, temperature 97.9  F (36.6  C), temperature source Oral, resp. rate 18, height 1.638 m (5' 4.5\"), weight 52.8 kg (116 lb 8 oz), SpO2 95 %.  Vitals:    03/06/22 0900 03/07/22 0600 03/08/22 0500   Weight: 51.3 kg (113 lb) 51.9 kg (114 lb 8 oz) 52.8 kg (116 lb 8 oz)      Current Weight: 52.8 Kg Trend: +0.9 Kg  Admit weight: 54.0 Kg    Daily Fluid status; net loss: +77 (500 O)  mL    Net loss SA: -3032 mL  MAPs: 60-66  LVEF: 10-15%    Gen: A&Ox4, NAD  Neuro: no focal deficits   CV: RRR, normal S1 S2, no murmurs, rubs or gallops. No appreciable JVD   Vascular: Peripheral pulses present Radial 2+, Dorsalis Pedis 2+, Posterior Tibialis 2+.   Pulm: CTA, no wheezing or rhonchi, normal breathing on RA  Abd: nondistended, normal BS, soft, nontender  Ext: negative peripheral edema, 0+ pitting. Warm.   Incision: clean, dry, intact, no erythema, sternum stable  Tubes/drain sites: dressing clean and dry         Data:    Imaging:  reviewed recent imaging    Chest x-ray  Interpretation:    Echocardiogram  Interpretation:    CT scan:    Labs:  CBC  Recent Labs   Lab 03/08/22  0659 03/07/22  0539 03/06/22  0704 03/05/22  0538   WBC 15.9* 17.6* 17.4* 15.6*   RBC 2.81* 3.22* 3.52* 3.31*   HGB 8.3* 9.3* 10.1* 9.8*   HCT 27.6* 31.0* 33.2* 31.3*   MCV 98 96 94 95   MCH 29.5 28.9 28.7 29.6   MCHC 30.1* 30.0* 30.4* 31.3*   RDW 19.3* 19.7* 20.1* 19.6*    348 361 316     CMP:  Last Comprehensive Metabolic Panel:  Sodium   Date Value Ref Range Status   03/08/2022 136 " 133 - 144 mmol/L Preliminary     Potassium   Date Value Ref Range Status   03/08/2022 4.3 3.4 - 5.3 mmol/L Preliminary     Chloride   Date Value Ref Range Status   03/08/2022 104 94 - 109 mmol/L Preliminary     Carbon Dioxide (CO2)   Date Value Ref Range Status   03/07/2022 28 20 - 32 mmol/L Final     Anion Gap   Date Value Ref Range Status   03/07/2022 6 3 - 14 mmol/L Final     Glucose   Date Value Ref Range Status   03/07/2022 87 70 - 99 mg/dL Final     Urea Nitrogen   Date Value Ref Range Status   03/07/2022 26 7 - 30 mg/dL Final     Creatinine   Date Value Ref Range Status   03/07/2022 0.66 0.52 - 1.04 mg/dL Final     GFR Estimate   Date Value Ref Range Status   03/07/2022 >90 >60 mL/min/1.73m2 Final     Comment:     Effective December 21, 2021 eGFRcr in adults is calculated using the 2021 CKD-EPI creatinine equation which includes age and gender (Marleen et al., [place], DOI: 10.1056/OLXFpq8520992)     Calcium   Date Value Ref Range Status   03/07/2022 8.9 8.5 - 10.1 mg/dL Final     Bilirubin Total   Date Value Ref Range Status   03/07/2022 0.6 0.2 - 1.3 mg/dL Final     Alkaline Phosphatase   Date Value Ref Range Status   03/07/2022 98 40 - 150 U/L Final     ALT   Date Value Ref Range Status   03/07/2022 172 (H) 0 - 50 U/L Final     AST   Date Value Ref Range Status   03/07/2022 38 0 - 45 U/L Final     BMP  Recent Labs   Lab 03/08/22  0659 03/07/22  1631 03/07/22  0539 03/06/22  1635 03/06/22  0704    134 134 130* 133   POTASSIUM 4.3 5.8* 4.5 4.9 3.8   CHLORIDE 104 100 100 94 92*   HEIDI  --  8.9 9.2 8.7 9.4   CO2  --  28 27 28 33*   BUN  --  26 22 30 22   CR  --  0.66 0.66 0.74 0.68   GLC  --  87 93 140* 96     INR  Recent Labs   Lab 03/04/22  0552 03/03/22  0617 03/02/22  0552   INR 1.33* 1.32* 1.31*      Hepatic Panel  Recent Labs   Lab 03/07/22  1631 03/07/22  0539 03/06/22  1635 03/06/22  0704   AST 38 40 52* 53*   * 186* 229* 269*   ALKPHOS 98 90 105 103   BILITOTAL 0.6 0.9 0.7 1.0   ALBUMIN 2.7*  2.7* 2.6* 2.9*     GLUCOSE:   Recent Labs   Lab 03/07/22  1631 03/07/22  0539 03/06/22  1635 03/06/22  0704 03/05/22  1703 03/05/22  0538   GLC 87 93 140* 96 171* 128*

## 2022-03-08 NOTE — PROGRESS NOTES
"CLINICAL NUTRITION SERVICES - REASSESSMENT NOTE     Nutrition Prescription    RECOMMENDATIONS FOR MDs/PROVIDERS TO ORDER:  1. Rec update diet order per SLP and liberalize diet order (temporarily remove saturated fat and sodium restrictions vs change sodium to 3g/day) to help encourage oral intake.   2. If pt is on kcal counts and consuming less than 1050 kcals and 44 g protein daily on average, then rec TFs. Per previous RD note: Recommend Osmolite 1.5 Byron @ goal of  45ml/hr  (1080ml/day) + 1 pkt ProSource BID will provide: 1700 kcals, 89 g PRO, 822 ml free H20, 219 g CHO, and 0 g fiber daily. TF may be adjusted pending oral intake    Malnutrition Status:    Moderate malnutrition in the context of chronic illness    Recommendations already ordered by Registered Dietitian (RD):  Multivitamin with minerals   Modified oral supplements    Future/Additional Recommendations:  1. Encourage intake of oral supplements between meals (as small, frequent meals). Rec high kcal/protein options. Pt to self-select softer foods.   2. Monitor fluid status and potential need for fluid restriction. However, rec keep diet as liberalized as possible to encourage oral intake.   3. Continue marinol to help encourage oral intake.   4. Change scheduled miralax to prn.   5. Consider checking a thiamine lab. Ordered to receive thiamine supplementation.      EVALUATION OF THE PROGRESS TOWARD GOALS   Diet: Soft and bite-sized diet (level 6) with thin liquids (level 0), 2 g sodium, low saturated fat diet since 3/3. SLP is following and rec \"Recommend a regular diet and thin liquids, pt to self select soft solids. Recommend pills whole in puree. Ensure pt is fully alert and upright for all PO, given small bites/sips, alternating bites/sips.\" Ordered to receive Gelatein Plus at supper and strawberry Ensure Enlive shakes with ice cream at 10:00 and 14:00.   Intake: Fair diet tolerance. Poor appetite overall, consuming 0-100% of meals. Some meals are " small, such as tea and sherbet. Per % intake flowsheets, pt consuming 50% with a poor appetite 3/2, 25% with a poor appetite 3/3, 50% with a poor appetite 3/4, % on 3/5, poor appetite 3/6, and consuming 75-10% with a good appetite 3/7. RN yesterday (3/7) noted better appetite. Pt states she likes the strawberry Ensure Enlive shakes and is usually consuming about two of these daily. She does not care for the Gelatein. Factors affecting oral intake include early satiety and limited meal options.    Kcal counts:   3/2         Total Kcals: 600       Total Protein: 10g. # Meals Ordered from Kitchen: 3 small meals. # Meals Recorded: 2 meals. # Supplements Recorded: 0   3/3         Total Kcals: 277       Total Protein: 24g. # Meals Ordered from Kitchen: 2 meals  # Meals Recorded: 1 meal (First - 100% 2 milks, 25% scrambled eggs) # Supplements Recorded: 25% 1 Gelatein Plus    3/4         Total Kcals: 886       Total Protein: 29g. # Meals Ordered from Kitchen: 2 meals # Meals Recorded: 2 meals. # Supplements Recorded: 100% Ensure Enlive w/ ice cream   3/5         Total Kcals: 463       Total Protein: 27g. # Meals Ordered from Kitchen: 2 meals # Meals Recorded: 2 Meals. # Supplements Recorded: 100% 1 Ensure Enlive   3/6         # Meals Ordered from Kitchen: 2 meals. # Meals Recorded: No food intake recorded. # Supplements Recorded: No food intake recorded   3/7         Total Kcals: 409       Total Protein: 17g. # Meals Ordered from Kitchen: 2 meals + food from nursing unit. # Meals Recorded: 100% peanut butter sandwich from nursing unit. # Supplements Recorded: 0 - Per pt, she consumed 2.5 Ensure Enlive oral supplements. Each Ensure Enlive oral supplement provides ~500 kcals and 22 g protein.   * Pt consumed a five-day average of 527 kcals and 21 g protein daily. Not meeting estimated needs noted below. Note, not all data recorded 3/2-3/3 and 3/6-3/7.         NEW FINDINGS   GI: Last stool was on 3/7. Stools are soft and  paris. No N/V, abdominal pain. Pt states she has had diarrhea, but this has improved with bowel meds recently held.   Wt Hx: 61.2 kg (2/9/2021), 61.4 kg (8/12/21), 56.8 kg (1/24/2022), 54 kg (2/12/2022, admit), 53.4 kg (2/21), 52.8 kg (3/8) - Pt has lost 2.2% of wt over the last approximate month. She has lost 14% of her body wt over the last approximate seven months. Diuresis this admission and pt reports fluid loss as well. Pt potentially may have lost some actual wt.     ASSESSED NUTRITION NEEDS (updated)  Dosing Weight: 51 kg (based on lowest wt this admission of 51.3 kg on 3/6)  Estimated Energy Needs: 7622-1962 kcals/day (30-35 kcals/kg)  Justification: Increased needs with wt status (rec the low end of this range minimally)  Estimated Protein Needs: 61-77+ grams protein/day (1.2 - 1.5+ grams of pro/kg)  Justification: Increased needs post-op  Estimated Fluid Needs: 1 mL/kcal/day   Justification: Per provider pending fluid status    MALNUTRITION  % Intake: < 75% for > 7 days (moderate)  % Weight Loss: Difficult to assess actual wt loss from fluid loss but may be meeting this criteria (see wt hx above)   Subcutaneous Fat Loss: Facial region:  Mild  Muscle Loss: Temporal:  Mild, Thoracic region (clavicle, acromium bone, deltoid, trapezius, pectoral):  Moderate and Posterior calf:  Moderate-Severe  Fluid Accumulation/Edema: None noted  Malnutrition Diagnosis: Moderate malnutrition in the context of chronic illness    Previous Goals   Patient to consume % of nutritionally adequate meal trays TID, or the equivalent with supplements/snacks.  Evaluation: Not meeting.     Previous Nutrition Diagnosis  Inadequate oral intake related to reduced appetite post-op and modified diet textures per SLP as evidenced by estimate pt is meeting <50% nutrition needs orally, on full liquid diet with mildly thick liquids     Evaluation: Unresolved. Updated below.     CURRENT NUTRITION DIAGNOSIS  Inadequate oral intake related  to early satiety and limited meal options on diet order as evidenced by five-day average of 527 kcals and 21 g protein daily while estimated needs are 1530-3497 kcals/day (30-35 kcals/kg) and 61-77+ grams protein/day (1.2 - 1.5+ grams of pro/kg).    INTERVENTIONS  Implementation  Medical food supplement therapy: Discussed oral supplements with pt. Discontinued Gelatein. Ordered an additional strawberry Ensure Enlive oral supplement at HS.   Multivitamin/mineral supplement therapy: Ordered multivitamin with minerals to help ensure micronutrient needs are met.   Collaboration with other providers: Discussed results of kcal counts with team on 3/7 and rec TFs, if able, due to kcal count results. Team agreeable to appetite stimulant (team ordered marinol) on 3/7. Paged team 3/8 regarding SLP's recs for diet upgrade.   Nutrition education for nutrition relationship to health/disease: Reinforced diet order. Encouraged small, frequent meals and intake at meals and oral supplements due to recent surgery and early satiety. Self-select tolerated/softer foods.     Goals  Patient to consume % of nutritionally adequate meal trays TID, or the equivalent with supplements/snacks.    Monitoring/Evaluation  Progress toward goals will be monitored and evaluated per protocol.     Nutrition will continue to follow.      Asha Dykes, MS, RD, LD, [facility]   6C Pgr: [phone]

## 2022-03-09 ENCOUNTER — APPOINTMENT (OUTPATIENT)
Dept: PHYSICAL THERAPY | Facility: CLINIC | Age: 72
DRG: 233 | End: 2022-03-09
Attending: INTERNAL MEDICINE
Payer: MEDICARE

## 2022-03-09 ENCOUNTER — APPOINTMENT (OUTPATIENT)
Dept: OCCUPATIONAL THERAPY | Facility: CLINIC | Age: 72
DRG: 233 | End: 2022-03-09
Attending: INTERNAL MEDICINE
Payer: MEDICARE

## 2022-03-09 ENCOUNTER — APPOINTMENT (OUTPATIENT)
Dept: CT IMAGING | Facility: CLINIC | Age: 72
DRG: 233 | End: 2022-03-09
Attending: NURSE PRACTITIONER
Payer: MEDICARE

## 2022-03-09 LAB
ALBUMIN SERPL-MCNC: 2.6 G/DL (ref 3.4–5)
ALBUMIN SERPL-MCNC: 2.6 G/DL (ref 3.4–5)
ALBUMIN UR-MCNC: 30 MG/DL
ALP SERPL-CCNC: 90 U/L (ref 40–150)
ALP SERPL-CCNC: 94 U/L (ref 40–150)
ALT SERPL W P-5'-P-CCNC: 106 U/L (ref 0–50)
ALT SERPL W P-5'-P-CCNC: 115 U/L (ref 0–50)
ANION GAP SERPL CALCULATED.3IONS-SCNC: 7 MMOL/L (ref 3–14)
ANION GAP SERPL CALCULATED.3IONS-SCNC: 7 MMOL/L (ref 3–14)
APPEARANCE UR: ABNORMAL
AST SERPL W P-5'-P-CCNC: 24 U/L (ref 0–45)
AST SERPL W P-5'-P-CCNC: 24 U/L (ref 0–45)
BACTERIA #/AREA URNS HPF: ABNORMAL /HPF
BILIRUB SERPL-MCNC: 0.7 MG/DL (ref 0.2–1.3)
BILIRUB SERPL-MCNC: 0.8 MG/DL (ref 0.2–1.3)
BILIRUB UR QL STRIP: NEGATIVE
BUN SERPL-MCNC: 26 MG/DL (ref 7–30)
BUN SERPL-MCNC: 29 MG/DL (ref 7–30)
CALCIUM SERPL-MCNC: 9 MG/DL (ref 8.5–10.1)
CALCIUM SERPL-MCNC: 9.2 MG/DL (ref 8.5–10.1)
CHLORIDE BLD-SCNC: 102 MMOL/L (ref 94–109)
CHLORIDE BLD-SCNC: 103 MMOL/L (ref 94–109)
CO2 SERPL-SCNC: 24 MMOL/L (ref 20–32)
CO2 SERPL-SCNC: 25 MMOL/L (ref 20–32)
COLOR UR AUTO: YELLOW
CREAT SERPL-MCNC: 0.49 MG/DL (ref 0.52–1.04)
CREAT SERPL-MCNC: 0.6 MG/DL (ref 0.52–1.04)
ERYTHROCYTE [DISTWIDTH] IN BLOOD BY AUTOMATED COUNT: 19.2 % (ref 10–15)
GFR SERPL CREATININE-BSD FRML MDRD: >90 ML/MIN/1.73M2
GFR SERPL CREATININE-BSD FRML MDRD: >90 ML/MIN/1.73M2
GLUCOSE BLD-MCNC: 101 MG/DL (ref 70–99)
GLUCOSE BLD-MCNC: 120 MG/DL (ref 70–99)
GLUCOSE UR STRIP-MCNC: NEGATIVE MG/DL
HCT VFR BLD AUTO: 28.8 % (ref 35–47)
HGB BLD-MCNC: 8.6 G/DL (ref 11.7–15.7)
HGB UR QL STRIP: NEGATIVE
HYALINE CASTS: 12 /LPF
KETONES UR STRIP-MCNC: 10 MG/DL
LEUKOCYTE ESTERASE UR QL STRIP: NEGATIVE
MAGNESIUM SERPL-MCNC: 2.3 MG/DL (ref 1.6–2.3)
MCH RBC QN AUTO: 29.5 PG (ref 26.5–33)
MCHC RBC AUTO-ENTMCNC: 29.9 G/DL (ref 31.5–36.5)
MCV RBC AUTO: 99 FL (ref 78–100)
MUCOUS THREADS #/AREA URNS LPF: PRESENT /LPF
NITRATE UR QL: NEGATIVE
PH UR STRIP: 5 [PH] (ref 5–7)
PHOSPHATE SERPL-MCNC: 3.4 MG/DL (ref 2.5–4.5)
PLATELET # BLD AUTO: 362 10E3/UL (ref 150–450)
POTASSIUM BLD-SCNC: 4.4 MMOL/L (ref 3.4–5.3)
POTASSIUM BLD-SCNC: 4.6 MMOL/L (ref 3.4–5.3)
PROT SERPL-MCNC: 6.1 G/DL (ref 6.8–8.8)
PROT SERPL-MCNC: 6.2 G/DL (ref 6.8–8.8)
RBC # BLD AUTO: 2.92 10E6/UL (ref 3.8–5.2)
RBC URINE: 0 /HPF
SODIUM SERPL-SCNC: 134 MMOL/L (ref 133–144)
SODIUM SERPL-SCNC: 134 MMOL/L (ref 133–144)
SP GR UR STRIP: 1.02 (ref 1–1.03)
SQUAMOUS EPITHELIAL: 1 /HPF
TRANSITIONAL EPI: 1 /HPF
UROBILINOGEN UR STRIP-MCNC: NORMAL MG/DL
WBC # BLD AUTO: 17.3 10E3/UL (ref 4–11)
WBC URINE: <1 /HPF

## 2022-03-09 PROCEDURE — 250N000011 HC RX IP 250 OP 636: Performed by: NURSE PRACTITIONER

## 2022-03-09 PROCEDURE — 250N000013 HC RX MED GY IP 250 OP 250 PS 637: Performed by: NURSE PRACTITIONER

## 2022-03-09 PROCEDURE — 250N000011 HC RX IP 250 OP 636: Performed by: PHYSICIAN ASSISTANT

## 2022-03-09 PROCEDURE — 99223 1ST HOSP IP/OBS HIGH 75: CPT | Mod: 24 | Performed by: INTERNAL MEDICINE

## 2022-03-09 PROCEDURE — 214N000001 HC R&B CCU UMMC

## 2022-03-09 PROCEDURE — 80053 COMPREHEN METABOLIC PANEL: CPT | Performed by: STUDENT IN AN ORGANIZED HEALTH CARE EDUCATION/TRAINING PROGRAM

## 2022-03-09 PROCEDURE — 250N000013 HC RX MED GY IP 250 OP 250 PS 637: Performed by: PHYSICIAN ASSISTANT

## 2022-03-09 PROCEDURE — 84100 ASSAY OF PHOSPHORUS: CPT | Performed by: SURGERY

## 2022-03-09 PROCEDURE — 250N000013 HC RX MED GY IP 250 OP 250 PS 637: Performed by: SURGERY

## 2022-03-09 PROCEDURE — 71250 CT THORAX DX C-: CPT

## 2022-03-09 PROCEDURE — 84155 ASSAY OF PROTEIN SERUM: CPT | Performed by: STUDENT IN AN ORGANIZED HEALTH CARE EDUCATION/TRAINING PROGRAM

## 2022-03-09 PROCEDURE — 99207 PR SC NO CHARGE VISIT: CPT | Performed by: INTERNAL MEDICINE

## 2022-03-09 PROCEDURE — 36592 COLLECT BLOOD FROM PICC: CPT | Performed by: STUDENT IN AN ORGANIZED HEALTH CARE EDUCATION/TRAINING PROGRAM

## 2022-03-09 PROCEDURE — 250N000011 HC RX IP 250 OP 636: Performed by: SURGERY

## 2022-03-09 PROCEDURE — 258N000003 HC RX IP 258 OP 636: Performed by: SURGERY

## 2022-03-09 PROCEDURE — 71250 CT THORAX DX C-: CPT | Mod: 26 | Performed by: RADIOLOGY

## 2022-03-09 PROCEDURE — 97530 THERAPEUTIC ACTIVITIES: CPT | Mod: GP

## 2022-03-09 PROCEDURE — 74176 CT ABD & PELVIS W/O CONTRAST: CPT | Mod: 26 | Performed by: RADIOLOGY

## 2022-03-09 PROCEDURE — 87040 BLOOD CULTURE FOR BACTERIA: CPT | Performed by: NURSE PRACTITIONER

## 2022-03-09 PROCEDURE — 83735 ASSAY OF MAGNESIUM: CPT | Performed by: SURGERY

## 2022-03-09 PROCEDURE — 97535 SELF CARE MNGMENT TRAINING: CPT | Mod: GO

## 2022-03-09 PROCEDURE — 85027 COMPLETE CBC AUTOMATED: CPT | Performed by: SURGERY

## 2022-03-09 PROCEDURE — 36415 COLL VENOUS BLD VENIPUNCTURE: CPT | Performed by: NURSE PRACTITIONER

## 2022-03-09 PROCEDURE — 81001 URINALYSIS AUTO W/SCOPE: CPT | Performed by: NURSE PRACTITIONER

## 2022-03-09 RX ORDER — POLYETHYLENE GLYCOL 3350 17 G/17G
17 POWDER, FOR SOLUTION ORAL DAILY PRN
Status: DISCONTINUED | OUTPATIENT
Start: 2022-03-09 | End: 2022-03-11

## 2022-03-09 RX ORDER — METRONIDAZOLE 375 MG/1
375 CAPSULE ORAL 2 TIMES DAILY
Status: CANCELLED | OUTPATIENT
Start: 2022-03-09

## 2022-03-09 RX ORDER — DRONABINOL 5 MG/1
10 CAPSULE ORAL 2 TIMES DAILY
Status: DISCONTINUED | OUTPATIENT
Start: 2022-03-09 | End: 2022-03-18 | Stop reason: HOSPADM

## 2022-03-09 RX ADMIN — HEPARIN SODIUM 5000 UNITS: 5000 INJECTION, SOLUTION INTRAVENOUS; SUBCUTANEOUS at 01:15

## 2022-03-09 RX ADMIN — PANTOPRAZOLE SODIUM 40 MG: 40 TABLET, DELAYED RELEASE ORAL at 09:49

## 2022-03-09 RX ADMIN — CEFEPIME HYDROCHLORIDE 2 G: 2 INJECTION, POWDER, FOR SOLUTION INTRAVENOUS at 16:32

## 2022-03-09 RX ADMIN — DRONABINOL 5 MG: 5 CAPSULE ORAL at 09:49

## 2022-03-09 RX ADMIN — HEPARIN SODIUM 5000 UNITS: 5000 INJECTION, SOLUTION INTRAVENOUS; SUBCUTANEOUS at 16:31

## 2022-03-09 RX ADMIN — CEFEPIME HYDROCHLORIDE 2 G: 2 INJECTION, POWDER, FOR SOLUTION INTRAVENOUS at 01:14

## 2022-03-09 RX ADMIN — Medication 250 MG: at 05:37

## 2022-03-09 RX ADMIN — Medication 250 MG: at 14:17

## 2022-03-09 RX ADMIN — CEFEPIME HYDROCHLORIDE 2 G: 2 INJECTION, POWDER, FOR SOLUTION INTRAVENOUS at 08:27

## 2022-03-09 RX ADMIN — HEPARIN SODIUM 5000 UNITS: 5000 INJECTION, SOLUTION INTRAVENOUS; SUBCUTANEOUS at 09:51

## 2022-03-09 RX ADMIN — OXYCODONE HYDROCHLORIDE 5 MG: 5 SOLUTION ORAL at 13:26

## 2022-03-09 RX ADMIN — Medication 5 ML: at 16:31

## 2022-03-09 RX ADMIN — FUROSEMIDE 20 MG: 20 TABLET ORAL at 09:50

## 2022-03-09 RX ADMIN — VANCOMYCIN HYDROCHLORIDE 1250 MG: 10 INJECTION, POWDER, LYOPHILIZED, FOR SOLUTION INTRAVENOUS at 09:48

## 2022-03-09 RX ADMIN — OXYCODONE HYDROCHLORIDE 5 MG: 5 SOLUTION ORAL at 20:15

## 2022-03-09 RX ADMIN — DRONABINOL 10 MG: 5 CAPSULE ORAL at 20:14

## 2022-03-09 RX ADMIN — Medication 5 ML: at 16:44

## 2022-03-09 RX ADMIN — OXYCODONE HYDROCHLORIDE 5 MG: 5 SOLUTION ORAL at 05:37

## 2022-03-09 RX ADMIN — THIAMINE HCL TAB 100 MG 100 MG: 100 TAB at 09:49

## 2022-03-09 RX ADMIN — Medication 250 MG: at 20:14

## 2022-03-09 RX ADMIN — ASPIRIN 81 MG CHEWABLE TABLET 162 MG: 81 TABLET CHEWABLE at 09:49

## 2022-03-09 RX ADMIN — Medication 1 TABLET: at 09:50

## 2022-03-09 ASSESSMENT — ACTIVITIES OF DAILY LIVING (ADL)
ADLS_ACUITY_SCORE: 12
ADLS_ACUITY_SCORE: 10
ADLS_ACUITY_SCORE: 12
ADLS_ACUITY_SCORE: 10
ADLS_ACUITY_SCORE: 12
ADLS_ACUITY_SCORE: 10
ADLS_ACUITY_SCORE: 12
ADLS_ACUITY_SCORE: 12
ADLS_ACUITY_SCORE: 10

## 2022-03-09 NOTE — PROGRESS NOTES
Care Management Follow Up    Length of Stay (days): 24    Expected Discharge Date: 03/09/2022     Concerns to be Addressed:       Patient plan of care discussed at interdisciplinary rounds: Yes    Anticipated Discharge Disposition:  TCU     Anticipated Discharge Services:  Therapy services   Anticipated Discharge DME:  n/a    Patient/family educated on Medicare website which has current facility and service quality ratings:  yes  Education Provided on the Discharge Plan:  yes  Patient/Family in Agreement with the Plan:  Yes, per discussion with SW    Referrals Placed by CM/SW:    [facility]- NO longer accepting SNF/TCU referrals as they are transitioning to an [facility]. Referral discontinued.             - [address] [place], [place] [address]             - [phone]      [facility]- Spoke with Brittany in admissions and she anticipates they will have a bed and she will review referral.              - [address] [place] [address]             - [phone]              - 03/08: admissions, SW left a voicemail to call back    - 03/09: extension #9, left a voicemail to call back     [facility]- Left a voicemail             - [address], [place] [address]             - [phone]              - 03/08: admissions, SW left a voicemail to call back   - 03/09: got a call back from the  & Admissions after leaving a voicemail this morning, denied Pt's referral due to the lack of beds available.      [facility]- Left a voicemail             - [address] [place] [address]             - [phone]              - 03/08: extension #4, spoke with admissions and they are currently running her insurance but so far, Pt looks okay for admission on paper. The number to call back is [phone].    - 03/09: extension #4, spoke with admissions and they said that because of Pt's insurance, 30 days of  her stay would be 100% covered but anything after, there'd be a daily copay (wasn't specified in the call/conversation)  Private pay costs discussed: Not applicable    Additional Information:  See above in the referrals section for follow up calls and pending TCU referrals.     PLAN: SW will call the son, Anand, to request additional locations for TCU referrals. Will follow up with patient as well.     ___________________    BARAK Morse  6C   [facility]  Pager [phone]  Phone [phone]      Silvino Pierce

## 2022-03-09 NOTE — PROGRESS NOTES
D:pt able to ambulate 60 feet steady slow gait, c/o some pain in left leg  A:tolerated ambulation  P:continue to pace activity

## 2022-03-09 NOTE — CONSULTS
[facility] CONSULT NOTE     Patient:  Ashley Osman   Date of birth 1950, Medical record number [id number]  Date of Visit:  03/09/2022  Date of Admission: 2/12/2022  Consult Requester:Calixto Bhandari*          Assessment and Recommendations:   ID Problem List   1. Persistent leukocytosis, unclear eitology  2. S/p CABG x 4 (2/24) and L femoral thrombectomy with bovine patch 2/25  3. HFrEF 2/2 ICM  4. History of tobacco use disorder, currently non-smoking  5. COPD     RECOMMENDATION:  1. Agree with discontinuation of IV vancomycin given negative blood cultures for resistant gram positive organisms.   2. Okay to continue Cefepime empirically at this time while infectious work up continued.   3. Reasonable to send repeat blood cultures to monitor for infection.   4. Would send a CK to evaluate for rhabdo with complaints of circumferential rib/thoracic pain.   5. Agree with Pan scan CT to evaluate for possible source of infection/inflammation.   6. Consider evaluating for non-infectious causes of leukocytosis such as US extremities to rule out DVT.   7. Send C diff PCR if having loose stools. If no stool within 24 hrs of order, less likely c diff infection.     ASSESSMENT:  Ashley Osman is a 71 year old female with PMHx significant for HFrEF 2/2 ICM (hx multiple MIs), biventricular failure requiring pressors, COPD, HLD, tobacco use disorder (quite smoking 12/2021), CAD who is s/p CABG x4 on 2/24/22 c/b L femoral artery thrombus with acute LLE ischemia s/p L femoral thrombectomy and bovine patch angioplasty 2/25. ID consulted for persistent leukocytosis.     Leukocytosis peaked in mid 20s just after CABG then downtrended to between 12-15 through 3/5 with slight up-tick to 15-17 over past few days. Blood cultures last checked 3/2 and 3/3 negative. PCT 3/6 rather unremarkable. Lactate wnl. She was empirically started on Cefepime+Vancomycin 3/3 and has completed 7 days. CXR without  focal opacities, patient without sputum production. Was having loose stools a few days ago while on stool softeners/laxatives but since these have been held, she has not had a BM for the past two days.     Will add to infectious work up including blood culture, agree with CT pan scan, and US of extremities to rule out DVT. Currently have a lower suspicion for infectious cause of leukocytosis given pt afebrile, HDS, without localizing sx.     Thank you for this consult. ID will continue to follow.     Patient was discussed with Dr. Reno.     Clarisse Lucas PA-C  Infectious Diseases  Pager #[phone]          History of Present Illness:   Ashley Osman is a 71 year old female with PMHx significant for HFrEF 2/2 ICM (hx multiple MIs), biventricular failure, COPD, HLD, tobacco use disorder (quite smoking 12/2021), CAD who is s/p CABG x4 on 2/24/22 c/b L femoral artery thrombus with acute LLE ischemia s/p L femoral thrombectomy and bovine patch angioplasty 2/25. ID consulted for persistent leukocytosis.     On review of chart, patient was extubated on POD5 and breathing well on RA. She did develop bilateral pneumothoracies which resolved prior to additional chest tube placements. CTs removed 3/3. Appears to have had persistent leukocytosis since CABG surgery between 12-17. Lactates wnl. PCT has improved over course of 1.5 weeks from 0.36 to 0.08 which is encouraging. Is afebrile since 2/27. Last initiated infectious work up 3/2 with negative BCx, negative COVID PCR. CXR without focal opacities, trace effusions and bilateral apical pneumothoracies.     Currently feeling okay. Her biggest complaint is rib pain associated with deep breaths. This has been managed better with muscle relaxer and pain meds. She denies productive cough. Does have some SOB with exertion but recovers without need of O2. Denies nausea/vomiting, abdominal pain, dysuria/frequency/urgency. Notes having days of loose stools while on laxatives/stool  softeners but once these were held she has not had a BM in the past 2 days. Noted some transient L foot pain but this has since resolved. All areas of incisions on chest, L groin and L leg are c/d/i, well approximated and healing without issue. No drainage.     [place], [place]. Had a cat and dog until recently when both passed away. Notes she enjoys light gardening, sewing and painting. She denies international travel.            Review of Systems:   CONSTITUTIONAL:  No fevers, chills or night sweats.   EYES: negative for icterus, redness, or purulent drainage.   ENT:  negative for nasal congestion, rhinorrhea, or sore throat.  RESPIRATORY:  Positive for light cough, negative for sputum production.   CARDIOVASCULAR:  negative for chest pain or palpitations.  GASTROINTESTINAL:  negative for nausea, vomiting. Positive for diarrhea and constipation.   GENITOURINARY:  negative for dysuria, frequency, or urgency.   HEME:  No easy bruising or bleeding.  INTEGUMENT:  negative for rash and pruritus.  NEURO:  Negative for headache, vision changes, or numbness/tingling in extremities.         Past Medical History:   History reviewed. No pertinent past medical history.         Past Surgical History:     Past Surgical History:   Procedure Laterality Date     BYPASS GRAFT ARTERY CORONARY N/A 02/24/2022    Procedure: median sternotomy, cardiopulmonary bypass, Coronary Artery Bypass Graft (CABG) x4 using left internal mammary artery and endoscopically harvested left saphenous vein, transesophageal echocardiogram per anesthesia;  Surgeon: Calixto Bhandari MD;  Location:  OR     CV INTRA AORTIC BALLOON N/A 02/23/2022    Procedure: Intra-Aortic Balloon Pump Insertion;  Surgeon: Rafael Cummings MD;  Location: [facility] CARDIAC CATH LAB     CV RIGHT HEART CATH MEASUREMENTS RECORDED N/A 02/23/2022    Procedure: Right Heart Cath- leave in [place];  Surgeon: Rafael Cummings MD;  Location: [facility]  CARDIAC CATH LAB     IR FLUORO 0-1 HOUR  03/02/2022     PICC DOUBLE LUMEN PLACEMENT Right 03/07/2022    43cm (1cm external), Lateral brachial vein     THROMBECTOMY LOWER EXTREMITY Left 02/25/2022    Procedure: THROMBECTOMY, LOWER EXTREMITY; Emergency Left Femeral Thrombectomy,;  Surgeon: Michelle Jules MD;  Location: [facility] OR            Family History:     History reviewed. No pertinent family history.         Social History:     Social History     Tobacco Use     Smoking status: Not on file     Smokeless tobacco: Not on file   Substance Use Topics     Alcohol use: Not on file     History   Sexual Activity     Sexual activity: Not on file            Current Medications:       aspirin  162 mg Oral Daily     atorvastatin  40 mg Oral QAM     ceFEPIme (MAXIPIME) IV  2 g Intravenous Q8H     dronabinol  10 mg Oral BID     heparin ANTICOAGULANT  5,000 Units Subcutaneous Q8H     heparin lock flush  5-20 mL Intracatheter Q24H     influenza vac high-dose quad  0.7 mL Intramuscular Prior to discharge     melatonin  3 mg Oral At Bedtime     multivitamin w/minerals  1 tablet Oral Daily     pantoprazole  40 mg Oral QAM AC     thiamine  100 mg Oral Daily     umeclidinium  1 puff Inhalation Daily     vancomycin  1,250 mg Intravenous Q24H            Allergies:     Allergies   Allergen Reactions     Wasp Venom Protein Anaphylaxis     Bee Venom Swelling and Hives     Other Drug Allergy (See Comments)      1980- had c-sec. Was in ICU for swelling, chills- unsure of what med it was -at [facility]. Was a Dr. Harsh Valentine Rash     Siblings are allergic to PCN              Physical Exam:   Vitals were reviewed  Patient Vitals for the past 24 hrs:   BP Temp Temp src Pulse Resp SpO2 Weight   03/09/22 0824 96/53 98  F (36.7  C) Axillary 73 16 94 % --   03/09/22 0500 100/60 98.2  F (36.8  C) Oral 78 18 93 % 53.3 kg (117 lb 6.4 oz)   03/09/22 0130 110/57 98.1  F (36.7  C) Oral 84 16 94 % --   03/08/22 1950 109/61 97.8  F (36.6   C) Oral 79 18 97 % --   03/08/22 1546 94/55 98.2  F (36.8  C) Oral 79 18 96 % --       Physical Examination:  Constitutional: Pleasant female seen sitting up in bed, in NAD. Alert and interactive.   HEENT: NCAT, anicteric sclerae, conjunctiva clear. Moist mucous membranes without.  Respiratory: Non-labored breathing on RA. Lungs are clear to auscultation bilaterally, without wheezing or crackles.   Cardiovascular: Regular rate and rhythm with no murmur. L groin access site c/d/i, without erythema or drainage. Sternal wound healing nicely, no drainage or spreading erythema.  GI: Normoactive BS. Abdomen is soft, non-distended, and non-tender to palpation.   Skin: Warm and dry. No rashes or lesions on exposed surfaces.  Musculoskeletal: Extremities grossly normal. No tenderness or edema present.   Neurologic: A &O x3, speech normal, answering questions appropriately. Moves all extremities spontaneously. Grossly non-focal.  Neuropsychiatric: Mentation and affect normal/appropriate.  VAD: PICC is c/d/i with no erythema, drainage, or tenderness.         Laboratory Data:     Inflammatory Markers  No lab results found.    Hematology Studies    Recent Labs   Lab Test 03/09/22  0544 03/08/22  0659 03/07/22  0539 03/06/22  0704 03/05/22  0538 03/04/22  0552   WBC 17.3* 15.9* 17.6* 17.4* 15.6* 12.0*   HGB 8.6* 8.3* 9.3* 10.1* 9.8* 9.2*   MCV 99 98 96 94 95 97    321 348 361 316 269       Metabolic Studies     Recent Labs   Lab Test 03/09/22  0544 03/08/22  1717 03/08/22  0659 03/07/22  1631 03/07/22  0539    133 136 134 134   POTASSIUM 4.6 4.9 4.3 5.8* 4.5   CHLORIDE 102 103 104 100 100   CO2 25 24 25 28 27   BUN 26 27 24 26 22   CR 0.60 0.64 0.57 0.66 0.66   GFRESTIMATED >90 >90 >90 >90 >90       Hepatic Studies    Recent Labs   Lab Test 03/09/22  0544 03/08/22  1717 03/08/22  0659 03/07/22  1631 03/07/22  0539 03/06/22  1635   BILITOTAL 0.7 0.6 0.7 0.6 0.9 0.7   ALKPHOS 90 97 82 98 90 105   ALBUMIN 2.6* 2.6*  2.4* 2.7* 2.7* 2.6*   AST 24 30 31 38 40 52*   * 132* 134* 172* 186* 229*       Microbiology:  Culture   Date Value Ref Range Status   03/03/2022 No Growth  Final   03/03/2022 No Growth  Final   03/02/2022 No Growth  Final   03/02/2022 No Growth  Final   02/28/2022 No Growth  Final   02/15/2022 50,000-100,000 CFU/mL Candida albicans (A)  Final     Comment:     Susceptibilities not routinely done   02/13/2022 No Growth  Final   02/13/2022 No Growth  Final       Urine Studies    Recent Labs   Lab Test 03/02/22  1147 02/28/22  2300 02/17/22  2050 02/15/22  1026 02/13/22  1028   LEUKEST Trace* Large* Negative Small* Negative   WBCU 4 74* 6* 14* 8*       Vancomycin Levels    Recent Labs   Lab Test 03/08/22  0903 03/05/22  0538   VANCOMYCIN 15.3 15.1       Hepatitis B Testing No lab results found.  Hepatitis C Testing   No results found for: HCVAB, HQTG, HCGENO, HCPCR, HQTRNA, HEPRNA  Respiratory Virus Testing    No results found for: RS, FLUAG    IMAGING    CXR (3/5/22)   Impression:   1. Continued trace effusions and bilateral apical pneumothoraces  2. Improved left basilar aeration.    CT CAP w/o contrast (2/15/22)   IMPRESSION:     1.  CT findings suggestive of pulmonary edema in this patient with  cardiomegaly and left cardiac enlargement. Additional left greater  than right small oral effusions, which demonstrate loculated  components associated with overlying presumed atelectatic changes.  2.  Prominent mediastinal lymph nodes are nonspecific, likely  reactive.  3.  Moderate to marked atherosclerotic calcification of the aorta and  its branches as above  4.  Bilateral pulmonary nodules including an irregular 7.5 mm in  diameter right upper lobe solid pulmonary nodule. Recommend follow-up  per Fleischner Society guidelines.  5.  Dilation of the right and left pulmonary arteries, which can be  seen in patients with pulmonary arterial hypertension.  6.  Emphysematous changes seen throughout both lungs.  7.   Please see above for additional findings and recommendations.

## 2022-03-09 NOTE — PROGRESS NOTES
Care Management Follow Up    Length of Stay (days): 24    Expected Discharge Date: 03/09/2022     Concerns to be Addressed:       Patient plan of care discussed at interdisciplinary rounds: Yes    Anticipated Discharge Disposition:  TCU     Anticipated Discharge Services:  Therapy services  Anticipated Discharge DME:  n/a    Patient/family educated on Medicare website which has current facility and service quality ratings:  yes  Education Provided on the Discharge Plan:  yes  Patient/Family in Agreement with the Plan:  Yes, per discussion with SW    Referrals Placed by CM/SW:   DENIAL  [facility] (NO longer accepting SNF/TCU referrals as they are transitioning to an [facility], Referral discontinued)  [facility] (denied due to the lack of beds available)  [facility] (nursing home admissions, SW was told that across all five units, there are no beds available     PENDING   [facility]  - [address] [facility] [address]  - [phone]  - 3/09: placed a referral through Epic   - 3/10: admissions, still waiting for review from care team so still a pending referral    [place] [address] [address]  - [phone]  - 3/09: placed a referral through Epic  - 3/10: SW spoke with Anand in admissions, he says that they are at capacity until the 15th, but SW can fax over @ [phone] the referral. SW faxed over the referral in two parts because when printed, it was a lot of pages. MARVIN called admissions back and left a voicemail to let them know (11:58am).     [facility]   - [address] [place] [address]  - [phone]  - 3/09: placed a referral through Epic  - 3/10: spoke to Diane on the phone, currently reviewing her referral    [facility]   - [address] [place] [address]  - [phone]  - 3/09: placed a referral through [email or web address]  - 3/10: got  transferred to Human Resources even though the SW asked for admissions, left a voicemail for them to call back    [facility]  - [address] [place] [address]   - [phone]   - 03/08: admissions, SW left a voicemail to call back   - 03/09: extension #9, left a voicemail to call back  - 3/10: Brittany in admissions, was told she'll call back in about 15 mins. (12:12pm).     [facility]  - [address], [place] [address]  - [phone]    - 03/08: extension #4, spoke with admissions and they are currently running her insurance but so far, Pt looks okay for admission on paper. The number to call back is [phone].   - 03/09: extension #4, spoke with admissions and they said that because of Pt's insurance, 30 days of her stay would be 100% covered but anything after, there'd be a daily copay (wasn't specified in the call/conversation)  Private pay costs discussed: Not applicable    Additional Information:  Please see above in the referrals section for follow up calls and pending TCU referrals.     PLAN: SW will call back the pending facilities later in the afternoon to follow up on Pt's referral.     ___________________    BARAK Morse  6C   [facility]  Pager [phone]  Phone [phone]

## 2022-03-09 NOTE — PROGRESS NOTES
Cardiovascular Surgery Progress Note  03/09/2022         Assessment and Plan:     Ashley Osman is a 71 year old female with PMH of HFrEF (10-15%) 2/2 ICM (Hx LAD and Circumflex MIs; total occlusion of RCA and LAD), Biventricular failure (requiring inotropic support PTA to [facility]), COPD, HLD, Tobacco Use Disorder (quit 12/2021), CAD who underwent CABG x4 on 02/24 with Dr. Bhandari. Found to have left femoral artery thrombosis with acute left lower extremity ischemia s/p emergent left femoral thrombectomy, bovine patch angioplasty on 2/25. 02/28 with elevated LFTs.     Cardiovascular:   Hx of biventricular HF requiring pressors, CAD, HFrEF 10-15%. Tobacco abuse, s/p CABG x4 on 2/24  Atrial fibrillation with RVR post surgery, HD stable-off pressors 2/28.  2/28 echo showed LV EF 15%, stable on 3/3 echo with LVEF 10-15% - no evidence of RH dysfunction but elevated RA/PA pressures  - Daily weights  - ASA 162mg  - Hold statin d/t LFT elevation  - Continue to hold Metoprolol; blood pressures soft  - Start atorvastatin today  - Holding amio due to LFT elevation  - [facility] consulted 2/22 however pt not scheduled as declining specialty care. 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   - LLE duplex US 3/4 to r/o DVT given LLE swelling>RLE - results negative for DVT      Volume Overload  - Hold  lasix today     Possible RHF (Elevated LFTs)  Ruled out on 3/3 echo, elevated LFT attributed to volume overload   - HF Cardiology consulted, appreciate recs   - Continue holding amio due to LFTs   - PRN tylenol discontinued 3/3   - Daily LFTs     Chest tubes: removed 3/3  TPW: removed without difficulty     Pulmonary:  Extubated POD #5; Saturating well on RA  Supplemental O2 PRN to keep sats > 92%.  Pulm toilet, IS, activity/OOB and deep breathing     Bilateral PTX, stable  IR consulted 3/2 for chest tube placement however PTX resolved prior to intervention when chest tubes returned to suction.  Small bilateral PTX stable on water seal, chest tubes removed 3/3.  Very small bilat apical PTX stable post-chest tube pull and again this morning; no intervention indicated.     Neurology / MSK:  Acute post-operative pain  - PO oxycodone 5mg PRN  - PRN Robaxin  - 3/3: Tylenol discontinued d/t elevation in LFT     Sternotomy  PT/OT/CR consulted  Sternal precautions      Physical deconditioning  Therapies consulted and recommending TCU at discharge      / Renal:  No Hx of renal disease, baseline creatinine ~0.6  Volume overloaded, diuresis as above    Hyperkalemia, resolved  - Lasix drip was discontinued with continuing potassium supplementation  - Patient remained asymptomatic with stable MAPs and no arrhythmias  - Discontinued potassium and Spironolactone.    Equivocal UA   - Covered with 1g Rocephin x1 on 3/1   - UC negative      GI / FEN:   Elevated LFT  Suspect venous congestion as likely source.  Plans as above; continue to trend daily, avoid hepatotoxic agents as able.  - LFTs improving     Moderate malnutrition in the context of acute on chronic illness  Estimated Energy Needs: 9807-0947 kcals/day    - Dietitian consulted, appreciate recs; supplements ordered   - Poor PO intake - calorie counts reordered to resume 3/5, not meeting goals. May need to consider supplemental feedings if intake not improved over  weekend.  - Increased for marinol for appetite stimulation  - Pt states this is her normal appetite and she is eating as much as she can. She does like the protein shakes and is consuming them.      Dysphagia  SLP following: recommend Full liquid diet- mildly thick     Endocrine:  Stress-induced hyperglycemia - resolved: initially managed on insulin drip postop, transitioned to sliding scale goal BG <180 and since discontinued after demonstrated euglycemia without exogenous insulin     Infectious Disease:  Stress-induced leukocytosis  WBC 15.6->17.4-->15.->17.3 afebrile. But procal 3/6 0.08 (0.15) (0.36)  - Completed perioperative antibiotics  - Pan cultured 3/2 NGTD  - Serial blood cultures 3/2 with NGTD  - Empiric cefepime started 3/3; can transition to PO abx once WBC < 12 per Dr. Bhandari  - Daily AM CBC  - Avoid invasive lines, as able  - Check c.diff with next stool, try to hold off on Stool softeners as able, but plan to start tomorrow if no BM+  - Consult ID 3/9, please see recs: Agree with stopping Vanco, Cdiff if able to get stool sample. Do not start po Flagyl. Blood cultures and await CT scan. Consider chivo HILLARY US as well, will hold off today.   - Non-contrast CT today      Hematology:   Stress-induced leukocytosis, as above  Acute blood loss anemia, Hgb stable ~8-9   Acute blood loss thrombocytopenia, resolved  Left femoral artery thrombosis with acute extremity ischemia, resolved s/p thrombectomy and bovine patch per Vascular Surgery  No signs or symptoms of active bleeding  - Transfuse prn for Hgb<7  - Daily CBC     Anticoagulation:   -  mg  - Subcutaneous Heparin TID  - Not currently anticoagulated for thrombosis or post-op afib (has been maintaining SR)     Prophylaxis:   Stress ulcer prophylaxis:   -Pantoprazole 40 mg daily for 30 days  -DVT prophylaxis: Subcutaneous heparin, SCD, OOB/activity/ambulation     Disposition:   - Transferred to  on 3/1  - No life-vest or ICD inpatient. May  "consider with Cardiology in outpatient  - Cards HF referral order is already placed.  - followup with Vascular Surgery established  - Rehab recommending discharge to TCU pending medical stability -- pending infectious work up     Discussed with Surgeon, Dr. Bhandari via written and verbal commnication.     Kylie Haas, DNP, CNP  [facility] Cardiothoracic Surgery  O: [phone]  Pgr [phone]          Interval History:     No overnight events.  States pain is well managed on current regimen. Slept well overnight.  Tolerating diet, is passing flatus. No nausea or vomiting.  Breathing well without complaints.   Working with therapies and ambulating in halls with assistance. Up to shower today.   Denies chest pain, palpitations, dizziness, syncopal symptoms, fevers, chills, myalgias, or sternal popping/clicking.         Physical Exam:   Blood pressure 96/53, pulse 73, temperature 98  F (36.7  C), temperature source Axillary, resp. rate 16, height 1.638 m (5' 4.5\"), weight 53.3 kg (117 lb 6.4 oz), SpO2 94 %.  Vitals:    03/07/22 0600 03/08/22 0500 03/09/22 0500   Weight: 51.9 kg (114 lb 8 oz) 52.8 kg (116 lb 8 oz) 53.3 kg (117 lb 6.4 oz)      Current Weight: 53.3 Kg Trend: +0.5 Kg  Admit weight: 54.0 Kg    Daily Fluid status; net loss: -910 (720 O)  mL    Net loss SA: -4227 mL  MAPs: 60-66  LVEF: 10-15%    Gen: A&Ox4, NAD, significant anxiety during assessment  Neuro: no focal deficits   CV: RRR, normal S1 S2, no murmurs, rubs or gallops. No appreciable JVD   Vascular: Peripheral pulses present Radial 2+, Dorsalis Pedis 2+, Posterior Tibialis 2+.   Pulm: CTA, no wheezing or rhonchi, normal breathing on RA  Abd: nondistended, normal BS, soft, nontender  Ext: negative peripheral edema, 0+ pitting. Warm.   Incision: clean, dry, intact, no erythema, sternum stable  Tubes/drain sites: dressing clean and dry         Data:    Imaging:  reviewed recent imaging     CT:  CT pending    Labs:  CBC  Recent Labs   Lab 03/09/22  0544 " 03/08/22  0659 03/07/22  0539 03/06/22  0704   WBC 17.3* 15.9* 17.6* 17.4*   RBC 2.92* 2.81* 3.22* 3.52*   HGB 8.6* 8.3* 9.3* 10.1*   HCT 28.8* 27.6* 31.0* 33.2*   MCV 99 98 96 94   MCH 29.5 29.5 28.9 28.7   MCHC 29.9* 30.1* 30.0* 30.4*   RDW 19.2* 19.3* 19.7* 20.1*    321 348 361     CMP:  Last Comprehensive Metabolic Panel:  Sodium   Date Value Ref Range Status   03/09/2022 134 133 - 144 mmol/L Final     Potassium   Date Value Ref Range Status   03/09/2022 4.6 3.4 - 5.3 mmol/L Final     Chloride   Date Value Ref Range Status   03/09/2022 102 94 - 109 mmol/L Final     Carbon Dioxide (CO2)   Date Value Ref Range Status   03/09/2022 25 20 - 32 mmol/L Final     Anion Gap   Date Value Ref Range Status   03/09/2022 7 3 - 14 mmol/L Final     Glucose   Date Value Ref Range Status   03/09/2022 101 (H) 70 - 99 mg/dL Final     Urea Nitrogen   Date Value Ref Range Status   03/09/2022 26 7 - 30 mg/dL Final     Creatinine   Date Value Ref Range Status   03/09/2022 0.60 0.52 - 1.04 mg/dL Final     GFR Estimate   Date Value Ref Range Status   03/09/2022 >90 >60 mL/min/1.73m2 Final     Comment:     Effective December 21, 2021 eGFRcr in adults is calculated using the 2021 CKD-EPI creatinine equation which includes age and gender (Marleen barnes al., NEJM, DOI: 10.1056/BQWGxy0546606)     Calcium   Date Value Ref Range Status   03/09/2022 9.2 8.5 - 10.1 mg/dL Final     Bilirubin Total   Date Value Ref Range Status   03/09/2022 0.7 0.2 - 1.3 mg/dL Final     Alkaline Phosphatase   Date Value Ref Range Status   03/09/2022 90 40 - 150 U/L Final     ALT   Date Value Ref Range Status   03/09/2022 115 (H) 0 - 50 U/L Final     AST   Date Value Ref Range Status   03/09/2022 24 0 - 45 U/L Final     BMP  Recent Labs   Lab 03/09/22  0544 03/08/22  1717 03/08/22  0659 03/07/22  1631    133 136 134   POTASSIUM 4.6 4.9 4.3 5.8*   CHLORIDE 102 103 104 100   HEIDI 9.2 8.8 8.9 8.9   CO2 25 24 25 28   BUN 26 27 24 26   CR 0.60 0.64 0.57 0.66   GLC  101* 132* 98 87     INR  Recent Labs   Lab 03/04/22  0552 03/03/22  0617   INR 1.33* 1.32*      Hepatic Panel  Recent Labs   Lab 03/09/22  0544 03/08/22  1717 03/08/22  0659 03/07/22  1631   AST 24 30 31 38   * 132* 134* 172*   ALKPHOS 90 97 82 98   BILITOTAL 0.7 0.6 0.7 0.6   ALBUMIN 2.6* 2.6* 2.4* 2.7*     GLUCOSE:   Recent Labs   Lab 03/09/22  0544 03/08/22  1717 03/08/22  0659 03/07/22  1631 03/07/22  0539 03/06/22  1635   * 132* 98 87 93 140*

## 2022-03-10 ENCOUNTER — APPOINTMENT (OUTPATIENT)
Dept: SPEECH THERAPY | Facility: CLINIC | Age: 72
DRG: 233 | End: 2022-03-10
Attending: INTERNAL MEDICINE
Payer: MEDICARE

## 2022-03-10 ENCOUNTER — APPOINTMENT (OUTPATIENT)
Dept: PHYSICAL THERAPY | Facility: CLINIC | Age: 72
DRG: 233 | End: 2022-03-10
Attending: INTERNAL MEDICINE
Payer: MEDICARE

## 2022-03-10 LAB
ALBUMIN SERPL-MCNC: 2.5 G/DL (ref 3.4–5)
ALBUMIN SERPL-MCNC: 2.7 G/DL (ref 3.4–5)
ALP SERPL-CCNC: 76 U/L (ref 40–150)
ALP SERPL-CCNC: 90 U/L (ref 40–150)
ALT SERPL W P-5'-P-CCNC: 89 U/L (ref 0–50)
ALT SERPL W P-5'-P-CCNC: 93 U/L (ref 0–50)
ANION GAP SERPL CALCULATED.3IONS-SCNC: 4 MMOL/L (ref 3–14)
ANION GAP SERPL CALCULATED.3IONS-SCNC: 6 MMOL/L (ref 3–14)
AST SERPL W P-5'-P-CCNC: 21 U/L (ref 0–45)
AST SERPL W P-5'-P-CCNC: 27 U/L (ref 0–45)
BILIRUB SERPL-MCNC: 0.6 MG/DL (ref 0.2–1.3)
BILIRUB SERPL-MCNC: 0.7 MG/DL (ref 0.2–1.3)
BUN SERPL-MCNC: 24 MG/DL (ref 7–30)
BUN SERPL-MCNC: 25 MG/DL (ref 7–30)
CALCIUM SERPL-MCNC: 8.9 MG/DL (ref 8.5–10.1)
CALCIUM SERPL-MCNC: 9.4 MG/DL (ref 8.5–10.1)
CHLORIDE BLD-SCNC: 102 MMOL/L (ref 94–109)
CHLORIDE BLD-SCNC: 104 MMOL/L (ref 94–109)
CK SERPL-CCNC: 16 U/L (ref 30–225)
CO2 SERPL-SCNC: 26 MMOL/L (ref 20–32)
CO2 SERPL-SCNC: 26 MMOL/L (ref 20–32)
CREAT SERPL-MCNC: 0.56 MG/DL (ref 0.52–1.04)
CREAT SERPL-MCNC: 0.64 MG/DL (ref 0.52–1.04)
ERYTHROCYTE [DISTWIDTH] IN BLOOD BY AUTOMATED COUNT: 19 % (ref 10–15)
GFR SERPL CREATININE-BSD FRML MDRD: >90 ML/MIN/1.73M2
GFR SERPL CREATININE-BSD FRML MDRD: >90 ML/MIN/1.73M2
GLUCOSE BLD-MCNC: 134 MG/DL (ref 70–99)
GLUCOSE BLD-MCNC: 92 MG/DL (ref 70–99)
HCT VFR BLD AUTO: 26.4 % (ref 35–47)
HGB BLD-MCNC: 7.8 G/DL (ref 11.7–15.7)
LACTATE SERPL-SCNC: 0.8 MMOL/L (ref 0.7–2)
MAGNESIUM SERPL-MCNC: 2.3 MG/DL (ref 1.6–2.3)
MCH RBC QN AUTO: 28.9 PG (ref 26.5–33)
MCHC RBC AUTO-ENTMCNC: 29.5 G/DL (ref 31.5–36.5)
MCV RBC AUTO: 98 FL (ref 78–100)
PHOSPHATE SERPL-MCNC: 3.1 MG/DL (ref 2.5–4.5)
PLATELET # BLD AUTO: 338 10E3/UL (ref 150–450)
POTASSIUM BLD-SCNC: 4.5 MMOL/L (ref 3.4–5.3)
POTASSIUM BLD-SCNC: 4.5 MMOL/L (ref 3.4–5.3)
PROT SERPL-MCNC: 5.9 G/DL (ref 6.8–8.8)
PROT SERPL-MCNC: 6.6 G/DL (ref 6.8–8.8)
RBC # BLD AUTO: 2.7 10E6/UL (ref 3.8–5.2)
SODIUM SERPL-SCNC: 134 MMOL/L (ref 133–144)
SODIUM SERPL-SCNC: 134 MMOL/L (ref 133–144)
WBC # BLD AUTO: 13.8 10E3/UL (ref 4–11)

## 2022-03-10 PROCEDURE — 84450 TRANSFERASE (AST) (SGOT): CPT | Performed by: STUDENT IN AN ORGANIZED HEALTH CARE EDUCATION/TRAINING PROGRAM

## 2022-03-10 PROCEDURE — 250N000013 HC RX MED GY IP 250 OP 250 PS 637: Performed by: NURSE PRACTITIONER

## 2022-03-10 PROCEDURE — 36592 COLLECT BLOOD FROM PICC: CPT | Performed by: STUDENT IN AN ORGANIZED HEALTH CARE EDUCATION/TRAINING PROGRAM

## 2022-03-10 PROCEDURE — 250N000013 HC RX MED GY IP 250 OP 250 PS 637: Performed by: STUDENT IN AN ORGANIZED HEALTH CARE EDUCATION/TRAINING PROGRAM

## 2022-03-10 PROCEDURE — 97116 GAIT TRAINING THERAPY: CPT | Mod: GP

## 2022-03-10 PROCEDURE — 97530 THERAPEUTIC ACTIVITIES: CPT | Mod: GP

## 2022-03-10 PROCEDURE — 250N000013 HC RX MED GY IP 250 OP 250 PS 637: Performed by: SURGERY

## 2022-03-10 PROCEDURE — 99233 SBSQ HOSP IP/OBS HIGH 50: CPT | Mod: 24 | Performed by: PHYSICIAN ASSISTANT

## 2022-03-10 PROCEDURE — 999N000007 HC SITE CHECK

## 2022-03-10 PROCEDURE — 85027 COMPLETE CBC AUTOMATED: CPT | Performed by: SURGERY

## 2022-03-10 PROCEDURE — 82550 ASSAY OF CK (CPK): CPT | Performed by: PHYSICIAN ASSISTANT

## 2022-03-10 PROCEDURE — 214N000001 HC R&B CCU UMMC

## 2022-03-10 PROCEDURE — 250N000013 HC RX MED GY IP 250 OP 250 PS 637: Performed by: PHYSICIAN ASSISTANT

## 2022-03-10 PROCEDURE — 92526 ORAL FUNCTION THERAPY: CPT | Mod: GN

## 2022-03-10 PROCEDURE — 84100 ASSAY OF PHOSPHORUS: CPT | Performed by: SURGERY

## 2022-03-10 PROCEDURE — 250N000011 HC RX IP 250 OP 636: Performed by: PHYSICIAN ASSISTANT

## 2022-03-10 PROCEDURE — 250N000011 HC RX IP 250 OP 636: Performed by: NURSE PRACTITIONER

## 2022-03-10 PROCEDURE — 250N000011 HC RX IP 250 OP 636: Performed by: SURGERY

## 2022-03-10 PROCEDURE — 83735 ASSAY OF MAGNESIUM: CPT | Performed by: SURGERY

## 2022-03-10 PROCEDURE — 83605 ASSAY OF LACTIC ACID: CPT | Performed by: INTERNAL MEDICINE

## 2022-03-10 PROCEDURE — 36592 COLLECT BLOOD FROM PICC: CPT | Performed by: INTERNAL MEDICINE

## 2022-03-10 PROCEDURE — 80053 COMPREHEN METABOLIC PANEL: CPT | Performed by: STUDENT IN AN ORGANIZED HEALTH CARE EDUCATION/TRAINING PROGRAM

## 2022-03-10 RX ORDER — SPIRONOLACTONE 25 MG/1
25 TABLET ORAL DAILY
Status: DISCONTINUED | OUTPATIENT
Start: 2022-03-10 | End: 2022-03-18 | Stop reason: HOSPADM

## 2022-03-10 RX ORDER — FUROSEMIDE 10 MG/ML
20 INJECTION INTRAMUSCULAR; INTRAVENOUS 2 TIMES DAILY
Status: DISCONTINUED | OUTPATIENT
Start: 2022-03-10 | End: 2022-03-12

## 2022-03-10 RX ADMIN — Medication 250 MG: at 02:49

## 2022-03-10 RX ADMIN — HEPARIN SODIUM 5000 UNITS: 5000 INJECTION, SOLUTION INTRAVENOUS; SUBCUTANEOUS at 15:31

## 2022-03-10 RX ADMIN — HEPARIN SODIUM 5000 UNITS: 5000 INJECTION, SOLUTION INTRAVENOUS; SUBCUTANEOUS at 00:11

## 2022-03-10 RX ADMIN — POLYETHYLENE GLYCOL 3350 17 G: 17 POWDER, FOR SOLUTION ORAL at 02:34

## 2022-03-10 RX ADMIN — HEPARIN SODIUM 5000 UNITS: 5000 INJECTION, SOLUTION INTRAVENOUS; SUBCUTANEOUS at 09:40

## 2022-03-10 RX ADMIN — DRONABINOL 10 MG: 5 CAPSULE ORAL at 09:38

## 2022-03-10 RX ADMIN — PANTOPRAZOLE SODIUM 40 MG: 40 TABLET, DELAYED RELEASE ORAL at 09:38

## 2022-03-10 RX ADMIN — DRONABINOL 10 MG: 5 CAPSULE ORAL at 19:50

## 2022-03-10 RX ADMIN — CEFEPIME HYDROCHLORIDE 2 G: 2 INJECTION, POWDER, FOR SOLUTION INTRAVENOUS at 08:14

## 2022-03-10 RX ADMIN — Medication 250 MG: at 09:36

## 2022-03-10 RX ADMIN — OXYCODONE HYDROCHLORIDE 5 MG: 5 SOLUTION ORAL at 02:35

## 2022-03-10 RX ADMIN — SPIRONOLACTONE 25 MG: 25 TABLET ORAL at 15:32

## 2022-03-10 RX ADMIN — OXYCODONE HYDROCHLORIDE 5 MG: 5 SOLUTION ORAL at 09:36

## 2022-03-10 RX ADMIN — Medication 1 TABLET: at 09:37

## 2022-03-10 RX ADMIN — OXYCODONE HYDROCHLORIDE 5 MG: 5 SOLUTION ORAL at 19:51

## 2022-03-10 RX ADMIN — CEFEPIME HYDROCHLORIDE 2 G: 2 INJECTION, POWDER, FOR SOLUTION INTRAVENOUS at 00:14

## 2022-03-10 RX ADMIN — ONDANSETRON 4 MG: 2 INJECTION INTRAMUSCULAR; INTRAVENOUS at 11:35

## 2022-03-10 RX ADMIN — FUROSEMIDE 20 MG: 10 INJECTION, SOLUTION INTRAMUSCULAR; INTRAVENOUS at 15:31

## 2022-03-10 RX ADMIN — Medication 5 ML: at 15:36

## 2022-03-10 RX ADMIN — ATORVASTATIN CALCIUM 40 MG: 40 TABLET, FILM COATED ORAL at 09:37

## 2022-03-10 RX ADMIN — CEFEPIME HYDROCHLORIDE 2 G: 2 INJECTION, POWDER, FOR SOLUTION INTRAVENOUS at 17:37

## 2022-03-10 RX ADMIN — THIAMINE HCL TAB 100 MG 100 MG: 100 TAB at 09:37

## 2022-03-10 RX ADMIN — ASPIRIN 81 MG CHEWABLE TABLET 162 MG: 81 TABLET CHEWABLE at 09:36

## 2022-03-10 RX ADMIN — Medication 3 MG: at 20:57

## 2022-03-10 ASSESSMENT — ACTIVITIES OF DAILY LIVING (ADL)
ADLS_ACUITY_SCORE: 12

## 2022-03-10 NOTE — PLAN OF CARE
D: Ashley Osman is a 71 year old female with PMHx significant for HFrEF 2/2 ICM (hx multiple MIs), biventricular failure requiring pressors, COPD, HLD, tobacco use disorder (quite smoking 12/2021), CAD who is s/p CABG x4 on 2/24/22 c/b L femoral artery thrombus with acute LLE ischemia s/p L femoral thrombectomy and bovine patch angioplasty 2/25. ID consulted for persistent leukocytosis.     I: Monitored vitals and assessed pt status.   PRN: oxycodone 5 mg PO  Methocarbamol 250 mg PO  Tele: NSR  O2: Room air  Mobility: A1    A: A0x4. VSS. Afebrile. Urinating adequately. Patient c/o no recent BM.  Last BM charted was on 03/07. Had refused scheduled Miralax earlier in the shift due to concerns of having diarrhea.  Accepted administration later in shift.  Reports incisional pain while moving.  Otherwise denies pain.  Requesting PRN oxycodone and methocarbamol be administered concurrently.  PRN oxycodone and methocarbamol administered x2 overnight.  No report of shortness of breath.  Receiving cefepime 2 g q 8 hours for leukocytosis.      P: Continue to monitor Pt status and report changes to CVTS.  Discharge pending acceptance to TCU.    Temp:  [97.7  F (36.5  C)-98.7  F (37.1  C)] 97.8  F (36.6  C)  Pulse:  [73-82] 73  Resp:  [15-16] 15  BP: ()/(46-56) 94/51  SpO2:  [93 %-98 %] 93 %

## 2022-03-10 NOTE — PLAN OF CARE
D: s/p CABG x4 on 2/24    I: Monitored vitals and assessed pt status.   PRN: Oxycodone and Robaxin  Tele: Sinus rhythm, HR 70-80's   Mobility: Ax1 / SBA    A: AOx4. VSS. Afebrile. Urinating adequately. UA collected and sent. No BM during shift.     P: Continue to monitor Pt status and report changes to CVTS.

## 2022-03-10 NOTE — PROGRESS NOTES
Cardiovascular Surgery Progress Note  03/10/2022         Assessment and Plan:     Ashley Osman is a 71 year old female with PMH of HFrEF (10-15%) 2/2 ICM (Hx LAD and Circumflex MIs; total occlusion of RCA and LAD), Biventricular failure (requiring inotropic support PTA to [facility]), COPD, HLD, Tobacco Use Disorder (quit 12/2021), CAD who underwent CABG x4 on 02/24 with Dr. Bhandari. Found to have left femoral artery thrombosis with acute left lower extremity ischemia s/p emergent left femoral thrombectomy, bovine patch angioplasty on 2/25. 02/28 with elevated LFTs.     Cardiovascular:   Hx of biventricular HF requiring pressors, CAD, HFrEF 10-15%. Tobacco abuse, s/p CABG x4 on 2/24  Atrial fibrillation with RVR post surgery, HD stable-off pressors 2/28.  2/28 echo showed LV EF 15%, stable on 3/3 echo with LVEF 10-15% - no evidence of RH dysfunction but elevated RA/PA pressures   - Daily weights  - ASA 162mg  - Hold statin d/t LFT elevation  - Continue to hold Metoprolol; blood pressures soft  - Start atorvastatin today  - Holding amio due to LFT elevation  - [facility] consulted 2/22 however pt not scheduled as declining specialty care. 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   - LLE duplex US 3/4 to r/o DVT given LLE swelling>RLE - results negative for DVT      Possible RHF (Elevated  LFTs)  Ruled out on 3/3 echo, elevated LFT attributed to volume overload   - HF Cardiology consulted, appreciate recs   - Continue holding amio due to LFTs   - PRN tylenol discontinued 3/3   - Daily LFTs     Chest tubes: removed 3/3  TPW: removed without difficulty     Pulmonary:  Extubated POD #5; Saturating well on RA  Supplemental O2 PRN to keep sats > 92%.  Pulm toilet, IS, activity/OOB and deep breathing     Bilateral PTX, stable  IR consulted 3/2 for chest tube placement however PTX resolved prior to intervention when chest tubes returned to suction.  Small bilateral PTX stable on water seal, chest tubes removed 3/3.  Very small bilat apical PTX stable post-chest tube pull and again this morning; no intervention indicated.     Neurology / MSK:  Acute post-operative pain  - PO oxycodone 5mg PRN  - PRN Robaxin  - 3/3: Tylenol discontinued d/t elevation in LFT     Sternotomy  PT/OT/CR consulted  Sternal precautions      Physical deconditioning  Therapies consulted and recommending TCU at discharge      / Renal:  No Hx of renal disease, baseline creatinine ~0.6  Volume overloaded, diuresis as above     Hyperkalemia, resolved  - Lasix drip was discontinued with continuing potassium supplementation  - Patient remained asymptomatic with stable MAPs and no arrhythmias  - Discontinued potassium and Spironolactone.     Equivocal UA   - Covered with 1g Rocephin x1 on 3/1   - UC negative      GI / FEN:   Elevated LFT  Suspect venous congestion as likely source.  Plans as above; continue to trend daily, avoid hepatotoxic agents as able.  - LFTs improving     Moderate malnutrition in the context of acute on chronic illness  Estimated Energy Needs: 0430-0261 kcals/day    - Dietitian consulted, appreciate recs; supplements ordered   - Poor PO intake - calorie counts reordered to resume 3/5, not meeting goals. May need to consider supplemental feedings if intake not improved over weekend.  - Increased marinol for appetite  stimulation  - Pt states this is her normal appetite and she is eating as much as she can. She does like the protein shakes and is consuming them.      Dysphagia  SLP following: recommend Full liquid diet- mildly thick     Endocrine:  Stress-induced hyperglycemia - resolved: initially managed on insulin drip postop, transitioned to sliding scale goal BG <180 and since discontinued after demonstrated euglycemia without exogenous insulin     Infectious Disease:  Stress-induced leukocytosis  WBC 15.6->17.4-->15.->17.3 afebrile. But procal 3/6 0.08 (0.15) (0.36)  - Completed perioperative antibiotics  - Pan cultured 3/2 NGTD  - Serial blood cultures 3/2 with NGTD  - Empiric cefepime started 3/3; can transition to PO abx once WBC < 12 per Dr. Bhandari  - Daily AM CBC  - Avoid invasive lines, as able  - Check c.diff with next stool, try to hold off on Stool softeners as able, but plan to start tomorrow if no BM+  - Consult ID 3/9, please see recs: Agree with stopping Vanco, Cdiff if able to get stool sample. Do not start po Flagyl. Blood cultures and await CT scan.  - Non-contrast CT not definitive, likely Pneumonia. Continue Cefepime     Hematology:   Stress-induced leukocytosis, as above  Acute blood loss anemia, Hgb stable ~8-9   Acute blood loss thrombocytopenia, resolved  Left femoral artery thrombosis with acute extremity ischemia, resolved s/p thrombectomy and bovine patch per Vascular Surgery  No signs or symptoms of active bleeding  - Transfuse prn for Hgb<7  - Daily CBC     Anticoagulation:   -  mg  - Subcutaneous Heparin TID  - Not currently anticoagulated for thrombosis or post-op afib (has been maintaining SR)     Prophylaxis:   Stress ulcer prophylaxis:   -Pantoprazole 40 mg daily for 30 days  -DVT prophylaxis: Subcutaneous heparin, SCD, OOB/activity/ambulation     Disposition:   - Transferred to  on 3/1  - No life-vest or ICD inpatient. May consider with Cardiology in outpatient  - Cards HF  "referral order is already placed.  - followup with Vascular Surgery established  - Rehab recommending discharge to TCU. Once WBC normalizes and nutrition status improves, okay for discharge.    Discussed with Surgeon, Dr. Bhandari via written and verbal commnication.     Amita Smith PA-c  Pager: [phone]  8:16 AM March 10, 2022           Interval History:     No overnight events.  States pain is well managed on current regimen. Slept well overnight.  Tolerating diet, is passing flatus, BM x 1. No nausea or vomiting.  Breathing well without complaints.   Working with therapies and ambulating in halls with assistance.   Denies chest pain, palpitations, dizziness, syncopal symptoms, fevers, chills, myalgias, or sternal popping/clicking.         Physical Exam:   Blood pressure 94/52, pulse 78, temperature 98.4  F (36.9  C), temperature source Oral, resp. rate 16, height 1.638 m (5' 4.5\"), weight 54.4 kg (119 lb 14.4 oz), SpO2 94 %.  Vitals:    03/08/22 0500 03/09/22 0500 03/10/22 0016   Weight: 52.8 kg (116 lb 8 oz) 53.3 kg (117 lb 6.4 oz) 54.4 kg (119 lb 14.4 oz)      Current Weight: 54.4 Kg Trend: +1.1 Kg  Admit weight: 54.0 Kg    Daily Fluid status; net loss: -470  mL    Net loss SA: -3676 mL  MAPs: 63-76  LVEF: 10-15%    Gen: A&Ox4, NAD  Neuro: no focal deficits   CV: RRR, normal S1 S2, no murmurs, rubs or gallops. No appreciable JVD   Vascular: Peripheral pulses present Radial 2+, Dorsalis Pedis 2+, Posterior Tibialis 2+.   Pulm: CTA, no wheezing or rhonchi, normal breathing on RA  Abd: nondistended, normal BS, soft, nontender  Ext: positive peripheral edema, 1+ pitting. Warm   Incision: clean, dry, intact, no erythema, sternum stable  Tubes/drain sites: dressing clean and dry         Data:    Imaging:  reviewed recent imaging    Chest x-ray  Interpretation:    Echocardiogram  Interpretation:    CT scan  IMPRESSION:  1. Postsurgical changes of CABG with low-density fluid collection of  the anterior chest wall " deep to the sternotomy and abutting the  anterior mediastinum. No defined walled off collection, however  presence of abscess cannot be definitively evaluated without contrast.  2. Left lower lobe opacity may represent infection or atelectasis.  Small bilateral pleural effusions with mild septal thickening in the  lung bases may represent interstitial pulmonary edema.    Labs:  CBC  Recent Labs   Lab 03/10/22  0607 03/09/22  0544 03/08/22  0659 03/07/22  0539   WBC 13.8* 17.3* 15.9* 17.6*   RBC 2.70* 2.92* 2.81* 3.22*   HGB 7.8* 8.6* 8.3* 9.3*   HCT 26.4* 28.8* 27.6* 31.0*   MCV 98 99 98 96   MCH 28.9 29.5 29.5 28.9   MCHC 29.5* 29.9* 30.1* 30.0*   RDW 19.0* 19.2* 19.3* 19.7*    362 321 348     CMP:  Last Comprehensive Metabolic Panel:  Sodium   Date Value Ref Range Status   03/10/2022 134 133 - 144 mmol/L Final     Potassium   Date Value Ref Range Status   03/10/2022 4.5 3.4 - 5.3 mmol/L Final     Chloride   Date Value Ref Range Status   03/10/2022 104 94 - 109 mmol/L Final     Carbon Dioxide (CO2)   Date Value Ref Range Status   03/10/2022 26 20 - 32 mmol/L Final     Anion Gap   Date Value Ref Range Status   03/10/2022 4 3 - 14 mmol/L Final     Glucose   Date Value Ref Range Status   03/10/2022 92 70 - 99 mg/dL Final     Urea Nitrogen   Date Value Ref Range Status   03/10/2022 24 7 - 30 mg/dL Final     Creatinine   Date Value Ref Range Status   03/10/2022 0.56 0.52 - 1.04 mg/dL Final     GFR Estimate   Date Value Ref Range Status   03/10/2022 >90 >60 mL/min/1.73m2 Final     Comment:     Effective December 21, 2021 eGFRcr in adults is calculated using the 2021 CKD-EPI creatinine equation which includes age and gender (Marleen barnes al., NEJM, DOI: 10.1056/WFVRfx1179751)     Calcium   Date Value Ref Range Status   03/10/2022 8.9 8.5 - 10.1 mg/dL Final     Bilirubin Total   Date Value Ref Range Status   03/10/2022 0.6 0.2 - 1.3 mg/dL Final     Alkaline Phosphatase   Date Value Ref Range Status   03/10/2022 76 40 -  150 U/L Final     ALT   Date Value Ref Range Status   03/10/2022 89 (H) 0 - 50 U/L Final     AST   Date Value Ref Range Status   03/10/2022 21 0 - 45 U/L Final     BMP  Recent Labs   Lab 03/10/22  0607 03/09/22  1648 03/09/22  0544 03/08/22  1717    134 134 133   POTASSIUM 4.5 4.4 4.6 4.9   CHLORIDE 104 103 102 103   HEIDI 8.9 9.0 9.2 8.8   CO2 26 24 25 24   BUN 24 29 26 27   CR 0.56 0.49* 0.60 0.64   GLC 92 120* 101* 132*     INR  Recent Labs   Lab 03/04/22  0552   INR 1.33*      Hepatic Panel  Recent Labs   Lab 03/10/22  0607 03/09/22  1648 03/09/22  0544 03/08/22  1717   AST 21 24 24 30   ALT 89* 106* 115* 132*   ALKPHOS 76 94 90 97   BILITOTAL 0.6 0.8 0.7 0.6   ALBUMIN 2.5* 2.6* 2.6* 2.6*     GLUCOSE:   Recent Labs   Lab 03/10/22  0607 03/09/22  1648 03/09/22  0544 03/08/22  1717 03/08/22  0659 03/07/22  1631   GLC 92 120* 101* 132* 98 87

## 2022-03-10 NOTE — PROGRESS NOTES
[facility] PROGRESS NOTE     Patient:  Ashley Osman   Date of birth 1950, Medical record number [id number]  Date of Visit:  03/10/2022  Date of Admission: 2/12/2022  Consult Requester:Calixto Bhandari*          Assessment and Plan:   ID Problem List   1. Persistent leukocytosis, unclear eitology  2. S/p CABG x 4 (2/24) and L femoral thrombectomy with bovine patch 2/25  3. HFrEF 2/2 ICM  4. History of tobacco use disorder, currently non-smoking  5. COPD      RECOMMENDATION:  1. Recommend CT Chest with contrast to definitively rule out abscess given findings of fluid collection deep to the sternum on CT without contrast imaging.   2. If CT chest w/ contrast returns negative for an abscess, would discontinue IV Cefepime today given she has had ~ 8 days of IV antibiotics. This would also be a sufficient course to cover any possible pna (low concern given unremarkable procal, lack of productive cough, no fevers, and no oxygen support needs).  3. Consider evaluating for non-infectious causes of leukocytosis such as US extremities to rule out DVT.   4. Low likelihood of c diff at this time given lack of stooling for 3-4 days off of laxatives/stool softeners.     ASSESSMENT:  Ashley Osman is a 71 year old female with PMHx significant for HFrEF 2/2 ICM (hx multiple MIs), biventricular failure requiring pressors, COPD, HLD, tobacco use disorder (quite smoking 12/2021), CAD who is s/p CABG x4 on 2/24/22 c/b L femoral artery thrombus with acute LLE ischemia s/p L femoral thrombectomy and bovine patch angioplasty 2/25. ID consulted for persistent leukocytosis.      Leukocytosis peaked in mid 20s just after CABG then downtrended to between 12-15 through 3/5 with slight up-tick to 15-17 over past few days. Blood cultures last checked 3/2 and 3/3 negative. PCT 3/6 rather unremarkable. Lactate wnl. She was empirically started on Cefepime+Vancomycin 3/3 and has completed 7 days. CXR without  focal opacities, patient without sputum production. Was having loose stools a few days ago while on stool softeners/laxatives but since these have been held, she has not had a BM for the past two days.      CT pan scan without contrast demonstrated a low density fluid collection deep to the sternotomy site that they could not definitively rule out for abscess due to lack of contrast. LLL with some atalectasis and sm bl pleural effusions. Recommend CT chest w/ contrast to definitively rule out abscess. No diarrhea over past 2-4 days so c diff less likely. BCx remain NGTD. WBC improved to 13K today without intervention. Low suspicion for infection at this time.       Thank you for this consult. ID will continue to follow.      Patient was discussed with Dr. Reno.      Clarisse Lucas PA-C  Infectious Diseases  Pager #[phone]          Interim History and Events:   Ashley noted that she felt as though she overexerted herself with PT/OT this morning. She did have nausea with emesis thereafter. Denies worsening of rib pain today. No abd pain. Has not had diarrhea or a BM for past 3ish days. No new sx otherwise endorsed.          HPI:   Ashley Osman is a 71 year old female with PMHx significant for HFrEF 2/2 ICM (hx multiple MIs), biventricular failure, COPD, HLD, tobacco use disorder (quite smoking 12/2021), CAD who is s/p CABG x4 on 2/24/22 c/b L femoral artery thrombus with acute LLE ischemia s/p L femoral thrombectomy and bovine patch angioplasty 2/25. ID consulted for persistent leukocytosis.      On review of chart, patient was extubated on POD5 and breathing well on RA. She did develop bilateral pneumothoracies which resolved prior to additional chest tube placements. CTs removed 3/3. Appears to have had persistent leukocytosis since CABG surgery between 12-17. Lactates wnl. PCT has improved over course of 1.5 weeks from 0.36 to 0.08 which is encouraging. Is afebrile since 2/27. Last initiated infectious work  up 3/2 with negative BCx, negative COVID PCR. CXR without focal opacities, trace effusions and bilateral apical pneumothoracies.      Currently feeling okay. Her biggest complaint is rib pain associated with deep breaths. This has been managed better with muscle relaxer and pain meds. She denies productive cough. Does have some SOB with exertion but recovers without need of O2. Denies nausea/vomiting, abdominal pain, dysuria/frequency/urgency. Notes having days of loose stools while on laxatives/stool softeners but once these were held she has not had a BM in the past 2 days. Noted some transient L foot pain but this has since resolved. All areas of incisions on chest, L groin and L leg are c/d/i, well approximated and healing without issue. No drainage.      [place], [place]. Had a cat and dog until recently when both passed away. Notes she enjoys light gardening, sewing and painting. She denies international travel.            ROS:   -Focused 5 point ROS completed, pertinent positives and negatives listed above.    Physical Examination:  Temp: 97.2  F (36.2  C) Temp src: Oral BP: 107/58 Pulse: 78   Resp: 17 SpO2: 95 % O2 Device: None (Room air)      Vitals:    03/06/22 0900 03/07/22 0600 03/08/22 0500 03/09/22 0500   Weight: 51.3 kg (113 lb) 51.9 kg (114 lb 8 oz) 52.8 kg (116 lb 8 oz) 53.3 kg (117 lb 6.4 oz)    03/10/22 0016   Weight: 54.4 kg (119 lb 14.4 oz)       Physical Examination:  Constitutional: Pleasant female seen sitting up in bed, in NAD. Alert and interactive.   HEENT: NCAT, anicteric sclerae, conjunctiva clear. Moist mucous membranes without.  Respiratory: Non-labored breathing on RA. Becomes slightly SOB with talking.   Cardiovascular:  L groin access site c/d/i, without erythema or drainage. Sternal wound healing nicely, no drainage or spreading erythema. Minimally tender to palpation.   GI: Normoactive BS. Abdomen is soft, non-distended.  Skin: Warm and dry. No rashes or  lesions on exposed surfaces.  Musculoskeletal: Extremities grossly normal. No tenderness or edema present.   Neurologic: A &O x3, speech normal, answering questions appropriately. Moves all extremities spontaneously. Grossly non-focal.  Neuropsychiatric: Mentation and affect normal/appropriate.  VAD: PICC is c/d/i with no erythema, drainage, or tenderness.    Medications:    aspirin  162 mg Oral Daily     atorvastatin  40 mg Oral QAM     ceFEPIme (MAXIPIME) IV  2 g Intravenous Q8H     dronabinol  10 mg Oral BID     heparin ANTICOAGULANT  5,000 Units Subcutaneous Q8H     heparin lock flush  5-20 mL Intracatheter Q24H     influenza vac high-dose quad  0.7 mL Intramuscular Prior to discharge     melatonin  3 mg Oral At Bedtime     multivitamin w/minerals  1 tablet Oral Daily     pantoprazole  40 mg Oral QAM AC     thiamine  100 mg Oral Daily     umeclidinium  1 puff Inhalation Daily       Infusions/Drips:    dextrose       ACE/ARB/ARNI NOT PRESCRIBED       BETA BLOCKER NOT PRESCRIBED         Metabolic Studies       Recent Labs   Lab Test 03/10/22  0607 03/09/22  1648 03/09/22  0544 03/08/22  1717 03/08/22  0659 03/07/22  1631 03/07/22  1138 03/06/22  0704 03/06/22  0042 02/13/22  0627 02/13/22  0334    134 134 133 136 134  --    < >  --    < > 136   POTASSIUM 4.5 4.4 4.6 4.9 4.3 5.8*  --    < >  --    < > 4.3   CHLORIDE 104 103 102 103 104 100  --    < >  --    < > 104   CO2 26 24 25 24 25 28  --    < >  --    < > 28   ANIONGAP 4 7 7 6 7 6  --    < >  --    < > 4   BUN 24 29 26 27 24 26  --    < >  --    < > 20   CR 0.56 0.49* 0.60 0.64 0.57 0.66  --    < >  --    < > 0.62   GFRESTIMATED >90 >90 >90 >90 >90 >90  --    < >  --    < > >90   GLC 92 120* 101* 132* 98 87  --    < >  --    < > 106*   A1C  --   --   --   --   --   --   --   --   --   --  5.6   HEIDI 8.9 9.0 9.2 8.8 8.9 8.9  --    < >  --    < > 8.6   PHOS 3.1  --  3.4  --  3.6  --   --    < >  --    < >  --    MAG 2.3  --  2.3  --  2.3  --   --    < >  --     < >  --    LACT  --   --   --   --   --   --  1.6  --  1.5   < >  --    CKT 16*  --   --   --   --   --   --   --   --   --   --     < > = values in this interval not displayed.       Hepatic Studies    Recent Labs   Lab Test 03/10/22  0607 03/09/22  1648 03/09/22  0544 03/08/22  1717 03/08/22  0659 03/07/22  1631   BILITOTAL 0.6 0.8 0.7 0.6 0.7 0.6   ALKPHOS 76 94 90 97 82 98   ALBUMIN 2.5* 2.6* 2.6* 2.6* 2.4* 2.7*   AST 21 24 24 30 31 38   ALT 89* 106* 115* 132* 134* 172*       Pancreatitis testing    Recent Labs   Lab Test 02/16/22  0400 02/13/22  0334   TRIG 84 94  94       Hematology Studies      Recent Labs   Lab Test 03/10/22  0607 03/09/22  0544 03/08/22  0659 03/07/22  0539 03/06/22  0704 03/05/22  0538   WBC 13.8* 17.3* 15.9* 17.6* 17.4* 15.6*   HGB 7.8* 8.6* 8.3* 9.3* 10.1* 9.8*   HCT 26.4* 28.8* 27.6* 31.0* 33.2* 31.3*    362 321 348 361 316       Arterial Blood Gas Testing    Recent Labs   Lab Test 02/28/22  0306 02/27/22  2313 02/27/22  1535 02/27/22  0749 02/27/22  0646 02/27/22  0351 02/26/22  2115 02/26/22  1947   PH 7.38 7.40  --   --  7.47* 7.46*  --  7.52*   PCO2 39 36  --   --  36 37  --  33*   PO2 119* 106*  --   --  92 96  --  100   HCO3 23 22  --   --  26 26  --  27   O2PER 1 1 1 30 30 30  30   < > 30  30    < > = values in this interval not displayed.        Urine Studies     Recent Labs   Lab Test 03/09/22  1646 03/02/22  1147 02/28/22  2300 02/17/22  2050 02/15/22  1026   URINEPH 5.0 5.5 5.0 5.0 5.0   NITRITE Negative Negative Negative Negative Negative   LEUKEST Negative Trace* Large* Negative Small*   WBCU <1 4 74* 6* 14*       Vancomycin Levels     Recent Labs   Lab Test 03/08/22  0903 03/05/22  0538   VANCOMYCIN 15.3 15.1       Tobramycin levels     No lab results found.    Gentamicin levels    No lab results found.    CSF testing   No lab results found.      Microbiology:  Culture   Date Value Ref Range Status   03/09/2022 No growth after 12 hours  Preliminary    03/09/2022 No growth after 12 hours  Preliminary   03/03/2022 No Growth  Final   03/03/2022 No Growth  Final   03/02/2022 No Growth  Final   03/02/2022 No Growth  Final   02/28/2022 No Growth  Final   02/15/2022 50,000-100,000 CFU/mL Candida albicans (A)  Final     Comment:     Susceptibilities not routinely done   02/13/2022 No Growth  Final   02/13/2022 No Growth  Final       Last check of C difficile  No results found for: CDBPCT    Imaging:    CT CAP w/o contrast (3/9/22)   IMPRESSION:  1. Postsurgical changes of CABG with low-density fluid collection of  the anterior chest wall deep to the sternotomy and abutting the  anterior mediastinum. No defined walled off collection, however  presence of abscess cannot be definitively evaluated without contrast.  2. Left lower lobe opacity may represent infection or atelectasis.  Small bilateral pleural effusions with mild septal thickening in the  lung bases may represent interstitial pulmonary edema.    CXR (3/5/22)   Impression:   1. Continued trace effusions and bilateral apical pneumothoraces  2. Improved left basilar aeration.     CT CAP w/o contrast (2/15/22)   IMPRESSION:     1.  CT findings suggestive of pulmonary edema in this patient with  cardiomegaly and left cardiac enlargement. Additional left greater  than right small oral effusions, which demonstrate loculated  components associated with overlying presumed atelectatic changes.  2.  Prominent mediastinal lymph nodes are nonspecific, likely  reactive.  3.  Moderate to marked atherosclerotic calcification of the aorta and  its branches as above  4.  Bilateral pulmonary nodules including an irregular 7.5 mm in  diameter right upper lobe solid pulmonary nodule. Recommend follow-up  per Fleischner Society guidelines.  5.  Dilation of the right and left pulmonary arteries, which can be  seen in patients with pulmonary arterial hypertension.  6.  Emphysematous changes seen throughout both lungs.  7.  Please see  above for additional findings and recommendations.

## 2022-03-10 NOTE — CARE PLAN
Speech Language Therapy Discharge Summary    Reason for therapy discharge:    All goals and outcomes met, no further needs identified.    Progress towards therapy goal(s). See goals on Care Plan in [facility] electronic health record for goal details.  Goals met    Therapy recommendation(s):    No further therapy is recommended.     Pt tolerating regular/thin liquid diet, appears to be at baseline oropharyngeal swallowing mechanism. No ongoing speech therapy warranted. SLP will complete orders and sign off.

## 2022-03-10 NOTE — PLAN OF CARE
D: S/p CABG x4 on 2/24    I: Monitored vitals and assessed pt status.   PRN: Oxycodone and Robaxin  Tele: Sinus rhythm, HR 70-80's  Mobility: Ax1     A: AOx4. VSS. Afebrile. Urinating adequately, given diuretics today. No BM during shift. Pain well controlled with current regimen.     P: Continue to monitor Pt status and report changes to CVTS.

## 2022-03-11 ENCOUNTER — APPOINTMENT (OUTPATIENT)
Dept: CT IMAGING | Facility: CLINIC | Age: 72
DRG: 233 | End: 2022-03-11
Attending: PHYSICIAN ASSISTANT
Payer: MEDICARE

## 2022-03-11 ENCOUNTER — APPOINTMENT (OUTPATIENT)
Dept: PHYSICAL THERAPY | Facility: CLINIC | Age: 72
DRG: 233 | End: 2022-03-11
Attending: INTERNAL MEDICINE
Payer: MEDICARE

## 2022-03-11 LAB
ALBUMIN SERPL-MCNC: 2.4 G/DL (ref 3.4–5)
ALBUMIN SERPL-MCNC: 2.5 G/DL (ref 3.4–5)
ALP SERPL-CCNC: 104 U/L (ref 40–150)
ALP SERPL-CCNC: 92 U/L (ref 40–150)
ALT SERPL W P-5'-P-CCNC: 76 U/L (ref 0–50)
ALT SERPL W P-5'-P-CCNC: 78 U/L (ref 0–50)
ANION GAP SERPL CALCULATED.3IONS-SCNC: 6 MMOL/L (ref 3–14)
ANION GAP SERPL CALCULATED.3IONS-SCNC: 6 MMOL/L (ref 3–14)
AST SERPL W P-5'-P-CCNC: 22 U/L (ref 0–45)
AST SERPL W P-5'-P-CCNC: 24 U/L (ref 0–45)
BASOPHILS # BLD AUTO: 0.1 10E3/UL (ref 0–0.2)
BASOPHILS NFR BLD AUTO: 1 %
BILIRUB SERPL-MCNC: 0.3 MG/DL (ref 0.2–1.3)
BILIRUB SERPL-MCNC: 0.4 MG/DL (ref 0.2–1.3)
BUN SERPL-MCNC: 28 MG/DL (ref 7–30)
BUN SERPL-MCNC: 30 MG/DL (ref 7–30)
CALCIUM SERPL-MCNC: 9 MG/DL (ref 8.5–10.1)
CALCIUM SERPL-MCNC: 9.2 MG/DL (ref 8.5–10.1)
CHLORIDE BLD-SCNC: 101 MMOL/L (ref 94–109)
CHLORIDE BLD-SCNC: 103 MMOL/L (ref 94–109)
CO2 SERPL-SCNC: 27 MMOL/L (ref 20–32)
CO2 SERPL-SCNC: 27 MMOL/L (ref 20–32)
CREAT SERPL-MCNC: 0.62 MG/DL (ref 0.52–1.04)
CREAT SERPL-MCNC: 0.66 MG/DL (ref 0.52–1.04)
EOSINOPHIL # BLD AUTO: 0.8 10E3/UL (ref 0–0.7)
EOSINOPHIL NFR BLD AUTO: 5 %
ERYTHROCYTE [DISTWIDTH] IN BLOOD BY AUTOMATED COUNT: 18.8 % (ref 10–15)
GFR SERPL CREATININE-BSD FRML MDRD: >90 ML/MIN/1.73M2
GFR SERPL CREATININE-BSD FRML MDRD: >90 ML/MIN/1.73M2
GLUCOSE BLD-MCNC: 100 MG/DL (ref 70–99)
GLUCOSE BLD-MCNC: 150 MG/DL (ref 70–99)
HCT VFR BLD AUTO: 27 % (ref 35–47)
HGB BLD-MCNC: 8 G/DL (ref 11.7–15.7)
IMM GRANULOCYTES # BLD: 0.1 10E3/UL
IMM GRANULOCYTES NFR BLD: 1 %
LYMPHOCYTES # BLD AUTO: 2.7 10E3/UL (ref 0.8–5.3)
LYMPHOCYTES NFR BLD AUTO: 19 %
MAGNESIUM SERPL-MCNC: 2.5 MG/DL (ref 1.6–2.3)
MCH RBC QN AUTO: 29 PG (ref 26.5–33)
MCHC RBC AUTO-ENTMCNC: 29.6 G/DL (ref 31.5–36.5)
MCV RBC AUTO: 98 FL (ref 78–100)
MONOCYTES # BLD AUTO: 1.8 10E3/UL (ref 0–1.3)
MONOCYTES NFR BLD AUTO: 12 %
NEUTROPHILS # BLD AUTO: 9 10E3/UL (ref 1.6–8.3)
NEUTROPHILS NFR BLD AUTO: 62 %
PHOSPHATE SERPL-MCNC: 3.3 MG/DL (ref 2.5–4.5)
PLATELET # BLD AUTO: 386 10E3/UL (ref 150–450)
POTASSIUM BLD-SCNC: 4.6 MMOL/L (ref 3.4–5.3)
POTASSIUM BLD-SCNC: 4.8 MMOL/L (ref 3.4–5.3)
PROT SERPL-MCNC: 6 G/DL (ref 6.8–8.8)
PROT SERPL-MCNC: 6.4 G/DL (ref 6.8–8.8)
RBC # BLD AUTO: 2.76 10E6/UL (ref 3.8–5.2)
SARS-COV-2 RNA RESP QL NAA+PROBE: NEGATIVE
SODIUM SERPL-SCNC: 134 MMOL/L (ref 133–144)
SODIUM SERPL-SCNC: 136 MMOL/L (ref 133–144)
WBC # BLD AUTO: 14.3 10E3/UL (ref 4–11)
WBC # BLD AUTO: ABNORMAL 10*3/UL

## 2022-03-11 PROCEDURE — 214N000001 HC R&B CCU UMMC

## 2022-03-11 PROCEDURE — 71260 CT THORAX DX C+: CPT

## 2022-03-11 PROCEDURE — 36592 COLLECT BLOOD FROM PICC: CPT | Performed by: STUDENT IN AN ORGANIZED HEALTH CARE EDUCATION/TRAINING PROGRAM

## 2022-03-11 PROCEDURE — 99207 PR SC NO CHARGE VISIT: CPT | Performed by: INTERNAL MEDICINE

## 2022-03-11 PROCEDURE — 250N000013 HC RX MED GY IP 250 OP 250 PS 637: Performed by: SURGERY

## 2022-03-11 PROCEDURE — 84450 TRANSFERASE (AST) (SGOT): CPT | Performed by: STUDENT IN AN ORGANIZED HEALTH CARE EDUCATION/TRAINING PROGRAM

## 2022-03-11 PROCEDURE — 250N000013 HC RX MED GY IP 250 OP 250 PS 637: Performed by: PHYSICIAN ASSISTANT

## 2022-03-11 PROCEDURE — U0005 INFEC AGEN DETEC AMPLI PROBE: HCPCS | Performed by: PHYSICIAN ASSISTANT

## 2022-03-11 PROCEDURE — 250N000013 HC RX MED GY IP 250 OP 250 PS 637: Performed by: NURSE PRACTITIONER

## 2022-03-11 PROCEDURE — 99232 SBSQ HOSP IP/OBS MODERATE 35: CPT | Mod: 24 | Performed by: PHYSICIAN ASSISTANT

## 2022-03-11 PROCEDURE — 97530 THERAPEUTIC ACTIVITIES: CPT | Mod: GP

## 2022-03-11 PROCEDURE — 71260 CT THORAX DX C+: CPT | Mod: 26 | Performed by: RADIOLOGY

## 2022-03-11 PROCEDURE — 250N000011 HC RX IP 250 OP 636: Performed by: PHYSICIAN ASSISTANT

## 2022-03-11 PROCEDURE — 82374 ASSAY BLOOD CARBON DIOXIDE: CPT | Performed by: STUDENT IN AN ORGANIZED HEALTH CARE EDUCATION/TRAINING PROGRAM

## 2022-03-11 PROCEDURE — 250N000011 HC RX IP 250 OP 636: Performed by: STUDENT IN AN ORGANIZED HEALTH CARE EDUCATION/TRAINING PROGRAM

## 2022-03-11 PROCEDURE — 85014 HEMATOCRIT: CPT | Performed by: SURGERY

## 2022-03-11 PROCEDURE — 250N000011 HC RX IP 250 OP 636: Performed by: NURSE PRACTITIONER

## 2022-03-11 PROCEDURE — 83735 ASSAY OF MAGNESIUM: CPT | Performed by: SURGERY

## 2022-03-11 PROCEDURE — 250N000011 HC RX IP 250 OP 636: Performed by: SURGERY

## 2022-03-11 PROCEDURE — 250N000013 HC RX MED GY IP 250 OP 250 PS 637: Performed by: STUDENT IN AN ORGANIZED HEALTH CARE EDUCATION/TRAINING PROGRAM

## 2022-03-11 PROCEDURE — 84100 ASSAY OF PHOSPHORUS: CPT | Performed by: SURGERY

## 2022-03-11 PROCEDURE — 36592 COLLECT BLOOD FROM PICC: CPT | Performed by: SURGERY

## 2022-03-11 PROCEDURE — 97116 GAIT TRAINING THERAPY: CPT | Mod: GP

## 2022-03-11 RX ORDER — POLYETHYLENE GLYCOL 3350 17 G/17G
17 POWDER, FOR SOLUTION ORAL 2 TIMES DAILY
Status: DISCONTINUED | OUTPATIENT
Start: 2022-03-11 | End: 2022-03-18 | Stop reason: HOSPADM

## 2022-03-11 RX ORDER — IOPAMIDOL 755 MG/ML
69 INJECTION, SOLUTION INTRAVASCULAR ONCE
Status: COMPLETED | OUTPATIENT
Start: 2022-03-11 | End: 2022-03-11

## 2022-03-11 RX ORDER — BISACODYL 10 MG
10 SUPPOSITORY, RECTAL RECTAL ONCE
Status: COMPLETED | OUTPATIENT
Start: 2022-03-11 | End: 2022-03-11

## 2022-03-11 RX ADMIN — Medication 250 MG: at 17:57

## 2022-03-11 RX ADMIN — Medication 5 ML: at 16:02

## 2022-03-11 RX ADMIN — OXYCODONE HYDROCHLORIDE 5 MG: 5 SOLUTION ORAL at 06:06

## 2022-03-11 RX ADMIN — PANTOPRAZOLE SODIUM 40 MG: 40 TABLET, DELAYED RELEASE ORAL at 07:47

## 2022-03-11 RX ADMIN — HEPARIN SODIUM 5000 UNITS: 5000 INJECTION, SOLUTION INTRAVENOUS; SUBCUTANEOUS at 16:28

## 2022-03-11 RX ADMIN — CEFEPIME HYDROCHLORIDE 2 G: 2 INJECTION, POWDER, FOR SOLUTION INTRAVENOUS at 07:59

## 2022-03-11 RX ADMIN — OXYCODONE HYDROCHLORIDE 5 MG: 5 SOLUTION ORAL at 19:06

## 2022-03-11 RX ADMIN — DRONABINOL 10 MG: 5 CAPSULE ORAL at 20:05

## 2022-03-11 RX ADMIN — POLYETHYLENE GLYCOL 3350 17 G: 17 POWDER, FOR SOLUTION ORAL at 20:05

## 2022-03-11 RX ADMIN — HEPARIN SODIUM 5000 UNITS: 5000 INJECTION, SOLUTION INTRAVENOUS; SUBCUTANEOUS at 00:34

## 2022-03-11 RX ADMIN — Medication 250 MG: at 06:06

## 2022-03-11 RX ADMIN — Medication 250 MG: at 12:17

## 2022-03-11 RX ADMIN — Medication 10 ML: at 11:30

## 2022-03-11 RX ADMIN — FUROSEMIDE 20 MG: 10 INJECTION, SOLUTION INTRAMUSCULAR; INTRAVENOUS at 16:28

## 2022-03-11 RX ADMIN — ATORVASTATIN CALCIUM 40 MG: 40 TABLET, FILM COATED ORAL at 07:47

## 2022-03-11 RX ADMIN — ASPIRIN 81 MG CHEWABLE TABLET 162 MG: 81 TABLET CHEWABLE at 07:47

## 2022-03-11 RX ADMIN — THIAMINE HCL TAB 100 MG 100 MG: 100 TAB at 07:47

## 2022-03-11 RX ADMIN — SPIRONOLACTONE 25 MG: 25 TABLET ORAL at 07:47

## 2022-03-11 RX ADMIN — Medication 1 TABLET: at 07:47

## 2022-03-11 RX ADMIN — CEFEPIME HYDROCHLORIDE 2 G: 2 INJECTION, POWDER, FOR SOLUTION INTRAVENOUS at 00:31

## 2022-03-11 RX ADMIN — HEPARIN SODIUM 5000 UNITS: 5000 INJECTION, SOLUTION INTRAVENOUS; SUBCUTANEOUS at 07:51

## 2022-03-11 RX ADMIN — POLYETHYLENE GLYCOL 3350 17 G: 17 POWDER, FOR SOLUTION ORAL at 09:04

## 2022-03-11 RX ADMIN — FUROSEMIDE 20 MG: 10 INJECTION, SOLUTION INTRAMUSCULAR; INTRAVENOUS at 07:51

## 2022-03-11 RX ADMIN — IOPAMIDOL 69 ML: 755 INJECTION, SOLUTION INTRAVENOUS at 08:26

## 2022-03-11 RX ADMIN — Medication 10 MG: at 11:28

## 2022-03-11 ASSESSMENT — ACTIVITIES OF DAILY LIVING (ADL)
ADLS_ACUITY_SCORE: 12

## 2022-03-11 NOTE — PLAN OF CARE
D: Ashley Osman is a 71 year old female with PMHx significant for HFrEF 2/2 ICM (hx multiple MIs), biventricular failure requiring pressors, COPD, HLD, tobacco use disorder (quite smoking 12/2021), CAD who is s/p CABG x4 on 2/24/22 c/b L femoral artery thrombus with acute LLE ischemia s/p L femoral thrombectomy and bovine patch angioplasty 2/25. ID consulted for persistent leukocytosis.      I: Monitored vitals and assessed pt status.   PRN: oxycodone 5 mg PO  Methocarbamol 250 mg PO  Tele: NSR  O2: Room air  Mobility: A1     A: A0x4. VSS. Afebrile. Urinating adequately. Receiving cefepime 2 g q 8 hours for leukocytosis.  BP 85/45 and WBC 13.8 triggered sepsis protocol.  Lactic acid 0.8.  Surgery cross cover updated.  Patient c/o no recent BM.  Last BM charted was on 03/07. Requesting bowel medication.  Will ask for day shift RN to administer PRN Miralax with meal. Reports incisional pain while moving.  Otherwise denies pain.  Requesting PRN oxycodone and methocarbamol be administered concurrently.  PRN oxycodone and methocarbamol administered x2 overnight.  Reports ELLER when transferring to commode.  Otherwise denies shortness of breath.       P: Continue to monitor Pt status and report changes to CVTS.  CT scan of chest with contrast ordered for today.  Discharge pending acceptance to TCU.    Temp:  [97.2  F (36.2  C)-98.7  F (37.1  C)] 98.1  F (36.7  C)  Pulse:  [71-80] 78  Resp:  [16-17] 16  BP: ()/(45-58) 90/53  SpO2:  [90 %-96 %] 90 %

## 2022-03-11 NOTE — PROGRESS NOTES
Patient triggered sepsis protocol.  Patient with know leukocytosis and soft BP.  Lactic acid drawn.  0.8  Vital signs obtained q 30 min x 1hr.  Surgery on call updated and rounded on patient at the bedside.  Will continue to monitor.

## 2022-03-11 NOTE — PROGRESS NOTES
Brief surgery progress note    Paged regarding soft blood pressure in the presence of known leukocytosis.  Patient triggered sepsis protocol, lactate drawn.    Evaluated patient at the bedside.  Vital signs stable, blood pressure 90s over 50s, no tachycardia, O2 saturation greater than 95%, respiratory rate 14.    Patient alert, oriented, no acute distress, nontoxic-appearing  Normal rate and work of breathing on room air  2+ pulses in bilateral upper and lower extremities, all extremities warm and well-perfused  Abdomen soft, nontender, non-distended  Cranial nerve II through XII grossly intact    Lactate 0.8  WBC 13.8, from 17.3  BMP within normal limits    Ashley Osman is a 71 year old female with PMH of HFrEF (10-15%) 2/2 ICM (Hx LAD and Circumflex MIs; total occlusion of RCA and LAD), Biventricular failure (requiring inotropic support PTA to [facility]), COPD, HLD, Tobacco Use Disorder (quit 12/2021), CAD who underwent CABG x4 on 02/24 with Dr. Bhandari. Found to have left femoral artery thrombosis with acute left lower extremity ischemia s/p emergent left femoral thrombectomy, bovine patch angioplasty on 2/25.     Patient evaluated at the bedside for concerns for sepsis.  Sepsis protocol due to hypotension and leukocytosis.  Exam unremarkable, leukocytosis downtrending compared to prior, hypotension resolved on repeat blood pressure checks.  Patient remains on cefepime.  No further work-up indicated at this time.  We will continue to monitor.    Misti Mosqueda MD  PGY1 [facility] Surgery  3/10/2022  [phone]

## 2022-03-11 NOTE — PROGRESS NOTES
"Care Management Follow Up    Length of Stay (days): 26    Expected Discharge Date: 03/11/2022     Concerns to be Addressed:       Patient plan of care discussed at interdisciplinary rounds: Yes    Anticipated Discharge Disposition:  TCU     Anticipated Discharge Services:  Therapy services  Anticipated Discharge DME:  n/a    Patient/family educated on Medicare website which has current facility and service quality ratings:  yes  Education Provided on the Discharge Plan:  yes  Patient/Family in Agreement with the Plan:  Yes, per discussion with SW    Referrals Placed by CM/SW:    DENIAL  [facility] (NO longer accepting SNF/TCU referrals as they are transitioning to an JOSH, Referral discontinued)  [facility] (denied due to the lack of beds available)  [facility] (nursing home admissions, SW was told that across all five units, there are no beds available)  [facility] (3/11: Brittany from admissions called the SW and said that due to her hemoglobin level being too low, they believe she'd admit back to the hospital if she was at their facility. So, Brittany says the denial is due to the level of care being \"too much\" for the nursing staff to handle.)    PENDING   [facility]  - [address] [facility] [address]  - [phone]  - 3/09: placed a referral through [facility]   - 3/10: admissions, still waiting for review from care team so still a pending referral     [facility]- [address] [address]  - [phone]  - 3/09: placed a referral through Epic  - 3/10: MARVIN spoke with Anand in admissions, he says that they are at capacity until the 15th, but SW can fax over @ [phone] the referral. SW faxed over the referral in two parts because when printed, it was a lot of pages. SW called admissions back and left a voicemail to let them know (11:58am).   - 3/11: Anand from admissions " spoke with SW and said that they're at capacity and the next expected discharge isn't until the 15th. He says if the Pt is here by then, then send a referral to them and they'll take a look.      [facility]   - [address] [place] [address]  - [phone]  - 3/09: placed a referral through [facility]  - 3/10: spoke to Diane on the phone, currently reviewing her referral  - 3/11: left a voicemail for Savannah Messina, who was covering admissions today     [facility]   - [address] [place] [address]  - [phone]  - 3/09: placed a referral through [facility]  - 3/10: got transferred to Human Resources even though the SW asked for admissions, left a voicemail for them to call back  - 3/11: called and left a voicemail     [facility]  - [address] [place] [address]  - [phone]    - 03/08: extension #4, spoke with admissions and they are currently running her insurance but so far, Pt looks okay for admission on paper. The number to call back is [phone].   - 03/09: extension #4, spoke with admissions and they said that because of Pt's insurance, 30 days of her stay would be 100% covered but anything after, there'd be a daily copay (wasn't specified in the call/conversation)  Private pay costs discussed: Not applicable    Additional Information:  SW called and followed up on the facilities in the referrals section.     SW also made more referrals based on the locations Pt's son, Anand, has mentioned in a previous conversation ([place], [place], and anything [place] of [place]). Listed below are the new referrals to the facilities.    [facility] [place]  - [address] [place] [address], [place] [address]  - [phone]  - 3/11: sent a referral through [facility]  - [address] [place] [address]  - [phone]  - 3/11: sent a referral through [facility]  - [address] [place] [address]  -  [phone]  - 3/11: sent a referral through [facility]  [facility]  - [address], [place] [address]  - [phone]  - 3/11: sent a referral through Epic  [facility] location  - [address] [facility], [place] [address]  - [phone]  - 3/11: sent a referral through [facility]    PLAN: SW will call and follow up on the referrals that were sent over through [facility]    ___________________    BARAK Morse  6C   [facility]  Pager [phone]  Phone [phone]

## 2022-03-11 NOTE — PLAN OF CARE
D: Admitted 2/12 left femoral artery thrombosis with acute left lower extremity ischemia. CAD who underwent CABG x4 on 02/24. S/p emergent left femoral thrombectomy, bovine patch angioplasty on 2/25.  Hx: HFrEF (10-15%) 2/2 ICM (Hx LAD and Circumflex MIs; total occlusion of RCA and LAD), Biventricular failure (requiring inotropic support PTA to [facility]), COPD, HLD, Tobacco Use Disorder (quit 12/2021)     I: Monitored vitals and assessed pt status.   PRN: Robaxin 250mg for pain  Oxycodone 5mgx1 for pain    A: A0x3-4, sometimes disoriented to time and forgetful. VSS, on RA. SR. Afebrile. Pain controlled with PRN robaxin and oxycodone. LBM 3/11, formed and hard. Voiding adequately via commode. Assist x1 with walker and GB. Regular diet. R DL PICC. Sternal incision and old CT sites LISS.    Pt was educated several times throughout shift about sternal precautions and how to utilize the heart pillow when going from a seated position to a standing position. Pt stated that she did not want to use pillow when going from seated to standing position, stated that she could not stand without using her arms (despite being able to with assist x1 with writer), and overall pt refused to follow sternal precautions despite repeated education from writer.    P: Continue to monitor Pt status and report changes to CVTS team.

## 2022-03-11 NOTE — PROGRESS NOTES
Cardiovascular Surgery Progress Note  03/11/2022         Assessment and Plan:     Ashley Osman is a 71 year old female with PMH of HFrEF (10-15%) 2/2 ICM (Hx LAD and Circumflex MIs; total occlusion of RCA and LAD), Biventricular failure (requiring inotropic support PTA to [facility]), COPD, HLD, Tobacco Use Disorder (quit 12/2021), CAD who underwent CABG x4 on 02/24 with Dr. Bhandari. Found to have left femoral artery thrombosis with acute left lower extremity ischemia s/p emergent left femoral thrombectomy, bovine patch angioplasty on 2/25. 02/28 with elevated LFTs.     Cardiovascular:   Hx of biventricular HF requiring pressors, CAD, HFrEF 10-15%. Tobacco abuse, s/p CABG x4 on 2/24  Atrial fibrillation with RVR post surgery, HD stable-off pressors 2/28.  2/28 echo showed LV EF 15%, stable on 3/3 echo with LVEF 10-15% - no evidence of RH dysfunction but elevated RA/PA pressures   - Daily weights  - ASA 162mg  - Hold statin d/t LFT elevation  - Continue to hold Metoprolol; blood pressures soft  - Start atorvastatin today  - Holding amio due to LFT elevation  - [facility] consulted 2/22 however pt not scheduled as declining specialty care. 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   - LLE duplex US 3/4 to r/o DVT given LLE swelling>RLE - results negative for DVT      Possible RHF (Elevated  LFTs)  Ruled out on 3/3 echo, elevated LFT attributed to volume overload   - HF Cardiology consulted, appreciate recs   - Continue holding amio due to LFTs   - PRN tylenol discontinued 3/3   - Daily LFTs     Chest tubes: removed 3/3  TPW: removed without difficulty     Pulmonary:  Extubated POD #5; Saturating well on RA  Supplemental O2 PRN to keep sats > 92%.  Pulm toilet, IS, activity/OOB and deep breathing     Bilateral PTX, stable  IR consulted 3/2 for chest tube placement however PTX resolved prior to intervention when chest tubes returned to suction.  Small bilateral PTX stable on water seal, chest tubes removed 3/3.  Very small bilat apical PTX stable post-chest tube pull and again this morning; no intervention indicated.     Neurology / MSK:  Acute post-operative pain  - PO oxycodone 5mg PRN  - PRN Robaxin  - 3/3: Tylenol discontinued d/t elevation in LFT     Sternotomy  PT/OT/CR consulted  Sternal precautions      Physical deconditioning  Therapies consulted and recommending TCU at discharge      / Renal:  No Hx of renal disease, baseline creatinine ~0.6  Volume overloaded, diuresis as above     Hyperkalemia, resolved  - Lasix drip was discontinued with continuing potassium supplementation  - Patient remained asymptomatic with stable MAPs and no arrhythmias  - Discontinued potassium and Spironolactone.     Equivocal UA   - Covered with 1g Rocephin x1 on 3/1   - UC negative      GI / FEN:   Elevated LFT  Suspect venous congestion as likely source.  Plans as above; continue to trend daily, avoid hepatotoxic agents as able.  - LFTs improving     Moderate malnutrition in the context of acute on chronic illness  Estimated Energy Needs: 5749-2393 kcals/day    - Dietitian consulted, appreciate recs; supplements ordered   - Poor PO intake - calorie counts reordered to resume 3/5, not meeting goals. May need to consider supplemental feedings if intake not improved over weekend.  - Increased marinol for appetite  stimulation  - Pt states this is her normal appetite and she is eating as much as she can. She does like the protein shakes and is consuming them.      Dysphagia  SLP following: recommend Full liquid diet- mildly thick     Endocrine:  Stress-induced hyperglycemia - resolved: initially managed on insulin drip postop, transitioned to sliding scale goal BG <180 and since discontinued after demonstrated euglycemia without exogenous insulin     Infectious Disease:  Stress-induced leukocytosis  WBC 14.3. Stable  afebrile. Procal 3/6 0.08 (0.15) (0.36)  - Completed perioperative antibiotics  - Serial blood cultures 3/2 with NGTD  - Empiric cefepime started 3/3; can transition to PO abx once WBC < 12 per Dr. Bhandari  - Daily AM CBC  - Check c.diff with next stool, try to hold off on Stool softeners as able, but plan to start tomorrow if no BM+  - Consult ID 3/9    * Agree with stopping Vanco, Cdiff if able to get stool sample. Do not start po Flagyl.   - CT scan w/ contrast 3/11. Stopped Cefepime per ID recommendations. Monitor for non-infectious source.    Hematology:   Stress-induced leukocytosis, as above  Acute blood loss anemia, Hgb stable ~8-9   Acute blood loss thrombocytopenia, resolved  Left femoral artery thrombosis with acute extremity ischemia, resolved s/p thrombectomy and bovine patch per Vascular Surgery  Right sided non-occlusive Thrombus  No signs or symptoms of active bleeding  - Transfuse prn for Hgb<7  - Daily CBC     Anticoagulation:   -  mg  - Subcutaneous Heparin TID  - Not currently anticoagulated for thrombosis or post-op afib (has been maintaining SR)     Prophylaxis:   Stress ulcer prophylaxis:   -Pantoprazole 40 mg daily for 30 days  -DVT prophylaxis: Subcutaneous heparin, SCD, OOB/activity/ambulation     Disposition:   - Transferred to  on 3/1  - No life-vest or ICD inpatient. May consider with Cardiology in outpatient  - Cards HF referral order is already placed.  - followup with  "Vascular Surgery established  - Rehab recommending discharge to TCU. Once WBC normalizes and nutrition status improves, okay for discharge.    Discussed with Surgeon, Dr. Bhandari via written and verbal commnication.     Amita Smith PA-c  Pager: [phone]  10:29 AM March 11, 2022           Interval History:     No overnight events.  States pain is well managed on current regimen. Slept well overnight.  Tolerating diet, is passing flatus, BM x 0. Had some slight nausea with vomiting noted yesterday after Physical therapy. Patient sitting eating cereal and stated she was feeling fine now.  Breathing well without complaints.   Working with therapies and ambulating in halls with assistance.   Denies chest pain, palpitations, dizziness, syncopal symptoms, fevers, chills, myalgias, or sternal popping/clicking.         Physical Exam:   Blood pressure 91/53, pulse 72, temperature 98.9  F (37.2  C), temperature source Oral, resp. rate 16, height 1.638 m (5' 4.5\"), weight 54.3 kg (119 lb 11.2 oz), SpO2 92 %.  Vitals:    03/09/22 0500 03/10/22 0016 03/11/22 0605   Weight: 53.3 kg (117 lb 6.4 oz) 54.4 kg (119 lb 14.4 oz) 54.3 kg (119 lb 11.2 oz)      Current Weight: 54.3 Kg Trend: -0.1 Kg  Admit weight: 54.0 Kg    Daily Fluid status; net loss: +450  mL    Net loss SA: -5328 mL  MAPs: 58-71  LVEF: 10-15%    Gen: A&Ox4, NAD  Neuro: no focal deficits   CV: RRR, normal S1 S2, no murmurs, rubs or gallops. No appreciable JVD  Vascular: Peripheral pulses present Radial 2+, Dorsalis Pedis 2+, Posterior Tibialis 2+.   Pulm: CTA, no wheezing or rhonchi, normal breathing on RA  Abd: nondistended, normal BS, soft, nontender  Ext: negative peripheral edema, 0+ pitting. Warm   Incision: clean, dry, intact, no erythema, sternum stable  Tubes/drain sites: dressing clean and dry         Data:    Imaging:  reviewed recent imaging    Chest x-ray  Interpretation:    Echocardiogram  Interpretation:    CT scan:    Labs:  CBC  Recent Labs   Lab " 03/11/22  0605 03/10/22  0607 03/09/22  0544 03/08/22  0659   WBC 14.3* 13.8* 17.3* 15.9*   RBC 2.76* 2.70* 2.92* 2.81*   HGB 8.0* 7.8* 8.6* 8.3*   HCT 27.0* 26.4* 28.8* 27.6*   MCV 98 98 99 98   MCH 29.0 28.9 29.5 29.5   MCHC 29.6* 29.5* 29.9* 30.1*   RDW 18.8* 19.0* 19.2* 19.3*    338 362 321     CMP:  Last Comprehensive Metabolic Panel:  Sodium   Date Value Ref Range Status   03/11/2022 136 133 - 144 mmol/L Final     Potassium   Date Value Ref Range Status   03/11/2022 4.8 3.4 - 5.3 mmol/L Final     Chloride   Date Value Ref Range Status   03/11/2022 103 94 - 109 mmol/L Final     Carbon Dioxide (CO2)   Date Value Ref Range Status   03/11/2022 27 20 - 32 mmol/L Final     Anion Gap   Date Value Ref Range Status   03/11/2022 6 3 - 14 mmol/L Final     Glucose   Date Value Ref Range Status   03/11/2022 100 (H) 70 - 99 mg/dL Final     Urea Nitrogen   Date Value Ref Range Status   03/11/2022 28 7 - 30 mg/dL Final     Creatinine   Date Value Ref Range Status   03/11/2022 0.62 0.52 - 1.04 mg/dL Final     GFR Estimate   Date Value Ref Range Status   03/11/2022 >90 >60 mL/min/1.73m2 Final     Comment:     Effective December 21, 2021 eGFRcr in adults is calculated using the 2021 CKD-EPI creatinine equation which includes age and gender (Marleen barnes al., NEJM, DOI: 10.1056/JJUBjm6303521)     Calcium   Date Value Ref Range Status   03/11/2022 9.2 8.5 - 10.1 mg/dL Final     Bilirubin Total   Date Value Ref Range Status   03/11/2022 0.4 0.2 - 1.3 mg/dL Final     Alkaline Phosphatase   Date Value Ref Range Status   03/11/2022 92 40 - 150 U/L Final     ALT   Date Value Ref Range Status   03/11/2022 78 (H) 0 - 50 U/L Final     AST   Date Value Ref Range Status   03/11/2022 22 0 - 45 U/L Final     BMP  Recent Labs   Lab 03/11/22  0605 03/10/22  1702 03/10/22  0607 03/09/22  1648    134 134 134   POTASSIUM 4.8 4.5 4.5 4.4   CHLORIDE 103 102 104 103   HEIDI 9.2 9.4 8.9 9.0   CO2 27 26 26 24   BUN 28 25 24 29   CR 0.62 0.64  0.56 0.49*   * 134* 92 120*     INR  No lab results found in last 7 days.   Hepatic Panel  Recent Labs   Lab 03/11/22  0605 03/10/22  1702 03/10/22  0607 03/09/22  1648   AST 22 27 21 24   ALT 78* 93* 89* 106*   ALKPHOS 92 90 76 94   BILITOTAL 0.4 0.7 0.6 0.8   ALBUMIN 2.4* 2.7* 2.5* 2.6*     GLUCOSE:   Recent Labs   Lab 03/11/22  0605 03/10/22  1702 03/10/22  0607 03/09/22  1648 03/09/22  0544 03/08/22  1717   * 134* 92 120* 101* 132*

## 2022-03-11 NOTE — PROGRESS NOTES
[facility] PROGRESS NOTE     Patient:  Ashley Osman   Date of birth 1950, Medical record number [id number]  Date of Visit:  03/11/2022  Date of Admission: 2/12/2022  Consult Requester:Calixto Bhandari*          Assessment and Plan:   ID Problem List   1. Persistent leukocytosis, unclear eitology  2. S/p CABG x 4 (2/24) and L femoral thrombectomy with bovine patch 2/25  3. HFrEF 2/2 ICM  4. History of tobacco use disorder, currently non-smoking  5. COPD      RECOMMENDATION:  1. If CT chest w/ contrast without signs of retrosternal abscess. Would discontinue IV Cefepime (has completed 8-9 days) and monitor off of antibiotics. No focal source of infection driving leukocytosis identified.   2. Consider evaluation for noninfectious causes of stable leukocytosis.     Thank you for the consult. Infectious disease will sign off at this time. Do not hesitate to contact us with additional questions or change in patient's clinical status.       ASSESSMENT:  Ashley Osman is a 71 year old female with PMHx significant for HFrEF 2/2 ICM (hx multiple MIs), biventricular failure requiring pressors, COPD, HLD, tobacco use disorder (quite smoking 12/2021), CAD who is s/p CABG x4 on 2/24/22 c/b L femoral artery thrombus with acute LLE ischemia s/p L femoral thrombectomy and bovine patch angioplasty 2/25. ID consulted for persistent leukocytosis.      Leukocytosis peaked in mid 20s just after CABG then downtrended to between 12-15 through 3/5 with slight up-tick to 15-17 over past few days. Blood cultures last checked 3/2 and 3/3 negative. PCT 3/6 rather unremarkable. Lactate wnl. She was empirically started on Cefepime+Vancomycin 3/3 and has completed 7 days. CXR without focal opacities, patient without sputum production. Was having loose stools a few days ago while on stool softeners/laxatives but since these have been held, she has not had a BM for the past two days.      CT pan scan without  Received a phone call from pt daughter wanting to schedule appointment with MD.  Was informed that Dr. Ellis is the breast specialist and has the referral to schedule appointment explained to pt daughter that they have different specialties that they see and Dr. Ellis is the breast specialist and this is why her team has the referral.  Daughter informed pt and pt got upset stating that she refuses to see another MD and that the surgeon told her she would be seeing MD so the appointment needs to be made with our MD.  Informed her that a message would be put in and discussed and someone would return their phone call.  She stated the surgeon instructed her in the office yesterday to make a appointment with Dr. Flores in 2 weeks.  Again informed her a message would be put in and discussed and we would contact her.  She then voiced understanding.    "contrast demonstrated a low density fluid collection deep to the sternotomy site that they could not definitively rule out for abscess due to lack of contrast. LLL with some atalectasis and sm bl pleural effusions. No diarrhea over past 2-4 days so c diff less likely. BCx remain NGTD. WBC continues to bounce around in mid teens without significant uptrend. CT chest with contrast demonstrated a \"greater than simple density fluid along retrocrural sternotomy space without enhancement, gas or inflammatory changes to suggest inflammatory fluid collection; favor post op seroma/hematoma.      Patient was discussed with Dr. Reno.      Clarisse Lucas PA-C  Infectious Diseases  Pager #[phone]          Interim History and Events:   Ashley was sleeping soundly on visit. Did not wake. Had some hypotension overnight between 80s-90s systolic and evaluated by surgery team. Improved without intervention. Denied pain overnight. Awaiting BM with bowel regimen. Some ELLER with activity, seems to recover without need for O2 support.          HPI:   Ashley Osman is a 71 year old female with PMHx significant for HFrEF 2/2 ICM (hx multiple MIs), biventricular failure, COPD, HLD, tobacco use disorder (quite smoking 12/2021), CAD who is s/p CABG x4 on 2/24/22 c/b L femoral artery thrombus with acute LLE ischemia s/p L femoral thrombectomy and bovine patch angioplasty 2/25. ID consulted for persistent leukocytosis.      On review of chart, patient was extubated on POD5 and breathing well on RA. She did develop bilateral pneumothoracies which resolved prior to additional chest tube placements. CTs removed 3/3. Appears to have had persistent leukocytosis since CABG surgery between 12-17. Lactates wnl. PCT has improved over course of 1.5 weeks from 0.36 to 0.08 which is encouraging. Is afebrile since 2/27. Last initiated infectious work up 3/2 with negative BCx, negative COVID PCR. CXR without focal opacities, trace effusions and bilateral " apical pneumothoracies.      Currently feeling okay. Her biggest complaint is rib pain associated with deep breaths. This has been managed better with muscle relaxer and pain meds. She denies productive cough. Does have some SOB with exertion but recovers without need of O2. Denies nausea/vomiting, abdominal pain, dysuria/frequency/urgency. Notes having days of loose stools while on laxatives/stool softeners but once these were held she has not had a BM in the past 2 days. Noted some transient L foot pain but this has since resolved. All areas of incisions on chest, L groin and L leg are c/d/i, well approximated and healing without issue. No drainage.      [place], [place]. Had a cat and dog until recently when both passed away. Notes she enjoys light gardening, sewing and painting. She denies international travel.            ROS:   -Did not collect today as patient was asleep.     Physical Examination:  Temp: 98.9  F (37.2  C) Temp src: Oral BP: 91/53 Pulse: 72   Resp: 16 SpO2: 92 % O2 Device: None (Room air)      Vitals:    03/07/22 0600 03/08/22 0500 03/09/22 0500 03/10/22 0016   Weight: 51.9 kg (114 lb 8 oz) 52.8 kg (116 lb 8 oz) 53.3 kg (117 lb 6.4 oz) 54.4 kg (119 lb 14.4 oz)    03/11/22 0605   Weight: 54.3 kg (119 lb 11.2 oz)       Physical Examination:  Constitutional: Pleasant female seen sleeping in bed, in NAD.   HEENT: NCAT, MMM.   Respiratory: Non-labored breathing on RA. Appeared comfortable.   Cardiovascular: did not assesss.  GI: Abdomen is nondistended.   Skin: Warm and dry. No rashes or lesions on exposed surfaces.  Musculoskeletal: Extremities grossly normal. No edema.  Neurologic: sleeping, did not attempt to wake   VAD: PICC is c/d/i with no erythema, drainage, or tenderness.    Medications:    aspirin  162 mg Oral Daily     atorvastatin  40 mg Oral QAM     bisacodyl  10 mg Rectal Once     ceFEPIme (MAXIPIME) IV  2 g Intravenous Q8H     dronabinol  10 mg Oral BID      furosemide  20 mg Intravenous BID     heparin ANTICOAGULANT  5,000 Units Subcutaneous Q8H     heparin lock flush  5-20 mL Intracatheter Q24H     influenza vac high-dose quad  0.7 mL Intramuscular Prior to discharge     melatonin  3 mg Oral At Bedtime     multivitamin w/minerals  1 tablet Oral Daily     pantoprazole  40 mg Oral QAM AC     polyethylene glycol  17 g Oral BID     sodium chloride (PF)  10 mL Intravenous Once     spironolactone  25 mg Oral Daily     thiamine  100 mg Oral Daily     umeclidinium  1 puff Inhalation Daily       Infusions/Drips:    dextrose       ACE/ARB/ARNI NOT PRESCRIBED       BETA BLOCKER NOT PRESCRIBED         Metabolic Studies       Recent Labs   Lab Test 03/11/22  0605 03/10/22  2004 03/10/22  1702 03/10/22  0607 03/09/22  1648 03/09/22  0544 03/08/22  1717 03/07/22  1631 03/07/22  1138 02/13/22  0627 02/13/22  0334     --  134 134 134 134 133   < >  --    < > 136   POTASSIUM 4.8  --  4.5 4.5 4.4 4.6 4.9   < >  --    < > 4.3   CHLORIDE 103  --  102 104 103 102 103   < >  --    < > 104   CO2 27  --  26 26 24 25 24   < >  --    < > 28   ANIONGAP 6  --  6 4 7 7 6   < >  --    < > 4   BUN 28  --  25 24 29 26 27   < >  --    < > 20   CR 0.62  --  0.64 0.56 0.49* 0.60 0.64   < >  --    < > 0.62   GFRESTIMATED >90  --  >90 >90 >90 >90 >90   < >  --    < > >90   *  --  134* 92 120* 101* 132*   < >  --    < > 106*   A1C  --   --   --   --   --   --   --   --   --   --  5.6   HEIDI 9.2  --  9.4 8.9 9.0 9.2 8.8   < >  --    < > 8.6   PHOS 3.3  --   --  3.1  --  3.4  --    < >  --    < >  --    MAG 2.5*  --   --  2.3  --  2.3  --    < >  --    < >  --    LACT  --  0.8  --   --   --   --   --   --  1.6   < >  --    CKT  --   --   --  16*  --   --   --   --   --   --   --     < > = values in this interval not displayed.       Hepatic Studies    Recent Labs   Lab Test 03/11/22  0605 03/10/22  1702 03/10/22  0607 03/09/22  1648 03/09/22  0544 03/08/22  1717   BILITOTAL 0.4 0.7 0.6 0.8 0.7  0.6   ALKPHOS 92 90 76 94 90 97   ALBUMIN 2.4* 2.7* 2.5* 2.6* 2.6* 2.6*   AST 22 27 21 24 24 30   ALT 78* 93* 89* 106* 115* 132*       Pancreatitis testing    Recent Labs   Lab Test 02/16/22  0400 02/13/22  0334   TRIG 84 94  94       Hematology Studies      Recent Labs   Lab Test 03/11/22  0605 03/10/22  0607 03/09/22  0544 03/08/22  0659 03/07/22  0539 03/06/22  0704   WBC 14.3* 13.8* 17.3* 15.9* 17.6* 17.4*   HGB 8.0* 7.8* 8.6* 8.3* 9.3* 10.1*   HCT 27.0* 26.4* 28.8* 27.6* 31.0* 33.2*    338 362 321 348 361       Arterial Blood Gas Testing    Recent Labs   Lab Test 02/28/22  0306 02/27/22  2313 02/27/22  1535 02/27/22  0749 02/27/22  0646 02/27/22  0351 02/26/22  2115 02/26/22  1947   PH 7.38 7.40  --   --  7.47* 7.46*  --  7.52*   PCO2 39 36  --   --  36 37  --  33*   PO2 119* 106*  --   --  92 96  --  100   HCO3 23 22  --   --  26 26  --  27   O2PER 1 1 1 30 30 30  30   < > 30  30    < > = values in this interval not displayed.        Urine Studies     Recent Labs   Lab Test 03/09/22  1646 03/02/22  1147 02/28/22  2300 02/17/22  2050 02/15/22  1026   URINEPH 5.0 5.5 5.0 5.0 5.0   NITRITE Negative Negative Negative Negative Negative   LEUKEST Negative Trace* Large* Negative Small*   WBCU <1 4 74* 6* 14*       Vancomycin Levels     Recent Labs   Lab Test 03/08/22  0903 03/05/22  0538   VANCOMYCIN 15.3 15.1       Tobramycin levels     No lab results found.    Gentamicin levels    No lab results found.    CSF testing   No lab results found.      Microbiology:  Culture   Date Value Ref Range Status   03/09/2022 No growth after 1 day  Preliminary   03/09/2022 No growth after 1 day  Preliminary   03/03/2022 No Growth  Final   03/03/2022 No Growth  Final   03/02/2022 No Growth  Final   03/02/2022 No Growth  Final   02/28/2022 No Growth  Final   02/15/2022 50,000-100,000 CFU/mL Candida albicans (A)  Final     Comment:     Susceptibilities not routinely done   02/13/2022 No Growth  Final   02/13/2022 No Growth   Final       Last check of C difficile  No results found for: CDBPCT    Imaging:  CT Chest w/ contrast (3/11/22)   Impression:   1. Relatively unchanged greater than simple density fluid along the  retrocrural sternotomy space, without enhancement, gas, or  inflammatory changes to suggest an inflammatory fluid collection.  Favor postoperative seroma/hematoma.  2. Eccentric laterally located and likely chronic thrombus or fibrin  sheath within the right IJ and innominate veins. These are not  occluded.  Reportedly known to the primary surgery time.  3. Continued effusions and patchy peripheral airspace opacities, most  likely pulmonary edema though superimposed infection remains on the  differential.  4. Advanced destructive emphysematous changes in the lungs.    CT CAP w/o contrast (3/9/22)   IMPRESSION:  1. Postsurgical changes of CABG with low-density fluid collection of  the anterior chest wall deep to the sternotomy and abutting the  anterior mediastinum. No defined walled off collection, however  presence of abscess cannot be definitively evaluated without contrast.  2. Left lower lobe opacity may represent infection or atelectasis.  Small bilateral pleural effusions with mild septal thickening in the  lung bases may represent interstitial pulmonary edema.    CXR (3/5/22)   Impression:   1. Continued trace effusions and bilateral apical pneumothoraces  2. Improved left basilar aeration.     CT CAP w/o contrast (2/15/22)   IMPRESSION:     1.  CT findings suggestive of pulmonary edema in this patient with  cardiomegaly and left cardiac enlargement. Additional left greater  than right small oral effusions, which demonstrate loculated  components associated with overlying presumed atelectatic changes.  2.  Prominent mediastinal lymph nodes are nonspecific, likely  reactive.  3.  Moderate to marked atherosclerotic calcification of the aorta and  its branches as above  4.  Bilateral pulmonary nodules including an  irregular 7.5 mm in  diameter right upper lobe solid pulmonary nodule. Recommend follow-up  per Fleischner Society guidelines.  5.  Dilation of the right and left pulmonary arteries, which can be  seen in patients with pulmonary arterial hypertension.  6.  Emphysematous changes seen throughout both lungs.  7.  Please see above for additional findings and recommendations.

## 2022-03-12 LAB
ALBUMIN SERPL-MCNC: 2.4 G/DL (ref 3.4–5)
ALBUMIN SERPL-MCNC: 2.4 G/DL (ref 3.4–5)
ALP SERPL-CCNC: 103 U/L (ref 40–150)
ALP SERPL-CCNC: 92 U/L (ref 40–150)
ALT SERPL W P-5'-P-CCNC: 64 U/L (ref 0–50)
ALT SERPL W P-5'-P-CCNC: 67 U/L (ref 0–50)
ANION GAP SERPL CALCULATED.3IONS-SCNC: 6 MMOL/L (ref 3–14)
ANION GAP SERPL CALCULATED.3IONS-SCNC: 6 MMOL/L (ref 3–14)
AST SERPL W P-5'-P-CCNC: 22 U/L (ref 0–45)
AST SERPL W P-5'-P-CCNC: 24 U/L (ref 0–45)
BILIRUB SERPL-MCNC: 0.4 MG/DL (ref 0.2–1.3)
BILIRUB SERPL-MCNC: 0.5 MG/DL (ref 0.2–1.3)
BUN SERPL-MCNC: 25 MG/DL (ref 7–30)
BUN SERPL-MCNC: 25 MG/DL (ref 7–30)
CALCIUM SERPL-MCNC: 8.6 MG/DL (ref 8.5–10.1)
CALCIUM SERPL-MCNC: 8.9 MG/DL (ref 8.5–10.1)
CHLORIDE BLD-SCNC: 100 MMOL/L (ref 94–109)
CHLORIDE BLD-SCNC: 97 MMOL/L (ref 94–109)
CO2 SERPL-SCNC: 29 MMOL/L (ref 20–32)
CO2 SERPL-SCNC: 31 MMOL/L (ref 20–32)
CREAT SERPL-MCNC: 0.57 MG/DL (ref 0.52–1.04)
CREAT SERPL-MCNC: 0.59 MG/DL (ref 0.52–1.04)
ERYTHROCYTE [DISTWIDTH] IN BLOOD BY AUTOMATED COUNT: 18.5 % (ref 10–15)
GFR SERPL CREATININE-BSD FRML MDRD: >90 ML/MIN/1.73M2
GFR SERPL CREATININE-BSD FRML MDRD: >90 ML/MIN/1.73M2
GLUCOSE BLD-MCNC: 128 MG/DL (ref 70–99)
GLUCOSE BLD-MCNC: 92 MG/DL (ref 70–99)
HCT VFR BLD AUTO: 26.5 % (ref 35–47)
HGB BLD-MCNC: 7.8 G/DL (ref 11.7–15.7)
MAGNESIUM SERPL-MCNC: 2.1 MG/DL (ref 1.6–2.3)
MCH RBC QN AUTO: 29 PG (ref 26.5–33)
MCHC RBC AUTO-ENTMCNC: 29.4 G/DL (ref 31.5–36.5)
MCV RBC AUTO: 99 FL (ref 78–100)
PHOSPHATE SERPL-MCNC: 2.7 MG/DL (ref 2.5–4.5)
PLATELET # BLD AUTO: 391 10E3/UL (ref 150–450)
POTASSIUM BLD-SCNC: 4.1 MMOL/L (ref 3.4–5.3)
POTASSIUM BLD-SCNC: 4.3 MMOL/L (ref 3.4–5.3)
PROT SERPL-MCNC: 6 G/DL (ref 6.8–8.8)
PROT SERPL-MCNC: 6 G/DL (ref 6.8–8.8)
RBC # BLD AUTO: 2.69 10E6/UL (ref 3.8–5.2)
SODIUM SERPL-SCNC: 134 MMOL/L (ref 133–144)
SODIUM SERPL-SCNC: 135 MMOL/L (ref 133–144)
WBC # BLD AUTO: 11.5 10E3/UL (ref 4–11)

## 2022-03-12 PROCEDURE — 250N000013 HC RX MED GY IP 250 OP 250 PS 637: Performed by: STUDENT IN AN ORGANIZED HEALTH CARE EDUCATION/TRAINING PROGRAM

## 2022-03-12 PROCEDURE — 84155 ASSAY OF PROTEIN SERUM: CPT | Performed by: STUDENT IN AN ORGANIZED HEALTH CARE EDUCATION/TRAINING PROGRAM

## 2022-03-12 PROCEDURE — 250N000013 HC RX MED GY IP 250 OP 250 PS 637: Performed by: SURGERY

## 2022-03-12 PROCEDURE — 80053 COMPREHEN METABOLIC PANEL: CPT | Performed by: STUDENT IN AN ORGANIZED HEALTH CARE EDUCATION/TRAINING PROGRAM

## 2022-03-12 PROCEDURE — 214N000001 HC R&B CCU UMMC

## 2022-03-12 PROCEDURE — 250N000013 HC RX MED GY IP 250 OP 250 PS 637: Performed by: NURSE PRACTITIONER

## 2022-03-12 PROCEDURE — 250N000011 HC RX IP 250 OP 636: Performed by: NURSE PRACTITIONER

## 2022-03-12 PROCEDURE — 84100 ASSAY OF PHOSPHORUS: CPT | Performed by: SURGERY

## 2022-03-12 PROCEDURE — 85014 HEMATOCRIT: CPT | Performed by: SURGERY

## 2022-03-12 PROCEDURE — 250N000013 HC RX MED GY IP 250 OP 250 PS 637: Performed by: PHYSICIAN ASSISTANT

## 2022-03-12 PROCEDURE — 36592 COLLECT BLOOD FROM PICC: CPT | Performed by: STUDENT IN AN ORGANIZED HEALTH CARE EDUCATION/TRAINING PROGRAM

## 2022-03-12 PROCEDURE — 250N000011 HC RX IP 250 OP 636: Performed by: PHYSICIAN ASSISTANT

## 2022-03-12 PROCEDURE — 83735 ASSAY OF MAGNESIUM: CPT | Performed by: SURGERY

## 2022-03-12 RX ORDER — FUROSEMIDE 20 MG
20 TABLET ORAL DAILY
Status: DISCONTINUED | OUTPATIENT
Start: 2022-03-13 | End: 2022-03-18 | Stop reason: HOSPADM

## 2022-03-12 RX ADMIN — DRONABINOL 10 MG: 5 CAPSULE ORAL at 20:10

## 2022-03-12 RX ADMIN — OXYCODONE HYDROCHLORIDE 5 MG: 5 SOLUTION ORAL at 20:10

## 2022-03-12 RX ADMIN — Medication 500 MG: at 00:35

## 2022-03-12 RX ADMIN — FUROSEMIDE 20 MG: 10 INJECTION, SOLUTION INTRAMUSCULAR; INTRAVENOUS at 07:49

## 2022-03-12 RX ADMIN — OXYCODONE HYDROCHLORIDE 5 MG: 5 SOLUTION ORAL at 15:35

## 2022-03-12 RX ADMIN — PANTOPRAZOLE SODIUM 40 MG: 40 TABLET, DELAYED RELEASE ORAL at 07:49

## 2022-03-12 RX ADMIN — ASPIRIN 81 MG CHEWABLE TABLET 162 MG: 81 TABLET CHEWABLE at 07:48

## 2022-03-12 RX ADMIN — POLYETHYLENE GLYCOL 3350 17 G: 17 POWDER, FOR SOLUTION ORAL at 20:10

## 2022-03-12 RX ADMIN — Medication 5 ML: at 19:06

## 2022-03-12 RX ADMIN — HEPARIN SODIUM 5000 UNITS: 5000 INJECTION, SOLUTION INTRAVENOUS; SUBCUTANEOUS at 07:49

## 2022-03-12 RX ADMIN — ATORVASTATIN CALCIUM 40 MG: 40 TABLET, FILM COATED ORAL at 07:49

## 2022-03-12 RX ADMIN — Medication 10 ML: at 12:09

## 2022-03-12 RX ADMIN — SPIRONOLACTONE 25 MG: 25 TABLET ORAL at 07:49

## 2022-03-12 RX ADMIN — THIAMINE HCL TAB 100 MG 100 MG: 100 TAB at 07:49

## 2022-03-12 RX ADMIN — Medication 1 TABLET: at 07:49

## 2022-03-12 RX ADMIN — HEPARIN SODIUM 5000 UNITS: 5000 INJECTION, SOLUTION INTRAVENOUS; SUBCUTANEOUS at 00:35

## 2022-03-12 RX ADMIN — Medication 3 ML: at 06:11

## 2022-03-12 RX ADMIN — Medication 250 MG: at 08:49

## 2022-03-12 RX ADMIN — HEPARIN SODIUM 5000 UNITS: 5000 INJECTION, SOLUTION INTRAVENOUS; SUBCUTANEOUS at 15:36

## 2022-03-12 RX ADMIN — POLYETHYLENE GLYCOL 3350 17 G: 17 POWDER, FOR SOLUTION ORAL at 07:48

## 2022-03-12 ASSESSMENT — ACTIVITIES OF DAILY LIVING (ADL)
ADLS_ACUITY_SCORE: 10
ADLS_ACUITY_SCORE: 12
ADLS_ACUITY_SCORE: 10
ADLS_ACUITY_SCORE: 12
ADLS_ACUITY_SCORE: 12
ADLS_ACUITY_SCORE: 10
ADLS_ACUITY_SCORE: 12
ADLS_ACUITY_SCORE: 10
ADLS_ACUITY_SCORE: 10
ADLS_ACUITY_SCORE: 12
ADLS_ACUITY_SCORE: 10
ADLS_ACUITY_SCORE: 12
ADLS_ACUITY_SCORE: 10
ADLS_ACUITY_SCORE: 12
ADLS_ACUITY_SCORE: 10
ADLS_ACUITY_SCORE: 12
ADLS_ACUITY_SCORE: 10
ADLS_ACUITY_SCORE: 12
ADLS_ACUITY_SCORE: 10

## 2022-03-12 NOTE — PROGRESS NOTES
Cardiovascular Surgery Progress Note  03/12/2022         Assessment and Plan:     Ashley Osman is a 71 year old female with PMH of HFrEF (10-15%) 2/2 ICM (Hx LAD and Circumflex MIs; total occlusion of RCA and LAD), Biventricular failure (requiring inotropic support PTA to [facility]), COPD, HLD, Tobacco Use Disorder (quit 12/2021), CAD who underwent CABG x4 on 02/24 with Dr. Bhandari. Found to have left femoral artery thrombosis with acute left lower extremity ischemia s/p emergent left femoral thrombectomy, bovine patch angioplasty on 2/25. 02/28 with elevated LFTs.     Cardiovascular:   Hx of biventricular HF requiring pressors, CAD, HFrEF 10-15%. Tobacco abuse, s/p CABG x4 on 2/24  Atrial fibrillation with RVR post surgery, HD stable-off pressors 2/28.  2/28 echo showed LV EF 15%, stable on 3/3 echo with LVEF 10-15% - no evidence of RH dysfunction but elevated RA/PA pressures   - Daily weights  - ASA 162mg  Atorvastatin 40mg daily  Spironolactone  - Continue to hold Metoprolol; blood pressures soft  - [facility] consulted 2/22 however pt not scheduled as declining specialty care. 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   - LLE duplex US 3/4 to r/o DVT given LLE swelling>RLE - results negative for DVT      Possible RHF (Elevated LFTs)  Ruled out on 3/3 echo, elevated LFT attributed  to volume overload   - HF Cardiology consulted, appreciate recs   - Continue holding amio due to LFTs   - PRN tylenol discontinued 3/3   - Daily LFTs     Chest tubes: removed 3/3  TPW: removed without difficulty     Pulmonary:  Extubated POD #5; Saturating well on RA  Supplemental O2 PRN to keep sats > 92%.  Pulm toilet, IS, activity/OOB and deep breathing     Bilateral PTX, stable  IR consulted 3/2 for chest tube placement however PTX resolved prior to intervention when chest tubes returned to suction.  Small bilateral PTX stable on water seal, chest tubes removed 3/3.  Very small bilat apical PTX stable post-chest tube pull and again this morning; no intervention indicated.     Neurology / MSK:  Acute post-operative pain  - PO oxycodone 5mg PRN  - PRN Robaxin  - 3/3: Tylenol discontinued d/t elevation in LFT     Sternotomy  PT/OT/CR consulted  Sternal precautions      Physical deconditioning  Therapies consulted and recommending TCU at discharge      / Renal:  No Hx of renal disease, baseline creatinine ~0.6  Volume overloaded, improved  - Furosemide 20mg po daily. No lyte replacement. Patient maintains K above 4.0 with spironolactone.     Hyperkalemia, resolved  - Lasix drip was discontinued with continuing potassium supplementation  - Patient remained asymptomatic with stable MAPs and no arrhythmias  - Discontinued potassium     Equivocal UA   - Covered with 1g Rocephin x1 on 3/1   - UC negative      GI / FEN:   Elevated LFT  Suspect venous congestion as likely source.  Plans as above; continue to trend daily, avoid hepatotoxic agents as able.  - LFTs improving     Moderate malnutrition in the context of acute on chronic illness  Estimated Energy Needs: 8229-0077 kcals/day    - Dietitian consulted, appreciate recs; supplements ordered   - Poor PO intake - calorie counts reordered to resume 3/5, not meeting goals. May need to consider supplemental feedings if intake not improved over  weekend.  - Increased marinol for appetite stimulation  - Pt states this is her normal appetite and she is eating as much as she can. She does like the protein shakes and is consuming them.      Dysphagia  SLP following: recommend Full liquid diet- mildly thick     Endocrine:  Stress-induced hyperglycemia - resolved: initially managed on insulin drip postop, transitioned to sliding scale goal BG <180 and since discontinued after demonstrated euglycemia without exogenous insulin     Infectious Disease:  Stress-induced leukocytosis  WBC 11.5. Stable afebrile. Procal 3/6 0.08 (0.15) (0.36)  - Completed perioperative antibiotics  - Serial blood cultures 3/2 with NGTD  - Empiric cefepime started 3/3; can transition to PO abx once WBC < 12 per Dr. Bhandari  - Daily AM CBC  - Check c.diff with next stool, try to hold off on Stool softeners as able, but plan to start tomorrow if no BM+  - Consult ID 3/9    * Agree with stopping Vanco, Cdiff if able to get stool sample. Do not start po Flagyl.   - CT scan w/ contrast 3/11. Stopped Cefepime per ID recommendations. Monitor for non-infectious source.     Hematology:   Stress-induced leukocytosis, as above  Acute blood loss anemia, Hgb stable ~8-9   Acute blood loss thrombocytopenia, resolved  Left femoral artery thrombosis with acute extremity ischemia, resolved s/p thrombectomy and bovine patch per Vascular Surgery  Right sided non-occlusive IJ Thrombus  No signs or symptoms of active bleeding  - Transfuse prn for Hgb<7  - Daily CBC     Anticoagulation:   -  mg  - Subcutaneous Heparin TID  - Not currently anticoagulated for thrombosis or post-op afib (has been maintaining SR)     Prophylaxis:   Stress ulcer prophylaxis:   -Pantoprazole 40 mg daily for 30 days  -DVT prophylaxis: Subcutaneous heparin, SCD, OOB/activity/ambulation     Disposition:   - Transferred to  on 3/1  - No life-vest or ICD inpatient. May consider with Cardiology in outpatient  - Cards HF  "referral order is already placed.  - followup with Vascular Surgery established  - Rehab recommending discharge to TCU. Once WBC normalizes and nutrition status improves, okay for discharge.    Discussed with Surgeon, Dr. Bhandari via written and verbal commnication.     Amita Smith PA-c  Pager: [phone]  7:30 AM March 12, 2022           Interval History:     No overnight events.  States pain is well managed on current regimen. Slept well overnight.  Tolerating diet, is passing flatus, BM x 1 hard after suppository. No nausea or vomiting.  Breathing well without complaints.   Working with therapies and ambulating in halls with assistance.   Denies chest pain, palpitations, dizziness, syncopal symptoms, fevers, chills, myalgias, or sternal popping/clicking.         Physical Exam:   Blood pressure 100/52, pulse 78, temperature 97.7  F (36.5  C), temperature source Axillary, resp. rate 18, height 1.638 m (5' 4.5\"), weight 54.3 kg (119 lb 11.2 oz), SpO2 92 %.  Vitals:    03/09/22 0500 03/10/22 0016 03/11/22 0605   Weight: 53.3 kg (117 lb 6.4 oz) 54.4 kg (119 lb 14.4 oz) 54.3 kg (119 lb 11.2 oz)      Current Weight: 53.7 Kg Trend: 0.5 Kg  Admit weight: 54.0 Kg    Daily Fluid status; net loss: -100 (2050 O)  mL    Net loss SA: -7044 mL  MAPs: 61-73  LVEF: 10-15%    Gen: A&Ox4, NAD  Neuro: no focal deficits   CV: RRR, normal S1 S2, no murmurs, rubs or gallops. No appreciable JVD   Vascular: Peripheral pulses present Radial 2+, Dorsalis Pedis 2+, Posterior Tibialis 2+.   Pulm: CTA, no wheezing or rhonchi, normal breathing on RA  Abd: nondistended, normal BS, soft, nontender  Ext: negative peripheral edema, 0+ pitting. Warm.   Incision: clean, dry, intact, no erythema, sternum stable  Tubes/drain sites: dressing clean and dry          Data:    Imaging:  reviewed recent imaging    Chest x-ray  Interpretation:    Echocardiogram  Interpretation:    CT scan:    Labs:  CBC  Recent Labs   Lab 03/11/22  0605 03/10/22  0607 " 03/09/22  0544 03/08/22  0659   WBC 14.3* 13.8* 17.3* 15.9*   RBC 2.76* 2.70* 2.92* 2.81*   HGB 8.0* 7.8* 8.6* 8.3*   HCT 27.0* 26.4* 28.8* 27.6*   MCV 98 98 99 98   MCH 29.0 28.9 29.5 29.5   MCHC 29.6* 29.5* 29.9* 30.1*   RDW 18.8* 19.0* 19.2* 19.3*    338 362 321     CMP:  Last Comprehensive Metabolic Panel:  Sodium   Date Value Ref Range Status   03/11/2022 134 133 - 144 mmol/L Final     Potassium   Date Value Ref Range Status   03/11/2022 4.6 3.4 - 5.3 mmol/L Final     Chloride   Date Value Ref Range Status   03/11/2022 101 94 - 109 mmol/L Final     Carbon Dioxide (CO2)   Date Value Ref Range Status   03/11/2022 27 20 - 32 mmol/L Final     Anion Gap   Date Value Ref Range Status   03/11/2022 6 3 - 14 mmol/L Final     Glucose   Date Value Ref Range Status   03/11/2022 150 (H) 70 - 99 mg/dL Final     Urea Nitrogen   Date Value Ref Range Status   03/11/2022 30 7 - 30 mg/dL Final     Creatinine   Date Value Ref Range Status   03/11/2022 0.66 0.52 - 1.04 mg/dL Final     GFR Estimate   Date Value Ref Range Status   03/11/2022 >90 >60 mL/min/1.73m2 Final     Comment:     Effective December 21, 2021 eGFRcr in adults is calculated using the 2021 CKD-EPI creatinine equation which includes age and gender (Marleen barnes al., NEJM, DOI: 10.1056/DFNOls2033000)     Calcium   Date Value Ref Range Status   03/11/2022 9.0 8.5 - 10.1 mg/dL Final     Bilirubin Total   Date Value Ref Range Status   03/11/2022 0.3 0.2 - 1.3 mg/dL Final     Alkaline Phosphatase   Date Value Ref Range Status   03/11/2022 104 40 - 150 U/L Final     ALT   Date Value Ref Range Status   03/11/2022 76 (H) 0 - 50 U/L Final     AST   Date Value Ref Range Status   03/11/2022 24 0 - 45 U/L Final     BMP  Recent Labs   Lab 03/11/22  1606 03/11/22  0605 03/10/22  1702 03/10/22  0607    136 134 134   POTASSIUM 4.6 4.8 4.5 4.5   CHLORIDE 101 103 102 104   HEIDI 9.0 9.2 9.4 8.9   CO2 27 27 26 26   BUN 30 28 25 24   CR 0.66 0.62 0.64 0.56   * 100* 134*  92     INR  No lab results found in last 7 days.   Hepatic Panel  Recent Labs   Lab 03/11/22  1606 03/11/22  0605 03/10/22  1702 03/10/22  0607   AST 24 22 27 21   ALT 76* 78* 93* 89*   ALKPHOS 104 92 90 76   BILITOTAL 0.3 0.4 0.7 0.6   ALBUMIN 2.5* 2.4* 2.7* 2.5*     GLUCOSE:   Recent Labs   Lab 03/11/22  1606 03/11/22  0605 03/10/22  1702 03/10/22  0607 03/09/22  1648 03/09/22  0544   * 100* 134* 92 120* 101*

## 2022-03-12 NOTE — PLAN OF CARE
"Shift: 1900-0730    Diagnosis: CABG x4 (2/24) & emergent L femoral thrombectomy w/ Bovine patch (2/25)    Vitals: /52 (BP Location: Left arm)   Pulse 78   Temp 97.7  F (36.5  C) (Axillary)   Resp 18   Ht 1.638 m (5' 4.5\")   Wt 54.3 kg (119 lb 11.2 oz)   SpO2 92%   BMI 20.23 kg/m        Neuro: A/o x4, forgetful, [place]  Resp: RA, sating low 90's. Denies SOB.   Cardiac: sinus rhythm, lower pressures, denies chest pain  GI/: voiding appropriately, last BM 3/11  Skin: sternal incision, CT sites, groin site, and left calf incision CDI.   LDAs: R double lumen PICC, both saline locked.   Pain: aching pain in bilateral rib areas. Pt refusing PRN oxycodone. Relaxation techniques, repositioning, and distraction used.   PRN Meds: PRN Robaxin given @ 0000  Mobility: SBA    Pt had trouble falling asleep and staying asleep. Repositioning and relaxation techniques used throughout the night with little success.    Goals/Plan: continue education of sternal precautions, continue plan of care, notify team of any significant changes                       "

## 2022-03-12 NOTE — PLAN OF CARE
D: Admitted 2/12 left femoral artery thrombosis with acute left lower extremity ischemia. CAD who underwent CABG x4 on 02/24. S/p emergent left femoral thrombectomy, bovine patch angioplasty on 2/25.  Hx: HFrEF (10-15%) 2/2 ICM (Hx LAD and Circumflex MIs; total occlusion of RCA and LAD), Biventricular failure (requiring inotropic support PTA to [facility]), COPD, HLD, Tobacco Use Disorder (quit 12/2021)    I: Monitored vitals and assessed pt status.   Changed: Lasix from IV to PO  PRN: Robaxin 250mg for pain  Oxycodone 5mg for pain    A: A0x4, forgetful. VSS, on RA. SR. Afebrile. Pain controlled with PRN robaxin.  LBM 3/12 x2, gave scheduled miralax. Voiding adequately via commode. SBA/assist x1 with GB and walker. Ambulated in hallway with writer x2. Regular diet. R DL PICC. Sternal incision and old CT sites LISS and WNL.     P: Continue to monitor Pt status and report changes to CVTS team. Awaiting TCU placement.

## 2022-03-13 LAB
ALBUMIN SERPL-MCNC: 2.4 G/DL (ref 3.4–5)
ALBUMIN SERPL-MCNC: 2.6 G/DL (ref 3.4–5)
ALP SERPL-CCNC: 110 U/L (ref 40–150)
ALP SERPL-CCNC: 92 U/L (ref 40–150)
ALT SERPL W P-5'-P-CCNC: 58 U/L (ref 0–50)
ALT SERPL W P-5'-P-CCNC: 60 U/L (ref 0–50)
ANION GAP SERPL CALCULATED.3IONS-SCNC: 5 MMOL/L (ref 3–14)
ANION GAP SERPL CALCULATED.3IONS-SCNC: 6 MMOL/L (ref 3–14)
AST SERPL W P-5'-P-CCNC: 18 U/L (ref 0–45)
AST SERPL W P-5'-P-CCNC: 22 U/L (ref 0–45)
BILIRUB SERPL-MCNC: 0.4 MG/DL (ref 0.2–1.3)
BILIRUB SERPL-MCNC: 0.5 MG/DL (ref 0.2–1.3)
BUN SERPL-MCNC: 20 MG/DL (ref 7–30)
BUN SERPL-MCNC: 23 MG/DL (ref 7–30)
CALCIUM SERPL-MCNC: 8.8 MG/DL (ref 8.5–10.1)
CALCIUM SERPL-MCNC: 9 MG/DL (ref 8.5–10.1)
CHLORIDE BLD-SCNC: 98 MMOL/L (ref 94–109)
CHLORIDE BLD-SCNC: 99 MMOL/L (ref 94–109)
CO2 SERPL-SCNC: 30 MMOL/L (ref 20–32)
CO2 SERPL-SCNC: 32 MMOL/L (ref 20–32)
CREAT SERPL-MCNC: 0.56 MG/DL (ref 0.52–1.04)
CREAT SERPL-MCNC: 0.84 MG/DL (ref 0.52–1.04)
ERYTHROCYTE [DISTWIDTH] IN BLOOD BY AUTOMATED COUNT: 18.3 % (ref 10–15)
GFR SERPL CREATININE-BSD FRML MDRD: 73 ML/MIN/1.73M2
GFR SERPL CREATININE-BSD FRML MDRD: >90 ML/MIN/1.73M2
GLUCOSE BLD-MCNC: 129 MG/DL (ref 70–99)
GLUCOSE BLD-MCNC: 92 MG/DL (ref 70–99)
HCT VFR BLD AUTO: 26.8 % (ref 35–47)
HGB BLD-MCNC: 7.9 G/DL (ref 11.7–15.7)
MAGNESIUM SERPL-MCNC: 2.2 MG/DL (ref 1.6–2.3)
MCH RBC QN AUTO: 28.5 PG (ref 26.5–33)
MCHC RBC AUTO-ENTMCNC: 29.5 G/DL (ref 31.5–36.5)
MCV RBC AUTO: 97 FL (ref 78–100)
PLATELET # BLD AUTO: 382 10E3/UL (ref 150–450)
POTASSIUM BLD-SCNC: 4.3 MMOL/L (ref 3.4–5.3)
POTASSIUM BLD-SCNC: 4.4 MMOL/L (ref 3.4–5.3)
PROT SERPL-MCNC: 5.8 G/DL (ref 6.8–8.8)
PROT SERPL-MCNC: 6.4 G/DL (ref 6.8–8.8)
RBC # BLD AUTO: 2.77 10E6/UL (ref 3.8–5.2)
SODIUM SERPL-SCNC: 134 MMOL/L (ref 133–144)
SODIUM SERPL-SCNC: 136 MMOL/L (ref 133–144)
WBC # BLD AUTO: 9.4 10E3/UL (ref 4–11)

## 2022-03-13 PROCEDURE — 36415 COLL VENOUS BLD VENIPUNCTURE: CPT | Performed by: STUDENT IN AN ORGANIZED HEALTH CARE EDUCATION/TRAINING PROGRAM

## 2022-03-13 PROCEDURE — 999N000128 HC STATISTIC PERIPHERAL IV START W/O US GUIDANCE

## 2022-03-13 PROCEDURE — 84450 TRANSFERASE (AST) (SGOT): CPT | Performed by: STUDENT IN AN ORGANIZED HEALTH CARE EDUCATION/TRAINING PROGRAM

## 2022-03-13 PROCEDURE — 250N000013 HC RX MED GY IP 250 OP 250 PS 637: Performed by: NURSE PRACTITIONER

## 2022-03-13 PROCEDURE — 250N000013 HC RX MED GY IP 250 OP 250 PS 637: Performed by: PHYSICIAN ASSISTANT

## 2022-03-13 PROCEDURE — 99207 PR NO BILLABLE SERVICE THIS VISIT: CPT | Performed by: INTERNAL MEDICINE

## 2022-03-13 PROCEDURE — 36592 COLLECT BLOOD FROM PICC: CPT | Performed by: STUDENT IN AN ORGANIZED HEALTH CARE EDUCATION/TRAINING PROGRAM

## 2022-03-13 PROCEDURE — 250N000013 HC RX MED GY IP 250 OP 250 PS 637: Performed by: SURGERY

## 2022-03-13 PROCEDURE — 250N000011 HC RX IP 250 OP 636: Performed by: PHYSICIAN ASSISTANT

## 2022-03-13 PROCEDURE — 250N000011 HC RX IP 250 OP 636: Performed by: NURSE PRACTITIONER

## 2022-03-13 PROCEDURE — 85027 COMPLETE CBC AUTOMATED: CPT | Performed by: SURGERY

## 2022-03-13 PROCEDURE — 214N000001 HC R&B CCU UMMC

## 2022-03-13 PROCEDURE — 250N000013 HC RX MED GY IP 250 OP 250 PS 637: Performed by: STUDENT IN AN ORGANIZED HEALTH CARE EDUCATION/TRAINING PROGRAM

## 2022-03-13 PROCEDURE — 80053 COMPREHEN METABOLIC PANEL: CPT | Performed by: STUDENT IN AN ORGANIZED HEALTH CARE EDUCATION/TRAINING PROGRAM

## 2022-03-13 PROCEDURE — 83735 ASSAY OF MAGNESIUM: CPT | Performed by: SURGERY

## 2022-03-13 RX ADMIN — OXYCODONE HYDROCHLORIDE 5 MG: 5 SOLUTION ORAL at 06:36

## 2022-03-13 RX ADMIN — ASPIRIN 81 MG CHEWABLE TABLET 162 MG: 81 TABLET CHEWABLE at 08:07

## 2022-03-13 RX ADMIN — Medication 1 TABLET: at 08:08

## 2022-03-13 RX ADMIN — POLYETHYLENE GLYCOL 3350 17 G: 17 POWDER, FOR SOLUTION ORAL at 08:07

## 2022-03-13 RX ADMIN — OXYCODONE HYDROCHLORIDE 5 MG: 5 SOLUTION ORAL at 20:15

## 2022-03-13 RX ADMIN — Medication 250 MG: at 18:52

## 2022-03-13 RX ADMIN — Medication 500 MG: at 06:36

## 2022-03-13 RX ADMIN — FUROSEMIDE 20 MG: 20 TABLET ORAL at 08:07

## 2022-03-13 RX ADMIN — SPIRONOLACTONE 25 MG: 25 TABLET ORAL at 08:07

## 2022-03-13 RX ADMIN — THIAMINE HCL TAB 100 MG 100 MG: 100 TAB at 08:07

## 2022-03-13 RX ADMIN — PANTOPRAZOLE SODIUM 40 MG: 40 TABLET, DELAYED RELEASE ORAL at 08:07

## 2022-03-13 RX ADMIN — HEPARIN SODIUM 5000 UNITS: 5000 INJECTION, SOLUTION INTRAVENOUS; SUBCUTANEOUS at 00:09

## 2022-03-13 RX ADMIN — Medication 250 MG: at 12:40

## 2022-03-13 RX ADMIN — HEPARIN SODIUM 5000 UNITS: 5000 INJECTION, SOLUTION INTRAVENOUS; SUBCUTANEOUS at 08:08

## 2022-03-13 RX ADMIN — ATORVASTATIN CALCIUM 40 MG: 40 TABLET, FILM COATED ORAL at 08:07

## 2022-03-13 RX ADMIN — HEPARIN SODIUM 5000 UNITS: 5000 INJECTION, SOLUTION INTRAVENOUS; SUBCUTANEOUS at 16:38

## 2022-03-13 RX ADMIN — Medication 5 ML: at 07:55

## 2022-03-13 RX ADMIN — DRONABINOL 10 MG: 5 CAPSULE ORAL at 20:15

## 2022-03-13 ASSESSMENT — ACTIVITIES OF DAILY LIVING (ADL)
ADLS_ACUITY_SCORE: 10

## 2022-03-13 NOTE — PLAN OF CARE
D: Admitted 2/12 left femoral artery thrombosis with acute left lower extremity ischemia. CAD who underwent CABG x4 on 02/24. S/p emergent left femoral thrombectomy, bovine patch angioplasty on 2/25.  Hx: HFrEF (10-15%) 2/2 ICM (Hx LAD and Circumflex MIs; total occlusion of RCA and LAD), Biventricular failure (requiring inotropic support PTA to [facility]), COPD, HLD, Tobacco Use Disorder (quit 12/2021)     I: Monitored vitals and assessed pt status.   Changed: Pt on calorie counts x3 days  PRN: Robaxin 250mg for pain     A: A0x4, forgetful, [place]. VSS, on RA. SR. Afebrile. Pain controlled with PRN robaxin. LBM 3/13, gave scheduled miralax. Voiding adequately via commode. SBA/assist x1 with GB and walker. Ambulated in hallway with writer x2. Regular diet with calorie counts. R DL PICC removed, R PIV SL. Sternal incision and old CT sites LISS and WNL.      P: Continue to monitor Pt status and report changes to CVTS team. Discharge Monday or Tuesday pending TCU placement. Pt's sons would like an update from SW and CVTS on Monday (3/14).

## 2022-03-13 NOTE — PLAN OF CARE
"Shift: 1900-0730     Diagnosis: CABG x4 (2/24) & emergent L femoral thrombectomy w/ Bovine patch (2/25)     Vitals: /55 (BP Location: Left arm)   Pulse 78   Temp 98.3  F (36.8  C) (Oral)   Resp 18   Ht 1.638 m (5' 4.5\")   Wt 53.7 kg (118 lb 4.8 oz)   SpO2 95%   BMI 19.99 kg/m       Neuro: A/o x4, forgetful, [place]  Resp: RA, sating low 90's. Denies SOB.   Cardiac: sinus rhythm, lower pressures, denies chest pain  GI/: voiding appropriately, last BM 3/12  Skin: sternal incision, CT sites, groin site, and left calf incision CDI.   LDAs: R double lumen PICC, both saline locked.   PRN Meds: PRN Robaxin and PRN oxy given @ 0630  Mobility: SBA    Pt refused VS @ 0400 to promote rest.      Goals/Plan: continue education of sternal precautions, continue plan of care, notify team of any significant changes                          "

## 2022-03-13 NOTE — PROGRESS NOTES
Cardiovascular Surgery Progress Note  03/13/2022         Assessment and Plan:     Ashley Osman is a 71 year old female with PMH of HFrEF (10-15%) 2/2 ICM (Hx LAD and Circumflex MIs; total occlusion of RCA and LAD), Biventricular failure (requiring inotropic support PTA to [facility]), COPD, HLD, Tobacco Use Disorder (quit 12/2021), CAD who underwent CABG x4 on 02/24 with Dr. Bhandari. Found to have left femoral artery thrombosis with acute left lower extremity ischemia s/p emergent left femoral thrombectomy, bovine patch angioplasty on 2/25. 02/28 with elevated LFTs.     Cardiovascular:   Hx of biventricular HF requiring pressors, CAD, HFrEF 10-15%. Tobacco abuse, s/p CABG x4 on 2/24  Atrial fibrillation with RVR post surgery, HD stable-off pressors 2/28.  2/28 echo showed LV EF 15%, stable on 3/3 echo with LVEF 10-15% - no evidence of RH dysfunction but elevated RA/PA pressures   - Daily weights  - ASA 162mg  Atorvastatin 40mg daily  Spironolactone  - Continue to hold Metoprolol; blood pressures soft  - [facility] consulted 2/22 however pt not scheduled as declining specialty care. 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   - LLE duplex US 3/4 to r/o DVT given LLE swelling>RLE - results negative for DVT      Possible RHF (Elevated LFTs)  Ruled out on 3/3 echo, elevated LFT attributed  to volume overload   - HF Cardiology consulted, appreciate recs   - Continue holding amio due to LFTs   - PRN tylenol discontinued 3/3   - Daily LFTs     Chest tubes: removed 3/3  TPW: removed without difficulty     Pulmonary:  Extubated POD #5; Saturating well on RA  Supplemental O2 PRN to keep sats > 92%.  Pulm toilet, IS, activity/OOB and deep breathing     Bilateral PTX, stable  IR consulted 3/2 for chest tube placement however PTX resolved prior to intervention when chest tubes returned to suction.  Small bilateral PTX stable on water seal, chest tubes removed 3/3.  Very small bilat apical PTX stable post-chest tube pull and again this morning; no intervention indicated.     Neurology / MSK:  Acute post-operative pain  - PO oxycodone 5mg PRN  - PRN Robaxin  - 3/3: Tylenol discontinued d/t elevation in LFT     Sternotomy  PT/OT/CR consulted  Sternal precautions      Physical deconditioning  Therapies consulted and recommending TCU at discharge      / Renal:  No Hx of renal disease, baseline creatinine ~0.6  Volume overloaded, improved  - Furosemide 20mg po daily. No lyte replacement. Patient maintains K above 4.0 with spironolactone.     Hyperkalemia, resolved  - Lasix drip was discontinued with continuing potassium supplementation  - Patient remained asymptomatic with stable MAPs and no arrhythmias  - Discontinued potassium     Equivocal UA   - Covered with 1g Rocephin x1 on 3/1   - UC negative      GI / FEN:   Elevated LFT  Suspect venous congestion as likely source.  Plans as above; continue to trend daily, avoid hepatotoxic agents as able.  - LFTs improving  - Calorie Counts 3/13/22     Moderate malnutrition in the context of acute on chronic illness  Estimated Energy Needs: 2317-7539 kcals/day    - Dietitian consulted, appreciate recs; supplements ordered   - Poor PO intake - calorie counts reordered to resume 3/5, not meeting goals. May need to consider supplemental feedings if intake not improved over  weekend.  - Increased marinol for appetite stimulation  - Pt states this is her normal appetite and she is eating as much as she can. She does like the protein shakes and is consuming them.      Dysphagia  SLP following: recommend Full liquid diet- mildly thick     Endocrine:  Stress-induced hyperglycemia - resolved: initially managed on insulin drip postop, transitioned to sliding scale goal BG <180 and since discontinued after demonstrated euglycemia without exogenous insulin     Infectious Disease:  Stress-induced leukocytosis  WBC 9.8. Stable afebrile. Procal 3/6 0.08 (0.15) (0.36)  - Completed perioperative antibiotics  - Serial blood cultures 3/2 with NGTD  - Empiric cefepime started 3/3; can transition to PO abx once WBC < 12 per Dr. Bhandari  - Daily AM CBC.  PICC removed 3/13  - Check c.diff with next stool, try to hold off on Stool softeners as able, but plan to start tomorrow if no BM+  - Consult ID 3/9    * Agree with stopping Vanco, Cdiff if able to get stool sample. Do not start po Flagyl.   - CT scan w/ contrast 3/11. Stopped Cefepime per ID recommendations. Monitor for non-infectious source.     Hematology:   Stress-induced leukocytosis, as above  Acute blood loss anemia, Hgb stable ~8-9   Acute blood loss thrombocytopenia, resolved  Left femoral artery thrombosis with acute extremity ischemia, resolved s/p thrombectomy and bovine patch per Vascular Surgery  Right sided non-occlusive IJ Thrombus  No signs or symptoms of active bleeding  - Transfuse prn for Hgb<7  - Daily CBC     Anticoagulation:   -  mg  - Subcutaneous Heparin TID  - Not currently anticoagulated for thrombosis or post-op afib (has been maintaining SR)     Prophylaxis:   Stress ulcer prophylaxis:   -Pantoprazole 40 mg daily for 30 days  -DVT prophylaxis: Subcutaneous heparin, SCD, OOB/activity/ambulation     Disposition:   - Transferred to  on 3/1  - No life-vest or ICD inpatient. May consider with Cardiology in  "outpatient  - Cards HF referral order is already placed.  - followup with Vascular Surgery established  - Rehab recommending discharge to TCU. Monday or Tuesday pending availability      Discussed with Surgeon, Dr. Bhandari via written and verbal commnication.     Amita Smith PA-c  Pager: [phone]  7:26 AM March 13, 2022           Interval History:     No overnight events.  States pain is well managed on current regimen. Slept well overnight.  Tolerating diet, is passing flatus, BM x 2. No nausea or vomiting.  Breathing well without complaints.   Working with therapies and ambulating in halls with assistance.   Denies chest pain, palpitations, dizziness, syncopal symptoms, fevers, chills, myalgias, or sternal popping/clicking.         Physical Exam:   Blood pressure 101/59, pulse 79, temperature 98.3  F (36.8  C), temperature source Oral, resp. rate 18, height 1.638 m (5' 4.5\"), weight 53.4 kg (117 lb 12.8 oz), SpO2 94 %.  Vitals:    03/11/22 0605 03/12/22 0738 03/13/22 0631   Weight: 54.3 kg (119 lb 11.2 oz) 53.7 kg (118 lb 4.8 oz) 53.4 kg (117 lb 12.8 oz)      Current Weight: 53.4 Kg Trend: -0.3 Kg  Admit weight: 54.0 Kg    Daily Fluid status; net loss: +320  mL    Net loss SA: -7161 mL  MAPs: 61-72  LVEF: 10-15%    Gen: A&Ox4, NAD  Neuro: no focal deficits   CV: RRR, normal S1 S2, no murmurs, rubs or gallops. No appreciable JVD   Vascular: Peripheral pulses present Radial 2+, Dorsalis Pedis 2+, Posterior Tibialis 2+.   Pulm: CTA, no wheezing or rhonchi, normal breathing on RA  Abd: nondistended, normal BS, soft, nontender  Ext: negative peripheral edema, 0+ pitting. Warm.   Incision: clean, dry, intact, no erythema, sternum stable  Tubes/drain sites: dressing clean and dry         Data:    Imaging:  reviewed recent imaging    Chest x-ray  Interpretation:    Echocardiogram  Interpretation:    CT scan:    Labs:  CBC  Recent Labs   Lab 03/12/22  0612 03/11/22  0605 03/10/22  0607 03/09/22  0544   WBC 11.5* " 14.3* 13.8* 17.3*   RBC 2.69* 2.76* 2.70* 2.92*   HGB 7.8* 8.0* 7.8* 8.6*   HCT 26.5* 27.0* 26.4* 28.8*   MCV 99 98 98 99   MCH 29.0 29.0 28.9 29.5   MCHC 29.4* 29.6* 29.5* 29.9*   RDW 18.5* 18.8* 19.0* 19.2*    386 338 362     CMP:  Last Comprehensive Metabolic Panel:  Sodium   Date Value Ref Range Status   03/12/2022 134 133 - 144 mmol/L Final     Potassium   Date Value Ref Range Status   03/12/2022 4.3 3.4 - 5.3 mmol/L Final     Chloride   Date Value Ref Range Status   03/12/2022 97 94 - 109 mmol/L Final     Carbon Dioxide (CO2)   Date Value Ref Range Status   03/12/2022 31 20 - 32 mmol/L Final     Anion Gap   Date Value Ref Range Status   03/12/2022 6 3 - 14 mmol/L Final     Glucose   Date Value Ref Range Status   03/12/2022 128 (H) 70 - 99 mg/dL Final     Urea Nitrogen   Date Value Ref Range Status   03/12/2022 25 7 - 30 mg/dL Final     Creatinine   Date Value Ref Range Status   03/12/2022 0.57 0.52 - 1.04 mg/dL Final     GFR Estimate   Date Value Ref Range Status   03/12/2022 >90 >60 mL/min/1.73m2 Final     Comment:     Effective December 21, 2021 eGFRcr in adults is calculated using the 2021 CKD-EPI creatinine equation which includes age and gender (Marleen barnes al., NEJ, DOI: 10.1056/DACHbt1744844)     Calcium   Date Value Ref Range Status   03/12/2022 8.6 8.5 - 10.1 mg/dL Final     Bilirubin Total   Date Value Ref Range Status   03/12/2022 0.4 0.2 - 1.3 mg/dL Final     Alkaline Phosphatase   Date Value Ref Range Status   03/12/2022 103 40 - 150 U/L Final     ALT   Date Value Ref Range Status   03/12/2022 64 (H) 0 - 50 U/L Final     AST   Date Value Ref Range Status   03/12/2022 24 0 - 45 U/L Final     BMP  Recent Labs   Lab 03/12/22  1915 03/12/22  0612 03/11/22  1606 03/11/22  0605    135 134 136   POTASSIUM 4.3 4.1 4.6 4.8   CHLORIDE 97 100 101 103   HEIDI 8.6 8.9 9.0 9.2   CO2 31 29 27 27   BUN 25 25 30 28   CR 0.57 0.59 0.66 0.62   * 92 150* 100*     INR  No lab results found in last 7  days.   Hepatic Panel  Recent Labs   Lab 03/12/22  1915 03/12/22  0612 03/11/22  1606 03/11/22  0605   AST 24 22 24 22   ALT 64* 67* 76* 78*   ALKPHOS 103 92 104 92   BILITOTAL 0.4 0.5 0.3 0.4   ALBUMIN 2.4* 2.4* 2.5* 2.4*     GLUCOSE:   Recent Labs   Lab 03/12/22  1915 03/12/22  0612 03/11/22  1606 03/11/22  0605 03/10/22  1702 03/10/22  0607   * 92 150* 100* 134* 92

## 2022-03-13 NOTE — PROGRESS NOTES
Brief note:    Our Team (Infectious Disease) have signed off on 3/11/22 given no clinical or lab evidence of infection. CT chest and abdomen performed for completeness and findings suggestive of retrosternal post surgical seroma without signs of infection. Patient had completed 8 days of empiric cefepime on 3/11/22, so we recommended to stop. For completeness, we peripherally followed the patient yesterday and this morning to assure continued stability after stopping antibiotics. Patient continues vitally stable, afebrile and white count today is normalized. Blood cultures from 3/2, 3/3 and 3/9 remain negative.     We will formally sign off today. Please call back if any question or need. Please also call back if new positivity from cultures.     Lay Reno  03/13/22

## 2022-03-14 ENCOUNTER — APPOINTMENT (OUTPATIENT)
Dept: PHYSICAL THERAPY | Facility: CLINIC | Age: 72
DRG: 233 | End: 2022-03-14
Attending: INTERNAL MEDICINE
Payer: MEDICARE

## 2022-03-14 LAB
ALBUMIN SERPL-MCNC: 2.5 G/DL (ref 3.4–5)
ALP SERPL-CCNC: 95 U/L (ref 40–150)
ALT SERPL W P-5'-P-CCNC: 52 U/L (ref 0–50)
ANION GAP SERPL CALCULATED.3IONS-SCNC: 7 MMOL/L (ref 3–14)
AST SERPL W P-5'-P-CCNC: 20 U/L (ref 0–45)
BACTERIA BLD CULT: NO GROWTH
BACTERIA BLD CULT: NO GROWTH
BILIRUB SERPL-MCNC: 0.5 MG/DL (ref 0.2–1.3)
BUN SERPL-MCNC: 21 MG/DL (ref 7–30)
CALCIUM SERPL-MCNC: 8.9 MG/DL (ref 8.5–10.1)
CHLORIDE BLD-SCNC: 101 MMOL/L (ref 94–109)
CO2 SERPL-SCNC: 27 MMOL/L (ref 20–32)
CREAT SERPL-MCNC: 0.6 MG/DL (ref 0.52–1.04)
ERYTHROCYTE [DISTWIDTH] IN BLOOD BY AUTOMATED COUNT: 18.2 % (ref 10–15)
GFR SERPL CREATININE-BSD FRML MDRD: >90 ML/MIN/1.73M2
GLUCOSE BLD-MCNC: 92 MG/DL (ref 70–99)
GLUCOSE BLDC GLUCOMTR-MCNC: 111 MG/DL (ref 70–99)
HCT VFR BLD AUTO: 28.2 % (ref 35–47)
HGB BLD-MCNC: 8.3 G/DL (ref 11.7–15.7)
MAGNESIUM SERPL-MCNC: 2.1 MG/DL (ref 1.6–2.3)
MCH RBC QN AUTO: 28.2 PG (ref 26.5–33)
MCHC RBC AUTO-ENTMCNC: 29.4 G/DL (ref 31.5–36.5)
MCV RBC AUTO: 96 FL (ref 78–100)
PHOSPHATE SERPL-MCNC: 4.2 MG/DL (ref 2.5–4.5)
PLATELET # BLD AUTO: 448 10E3/UL (ref 150–450)
POTASSIUM BLD-SCNC: 4.2 MMOL/L (ref 3.4–5.3)
PROT SERPL-MCNC: 6 G/DL (ref 6.8–8.8)
RBC # BLD AUTO: 2.94 10E6/UL (ref 3.8–5.2)
SODIUM SERPL-SCNC: 135 MMOL/L (ref 133–144)
WBC # BLD AUTO: 9.6 10E3/UL (ref 4–11)

## 2022-03-14 PROCEDURE — 250N000013 HC RX MED GY IP 250 OP 250 PS 637: Performed by: PHYSICIAN ASSISTANT

## 2022-03-14 PROCEDURE — 80053 COMPREHEN METABOLIC PANEL: CPT | Performed by: STUDENT IN AN ORGANIZED HEALTH CARE EDUCATION/TRAINING PROGRAM

## 2022-03-14 PROCEDURE — 250N000013 HC RX MED GY IP 250 OP 250 PS 637: Performed by: NURSE PRACTITIONER

## 2022-03-14 PROCEDURE — 250N000011 HC RX IP 250 OP 636: Performed by: PHYSICIAN ASSISTANT

## 2022-03-14 PROCEDURE — 85018 HEMOGLOBIN: CPT | Performed by: SURGERY

## 2022-03-14 PROCEDURE — 214N000001 HC R&B CCU UMMC

## 2022-03-14 PROCEDURE — 250N000011 HC RX IP 250 OP 636: Performed by: NURSE PRACTITIONER

## 2022-03-14 PROCEDURE — 82040 ASSAY OF SERUM ALBUMIN: CPT | Performed by: STUDENT IN AN ORGANIZED HEALTH CARE EDUCATION/TRAINING PROGRAM

## 2022-03-14 PROCEDURE — 97530 THERAPEUTIC ACTIVITIES: CPT | Mod: GP

## 2022-03-14 PROCEDURE — 250N000013 HC RX MED GY IP 250 OP 250 PS 637: Performed by: SURGERY

## 2022-03-14 PROCEDURE — 36415 COLL VENOUS BLD VENIPUNCTURE: CPT | Performed by: STUDENT IN AN ORGANIZED HEALTH CARE EDUCATION/TRAINING PROGRAM

## 2022-03-14 PROCEDURE — 83735 ASSAY OF MAGNESIUM: CPT | Performed by: SURGERY

## 2022-03-14 PROCEDURE — 97116 GAIT TRAINING THERAPY: CPT | Mod: GP

## 2022-03-14 PROCEDURE — 84100 ASSAY OF PHOSPHORUS: CPT | Performed by: SURGERY

## 2022-03-14 PROCEDURE — 250N000013 HC RX MED GY IP 250 OP 250 PS 637: Performed by: STUDENT IN AN ORGANIZED HEALTH CARE EDUCATION/TRAINING PROGRAM

## 2022-03-14 RX ADMIN — Medication 1 TABLET: at 10:24

## 2022-03-14 RX ADMIN — Medication 12.5 MG: at 10:24

## 2022-03-14 RX ADMIN — POLYETHYLENE GLYCOL 3350 17 G: 17 POWDER, FOR SOLUTION ORAL at 19:03

## 2022-03-14 RX ADMIN — Medication 250 MG: at 19:03

## 2022-03-14 RX ADMIN — OXYCODONE HYDROCHLORIDE 5 MG: 5 SOLUTION ORAL at 18:59

## 2022-03-14 RX ADMIN — THIAMINE HCL TAB 100 MG 100 MG: 100 TAB at 10:24

## 2022-03-14 RX ADMIN — OXYCODONE HYDROCHLORIDE 5 MG: 5 SOLUTION ORAL at 00:12

## 2022-03-14 RX ADMIN — Medication 500 MG: at 00:12

## 2022-03-14 RX ADMIN — HEPARIN SODIUM 5000 UNITS: 5000 INJECTION, SOLUTION INTRAVENOUS; SUBCUTANEOUS at 09:31

## 2022-03-14 RX ADMIN — OXYCODONE HYDROCHLORIDE 5 MG: 5 SOLUTION ORAL at 10:33

## 2022-03-14 RX ADMIN — OXYCODONE HYDROCHLORIDE 5 MG: 5 SOLUTION ORAL at 04:33

## 2022-03-14 RX ADMIN — ASPIRIN 81 MG CHEWABLE TABLET 162 MG: 81 TABLET CHEWABLE at 10:23

## 2022-03-14 RX ADMIN — PANTOPRAZOLE SODIUM 40 MG: 40 TABLET, DELAYED RELEASE ORAL at 10:24

## 2022-03-14 RX ADMIN — ONDANSETRON 4 MG: 2 INJECTION INTRAMUSCULAR; INTRAVENOUS at 21:01

## 2022-03-14 RX ADMIN — FUROSEMIDE 20 MG: 20 TABLET ORAL at 10:24

## 2022-03-14 RX ADMIN — HEPARIN SODIUM 5000 UNITS: 5000 INJECTION, SOLUTION INTRAVENOUS; SUBCUTANEOUS at 16:26

## 2022-03-14 RX ADMIN — OXYCODONE HYDROCHLORIDE 5 MG: 5 SOLUTION ORAL at 14:32

## 2022-03-14 RX ADMIN — HEPARIN SODIUM 5000 UNITS: 5000 INJECTION, SOLUTION INTRAVENOUS; SUBCUTANEOUS at 00:12

## 2022-03-14 RX ADMIN — SPIRONOLACTONE 25 MG: 25 TABLET ORAL at 10:24

## 2022-03-14 RX ADMIN — ATORVASTATIN CALCIUM 40 MG: 40 TABLET, FILM COATED ORAL at 10:24

## 2022-03-14 ASSESSMENT — ACTIVITIES OF DAILY LIVING (ADL)
ADLS_ACUITY_SCORE: 9
ADLS_ACUITY_SCORE: 10
ADLS_ACUITY_SCORE: 9
ADLS_ACUITY_SCORE: 8
ADLS_ACUITY_SCORE: 8
ADLS_ACUITY_SCORE: 10
ADLS_ACUITY_SCORE: 8
ADLS_ACUITY_SCORE: 10
ADLS_ACUITY_SCORE: 8
ADLS_ACUITY_SCORE: 10
ADLS_ACUITY_SCORE: 9
ADLS_ACUITY_SCORE: 9
ADLS_ACUITY_SCORE: 8
ADLS_ACUITY_SCORE: 8
ADLS_ACUITY_SCORE: 9
ADLS_ACUITY_SCORE: 9
ADLS_ACUITY_SCORE: 10
ADLS_ACUITY_SCORE: 8
ADLS_ACUITY_SCORE: 8
ADLS_ACUITY_SCORE: 10
ADLS_ACUITY_SCORE: 8
ADLS_ACUITY_SCORE: 8

## 2022-03-14 NOTE — PROGRESS NOTES
"Vascular Surgery Brief Progress Note    No acute complaints this am. Denies pain at rest at incision site. Reports occasional bilateral leg swelling. Tegaderm removed from L groin incision. Denies pain, numbness, tingling, motor deficits in BLE.     BP 92/50 (BP Location: Left arm)   Pulse 74   Temp 97.9  F (36.6  C) (Oral)   Resp 20   Ht 1.638 m (5' 4.5\")   Wt 53.1 kg (117 lb 1.6 oz)   SpO2 96%   BMI 19.79 kg/m    Gen: alert, awake, pleasant female. Resting in bed   Resp: nonlabored breathing on room air  CV: RR  Incisions: L Groin: Clean, dry, in tact. No erythema or edema. No drainage.  Scarring palpable at superior aspect of incision.   L knee: clean, dry, in tact. Some minimal surrounding discoloration.  Extremities: Doppler signals biphasic bilaterally in DP and PT.  Warm and well perfused.   Neuro: Strength 5/5 in all major muscle groups in b/l lower extremities.            A/P: Ashley Osman is a 71 year old year old female who has a PMH significant for CAD, HFrEF (EF 10-15%), COPD, ICM, tobacco use who underwent CABG x4 on 2/24/22 with left saphenous vein harvest. Overnight 2/24 lost pulses to LLE prompting discontinuation of the IABP. Arterial duplex showed monophasic flow to bilateral lower extremities. CTA showed a thrombotic occlusion of the distal left external iliac and proximal CFA. Now s/p left femoral thrombectomy with bovine patch angioplasty 2/25/22 with restoration of biphasic L PT and DP doppler signals.    - Tegaderm removed from L groin  - Wound appears to be healing appropriately.   - No need for further imaging  - Will arrange follow up with Vascular Surgery   Call with questions     Dave Maldonado MD   Vascular Surgery PGY1         "

## 2022-03-14 NOTE — PLAN OF CARE
Patient is A/O x4. SBA. VSS, RA. Minimal c/o pain. PRN oxy given with relief per pt report. Tele NSR. Lung sounds clear in upper lobes and diminished in bases. Encourage IS. ELLER noted. Sternal incision and CT sites CDI and LISS. L groin and L leg harvest site LISS and CDI. Regular diet, with minimal appetite. Byron counts through this evening. Pt still currently awaiting TCU placement for discharge. Pt in bed with call light in reach.

## 2022-03-14 NOTE — PROGRESS NOTES
Cardiovascular Surgery Progress Note  03/14/2022         Assessment and Plan:     Ashley Osman is a 71 year old female with PMH of HFrEF (10-15%) 2/2 ICM (Hx LAD and Circumflex MIs; total occlusion of RCA and LAD), Biventricular failure (requiring inotropic support PTA to [facility]), COPD, HLD, Tobacco Use Disorder (quit 12/2021), CAD who underwent CABG x4 on 02/24 with Dr. Bhandari. Found to have left femoral artery thrombosis with acute left lower extremity ischemia s/p emergent left femoral thrombectomy, bovine patch angioplasty on 2/25. 02/28 with elevated LFTs.     Cardiovascular:   Hx of biventricular HF requiring pressors, CAD, HFrEF 10-15%. Tobacco abuse, s/p CABG x4 on 2/24  Atrial fibrillation with RVR post surgery, HD stable-off pressors 2/28.  2/28 echo showed LV EF 15%, stable on 3/3 echo with LVEF 10-15% - no evidence of RH dysfunction but elevated RA/PA pressures   - Daily weights  - ASA 162mg  Atorvastatin 40mg daily  Spironolactone  - Start Toprol 12.5 mg daily (per cards recs)   - [facility] consulted 2/22 however pt not scheduled as declining specialty care. 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 s/p emergent left femoral thrombectomy, bovine patch angioplasty on 2/25  Occurred in setting of recent perioperative IABP  - Heparin gtt discontinued, s/p thrombectomy per Vascular  - On subcutaneous heparin  - Monitor lower extremity pulses and perfusion status  - Per vascular recs 3/1:              - NO need for additional anticoagulation from surgery standpoint              - Recommend ASA when able and statin when able               - Incisional dressing to stay on for 2 weeks              - Vascular will sign off              - Will arrange followup with Vascular Surgery  - LLE duplex US 3/4 to r/o DVT given LLE swelling>RLE - results negative for DVT      Possible RHF (Elevated LFTs)  Ruled out on 3/3 echo, elevated LFT attributed to  volume overload   - HF Cardiology consulted, appreciate recs   - Continue holding amio due to LFTs   - PRN tylenol discontinued 3/3   - Daily LFTs    Chest tubes: removed 3/3  TPW: removed without difficulty     Pulmonary:  Extubated POD #5; Saturating well on RA  Supplemental O2 PRN to keep sats > 92%.  Pulm toilet, IS, activity/OOB and deep breathing     Bilateral PTX, stable  IR consulted 3/2 for chest tube placement however PTX resolved prior to intervention when chest tubes returned to suction.  Small bilateral PTX stable on water seal, chest tubes removed 3/3.  Very small bilat apical PTX stable post-chest tube pull and again this morning; no intervention indicated.     Neurology / MSK:  Acute post-operative pain  - PO oxycodone 5mg PRN  - PRN Robaxin  - 3/3: Tylenol discontinued d/t elevation in LFT     Sternotomy  PT/OT/CR consulted  Sternal precautions      Physical deconditioning  Therapies consulted and recommending TCU at discharge      / Renal:  No Hx of renal disease, baseline creatinine ~0.6  Volume overloaded, improved  - Furosemide 20mg po daily. Appears euvolemic on exam. No lyte replacement. Patient maintains K above 4.0 with spironolactone.     Hyperkalemia, resolved  - Lasix drip was discontinued with continuing potassium supplementation  - Patient remained asymptomatic with stable MAPs and no arrhythmias  - Discontinued potassium     Equivocal UA   - Covered with 1g Rocephin x1 on 3/1   - UC negative      GI / FEN:   Elevated LFT  Suspect venous congestion as likely source.  Plans as above; continue to trend daily, avoid hepatotoxic agents as able.  - LFTs improving  - Calorie Counts 3/13/22     Moderate malnutrition in the context of acute on chronic illness  Estimated Energy Needs: 2524-4902 kcals/day    - Dietitian consulted, appreciate recs; supplements ordered   - Poor PO intake - calorie counts reordered to resume 3/5, not meeting goals. May need to consider supplemental feedings if  intake not improved over weekend.  - Increased marinol for appetite stimulation  - Pt states this is her normal appetite and she is eating as much as she can. She does like the protein shakes and is consuming them.   - Byron counts starting 3/13     Dysphagia  SLP following: recommend Full liquid diet- mildly thick     Endocrine:  Stress-induced hyperglycemia - resolved: initially managed on insulin drip postop, transitioned to sliding scale goal BG <180 and since discontinued after demonstrated euglycemia without exogenous insulin      Infectious Disease:  Stress-induced leukocytosis  WBC WNL. Stable afebrile. Procal 3/6 0.08 (0.15) (0.36)  - Completed perioperative antibiotics  - Serial blood cultures 3/2 with NGTD  - Empiric cefepime started 3/3; can transition to PO abx once WBC < 12 per Dr. Bhandari  - Daily AM CBC.  PICC removed 3/13  - Planned to check c.diff with next stool, try to hold off on Stool softeners as able. +BM today  - Consult ID 3/9, now signed off.    * Agree with stopping Vanco, Cdiff if able to get stool sample (hold off now that leukocytosis resolved and having normal BMs). Do not start po Flagyl.   - CT scan w/ contrast 3/11. Stopped Cefepime per ID recommendations.   - WBC stable off antibiotics, continue to monitor     Hematology:   Stress-induced leukocytosis, as above  Acute blood loss anemia, Hgb stable ~8-9   Acute blood loss thrombocytopenia, resolved  Left femoral artery thrombosis with acute extremity ischemia, resolved s/p thrombectomy and bovine patch per Vascular Surgery  Right sided non-occlusive IJ Thrombus  No signs or symptoms of active bleeding  - Transfuse prn for Hgb<7  - Daily CBC     Anticoagulation:   -  mg  - Subcutaneous Heparin TID  - Not currently anticoagulated for thrombosis or post-op afib (has been maintaining SR)     Prophylaxis:   Stress ulcer prophylaxis:   -Pantoprazole 40 mg daily for 30 days  -DVT prophylaxis: Subcutaneous heparin, SCD,  "OOB/activity/ambulation     Disposition:   - Transferred to  on 3/1  - No life-vest or ICD inpatient. May consider with Cardiology in outpatient  - Cards HF referral order is already placed.  - followup with Vascular Surgery established  - Rehab recommending discharge to TCU. Monday or Tuesday pending availability      Discussed with Surgeon, Dr. Bhandari via written and verbal commnication.     Boni Aragon PA-C  Cardiothoracic Surgery  p: [phone]          Interval History:     No overnight events.  States pain is well managed on current regimen. Slept well overnight.  Tolerating diet, is passing flatus, +BM. No nausea or vomiting.  Breathing well without complaints.   Working with therapies and ambulating in halls with assistance.   Denies chest pain, palpitations, dizziness, syncopal symptoms, fevers, chills, myalgias, or sternal popping/clicking.         Physical Exam:   Blood pressure 102/56, pulse 79, temperature 98.2  F (36.8  C), temperature source Oral, resp. rate 18, height 1.638 m (5' 4.5\"), weight 53.1 kg (117 lb 1.6 oz), SpO2 94 %.  Vitals:    03/12/22 0738 03/13/22 0631 03/14/22 0429   Weight: 53.7 kg (118 lb 4.8 oz) 53.4 kg (117 lb 12.8 oz) 53.1 kg (117 lb 1.6 oz)        Gen: A&Ox4, NAD  Neuro: no focal deficits   CV: RRR, normal S1 S2, no murmurs, rubs or gallops. No appreciable JVD   Vascular: Peripheral pulses present Radial 2+, Dorsalis Pedis 2+, Posterior Tibialis 2+.   Pulm: CTA, no wheezing or rhonchi, normal breathing on RA  Abd: nondistended, normal BS, soft, nontender  Ext: negative peripheral edema, 0+ pitting. Warm.   Incision: clean, dry, intact, no erythema, sternum stable  Tubes/drain sites: dressing clean and dry         Data:    Imaging:  reviewed recent imaging  No results found for this or any previous visit (from the past 24 hour(s)).    Labs:  CBC  Recent Labs   Lab 03/14/22  0536 03/13/22  0801 03/12/22  0612 03/11/22  0605   WBC 9.6 9.4 11.5* 14.3*   RBC 2.94* 2.77* " 2.69* 2.76*   HGB 8.3* 7.9* 7.8* 8.0*   HCT 28.2* 26.8* 26.5* 27.0*   MCV 96 97 99 98   MCH 28.2 28.5 29.0 29.0   MCHC 29.4* 29.5* 29.4* 29.6*   RDW 18.2* 18.3* 18.5* 18.8*    382 391 386     CMP:  Last Comprehensive Metabolic Panel:  Sodium   Date Value Ref Range Status   03/14/2022 135 133 - 144 mmol/L Final     Potassium   Date Value Ref Range Status   03/14/2022 4.2 3.4 - 5.3 mmol/L Final     Chloride   Date Value Ref Range Status   03/14/2022 101 94 - 109 mmol/L Final     Carbon Dioxide (CO2)   Date Value Ref Range Status   03/14/2022 27 20 - 32 mmol/L Final     Anion Gap   Date Value Ref Range Status   03/14/2022 7 3 - 14 mmol/L Final     Glucose   Date Value Ref Range Status   03/14/2022 92 70 - 99 mg/dL Final     Urea Nitrogen   Date Value Ref Range Status   03/14/2022 21 7 - 30 mg/dL Final     Creatinine   Date Value Ref Range Status   03/14/2022 0.60 0.52 - 1.04 mg/dL Final     GFR Estimate   Date Value Ref Range Status   03/14/2022 >90 >60 mL/min/1.73m2 Final     Comment:     Effective December 21, 2021 eGFRcr in adults is calculated using the 2021 CKD-EPI creatinine equation which includes age and gender (Marleen et al., NEJ, DOI: 10.1056/DUAWdk4108625)     Calcium   Date Value Ref Range Status   03/14/2022 8.9 8.5 - 10.1 mg/dL Final     Bilirubin Total   Date Value Ref Range Status   03/14/2022 0.5 0.2 - 1.3 mg/dL Final     Alkaline Phosphatase   Date Value Ref Range Status   03/14/2022 95 40 - 150 U/L Final     ALT   Date Value Ref Range Status   03/14/2022 52 (H) 0 - 50 U/L Final     AST   Date Value Ref Range Status   03/14/2022 20 0 - 45 U/L Final     BMP  Recent Labs   Lab 03/14/22  0536 03/13/22  1650 03/13/22  0801 03/12/22  1915    134 136 134   POTASSIUM 4.2 4.4 4.3 4.3   CHLORIDE 101 98 99 97   HEIDI 8.9 9.0 8.8 8.6   CO2 27 30 32 31   BUN 21 23 20 25   CR 0.60 0.84 0.56 0.57   GLC 92 129* 92 128*     INR  No lab results found in last 7 days.   Hepatic Panel  Recent Labs   Lab  03/14/22  0536 03/13/22  1650 03/13/22  0801 03/12/22  1915   AST 20 22 18 24   ALT 52* 60* 58* 64*   ALKPHOS 95 110 92 103   BILITOTAL 0.5 0.5 0.4 0.4   ALBUMIN 2.5* 2.6* 2.4* 2.4*     GLUCOSE:   Recent Labs   Lab 03/14/22  0536 03/13/22  1650 03/13/22  0801 03/12/22  1915 03/12/22  0612 03/11/22  1606   GLC 92 129* 92 128* 92 150*

## 2022-03-14 NOTE — PROGRESS NOTES
SPIRITUAL HEALTH SERVICES  SPIRITUAL ASSESSMENT Progress Note  [facility] ([place] 6C     REFERRAL SOURCE: Follow up    Pt was receptive to  visit, talked about her desire to go home and her frustration with lack of clarity about a plan moving forward with her care.  Pt reiterated her shanna is important to her and was accepting of 's prayer for healing and strength.     PLAN: [facility] will remain available    Nohemi Briscoelain  Pager: [phone]

## 2022-03-14 NOTE — PROGRESS NOTES
Calorie Count  Intake recorded for: 3/13  Total Kcals: 1,102 Total Protein: 52g  Kcals from Hospital Food: 1,077   Protein: 52g  Kcals from Outside Food (average):25 Protein: 0g  # Meals Ordered from Kitchen: 2  # Meals Recorded: 2  First- 100% 4 sugar, greek yogurt, cornflakes, 50% 8oz milk   Second-100% 8oz milk, orange sherbet, 50% BLT sandwich   From nursing- 50% cup applesauce   # Supplements Recorded: 50% each 2 ensure enlive shake

## 2022-03-14 NOTE — PLAN OF CARE
"Shift: 1900-0730     Diagnosis: CABG x4 (2/24) & emergent L femoral thrombectomy w/ Bovine patch (2/25)     Vitals: /56 (BP Location: Left arm)   Pulse 79   Temp 98.2  F (36.8  C) (Oral)   Resp 18   Ht 1.638 m (5' 4.5\")   Wt 53.1 kg (117 lb 1.6 oz)   SpO2 94%   BMI 19.79 kg/m        Neuro: A/o x4, forgetful, [place]  Resp: RA, sating low-mid 90's. Denies SOB.   Cardiac: sinus rhythm, lower pressures, denies chest pain  GI/: voiding appropriately, last BM 3/13  Skin: sternal incision, CT sites, groin site, and left calf incision CDI.   LDAs: RPIV saline locked. Immobilizer on wrist for pain when moving.  PRN Meds: PRN Robaxin and PRN oxy given.  Mobility: SBA      Goals/Plan: continue education of sternal precautions, awaiting placement at TCU, discharge today Monday 3/14 or Tuesday 3/15, continue plan of care, notify team of any significant changes       "

## 2022-03-14 NOTE — PROGRESS NOTES
"Care Management Follow Up    Length of Stay (days): 29    Expected Discharge Date: 03/11/2022     Concerns to be Addressed:       Patient plan of care discussed at interdisciplinary rounds: Yes    Anticipated Discharge Disposition: TCU       Anticipated Discharge Services:  Therapy services  Anticipated Discharge DME:  n/a    Patient/family educated on Medicare website which has current facility and service quality ratings:  yes  Education Provided on the Discharge Plan:  yes  Patient/Family in Agreement with the Plan:  Yes, per discussion with SW    Referrals Placed by CM/SW:    DENIAL  [facility] (NO longer accepting SNF/TCU referrals as they are transitioning to an [facility], Referral discontinued)  [facility] (denied due to the lack of beds available)  [facility] (nursing home admissions, SW was told that across all five units, there are no beds available)  [facility] (3/11: Brittany from admissions called the SW and said that due to her hemoglobin level being too low, they believe she'd admit back to the hospital if she was at their facility. So, Brittany says the denial is due to the level of care being \"too much\" for the nursing staff to handle.)  [facility] (3/14: referral was declined due to her cognitive issues and there being no beds available, as stated by admissions)  [facility] (3/14: the person who is covering admissions today left a voicemail for the SW and said that since they haven't seen anything about Pt, their guess is that the referral was denied, SW will call back to get a more detailed explanation)  [address] [address]  - [phone]  - 3/09: placed a referral through [facility]  - 3/10: MARVIN spoke with Anand in admissions, he says that they are at capacity until the 15th, but SW can fax over @ [phone] the referral. SW faxed over " the referral in two parts because when printed, it was a lot of pages. SW called admissions back and left a voicemail to let them know (11:58am).   - 3/11: Anand from admissions spoke with SW and said that they're at capacity and the next expected discharge isn't until the 15th. He says if the Pt is here by then, then send a referral to them and they'll take a look.   [facility]  - [address] [place] [address]  - [phone]  - 3/11: sent a referral through Epic  - 3/14: extension #2, SW will get a call back from manager in admissions about an update on the referral for Pt.   Addendum 3:55pm 3/14: talked with admissions, no beds available, will continue to follow and if SW finds other placement for the Pt, they will update admissions.   [facility]  - [facility] [address] [place] [address]  - [phone]  - 3/11: sent a referral through Epic  - 3/14: called and left a voicemail  - 3/14: got a call back and they currently don't have any beds available, admissions said that if anything changes or availability opens up, SW will get a call back    PENDING   [facility]   - [address] [place] [address]  - [phone]  - 3/09: placed a referral through Epic  - 3/10: got transferred to Human Resources even though the SW asked for admissions, left a voicemail for them to call back  - 3/11: called and left a voicemail  - 3/14: called and left a voicemail    [facility]  - [address] [place] [address]  - [phone]  - 3/11: sent a referral through Epic  - 3/14: called and left a voicemail     [facility]  - [address] [place] [address]  - [phone]  - 3/11: sent a referral through Epic  - 3/14: called and left a voicemail     [facility] location  - [address], [place] [address]  - [phone]  - 3/11: sent a referral through [facility]  - 3/14: got a voicemail this morning and  SW called back and left a voicemail to follow up.   Private pay costs discussed: Not applicable    Additional Information:  SW called and followed up on referrals that are listed in the referrals section. SW is expecting a call back from the [facility] on an update on Pt's referral. SW is also expecting a call back from the [facility] about any updates. She was accepted but they want to see if there's been any changes. Addendum 4:18pm: SW went into Pt's room to update but since Pt was taking a nap, SW will revisit tomorrow.     ACCEPTED:  [facility]   - [address] [place] [address]  - [phone], [phone] is the number to call  - 3/14: got a call back from admissions and they are able to accept Pt.. With the fact that Pt doesn't have her booster shot, she would have to quarantine for ten days if admitted. Admissions also informed SW that Pt's first 30 days of stay are covered by her insurance, but if Pt stays longer, there is a daily co-pay of approximately $190 per day. SW talked with Anand and Etienne (Pt's sons) separately via phone to relay this information. They both want to get more updates from the other facilities before coming to a decision about discharge for Pt.       ___________________    BARAK Morse  6C   [facility]  Pager [phone]  Phone [phone]

## 2022-03-15 ENCOUNTER — APPOINTMENT (OUTPATIENT)
Dept: OCCUPATIONAL THERAPY | Facility: CLINIC | Age: 72
DRG: 233 | End: 2022-03-15
Attending: INTERNAL MEDICINE
Payer: MEDICARE

## 2022-03-15 ENCOUNTER — APPOINTMENT (OUTPATIENT)
Dept: PHYSICAL THERAPY | Facility: CLINIC | Age: 72
DRG: 233 | End: 2022-03-15
Attending: INTERNAL MEDICINE
Payer: MEDICARE

## 2022-03-15 LAB
ALBUMIN SERPL-MCNC: 2.6 G/DL (ref 3.4–5)
ALP SERPL-CCNC: 99 U/L (ref 40–150)
ALT SERPL W P-5'-P-CCNC: 46 U/L (ref 0–50)
ANION GAP SERPL CALCULATED.3IONS-SCNC: 6 MMOL/L (ref 3–14)
AST SERPL W P-5'-P-CCNC: 20 U/L (ref 0–45)
BILIRUB SERPL-MCNC: 0.5 MG/DL (ref 0.2–1.3)
BUN SERPL-MCNC: 18 MG/DL (ref 7–30)
CALCIUM SERPL-MCNC: 9.1 MG/DL (ref 8.5–10.1)
CHLORIDE BLD-SCNC: 101 MMOL/L (ref 94–109)
CO2 SERPL-SCNC: 30 MMOL/L (ref 20–32)
CREAT SERPL-MCNC: 0.6 MG/DL (ref 0.52–1.04)
ERYTHROCYTE [DISTWIDTH] IN BLOOD BY AUTOMATED COUNT: 18 % (ref 10–15)
GFR SERPL CREATININE-BSD FRML MDRD: >90 ML/MIN/1.73M2
GLUCOSE BLD-MCNC: 88 MG/DL (ref 70–99)
HCT VFR BLD AUTO: 29.1 % (ref 35–47)
HGB BLD-MCNC: 8.6 G/DL (ref 11.7–15.7)
MAGNESIUM SERPL-MCNC: 2.3 MG/DL (ref 1.6–2.3)
MCH RBC QN AUTO: 28.3 PG (ref 26.5–33)
MCHC RBC AUTO-ENTMCNC: 29.6 G/DL (ref 31.5–36.5)
MCV RBC AUTO: 96 FL (ref 78–100)
PHOSPHATE SERPL-MCNC: 4.8 MG/DL (ref 2.5–4.5)
PLATELET # BLD AUTO: 441 10E3/UL (ref 150–450)
POTASSIUM BLD-SCNC: 4.5 MMOL/L (ref 3.4–5.3)
PROT SERPL-MCNC: 6.2 G/DL (ref 6.8–8.8)
RBC # BLD AUTO: 3.04 10E6/UL (ref 3.8–5.2)
SODIUM SERPL-SCNC: 137 MMOL/L (ref 133–144)
WBC # BLD AUTO: 8.8 10E3/UL (ref 4–11)

## 2022-03-15 PROCEDURE — 84100 ASSAY OF PHOSPHORUS: CPT | Performed by: SURGERY

## 2022-03-15 PROCEDURE — 250N000013 HC RX MED GY IP 250 OP 250 PS 637: Performed by: PHYSICIAN ASSISTANT

## 2022-03-15 PROCEDURE — 214N000001 HC R&B CCU UMMC

## 2022-03-15 PROCEDURE — 250N000011 HC RX IP 250 OP 636: Performed by: NURSE PRACTITIONER

## 2022-03-15 PROCEDURE — 97116 GAIT TRAINING THERAPY: CPT | Mod: GP

## 2022-03-15 PROCEDURE — 250N000013 HC RX MED GY IP 250 OP 250 PS 637: Performed by: STUDENT IN AN ORGANIZED HEALTH CARE EDUCATION/TRAINING PROGRAM

## 2022-03-15 PROCEDURE — 97535 SELF CARE MNGMENT TRAINING: CPT | Mod: GO | Performed by: OCCUPATIONAL THERAPIST

## 2022-03-15 PROCEDURE — 85027 COMPLETE CBC AUTOMATED: CPT | Performed by: SURGERY

## 2022-03-15 PROCEDURE — 250N000013 HC RX MED GY IP 250 OP 250 PS 637: Performed by: NURSE PRACTITIONER

## 2022-03-15 PROCEDURE — 97530 THERAPEUTIC ACTIVITIES: CPT | Mod: GO | Performed by: OCCUPATIONAL THERAPIST

## 2022-03-15 PROCEDURE — 250N000013 HC RX MED GY IP 250 OP 250 PS 637: Performed by: SURGERY

## 2022-03-15 PROCEDURE — 97530 THERAPEUTIC ACTIVITIES: CPT | Mod: GP

## 2022-03-15 PROCEDURE — 83735 ASSAY OF MAGNESIUM: CPT | Performed by: SURGERY

## 2022-03-15 PROCEDURE — 36415 COLL VENOUS BLD VENIPUNCTURE: CPT | Performed by: PHYSICIAN ASSISTANT

## 2022-03-15 PROCEDURE — 82040 ASSAY OF SERUM ALBUMIN: CPT | Performed by: PHYSICIAN ASSISTANT

## 2022-03-15 PROCEDURE — 80053 COMPREHEN METABOLIC PANEL: CPT | Performed by: PHYSICIAN ASSISTANT

## 2022-03-15 RX ADMIN — OXYCODONE HYDROCHLORIDE 5 MG: 5 SOLUTION ORAL at 11:08

## 2022-03-15 RX ADMIN — SPIRONOLACTONE 25 MG: 25 TABLET ORAL at 08:19

## 2022-03-15 RX ADMIN — HEPARIN SODIUM 5000 UNITS: 5000 INJECTION, SOLUTION INTRAVENOUS; SUBCUTANEOUS at 00:16

## 2022-03-15 RX ADMIN — PANTOPRAZOLE SODIUM 40 MG: 40 TABLET, DELAYED RELEASE ORAL at 08:17

## 2022-03-15 RX ADMIN — FUROSEMIDE 20 MG: 20 TABLET ORAL at 08:17

## 2022-03-15 RX ADMIN — HEPARIN SODIUM 5000 UNITS: 5000 INJECTION, SOLUTION INTRAVENOUS; SUBCUTANEOUS at 17:10

## 2022-03-15 RX ADMIN — OXYCODONE HYDROCHLORIDE 5 MG: 5 SOLUTION ORAL at 21:01

## 2022-03-15 RX ADMIN — ATORVASTATIN CALCIUM 40 MG: 40 TABLET, FILM COATED ORAL at 08:17

## 2022-03-15 RX ADMIN — Medication 250 MG: at 20:42

## 2022-03-15 RX ADMIN — ASPIRIN 81 MG CHEWABLE TABLET 162 MG: 81 TABLET CHEWABLE at 08:15

## 2022-03-15 RX ADMIN — HEPARIN SODIUM 5000 UNITS: 5000 INJECTION, SOLUTION INTRAVENOUS; SUBCUTANEOUS at 08:17

## 2022-03-15 RX ADMIN — HEPARIN SODIUM 5000 UNITS: 5000 INJECTION, SOLUTION INTRAVENOUS; SUBCUTANEOUS at 23:38

## 2022-03-15 RX ADMIN — POLYETHYLENE GLYCOL 3350 17 G: 17 POWDER, FOR SOLUTION ORAL at 20:28

## 2022-03-15 RX ADMIN — THIAMINE HCL TAB 100 MG 100 MG: 100 TAB at 08:19

## 2022-03-15 RX ADMIN — Medication 12.5 MG: at 08:17

## 2022-03-15 RX ADMIN — Medication 1 TABLET: at 08:17

## 2022-03-15 RX ADMIN — DRONABINOL 5 MG: 5 CAPSULE ORAL at 08:28

## 2022-03-15 ASSESSMENT — ACTIVITIES OF DAILY LIVING (ADL)
ADLS_ACUITY_SCORE: 8
ADLS_ACUITY_SCORE: 10
ADLS_ACUITY_SCORE: 10
ADLS_ACUITY_SCORE: 8
ADLS_ACUITY_SCORE: 10
ADLS_ACUITY_SCORE: 8

## 2022-03-15 NOTE — PROGRESS NOTES
Admit: 2/12/2022  7:28 PM Heart failure     Neuro: AAOx3-4. Forgetful, not a good historian. Not always aware of situation. Pain 0-3/10.    Vitals: Temp: 97.5  F (36.4  C) Temp src: Oral BP: 104/51 Pulse: 74   Resp: 16 SpO2: 90 % O2 Device: None (Room air)      GI/: Regular diet with supplemental shakes. Last BM 3/13. Low UOP, MD notified. On lasix.     Running: PIV SL    Mobility: SBA with GB and FWW    Changes: medically ready for discharge    Plan: Multiple referrals to TCU's. Patient prefers to go home with son and home health. Possible discharge tomorrow if team agrees with this plan.

## 2022-03-15 NOTE — PLAN OF CARE
"Shift: 1900-0730     Diagnosis: CABG x4 (2/24) & emergent L femoral thrombectomy w/ Bovine patch (2/25)     Vitals: BP 95/50 (BP Location: Left arm)   Pulse 71   Temp 97.9  F (36.6  C) (Oral)   Resp 16   Ht 1.638 m (5' 4.5\")   Wt 52.5 kg (115 lb 12.8 oz)   SpO2 93%   BMI 19.57 kg/m        Neuro: A/o x4, forgetful, [place]  Resp: RA, sating low-mid 90's. Denies SOB.   Cardiac: sinus rhythm, lower pressures, denies chest pain  GI/: voiding appropriately, last BM 3/13. Pt nauseous in, PRN IV Zofran given w/ therapeutic effect  Skin: sternal incision, CT sites, groin site, and left calf incision CDI.   LDAs: RPIV saline locked. Immobilizer on wrist for pain when moving.  Mobility: SBA      Goals/Plan: continue education of sternal precautions, awaiting placement at TCU, possible discharge Tuesday 3/15, continue plan of care, notify team of any significant changes    "

## 2022-03-15 NOTE — PROGRESS NOTES
"Care Management Follow Up    Length of Stay (days): 30    Expected Discharge Date: 03/11/2022     Concerns to be Addressed:   Discharge planning    Patient plan of care discussed at interdisciplinary rounds: Yes    Anticipated Discharge Disposition:  TCU     Anticipated Discharge Services:  Therapy services  Anticipated Discharge DME:  n/a    Patient/family educated on Medicare website which has current facility and service quality ratings:  Yes   Education Provided on the Discharge Plan:  Yes   Patient/Family in Agreement with the Plan:  Yes, per discussion with SW     Referrals Placed by CM/SW:    DENIAL  [facility] (NO longer accepting SNF/TCU referrals as they are transitioning to an [facility], Referral discontinued)  [facility] (denied due to the lack of beds available)  [facility] (nursing home admissions, SW was told that across all five units, there are no beds available)  [facility] (3/11: Brittany from admissions called the SW and said that due to her hemoglobin level being too low, they believe she'd admit back to the hospital if she was at their facility. So, Brittany says the denial is due to the level of care being \"too much\" for the nursing staff to handle.)  [facility] (3/14: referral was declined due to her cognitive issues and there being no beds available, as stated by admissions)  [facility] (3/14: the person who is covering admissions today left a voicemail for the SW and said that since they haven't seen anything about Pt, their guess is that the referral was denied, SW will call back to get a more detailed explanation)  [address] [address]  - [phone]  - 3/09: placed a referral through [facility]  - 3/10: SW spoke with Anand in admissions, he says that they are at capacity until the 15th, but SW can fax over @ [phone] the " referral. SW faxed over the referral in two parts because when printed, it was a lot of pages. SW called admissions back and left a voicemail to let them know (11:58am).   - 3/11: Anand from admissions spoke with SW and said that they're at capacity and the next expected discharge isn't until the 15th. He says if the Pt is here by then, then send a referral to them and they'll take a look.   [facility]  - [address] [place] [address]  - [phone]  - 3/11: sent a referral through Epic  - 3/14: extension #2, SW will get a call back from manager in admissions about an update on the referral for Pt.   Addendum 3:55pm 3/14: talked with admissions, no beds available, will continue to follow and if SW finds other placement for the Pt, they will update admissions.   [facility] [address] [address]  - [phone]  - 3/11: sent a referral through Epic  - 3/14: called and left a voicemail  - 3/14: got a call back and they currently don't have any beds available, admissions said that if anything changes or availability opens up, SW will get a call back     PENDING   [facility]   - [address] [place] [address]  - [phone]  - 3/09: placed a referral through Epic  - 3/10: got transferred to Human Resources even though the SW asked for admissions, left a voicemail for them to call back  - 3/11: called and left a voicemail  - 3/14: called and left a voicemail  - 3/15: called and left a voicemail     [facility]  - [address], [place] [address]  - [phone]  - 3/11: sent a referral through Epic  - 3/14: called and left a voicemail   - 3/15: called and left a voicemail with admissions     [facility]  - [address] [place] [address]  - [phone]  - 3/11: sent a referral through [facility]  - 3/14: called and left a voicemail   - 3/15: called and left a voicemail      [facility] [place]  [place] [facility]  - [address] [facility] [address], [place] [address]  - [phone]  - 3/11: sent a referral through [facility]  - 3/14: got a voicemail this morning and SW called back and left a voicemail to follow up.   - 3/15: no beds today, will continue to follow on referral if availability opens up  Private pay costs discussed: potential daily copay for 30+ day stay at [facility]    Additional Information:  See above in the referrals section for updates on pending referrals + denials.     SW went into the Pt's room and updated her on her referrals. SW let Pt know that [facility] is currently the only place that has accepted her. Pt said that she wants to go to a facility that's closer to [place]. SW made new referrals in Epic to the following places: [facility], [facility], [facility].    Since in Epic, it indicates that Pt is medically ready for discharge as of 4 days ago, MARVIN believes that [facility] is currently Pt's safe option for discharge. MARVIN will talk with Salty, Pt's son's, to relay this message. SW will also talk with Pt's care team and relay this message. After, SW will speak with Pt as well.     PLAN: MARVIN will speak with Pt, Salty (Pt's sons), and care team about [facility] being Pt's only current acceptance/option for safe discharge.     ___________________    BARAK Morse  6C   [facility]  Pager [phone]  Phone [phone]

## 2022-03-15 NOTE — PROGRESS NOTES
Cardiovascular Surgery Progress Note  03/15/2022         Assessment and Plan:     Ashley Osman is a 71 year old female with PMH of HFrEF (10-15%) 2/2 ICM (Hx LAD and Circumflex MIs; total occlusion of RCA and LAD), Biventricular failure (requiring inotropic support PTA to [facility]), COPD, HLD, Tobacco Use Disorder (quit 12/2021), CAD who underwent CABG x4 on 02/24 with Dr. Bhandari. Found to have left femoral artery thrombosis with acute left lower extremity ischemia s/p emergent left femoral thrombectomy, bovine patch angioplasty on 2/25. 02/28 with elevated LFTs.     Cardiovascular:   Hx of biventricular HF requiring pressors, CAD, HFrEF 10-15%. Tobacco abuse, s/p CABG x4 on 2/24  Atrial fibrillation with RVR post surgery, HD stable-off pressors 2/28.  2/28 echo showed LV EF 15%, stable on 3/3 echo with LVEF 10-15% - no evidence of RH dysfunction but elevated RA/PA pressures, now euvolemic  - Daily weights  - ASA 162mg  Atorvastatin 40mg daily  Spironolactone  - Start Toprol 12.5 mg daily (per cards recs)   - [facility] consulted 2/22 however pt not scheduled as declining specialty care. 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 s/p emergent left femoral thrombectomy, bovine patch angioplasty on 2/25  Occurred in setting of recent perioperative IABP  - Heparin gtt discontinued, s/p thrombectomy per Vascular  - On subcutaneous heparin  - Monitor lower extremity pulses and perfusion status  - Per vascular recs 3/1:              - NO need for additional anticoagulation from surgery standpoint              - Recommend ASA when able and statin when able               - Incisional dressing to stay on for 2 weeks              - Vascular will sign off              - Will arrange followup with Vascular Surgery  - LLE duplex US 3/4 to r/o DVT given LLE swelling>RLE - results negative for DVT      Possible RHF (Elevated LFTs)  Ruled out on 3/3 echo, elevated LFT  attributed to volume overload   - HF Cardiology consulted, appreciate recs   - Continue holding amio due to LFTs   - PRN tylenol discontinued 3/3   - Daily LFTs    Chest tubes: removed 3/3  TPW: removed without difficulty     Pulmonary:  Extubated POD #5; Saturating well on RA  Supplemental O2 PRN to keep sats > 92%.  Pulm toilet, IS, activity/OOB and deep breathing     Bilateral PTX, stable  IR consulted 3/2 for chest tube placement however PTX resolved prior to intervention when chest tubes returned to suction.  Small bilateral PTX stable on water seal, chest tubes removed 3/3.  Very small bilat apical PTX stable post-chest tube pull and again this morning; no intervention indicated.     Neurology / MSK:  Acute post-operative pain  - PO oxycodone 5mg PRN  - PRN Robaxin  - 3/3: Tylenol discontinued d/t elevation in LFT     Sternotomy  PT/OT/CR consulted  Sternal precautions      Physical deconditioning  Therapies consulted and recommending TCU at discharge      / Renal:  No Hx of renal disease, baseline creatinine ~0.6  Volume overloaded, resolved  - Furosemide 20mg po daily. Appears euvolemic on exam. No lyte replacement. Patient maintains K above 4.0 with spironolactone.     Hyperkalemia, resolved  - Lasix drip was discontinued with continuing potassium supplementation  - Patient remained asymptomatic with stable MAPs and no arrhythmias  - Discontinued potassium     Equivocal UA   - Covered with 1g Rocephin x1 on 3/1   - UC negative      GI / FEN:   Elevated LFT  Suspect venous congestion as likely source.  Plans as above; continue to trend daily, avoid hepatotoxic agents as able.  - LFTs improving  - Calorie Counts 3/13/22     Moderate malnutrition in the context of acute on chronic illness  Estimated Energy Needs: 7264-1948 kcals/day    - Dietitian consulted, appreciate recs; supplements ordered   - Poor PO intake - Increased marinol for appetite stimulation  - Pt states this is her normal appetite and she is  eating as much as she can. She does like the protein shakes and is consuming them. Byron counts 1100 and 200 (per patient had something at all three meals on 3/14) last two days  - Continue byron counts starting 3/13     Dysphagia  SLP following: recommend regular diet thin liquids     Endocrine:  Stress-induced hyperglycemia - resolved: initially managed on insulin drip postop, transitioned to sliding scale goal BG <180 and since discontinued after demonstrated euglycemia without exogenous insulin      Infectious Disease:  Stress-induced leukocytosis  WBC WNL. Stable afebrile. Procal 3/6 0.08 (0.15) (0.36)  - Completed perioperative antibiotics  - Serial blood cultures 3/2 with NGTD  - Empiric cefepime started 3/3; can transition to PO abx once WBC < 12 per Dr. Bhandari  - Daily AM CBC.  PICC removed 3/13  - Planned to check c.diff with next stool, try to hold off on Stool softeners as able. +BM today  - Consult ID 3/9, now signed off.    * Agreed with stopping Vanco, Cdiff if able to get stool sample (hold off now that leukocytosis resolved and having normal BMs). Do not start po Flagyl.   - CT scan w/ contrast 3/11. Stopped Cefepime per ID recommendations.   - WBC stable off antibiotics, continue to monitor     Hematology:   Stress-induced leukocytosis, as above  Acute blood loss anemia, Hgb stable ~8-9   Acute blood loss thrombocytopenia, resolved  Left femoral artery thrombosis with acute extremity ischemia, resolved s/p thrombectomy and bovine patch per Vascular Surgery  Right sided non-occlusive IJ Thrombus  No signs or symptoms of active bleeding  - Transfuse prn for Hgb<7  - Daily CBC     Anticoagulation:   -  mg  - Subcutaneous Heparin TID  - Not currently anticoagulated for thrombosis or post-op afib (has been maintaining SR)     Prophylaxis:   Stress ulcer prophylaxis:   -Pantoprazole 40 mg daily for 30 days  -DVT prophylaxis: Subcutaneous heparin, SCD,  "OOB/activity/ambulation     Disposition:   - Transferred to  on 3/1  - No life-vest or ICD inpatient. May consider with Cardiology in outpatient  - Cards HF referral order is already placed.  - followup with Vascular Surgery established  - Rehab recommending discharge to TCU. Medically ready, pending availability      Discussed with Surgeon, Dr. Bhandari via written and verbal commnication.     Boni Aragon PA-C  Cardiothoracic Surgery  p: [phone]          Interval History:     No overnight events.  States pain is well managed on current regimen. Slept well overnight.  Tolerating diet, is passing flatus, +BM. Reported some nausea last night after taking pain meds, resolved with zofran and eating. No further nausea. Reports eating at all three meals yesterday, inclding cereal and yogurt, sandwich and broth.   Breathing well without complaints.   Working with therapies and ambulating in halls with assistance.   Denies chest pain, palpitations, dizziness, syncopal symptoms, fevers, chills, myalgias, or sternal popping/clicking.         Physical Exam:   Blood pressure 100/51, pulse 75, temperature 97.9  F (36.6  C), temperature source Oral, resp. rate 16, height 1.638 m (5' 4.5\"), weight 52.5 kg (115 lb 12.8 oz), SpO2 94 %.  Vitals:    03/13/22 0631 03/14/22 0429 03/15/22 0425   Weight: 53.4 kg (117 lb 12.8 oz) 53.1 kg (117 lb 1.6 oz) 52.5 kg (115 lb 12.8 oz)        Gen: A&Ox4, NAD  Neuro: no focal deficits   CV: RRR, normal S1 S2, no murmurs, rubs or gallops. No appreciable JVD   Vascular: Peripheral pulses present Radial 2+, Dorsalis Pedis 2+, Posterior Tibialis 2+.   Pulm: CTA, no wheezing or rhonchi, normal breathing on RA  Abd: nondistended, normal BS, soft, nontender  Ext: negative peripheral edema, 0+ pitting. Warm.   Incision: clean, dry, intact, no erythema, sternum stable  Tubes/drain sites: dressing clean and dry         Data:    Imaging:  reviewed recent imaging  No results found for this or any " previous visit (from the past 24 hour(s)).    Labs:  CBC  Recent Labs   Lab 03/15/22  0609 03/14/22  0536 03/13/22  0801 03/12/22  0612   WBC 8.8 9.6 9.4 11.5*   RBC 3.04* 2.94* 2.77* 2.69*   HGB 8.6* 8.3* 7.9* 7.8*   HCT 29.1* 28.2* 26.8* 26.5*   MCV 96 96 97 99   MCH 28.3 28.2 28.5 29.0   MCHC 29.6* 29.4* 29.5* 29.4*   RDW 18.0* 18.2* 18.3* 18.5*    448 382 391     CMP:  Last Comprehensive Metabolic Panel:  Sodium   Date Value Ref Range Status   03/15/2022 137 133 - 144 mmol/L Final     Potassium   Date Value Ref Range Status   03/15/2022 4.5 3.4 - 5.3 mmol/L Final     Chloride   Date Value Ref Range Status   03/15/2022 101 94 - 109 mmol/L Final     Carbon Dioxide (CO2)   Date Value Ref Range Status   03/15/2022 30 20 - 32 mmol/L Final     Anion Gap   Date Value Ref Range Status   03/15/2022 6 3 - 14 mmol/L Final     Glucose   Date Value Ref Range Status   03/15/2022 88 70 - 99 mg/dL Final     Urea Nitrogen   Date Value Ref Range Status   03/15/2022 18 7 - 30 mg/dL Final     Creatinine   Date Value Ref Range Status   03/15/2022 0.60 0.52 - 1.04 mg/dL Final     GFR Estimate   Date Value Ref Range Status   03/15/2022 >90 >60 mL/min/1.73m2 Final     Comment:     Effective December 21, 2021 eGFRcr in adults is calculated using the 2021 CKD-EPI creatinine equation which includes age and gender (Marleen et al., NEJM, DOI: 10.1056/OSHIqq6708767)     Calcium   Date Value Ref Range Status   03/15/2022 9.1 8.5 - 10.1 mg/dL Final     Bilirubin Total   Date Value Ref Range Status   03/15/2022 0.5 0.2 - 1.3 mg/dL Final     Alkaline Phosphatase   Date Value Ref Range Status   03/15/2022 99 40 - 150 U/L Final     ALT   Date Value Ref Range Status   03/15/2022 46 0 - 50 U/L Final     AST   Date Value Ref Range Status   03/15/2022 20 0 - 45 U/L Final     BMP  Recent Labs   Lab 03/15/22  0609 03/14/22  2110 03/14/22  0536 03/13/22  1650 03/13/22  0801     --  135 134 136   POTASSIUM 4.5  --  4.2 4.4 4.3   CHLORIDE 101   --  101 98 99   HEIDI 9.1  --  8.9 9.0 8.8   CO2 30  --  27 30 32   BUN 18  --  21 23 20   CR 0.60  --  0.60 0.84 0.56   GLC 88 111* 92 129* 92     INR  No lab results found in last 7 days.   Hepatic Panel  Recent Labs   Lab 03/15/22  0609 03/14/22  0536 03/13/22  1650 03/13/22  0801   AST 20 20 22 18   ALT 46 52* 60* 58*   ALKPHOS 99 95 110 92   BILITOTAL 0.5 0.5 0.5 0.4   ALBUMIN 2.6* 2.5* 2.6* 2.4*     GLUCOSE:   Recent Labs   Lab 03/15/22  0609 03/14/22  2110 03/14/22  0536 03/13/22  1650 03/13/22  0801 03/12/22  1915   GLC 88 111* 92 129* 92 128*

## 2022-03-15 NOTE — PROGRESS NOTES
"CLINICAL NUTRITION SERVICES - REASSESSMENT NOTE     Nutrition Prescription    RECOMMENDATIONS FOR MDs/PROVIDERS TO ORDER:  If kcal counts indicate pt is consuming less than 1050 kcals and 44 g protein daily on average, then consider TFs.    Malnutrition Status:    Moderate malnutrition in the context of chronic illness    Recommendations already ordered by Registered Dietitian (RD):  Modified oral supplement timing    Future/Additional Recommendations:  1. Rec keep diet as liberalized as possible to encourage oral intake. Encourage intake of oral supplements between meals (as small, frequent meals). Rec high kcal/protein options. Pt to self-select softer foods.   2. Continue marinol to potentially help increase appetite.   3. Continue multivitamin with minerals, as ordered, to help ensure micronutrient needs are met.   4. Ck vitamin B12 status.    5. Monitor potential need to adjust miralax supplementation.   6. Monitor phos trends. Phos was 4.8 on 3/15.   7. Consider checking a thiamine lab. Pt is ordered to receive thiamine supplementation.   8. TF recs, if needed per previous RD note: Recommend Osmolite 1.5 Byron @ goal of  45ml/hr  (1080ml/day) + 1 pkt ProSource BID will provide: 1700 kcals, 89 g PRO, 822 ml free H20, 219 g CHO, and 0 g fiber daily. TF may be adjusted pending oral intake     EVALUATION OF THE PROGRESS TOWARD GOALS   Diet: Regular with thin liquids since 3/8 (and as rec by SLP on 3/10, with precautions). Has an order for strawberry Ensure Enlive shakes made with ice cream at 10:00, 14:00, and HS.   Intake: Tolerating diet. Per % intake flowsheets, pt consuming 100% with a good appetite 3/9, % with a good appetite 3/10, 25-75% with a poor appetite 3/11, % with a fair to good appetite 3/12, 50-75% with a fair appetite 3/13, and 25% (at 10:00) on 3/14. Per team 3/15, \"Tolerating diet. Reported some nausea last night after taking pain meds, resolved with zofran and eating. No further " "nausea. Reports eating at all three meals yesterday, inclding cereal and yogurt, sandwich and broth.\" Per discussion with pt (3/15), pt feels she is eating normal amounts currently. She grazes on her food at meals (saves some food for later). States she get four oral supplements, which she likes, but she normally does not consume the last one due to the timing. Factors affecting oral intake include early satiety and meal options with her LOS. Diet order has been upgraded and liberalized which provides many more options for her.    Kcal counts:   3/13       Total Kcals: 1,102    Total Protein: 52g. # Meals Ordered from Kitchen: 2  # Meals Recorded: 2. First- 100% 4 sugar, greek yogurt, cornflakes, 50% 8oz milk. Second-100% 8oz milk, orange sherbet, 50% BLT sandwich. From nursing- 50% cup applesauce. # Supplements Recorded: 50% each 2 ensure enlive shake   3/14       Total Kcals: 194       Total Protein: 5g. # Meals Ordered from Kitchen: 2 meals # Meals Recorded: 1 meal (100% corn flakes, 1 4oz whole milk, tea, 50% cantaloupe) # Supplements Recorded: 0 - Pt did not order supper. Lunch ordered (but not recorded) totaled 781 kcals and 24 g protein if consuming 100%.    3/15        Pending   * Pt consumed a two-day average of 648 kcals and 29 g protein daily. However, lunch meal and no supplements recorded on 3/14.         NEW FINDINGS   GI: Ordered to receive miralax, receiving about once daily. Having zero to two stools daily. Stools have been loose and brown. Last stool recently was on 3/14. Ordered to receive dulcolax.  Wt Hx: 61.2 kg (2/9/2021), 61.4 kg (8/12/21), 56.8 kg (1/24/2022), 54 kg (2/12/2022, admit), 53.4 kg (2/21), 52.8 kg (3/8), 52.5 kg (3/15) - She has lost 14.5% of her body wt over the last approximate seven months. Diuresis this admission and pt reports fluid loss as well. Pt potentially may have lost some actual wt.     MALNUTRITION  % Intake: < 75% for > 7 days (moderate)  % Weight Loss: Difficult " to assess actual wt loss from fluid loss but may be meeting this criteria (see wt hx above)   Subcutaneous Fat Loss: Facial region:  Mild  Muscle Loss: Temporal:  Mild, Thoracic region (clavicle, acromium bone, deltoid, trapezius, pectoral):  Moderate and Posterior calf:  Moderate-Severe  Fluid Accumulation/Edema: None noted  Malnutrition Diagnosis: Moderate malnutrition in the context of chronic illness    Previous Goals   Patient to consume % of nutritionally adequate meal trays TID, or the equivalent with supplements/snacks.  Evaluation: Unresolved. Updated below.    Previous Nutrition Diagnosis  Inadequate oral intake related to early satiety and limited meal options on diet order as evidenced by five-day average of 527 kcals and 21 g protein daily while estimated needs are 4155-4441 kcals/day (30-35 kcals/kg) and 61-77+ grams protein/day (1.2 - 1.5+ grams of pro/kg).  Evaluation: Unresolved. Updated below.     CURRENT NUTRITION DIAGNOSIS  Inadequate oral intake related to early satiety and meal options with LOS as evidenced by pt consuming 25-50% of meals at times.     INTERVENTIONS  Implementation  Modify composition of meals/snacks: Ordered additional food at lunch.   Nutrition education for nutrition relationship to health/disease: Provided Nutrition Care Manual handout on Heart Healthy Eating Nutrition Therapy (handout for reference when her appetite starts to improve and when she is eating normal amounts). Encouraged oral intake adequacy and intake of meals/oral supplements are her current priority.   Medical food supplement therapy: Modified oral supplements to send at meals (as per pt request). Encouraged intake of these.     Goals  Patient to consume % of nutritionally adequate meal trays TID, or the equivalent with supplements/snacks.    Monitoring/Evaluation  Progress toward goals will be monitored and evaluated per protocol.      Nutrition will continue to follow.      Asha Dykes,  MS, RD, BRAD, [facility]   6C Pgr: [phone]

## 2022-03-15 NOTE — PROGRESS NOTES
Care Management Follow Up    Length of Stay (days): 30    Expected Discharge Date: 03/11/2022     Concerns to be Addressed: Discharge planning  Patient plan of care discussed at interdisciplinary rounds: Yes    Anticipated Discharge Disposition: Home     Anticipated Discharge Services: Home Care  Anticipated Discharge DME: HARISH (PT to issue)    Patient/family educated on Medicare website which has current facility and service quality ratings: Yes  Education Provided on the Discharge Plan: Yes    Referrals Placed by CM/SW: Home Care referrals pending  Private pay costs discussed: Not applicable    Additional Information:  This writer received and update from SW that pt and son considering discharge to home. This writer met with pt along with SW to discuss continued recommendation for TCU and acceptance to facility. Pt declining current TCU option and now prefers to discharge directly home with support from her son, Anand. Pt states that Anand works from home and can provide 24/7 assistance post discharge. This writer updated PT who will issue a walker.  This writer called and spoke with son, Anand. Anand confirmed that he can arrange to stay with pt and work from home for at least a week to provide 24/7 assistance. Anand stated that pt previously had home care services through [facility] (appears to have been discharge in 2/2022) and he would strongly prefer to start with home care prior to transitioning to OP CR. Anand stated he will work on setting up pt's home and confirmed he can transport pt home tomorrow afternoon/evening if she is ready.    Initiated the following home care referrals:  1. [facility]- left voicemail with intake  2. [facility]- left voicemail with intake    CC will continue to monitor patient's medical condition and progress towards discharge.  Francia Gonzalez RN BSN  6C Unit Care Coordinator  Phone number: [phone]  Pager: [phone]

## 2022-03-15 NOTE — PROGRESS NOTES
Calorie Count  Intake recorded for: 3/14  Total Kcals: 194 Total Protein: 5g  Kcals from Hospital Food: 194   Protein: 5g  Kcals from Outside Food (average):0 Protein: 0g  # Meals Ordered from Kitchen: 2 meals  # Meals Recorded: 1 meal (100% corn flakes, 1 4oz whole milk, tea, 50% cantaloupe)  # Supplements Recorded: 0

## 2022-03-16 ENCOUNTER — APPOINTMENT (OUTPATIENT)
Dept: OCCUPATIONAL THERAPY | Facility: CLINIC | Age: 72
DRG: 233 | End: 2022-03-16
Attending: INTERNAL MEDICINE
Payer: MEDICARE

## 2022-03-16 ENCOUNTER — APPOINTMENT (OUTPATIENT)
Dept: PHYSICAL THERAPY | Facility: CLINIC | Age: 72
DRG: 233 | End: 2022-03-16
Attending: INTERNAL MEDICINE
Payer: MEDICARE

## 2022-03-16 LAB
ALBUMIN SERPL-MCNC: 2.7 G/DL (ref 3.4–5)
ALP SERPL-CCNC: 98 U/L (ref 40–150)
ALT SERPL W P-5'-P-CCNC: 46 U/L (ref 0–50)
ANION GAP SERPL CALCULATED.3IONS-SCNC: 6 MMOL/L (ref 3–14)
AST SERPL W P-5'-P-CCNC: 22 U/L (ref 0–45)
BILIRUB SERPL-MCNC: 0.4 MG/DL (ref 0.2–1.3)
BUN SERPL-MCNC: 20 MG/DL (ref 7–30)
CALCIUM SERPL-MCNC: 9.4 MG/DL (ref 8.5–10.1)
CHLORIDE BLD-SCNC: 100 MMOL/L (ref 94–109)
CO2 SERPL-SCNC: 30 MMOL/L (ref 20–32)
CREAT SERPL-MCNC: 0.66 MG/DL (ref 0.52–1.04)
ERYTHROCYTE [DISTWIDTH] IN BLOOD BY AUTOMATED COUNT: 17.6 % (ref 10–15)
GFR SERPL CREATININE-BSD FRML MDRD: >90 ML/MIN/1.73M2
GLUCOSE BLD-MCNC: 85 MG/DL (ref 70–99)
HCT VFR BLD AUTO: 30.4 % (ref 35–47)
HGB BLD-MCNC: 8.9 G/DL (ref 11.7–15.7)
MAGNESIUM SERPL-MCNC: 2.1 MG/DL (ref 1.6–2.3)
MCH RBC QN AUTO: 28.3 PG (ref 26.5–33)
MCHC RBC AUTO-ENTMCNC: 29.3 G/DL (ref 31.5–36.5)
MCV RBC AUTO: 97 FL (ref 78–100)
PHOSPHATE SERPL-MCNC: 4.3 MG/DL (ref 2.5–4.5)
PLATELET # BLD AUTO: 453 10E3/UL (ref 150–450)
POTASSIUM BLD-SCNC: 4.5 MMOL/L (ref 3.4–5.3)
PROT SERPL-MCNC: 6.3 G/DL (ref 6.8–8.8)
RBC # BLD AUTO: 3.14 10E6/UL (ref 3.8–5.2)
SODIUM SERPL-SCNC: 136 MMOL/L (ref 133–144)
WBC # BLD AUTO: 8.3 10E3/UL (ref 4–11)

## 2022-03-16 PROCEDURE — 250N000013 HC RX MED GY IP 250 OP 250 PS 637: Performed by: SURGERY

## 2022-03-16 PROCEDURE — 85027 COMPLETE CBC AUTOMATED: CPT | Performed by: SURGERY

## 2022-03-16 PROCEDURE — 250N000013 HC RX MED GY IP 250 OP 250 PS 637: Performed by: PHYSICIAN ASSISTANT

## 2022-03-16 PROCEDURE — 250N000013 HC RX MED GY IP 250 OP 250 PS 637: Performed by: STUDENT IN AN ORGANIZED HEALTH CARE EDUCATION/TRAINING PROGRAM

## 2022-03-16 PROCEDURE — 83735 ASSAY OF MAGNESIUM: CPT | Performed by: SURGERY

## 2022-03-16 PROCEDURE — 36415 COLL VENOUS BLD VENIPUNCTURE: CPT | Performed by: PHYSICIAN ASSISTANT

## 2022-03-16 PROCEDURE — 250N000013 HC RX MED GY IP 250 OP 250 PS 637: Performed by: NURSE PRACTITIONER

## 2022-03-16 PROCEDURE — 97530 THERAPEUTIC ACTIVITIES: CPT | Mod: GP

## 2022-03-16 PROCEDURE — 97116 GAIT TRAINING THERAPY: CPT | Mod: GP

## 2022-03-16 PROCEDURE — 97535 SELF CARE MNGMENT TRAINING: CPT | Mod: GO

## 2022-03-16 PROCEDURE — 84100 ASSAY OF PHOSPHORUS: CPT | Performed by: INTERNAL MEDICINE

## 2022-03-16 PROCEDURE — 214N000001 HC R&B CCU UMMC

## 2022-03-16 PROCEDURE — 80053 COMPREHEN METABOLIC PANEL: CPT | Performed by: PHYSICIAN ASSISTANT

## 2022-03-16 PROCEDURE — 250N000011 HC RX IP 250 OP 636: Performed by: NURSE PRACTITIONER

## 2022-03-16 RX ADMIN — SPIRONOLACTONE 25 MG: 25 TABLET ORAL at 08:46

## 2022-03-16 RX ADMIN — Medication 12.5 MG: at 10:41

## 2022-03-16 RX ADMIN — Medication 1 TABLET: at 08:46

## 2022-03-16 RX ADMIN — HEPARIN SODIUM 5000 UNITS: 5000 INJECTION, SOLUTION INTRAVENOUS; SUBCUTANEOUS at 16:23

## 2022-03-16 RX ADMIN — DRONABINOL 5 MG: 5 CAPSULE ORAL at 08:46

## 2022-03-16 RX ADMIN — ATORVASTATIN CALCIUM 40 MG: 40 TABLET, FILM COATED ORAL at 08:46

## 2022-03-16 RX ADMIN — FUROSEMIDE 20 MG: 20 TABLET ORAL at 08:46

## 2022-03-16 RX ADMIN — HEPARIN SODIUM 5000 UNITS: 5000 INJECTION, SOLUTION INTRAVENOUS; SUBCUTANEOUS at 08:46

## 2022-03-16 RX ADMIN — HEPARIN SODIUM 5000 UNITS: 5000 INJECTION, SOLUTION INTRAVENOUS; SUBCUTANEOUS at 23:01

## 2022-03-16 RX ADMIN — PANTOPRAZOLE SODIUM 40 MG: 40 TABLET, DELAYED RELEASE ORAL at 08:46

## 2022-03-16 RX ADMIN — Medication 250 MG: at 20:50

## 2022-03-16 RX ADMIN — ASPIRIN 81 MG CHEWABLE TABLET 162 MG: 81 TABLET CHEWABLE at 08:46

## 2022-03-16 RX ADMIN — Medication 250 MG: at 05:02

## 2022-03-16 RX ADMIN — THIAMINE HCL TAB 100 MG 100 MG: 100 TAB at 08:46

## 2022-03-16 ASSESSMENT — ACTIVITIES OF DAILY LIVING (ADL)
ADLS_ACUITY_SCORE: 8
ADLS_ACUITY_SCORE: 10
ADLS_ACUITY_SCORE: 8
ADLS_ACUITY_SCORE: 10
ADLS_ACUITY_SCORE: 8
ADLS_ACUITY_SCORE: 10
ADLS_ACUITY_SCORE: 10
ADLS_ACUITY_SCORE: 8
ADLS_ACUITY_SCORE: 8
ADLS_ACUITY_SCORE: 10

## 2022-03-16 NOTE — PLAN OF CARE
Status: CABG x4 (2/24) & emergent L femoral thrombectomy w/ Bovine patch (2/25)     Neuro: A/Ox4. Irritable and forgetful at times.   Cardiac: SR with HR 80's VSS. BP soft at times.    Respiratory: O2 sats stable on RA. 94%  GI/: Up to commode to void, small incontinent BM  Diet/appetite: Fair po, dale counts-   Activity:  Up with SBA  Pain: Robaxin x 2, Oxy x 1 overnight with relief.   Skin: L groin, knee and sternum incision CDI  LDA's: PIV sl'd    Plan: Possible discharge home with son VS TCU. Notify primary team with changes.

## 2022-03-16 NOTE — PLAN OF CARE
Problem: Cardiac Output Decreased  Goal: Effective Cardiac Output  Outcome: Adequate for Care Transition  Intervention: Optimize Cardiac Output  Recent Flowsheet Documentation  Taken 3/16/2022 1200 by CRISPIN CISNEROS  VTE Prevention/Management: SCDs (sequential compression devices) off  Head of Bed (HOB) Positioning: HOB at 30-45 degrees  Taken 3/16/2022 0800 by CRISPIN CISNEROS  VTE Prevention/Management: SCDs (sequential compression devices) off  Head of Bed (HOB) Positioning: HOB at 30-45 degrees     Problem: Gas Exchange Impaired  Goal: Optimal Gas Exchange  Outcome: Adequate for Care Transition  Intervention: Optimize Oxygenation and Ventilation  Recent Flowsheet Documentation  Taken 3/16/2022 1200 by CRISPIN CISNEROS  Head of Bed (HOB) Positioning: HOB at 30-45 degrees  Taken 3/16/2022 0800 by CRISPIN CISNEROS  Head of Bed (HOB) Positioning: HOB at 30-45 degrees     Problem: Adjustment to Illness (Heart Failure)  Goal: Optimal Coping  Outcome: Adequate for Care Transition     Problem: Cardiac Output Decreased (Heart Failure)  Goal: Optimal Cardiac Output  Outcome: Adequate for Care Transition   Goal Outcome Evaluation:

## 2022-03-16 NOTE — PROGRESS NOTES
Care Management Follow Up    Length of Stay (days): 31    Expected Discharge Date: 03/17/2022     Concerns to be Addressed: Discharge planning     Patient plan of care discussed at interdisciplinary rounds: Yes    Anticipated Discharge Disposition: Home vs TCU     Anticipated Discharge Services: Home Care  Anticipated Discharge DME: HARISH (PT to issue)    Patient/family educated on Medicare website which has current facility and service quality ratings: Yes  Education Provided on the Discharge Plan: Yes   Patient/Family in Agreement with the Plan: Yes    Referrals Placed by CM/SW: Home care  Private pay costs discussed: Not applicable    Additional Information:  This writer spoke with pt and both sons, Salty to follow up on discharge planning. Pt continues to be adamant on discharging directly home despite recommendation for TCU. Pt's sons state they support pt's decision though they feel pt would benefit from TCU and they are nervous about pt returning directly home. Educated pt's and sons on home care and that they provide intermittent skilled visits, but do not provide daily cares. Per discussion with PA, likely plan for discharge to home tomorrow. This writer called back to follow up on referrals to [facility] and [facility], but again got voicemail. This writer sent referral to [facility] intake to see if they are able to accept for RN/PT/OT.    CC will continue to monitor patient's medical condition and progress towards discharge.  Francia Gonzalez RN BSN  6C Unit Care Coordinator  Phone number: [phone]  Pager: [phone]

## 2022-03-16 NOTE — PROGRESS NOTES
Admit: 2/12/2022  7:28 PM Heart failure     Neuro: AAOx4. Forgetful. Denies pain.     Vitals: Temp: 98.1  F (36.7  C) Temp src: Oral BP: 108/60 Pulse: 73   Resp: 16 SpO2: 95 %    sinus rhythm 70's.     GI/: Regular diet with poor appetite. Calorie count in place. No BM. 200 ml urine output.     Running: PIV SL    Mobility: SBA with walker    Changes: No acute changes    Plan: Discharge tomorrow home with assistance from Son's and home care.     Will need equipment from therapy prior to discharge

## 2022-03-16 NOTE — PROGRESS NOTES
Cardiovascular Surgery Progress Note  03/16/2022         Assessment and Plan:     Ashley Osman is a 71 year old female with PMH of HFrEF (10-15%) 2/2 ICM (Hx LAD and Circumflex MIs; total occlusion of RCA and LAD), Biventricular failure (requiring inotropic support PTA to [facility]), COPD, HLD, Tobacco Use Disorder (quit 12/2021), CAD who underwent CABG x4 on 02/24 with Dr. Bhandari. Found to have left femoral artery thrombosis with acute left lower extremity ischemia s/p emergent left femoral thrombectomy, bovine patch angioplasty on 2/25. 02/28 with elevated LFTs.     Cardiovascular:   Hx of biventricular HF requiring pressors, CAD, HFrEF 10-15%. Tobacco abuse, s/p CABG x4 on 2/24  Atrial fibrillation with RVR post surgery, HD stable-off pressors 2/28.  2/28 echo showed LV EF 15%, stable on 3/3 echo with LVEF 10-15% - no evidence of RH dysfunction but elevated RA/PA pressures, now euvolemic  - Daily weights   - ASA 162mg  Atorvastatin 40mg daily  Spironolactone  - Start Toprol 12.5 mg daily (per cards recs)   - [facility] consulted 2/22 however pt not scheduled as declining specialty care. 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 s/p emergent left femoral thrombectomy, bovine patch angioplasty on 2/25  Occurred in setting of recent perioperative IABP  - Heparin gtt discontinued, s/p thrombectomy per Vascular  - On subcutaneous heparin  - Monitor lower extremity pulses and perfusion status  - Per vascular recs 3/1:              - NO need for additional anticoagulation from surgery standpoint              - Recommend ASA when able and statin when able               - Incisional dressing to stay on for 2 weeks              - Vascular will sign off              - Will arrange followup with Vascular Surgery  - LLE duplex US 3/4 to r/o DVT given LLE swelling>RLE - results negative for DVT      Possible RHF (Elevated LFTs)  Ruled out on 3/3 echo, elevated LFT  attributed to volume overload   - HF Cardiology consulted, appreciate recs   - Continue holding amio due to LFTs   - PRN tylenol discontinued 3/3   - Daily LFTs    Chest tubes: removed 3/3  TPW: removed without difficulty     Pulmonary:  Extubated POD #5; Saturating well on RA  Supplemental O2 PRN to keep sats > 92%.  Pulm toilet, IS, activity/OOB and deep breathing     Bilateral PTX, stable  IR consulted 3/2 for chest tube placement however PTX resolved prior to intervention when chest tubes returned to suction.  Small bilateral PTX stable on water seal, chest tubes removed 3/3.  Very small bilat apical PTX stable post-chest tube pull and again this morning; no intervention indicated.     Neurology / MSK:  Acute post-operative pain  - PO oxycodone 5mg PRN  - PRN Robaxin  - 3/3: Tylenol discontinued d/t elevation in LFT     Sternotomy  PT/OT/CR consulted  Sternal precautions      Physical deconditioning  Therapies consulted and recommending TCU at discharge      / Renal:  No Hx of renal disease, baseline creatinine ~0.6  Volume overloaded, resolved  - Furosemide 20mg po daily. Appears euvolemic on exam. No lyte replacement. Patient maintains K above 4.0 with spironolactone.     Hyperkalemia, resolved  - Lasix drip was discontinued with continuing potassium supplementation  - Patient remained asymptomatic with stable MAPs and no arrhythmias  - Discontinued potassium     Equivocal UA   - Covered with 1g Rocephin x1 on 3/1   - UC negative      GI / FEN:   Elevated LFT  Suspect venous congestion as likely source.  Plans as above; continue to trend daily, avoid hepatotoxic agents as able.  - LFTs improving  - Calorie Counts 3/13/22     Moderate malnutrition in the context of acute on chronic illness  Estimated Energy Needs: 3653-9483 kcals/day    - Dietitian consulted, appreciate recs; supplements ordered   - Poor PO intake - Increased marinol for appetite stimulation  - Pt states this is her normal appetite and she is  eating as much as she can. She does like the protein shakes and is consuming them. Byron counts 1100 and 200 (per patient had something at all three meals on 3/14) previous two days  - Continue byron counts starting 3/13     Dysphagia  SLP following: recommend regular diet thin liquids     Endocrine:  Stress-induced hyperglycemia - resolved: initially managed on insulin drip postop, transitioned to sliding scale goal BG <180 and since discontinued after demonstrated euglycemia without exogenous insulin      Infectious Disease:  Stress-induced leukocytosis  WBC WNL. Stable afebrile. Procal 3/6 0.08 (0.15) (0.36)  - Completed perioperative antibiotics  - Serial blood cultures 3/2 with NGTD  - Empiric cefepime started 3/3; can transition to PO abx once WBC < 12 per Dr. Bhandari  - Daily AM CBC.  PICC removed 3/13  - Planned to check c.diff with next stool, try to hold off on Stool softeners as able. +BM today  - Consult ID 3/9, now signed off.    * Agreed with stopping Vanco, Cdiff if able to get stool sample (hold off now that leukocytosis resolved and having normal BMs). Do not start po Flagyl.   - CT scan w/ contrast 3/11. Stopped Cefepime per ID recommendations.   - WBC stable off antibiotics, continue to monitor     Hematology:   Stress-induced leukocytosis, as above  Acute blood loss anemia, Hgb stable ~8-9   Acute blood loss thrombocytopenia, resolved  Left femoral artery thrombosis with acute extremity ischemia, resolved s/p thrombectomy and bovine patch per Vascular Surgery  Right sided non-occlusive IJ Thrombus  No signs or symptoms of active bleeding  - Transfuse prn for Hgb<7  - Daily CBC     Anticoagulation:   -  mg  - Subcutaneous Heparin TID  - Not currently anticoagulated for thrombosis or post-op afib (has been maintaining SR)     Prophylaxis:   Stress ulcer prophylaxis:   -Pantoprazole 40 mg daily for 30 days  -DVT prophylaxis: Subcutaneous heparin, SCD,  "OOB/activity/ambulation     Disposition:   - Transferred to  on 3/1  - No life-vest or ICD inpatient. May consider with Cardiology in outpatient  - Cards HF referral order is already placed.  - followup with Vascular Surgery established  - Rehab recommending discharge to TCU. Medically ready, pending availability. Rehab recommended 24/7 assist if were to discharge home. Will discuss with Dr. Bhandari.       Discussed with Surgeon, Dr. Bhandari via written and verbal commnication.     Boni Aragon PA-C  Cardiothoracic Surgery  p: [phone]          Interval History:     No overnight events.  States pain is well managed on current regimen. Slept well overnight.  Tolerating diet, is passing flatus, +BM.    Breathing well without complaints.   Working with therapies and ambulating in halls with assistance.   Denies chest pain, palpitations, dizziness, syncopal symptoms, fevers, chills, myalgias, or sternal popping/clicking.         Physical Exam:   Blood pressure 99/57, pulse 72, temperature 97.9  F (36.6  C), temperature source Oral, resp. rate 16, height 1.638 m (5' 4.5\"), weight 52.5 kg (115 lb 12.8 oz), SpO2 94 %.  Vitals:    03/14/22 0429 03/15/22 0425 03/16/22 0447   Weight: 53.1 kg (117 lb 1.6 oz) 52.5 kg (115 lb 12.8 oz) 52.5 kg (115 lb 12.8 oz)        Gen: A&Ox4, NAD  Neuro: no focal deficits   CV: RRR, normal S1 S2, no murmurs, rubs or gallops. No appreciable JVD   Vascular: Peripheral pulses present Radial 2+, Dorsalis Pedis 2+, Posterior Tibialis 2+.   Pulm: CTA, no wheezing or rhonchi, normal breathing on RA  Abd: nondistended, normal BS, soft, nontender  Ext: negative peripheral edema, 0+ pitting. Warm.   Incision: clean, dry, intact, no erythema, sternum stable  Tubes/drain sites: dressing clean and dry         Data:    Imaging:  reviewed recent imaging  No results found for this or any previous visit (from the past 24 hour(s)).    Labs:  CBC  Recent Labs   Lab 03/16/22  0518 03/15/22  0609 " 03/14/22  0536 03/13/22  0801   WBC 8.3 8.8 9.6 9.4   RBC 3.14* 3.04* 2.94* 2.77*   HGB 8.9* 8.6* 8.3* 7.9*   HCT 30.4* 29.1* 28.2* 26.8*   MCV 97 96 96 97   MCH 28.3 28.3 28.2 28.5   MCHC 29.3* 29.6* 29.4* 29.5*   RDW 17.6* 18.0* 18.2* 18.3*   * 441 448 382     CMP:  Last Comprehensive Metabolic Panel:  Sodium   Date Value Ref Range Status   03/16/2022 136 133 - 144 mmol/L Final     Potassium   Date Value Ref Range Status   03/16/2022 4.5 3.4 - 5.3 mmol/L Final     Chloride   Date Value Ref Range Status   03/16/2022 100 94 - 109 mmol/L Final     Carbon Dioxide (CO2)   Date Value Ref Range Status   03/16/2022 30 20 - 32 mmol/L Final     Anion Gap   Date Value Ref Range Status   03/16/2022 6 3 - 14 mmol/L Final     Glucose   Date Value Ref Range Status   03/16/2022 85 70 - 99 mg/dL Final     Urea Nitrogen   Date Value Ref Range Status   03/16/2022 20 7 - 30 mg/dL Final     Creatinine   Date Value Ref Range Status   03/16/2022 0.66 0.52 - 1.04 mg/dL Final     GFR Estimate   Date Value Ref Range Status   03/16/2022 >90 >60 mL/min/1.73m2 Final     Comment:     Effective December 21, 2021 eGFRcr in adults is calculated using the 2021 CKD-EPI creatinine equation which includes age and gender (Marleen et al., NEJM, DOI: 10.1056/OMFAqx6217264)     Calcium   Date Value Ref Range Status   03/16/2022 9.4 8.5 - 10.1 mg/dL Final     Bilirubin Total   Date Value Ref Range Status   03/16/2022 0.4 0.2 - 1.3 mg/dL Final     Alkaline Phosphatase   Date Value Ref Range Status   03/16/2022 98 40 - 150 U/L Final     ALT   Date Value Ref Range Status   03/16/2022 46 0 - 50 U/L Final     AST   Date Value Ref Range Status   03/16/2022 22 0 - 45 U/L Final     BMP  Recent Labs   Lab 03/16/22  0518 03/15/22  0609 03/14/22  2110 03/14/22  0536 03/13/22  1650    137  --  135 134   POTASSIUM 4.5 4.5  --  4.2 4.4   CHLORIDE 100 101  --  101 98   HEIDI 9.4 9.1  --  8.9 9.0   CO2 30 30  --  27 30   BUN 20 18  --  21 23   CR 0.66 0.60  --   0.60 0.84   GLC 85 88 111* 92 129*     INR  No lab results found in last 7 days.   Hepatic Panel  Recent Labs   Lab 03/16/22  0518 03/15/22  0609 03/14/22  0536 03/13/22  1650   AST 22 20 20 22   ALT 46 46 52* 60*   ALKPHOS 98 99 95 110   BILITOTAL 0.4 0.5 0.5 0.5   ALBUMIN 2.7* 2.6* 2.5* 2.6*     GLUCOSE:   Recent Labs   Lab 03/16/22  0518 03/15/22  0609 03/14/22  2110 03/14/22  0536 03/13/22  1650 03/13/22  0801   GLC 85 88 111* 92 129* 92

## 2022-03-16 NOTE — PROGRESS NOTES
Calorie Count  Intake recorded for: 3/15  Total Kcals: 0 Total Protein: 0g  Kcals from Hospital Food: 0   Protein: 0g  Kcals from Outside Food (average):0 Protein: 0g  # Meals Ordered from Kitchen: 3 meals  # Meals Recorded: no intake recorded.   # Supplements Recorded: no intake recorded.       Addendum 1500:    RN found meal slips: pt consumed 2 meals, 25 and 50% supplement for 798  Kcals, 29 gm protein    Naye Solitario, RD, LD  6C RD Pager: [phone]

## 2022-03-17 ENCOUNTER — APPOINTMENT (OUTPATIENT)
Dept: PHYSICAL THERAPY | Facility: CLINIC | Age: 72
DRG: 233 | End: 2022-03-17
Attending: INTERNAL MEDICINE
Payer: MEDICARE

## 2022-03-17 ENCOUNTER — APPOINTMENT (OUTPATIENT)
Dept: OCCUPATIONAL THERAPY | Facility: CLINIC | Age: 72
DRG: 233 | End: 2022-03-17
Attending: INTERNAL MEDICINE
Payer: MEDICARE

## 2022-03-17 LAB
ALBUMIN SERPL-MCNC: 2.9 G/DL (ref 3.4–5)
ALP SERPL-CCNC: 109 U/L (ref 40–150)
ALT SERPL W P-5'-P-CCNC: 48 U/L (ref 0–50)
ANION GAP SERPL CALCULATED.3IONS-SCNC: 8 MMOL/L (ref 3–14)
AST SERPL W P-5'-P-CCNC: 24 U/L (ref 0–45)
BILIRUB SERPL-MCNC: 0.6 MG/DL (ref 0.2–1.3)
BUN SERPL-MCNC: 15 MG/DL (ref 7–30)
CALCIUM SERPL-MCNC: 9.7 MG/DL (ref 8.5–10.1)
CHLORIDE BLD-SCNC: 103 MMOL/L (ref 94–109)
CO2 SERPL-SCNC: 26 MMOL/L (ref 20–32)
CREAT SERPL-MCNC: 0.56 MG/DL (ref 0.52–1.04)
ERYTHROCYTE [DISTWIDTH] IN BLOOD BY AUTOMATED COUNT: 17.3 % (ref 10–15)
GFR SERPL CREATININE-BSD FRML MDRD: >90 ML/MIN/1.73M2
GLUCOSE BLD-MCNC: 128 MG/DL (ref 70–99)
HCT VFR BLD AUTO: 33.8 % (ref 35–47)
HGB BLD-MCNC: 10.1 G/DL (ref 11.7–15.7)
MAGNESIUM SERPL-MCNC: 2.1 MG/DL (ref 1.6–2.3)
MCH RBC QN AUTO: 28.4 PG (ref 26.5–33)
MCHC RBC AUTO-ENTMCNC: 29.9 G/DL (ref 31.5–36.5)
MCV RBC AUTO: 95 FL (ref 78–100)
PHOSPHATE SERPL-MCNC: 4 MG/DL (ref 2.5–4.5)
PLATELET # BLD AUTO: 498 10E3/UL (ref 150–450)
POTASSIUM BLD-SCNC: 4 MMOL/L (ref 3.4–5.3)
PROT SERPL-MCNC: 6.8 G/DL (ref 6.8–8.8)
RBC # BLD AUTO: 3.56 10E6/UL (ref 3.8–5.2)
SODIUM SERPL-SCNC: 137 MMOL/L (ref 133–144)
WBC # BLD AUTO: 8.2 10E3/UL (ref 4–11)

## 2022-03-17 PROCEDURE — 250N000013 HC RX MED GY IP 250 OP 250 PS 637: Performed by: SURGERY

## 2022-03-17 PROCEDURE — 250N000013 HC RX MED GY IP 250 OP 250 PS 637: Performed by: PHYSICIAN ASSISTANT

## 2022-03-17 PROCEDURE — 214N000001 HC R&B CCU UMMC

## 2022-03-17 PROCEDURE — 85027 COMPLETE CBC AUTOMATED: CPT | Performed by: SURGERY

## 2022-03-17 PROCEDURE — 83735 ASSAY OF MAGNESIUM: CPT | Performed by: SURGERY

## 2022-03-17 PROCEDURE — 97530 THERAPEUTIC ACTIVITIES: CPT | Mod: GO

## 2022-03-17 PROCEDURE — 36415 COLL VENOUS BLD VENIPUNCTURE: CPT | Performed by: PHYSICIAN ASSISTANT

## 2022-03-17 PROCEDURE — 84100 ASSAY OF PHOSPHORUS: CPT | Performed by: INTERNAL MEDICINE

## 2022-03-17 PROCEDURE — 80053 COMPREHEN METABOLIC PANEL: CPT | Performed by: PHYSICIAN ASSISTANT

## 2022-03-17 PROCEDURE — 97535 SELF CARE MNGMENT TRAINING: CPT | Mod: GO

## 2022-03-17 PROCEDURE — 97530 THERAPEUTIC ACTIVITIES: CPT | Mod: GP

## 2022-03-17 PROCEDURE — 250N000013 HC RX MED GY IP 250 OP 250 PS 637: Performed by: STUDENT IN AN ORGANIZED HEALTH CARE EDUCATION/TRAINING PROGRAM

## 2022-03-17 PROCEDURE — 250N000011 HC RX IP 250 OP 636: Performed by: NURSE PRACTITIONER

## 2022-03-17 PROCEDURE — 97116 GAIT TRAINING THERAPY: CPT | Mod: GP

## 2022-03-17 RX ORDER — OXYCODONE HYDROCHLORIDE 5 MG/1
5 TABLET ORAL EVERY 4 HOURS PRN
Status: DISCONTINUED | OUTPATIENT
Start: 2022-03-17 | End: 2022-03-18 | Stop reason: HOSPADM

## 2022-03-17 RX ADMIN — POLYETHYLENE GLYCOL 3350 8.5 G: 17 POWDER, FOR SOLUTION ORAL at 13:57

## 2022-03-17 RX ADMIN — HEPARIN SODIUM 5000 UNITS: 5000 INJECTION, SOLUTION INTRAVENOUS; SUBCUTANEOUS at 16:04

## 2022-03-17 RX ADMIN — OXYCODONE HYDROCHLORIDE 5 MG: 5 SOLUTION ORAL at 06:16

## 2022-03-17 RX ADMIN — HEPARIN SODIUM 5000 UNITS: 5000 INJECTION, SOLUTION INTRAVENOUS; SUBCUTANEOUS at 09:27

## 2022-03-17 RX ADMIN — PANTOPRAZOLE SODIUM 40 MG: 40 TABLET, DELAYED RELEASE ORAL at 09:27

## 2022-03-17 RX ADMIN — OXYCODONE HYDROCHLORIDE 5 MG: 5 TABLET ORAL at 16:04

## 2022-03-17 RX ADMIN — OXYCODONE HYDROCHLORIDE 5 MG: 5 SOLUTION ORAL at 11:17

## 2022-03-17 RX ADMIN — ATORVASTATIN CALCIUM 40 MG: 40 TABLET, FILM COATED ORAL at 09:27

## 2022-03-17 RX ADMIN — OXYCODONE HYDROCHLORIDE 5 MG: 5 TABLET ORAL at 22:55

## 2022-03-17 RX ADMIN — Medication 12.5 MG: at 09:27

## 2022-03-17 RX ADMIN — FUROSEMIDE 20 MG: 20 TABLET ORAL at 09:27

## 2022-03-17 RX ADMIN — THIAMINE HCL TAB 100 MG 100 MG: 100 TAB at 09:27

## 2022-03-17 RX ADMIN — ASPIRIN 81 MG CHEWABLE TABLET 162 MG: 81 TABLET CHEWABLE at 09:27

## 2022-03-17 RX ADMIN — SPIRONOLACTONE 25 MG: 25 TABLET ORAL at 09:27

## 2022-03-17 ASSESSMENT — ACTIVITIES OF DAILY LIVING (ADL)
ADLS_ACUITY_SCORE: 10

## 2022-03-17 NOTE — PROGRESS NOTES
Care Management Follow Up    Length of Stay (days): 32  Expected Discharge Date: 03/17/2022  Concerns to be Addressed:       Patient plan of care discussed at interdisciplinary rounds: Yes    Anticipated Discharge Disposition:  Home with sonAnand - needs 24/7 assist  Anticipated Discharge Services:  Home Care RN/PT/OT  Anticipated Discharge DME:  PT to issue FWW      Additional Information:  After much back and forth about patient preference for home or TCU - CVTS PA and SW had a conversation with patient who has now  decided to discharge home. Referral was made to [facility] yesterday and they can accept patient and will start care within 48hours of discharge.     12:40PM  Spoke with HENRY Levi CVTS who said pt is medically ready today if everything is set up.   Spoke with patient by phone to confirm that she is going home. She was under impression that she will discharge on Monday. Explained that since she is medically ready and [facility] is able to provide home care services she could go today. Pt states her son is staying with her and can provide 24/7 care temporarily. One of her son's is purchasing some DME for her (Shower chair, raised toilet ). Pt prefers to discharge tomorrow so that her son is ready for her. Updated HENRY Levi who is fine with this if needed.     Spoke with sonAnand who will be available 24/7 for 1-2 weeks. He states his brother is buying an new shower head but doesn't know of any other DME that is being purchased. Asked that he check with his brother. He can  pt tomorrow by 11AM. Reviewed home care agency. Anand okay with taking mom home as long as she will have her needs met. He requested medical update from CVTS who called him and reviewed.     1:45PM  Spoke with PT about plan for home tomorrow. The only DME that they think she needs is FWW that they will issue. They would like to review sternal precautions and OT to review getting into tub with son. Updated son who is in agreement with  plan to come tomorrow at 11AM for this. Updated CVTS. Attempted to call pt, no answer. Reviewed plan with bedside RN who will update pt.       Rosa Gross, RNCC  [phone]

## 2022-03-17 NOTE — PLAN OF CARE
Neuro: A/Ox4. Forgetful at times. Irritable. Appeared to sleep soundly after midnight, but pt states she did not sleep. Has decision making difficulty, and does not appear to grasp some medical concepts regarding diet and medications.  Cardiac: SR with HR 80's. VSS. BP soft at times.  Respiratory: O2 sats stable on RA 97%  GI/: Voiding on commode, no BM  Diet/appetite: Fair to poor intake, states she eats more than she does. Refusing Marinol.    Activity:  Up with SBA, remind sternal precautions.   Pain: Robaxin x 1 for incisional pain last PM pt reported relief, this am pt states pain worse and wanted Oxy.   Skin: Sternal, L groin, L leg and old CT sites healing.   LDA's: PIV SL'd    Plan: Continue with POC. Notify primary team with changes.

## 2022-03-17 NOTE — PROGRESS NOTES
Cardiovascular Surgery Progress Note  03/17/2022         Assessment and Plan:     Ashley Osman is a 71 year old female with PMH of HFrEF (10-15%) 2/2 ICM (Hx LAD and Circumflex MIs; total occlusion of RCA and LAD), Biventricular failure (requiring inotropic support PTA to [facility]), COPD, HLD, Tobacco Use Disorder (quit 12/2021), CAD who underwent CABG x4 on 02/24 with Dr. Bhandari. Found to have left femoral artery thrombosis with acute left lower extremity ischemia s/p emergent left femoral thrombectomy, bovine patch angioplasty on 2/25. 02/28 with elevated LFTs.     Cardiovascular:   Hx of biventricular HF requiring pressors, CAD, HFrEF 10-15%. Tobacco abuse, s/p CABG x4 on 2/24  Atrial fibrillation with RVR post surgery, HD stable-off pressors 2/28.  2/28 echo showed LV EF 15%, stable on 3/3 echo with LVEF 10-15% - no evidence of RH dysfunction but elevated RA/PA pressures, now euvolemic  - Daily weights   - ASA 162mg  Atorvastatin 40mg daily  Spironolactone  - Start Toprol 12.5 mg daily (per cards recs)   - [facility] consulted 2/22 however pt not scheduled as declining specialty care. 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 s/p emergent left femoral thrombectomy, bovine patch angioplasty on 2/25  Occurred in setting of recent perioperative IABP  - Heparin gtt discontinued, s/p thrombectomy per Vascular  - On subcutaneous heparin  - Monitor lower extremity pulses and perfusion status  - Per vascular recs 3/1:              - NO need for additional anticoagulation from surgery standpoint              - Recommend ASA when able and statin when able               - Incisional dressing to stay on for 2 weeks              - Vascular will sign off              - Will arrange followup with Vascular Surgery  - LLE duplex US 3/4 to r/o DVT given LLE swelling>RLE - results negative for DVT      Possible RHF (Elevated LFTs)  Ruled out on 3/3 echo, elevated LFT  attributed to volume overload   - HF Cardiology consulted, appreciate recs   - Continue holding amio due to LFTs   - PRN tylenol discontinued 3/3   - Daily LFTs     Chest tubes: removed 3/3  TPW: removed without difficulty     Pulmonary:  Extubated POD #5; Saturating well on RA  Supplemental O2 PRN to keep sats > 92%.  Pulm toilet, IS, activity/OOB and deep breathing     Bilateral PTX, stable  IR consulted 3/2 for chest tube placement however PTX resolved prior to intervention when chest tubes returned to suction.  Small bilateral PTX stable on water seal, chest tubes removed 3/3.  Very small bilat apical PTX stable post-chest tube pull and again this morning; no intervention indicated.     Neurology / MSK:  Acute post-operative pain  - PO oxycodone 5mg PRN  - PRN Robaxin  - 3/3: Tylenol discontinued d/t elevation in LFT     Sternotomy  PT/OT/CR consulted  Sternal precautions      Physical deconditioning  Therapies consulted and recommending TCU at discharge      / Renal:  No Hx of renal disease, baseline creatinine ~0.6  Volume overloaded, resolved  - Furosemide 20mg po daily. Appears euvolemic on exam. No lyte replacement. Patient maintains K above 4.0 with spironolactone.     Hyperkalemia, resolved  - Lasix drip was discontinued with continuing potassium supplementation  - Patient remained asymptomatic with stable MAPs and no arrhythmias  - Discontinued potassium     Equivocal UA   - Covered with 1g Rocephin x1 on 3/1   - UC negative      GI / FEN:   Elevated LFT  Suspect venous congestion as likely source.  Plans as above; continue to trend daily, avoid hepatotoxic agents as able.  - LFTs improving  - Calorie Counts 3/13/22     Moderate malnutrition in the context of acute on chronic illness  Estimated Energy Needs: 7340-6697 kcals/day    - Dietitian consulted, appreciate recs; supplements ordered   - Poor PO intake - Increased marinol for appetite stimulation  - Pt states this is her normal appetite and she  is eating as much as she can. She does like the protein shakes and is consuming them. Byron counts 1100 and 200 (per patient had something at all three meals on 3/14) previous two days  - Continue byron counts starting 3/13     Dysphagia  SLP following: recommend regular diet thin liquids     Endocrine:  Stress-induced hyperglycemia - resolved: initially managed on insulin drip postop, transitioned to sliding scale goal BG <180 and since discontinued after demonstrated euglycemia without exogenous insulin      Infectious Disease:  Stress-induced leukocytosis  WBC WNL. Stable afebrile. Procal 3/6 0.08 (0.15) (0.36)  - Completed perioperative antibiotics  - Serial blood cultures 3/2 with NGTD  - Empiric cefepime started 3/3; can transition to PO abx once WBC < 12 per Dr. Bhandari  - Daily AM CBC.  PICC removed 3/13  - Planned to check c.diff with next stool, try to hold off on Stool softeners as able. +BM today  - Consult ID 3/9, now signed off.    * Agreed with stopping Vanco, Cdiff if able to get stool sample (hold off now that leukocytosis resolved and having normal BMs). Do not start po Flagyl.   - CT scan w/ contrast 3/11. Stopped Cefepime per ID recommendations.   - WBC stable off antibiotics, continue to monitor     Hematology:   Stress-induced leukocytosis, as above  Acute blood loss anemia, Hgb stable ~8-9   Acute blood loss thrombocytopenia, resolved  Left femoral artery thrombosis with acute extremity ischemia, resolved s/p thrombectomy and bovine patch per Vascular Surgery  Right sided non-occlusive IJ Thrombus  No signs or symptoms of active bleeding  - Transfuse prn for Hgb<7  - Daily CBC     Anticoagulation:   -  mg  - Subcutaneous Heparin TID  - Not currently anticoagulated for thrombosis or post-op afib (has been maintaining SR)     Prophylaxis:   Stress ulcer prophylaxis:   -Pantoprazole 40 mg daily for 30 days  -DVT prophylaxis: Subcutaneous heparin, SCD,  "OOB/activity/ambulation     Disposition:   - Transferred to  on 3/1  - No life-vest or ICD inpatient. May consider with Cardiology in outpatient  - Cards HF referral order is already placed.  - followup with Vascular Surgery established  - Rehab recommending discharge to TCU. Medically ready, pending availability. Discussed with patient.    Discussed with Surgeon, Dr. Bhandari via written and verbal commnication.     Amita Smith PA-c  Pager: [phone]  10:21 AM March 17, 2022           Interval History:     No overnight events.  States pain is well managed on current regimen. Slept well overnight.  Tolerating diet, is passing flatus, BM x 0. No nausea or vomiting.  Breathing well without complaints.   Working with therapies and ambulating in halls with assistance.   Denies chest pain, palpitations, dizziness, syncopal symptoms, fevers, chills, myalgias, or sternal popping/clicking.         Physical Exam:   Blood pressure 113/59, pulse 76, temperature 98.2  F (36.8  C), temperature source Oral, resp. rate 16, height 1.638 m (5' 4.5\"), weight 52 kg (114 lb 9.6 oz), SpO2 96 %.  Vitals:    03/15/22 0425 03/16/22 0447 03/17/22 0030   Weight: 52.5 kg (115 lb 12.8 oz) 52.5 kg (115 lb 12.8 oz) 52 kg (114 lb 9.6 oz)      Current Weight: 52.0 Kg Trend: -0.5 Kg  Admit weight: 54.0 Kg    Daily Fluid status; net loss: -160 (950 O)  mL    Net loss SA: -5927 mL  MAPs: 69-82  LVEF: 10-20%    Gen: A&Ox4, NAD  Neuro: no focal deficits   CV: RRR, normal S1 S2, no murmurs, rubs or gallops. No appreciable JVD  Vascular: Peripheral pulses present Radial 2+, Dorsalis Pedis 2+, Posterior Tibialis 2+.   Pulm: CTA, no wheezing or rhonchi, normal breathing on RA  Abd: nondistended, normal BS, soft, nontender  Ext: negative peripheral edema, 0+ pitting. Warm.   Incision: clean, dry, intact=, no erythema, sternum stable  Tubes/drain sites: dressing clean and dry         Data:    Imaging:  reviewed recent imaging    Chest " x-ray  Interpretation:    Echocardiogram  Interpretation:    CT scan:    Labs:  CBC  Recent Labs   Lab 03/17/22  0608 03/16/22  0518 03/15/22  0609 03/14/22  0536   WBC 8.2 8.3 8.8 9.6   RBC 3.56* 3.14* 3.04* 2.94*   HGB 10.1* 8.9* 8.6* 8.3*   HCT 33.8* 30.4* 29.1* 28.2*   MCV 95 97 96 96   MCH 28.4 28.3 28.3 28.2   MCHC 29.9* 29.3* 29.6* 29.4*   RDW 17.3* 17.6* 18.0* 18.2*   * 453* 441 448     CMP:  Last Comprehensive Metabolic Panel:  Sodium   Date Value Ref Range Status   03/17/2022 137 133 - 144 mmol/L Final     Potassium   Date Value Ref Range Status   03/17/2022 4.0 3.4 - 5.3 mmol/L Final     Chloride   Date Value Ref Range Status   03/17/2022 103 94 - 109 mmol/L Final     Carbon Dioxide (CO2)   Date Value Ref Range Status   03/17/2022 26 20 - 32 mmol/L Final     Anion Gap   Date Value Ref Range Status   03/17/2022 8 3 - 14 mmol/L Final     Glucose   Date Value Ref Range Status   03/17/2022 128 (H) 70 - 99 mg/dL Final     Urea Nitrogen   Date Value Ref Range Status   03/17/2022 15 7 - 30 mg/dL Final     Creatinine   Date Value Ref Range Status   03/17/2022 0.56 0.52 - 1.04 mg/dL Final     GFR Estimate   Date Value Ref Range Status   03/17/2022 >90 >60 mL/min/1.73m2 Final     Comment:     Effective December 21, 2021 eGFRcr in adults is calculated using the 2021 CKD-EPI creatinine equation which includes age and gender (Marleen barnes al., NEJM, DOI: 10.1056/[id number])     Calcium   Date Value Ref Range Status   03/17/2022 9.7 8.5 - 10.1 mg/dL Final     Bilirubin Total   Date Value Ref Range Status   03/17/2022 0.6 0.2 - 1.3 mg/dL Final     Alkaline Phosphatase   Date Value Ref Range Status   03/17/2022 109 40 - 150 U/L Final     ALT   Date Value Ref Range Status   03/17/2022 48 0 - 50 U/L Final     AST   Date Value Ref Range Status   03/17/2022 24 0 - 45 U/L Final     BMP  Recent Labs   Lab 03/17/22  0608 03/16/22  0518 03/15/22  0609 03/14/22  2110 03/14/22  0536    136 137  --  135   POTASSIUM 4.0  4.5 4.5  --  4.2   CHLORIDE 103 100 101  --  101   HEIDI 9.7 9.4 9.1  --  8.9   CO2 26 30 30  --  27   BUN 15 20 18  --  21   CR 0.56 0.66 0.60  --  0.60   * 85 88 111* 92     INR  No lab results found in last 7 days.   Hepatic Panel  Recent Labs   Lab 03/17/22  0608 03/16/22  0518 03/15/22  0609 03/14/22  0536   AST 24 22 20 20   ALT 48 46 46 52*   ALKPHOS 109 98 99 95   BILITOTAL 0.6 0.4 0.5 0.5   ALBUMIN 2.9* 2.7* 2.6* 2.5*     GLUCOSE:   Recent Labs   Lab 03/17/22  0608 03/16/22  0518 03/15/22  0609 03/14/22  2110 03/14/22  0536 03/13/22  1650   * 85 88 111* 92 129*

## 2022-03-17 NOTE — PLAN OF CARE
[birth year]:  Neuro: A/Ox4. Forgetful at times. Bit if difficulty grasping info re: her restrictions and care plan.  Cardiac: SR HR 70s-80s. VSS.  Respiratory: O2 sats mid-upper 90s on RA.  GI/: Voiding on commode, no BM today. Agreed to 1/2 dose Miralax at 2pm.  Diet/appetite: Fair intake for bkfst, no lunch yet. Declined Marinol.    Activity:  Up with SBA, remind of sternal precautions.   Pain: Oxycodone x1 for incisional pain.  Skin: Sternal, L groin, L leg and old CT sites healing.   LDA's: PIV SL'd     Plan: Continue with POC. Notify primary team with changes/concerns.  Son will come tomorrow at 11:00 to discuss discharge. PT/OT will talk to patient and son about sternal precautions, etc. Patient will have PT/OT at home.

## 2022-03-17 NOTE — DISCHARGE SUMMARY
[facility]   Cardiothoracic Surgery Hospital Discharge Summary     Ashley Osman MRN# [id number]   Age: 71 year old YOB: 1950     Admitting Physician:  Alex Brewer MD  Discharge Physician:  Amita Smith PA-C  [facility] Care Physician:        [facility]     DATE OF ADMISSION: 2/12/2022      DATE OF DISCHARGE: March 18, 2022     Admit Wt: 54.0 Kg  Discharge Wt: 52.0 Kg          Primary Diagnoses:   1. Severe multivessel coronary artery disease s/p CABG x 4  2. Severe ischemic cardiomyopathy with severely diminished left ventricular systolic and diastolic function.  3. Heart failure with reduced ejection fraction. 10-15% on most recent TTE  4. Left femoral artery thrombosis with acute left lower extremity ischemia s/p emergent left femoral thrombectomy, bovine patch angioplasty on 2/25  5. Right sided non-occlusive IJ Thrombus, stable  6. Bilateral PTX, improved  7. Acute post-operative pain, improving  8. Physical deconditioning,  improving  9. Hyperkalemia, resolved  10. Moderate malnutrition in the context of acute on chronic illness, resolved  11. Stress-induced hyperglycemia, resolved  12. Stress-induced leukocytosis, resolved  13. Acute blood loss anemia, stable  14. Acute blood loss thrombocytopenia, resolved         Secondary Diagnoses:   1. COPD  2. HLD  3. Tobacco Use Disorder (quit 12/21)    PROCEDURES PERFORMED:   Date: 2/24/22.  Surgeon: Dr. Bhandari    1.  Coronary artery bypass grafting x 4 (reversed saphenous vein graft to the left posterior descending coronary artery, reversed saphenous vein graft to the obtuse marginal branch of the left circumflex coronary artery, reversed saphenous vein graft to the diagonal branch of the left anterior descending coronary artery, and pedicled left internal mammary artery to left anterior descending coronary artery).  2.  Endoscopic vein harvest of the greater saphenous vein from the  left lower extremity.    INTRAOPERATIVE FINDINGS:    OPERATIVE FINDINGS:  The left internal mammary artery was 1.75 mm in diameter and had excellent flow.  The greater saphenous vein from the left lower extremity was 3-4 mm in diameter and suitable for conduit.  The ascending aorta was free of calcified plaque.  The leftt posterior descending coronary artery was 1.75 mm in diameter and free of disease at the site of anastomosis. The obtuse marginal branch of the left circumflex coronary artery was 1,75 mm in diameter and free of disease at the site of anastomosis. The diagonal branch of the left anterior descending coronary artery was 1.5 mm in diameter and free of disease at the site anastomosis.  The left anterior descending coronary artery 1.75 mm in diameter and free of disease at the site of anastomosis.  After reperfusion, sinus rhythm resumed.  Left ventricular function was severely diminished preoperatively and unchanged after bypass on moderate-high dose inotropic support.    PATHOLOGY RESULTS:    Left femoral thrombus:  -Laminated blood clot     CULTURE RESULTS:    NGTD    CONSULTS:    1. PT/OT  2. Vascular Surgery  3. SLP  4. Nutrition  5. Interventional Radiology  6. Cardiology Heart Failure  7. CORE clinic    BRIEF HISTORY OF ILLNESS:  Ashley Osman is a 71 year old female with PMH of HFrEF (10-15%) 2/2 ICM (Hx LAD and Circumflex MIs; total occlusion of RCA and LAD), Biventricular failure (requiring inotropic support PTA to [facility]), COPD, HLD, Tobacco Use Disorder (quit 12/2021), CAD who underwent CABG x4 on 02/24 with Dr. Bhandari. Found to have left femoral artery thrombosis with acute left lower extremity ischemia s/p emergent left femoral thrombectomy, bovine patch angioplasty on 2/25.     HOSPITAL COURSE: Ashley Osman is a 71 year old female who on 2/12/2022 underwent the above-named procedures. She tolerated the operation well.     Postoperatively was admitted to the CVICU.  With a history of  COPD, she was extubated within protocol on POD #5.  Blood pressure and cardiac index were managed with vasopressors and inotropic agents which were continuously weaned until no longer needed.  Patient was subsequently  transferred to the surgical telemetry floor.     During her post-operative period Ashley experienced a Left femoral artery thrombosis with acute left lower extremity ischemia s/p emergent left femoral thrombectomy, bovine patch angioplasty on 2/25 per Vascular Surgery. In the post-operative, deconditioned state, it is difficult to notice any residual weaknesses from this complication, but she continues LLE mobility without noticeable change comparison to the right. A right IJ non-occlusive thrombus was also noted via CT scan. In the setting of R IJ central venous catheter placement, it was assumed to be a provoked thrombus. No other intervention was added for this consideration. No plan for anticoagulation was continued per vascular surgery. Follow up with Dr. Laura Allen MD in the Vascular Surgery Clinic in 2 weeks with a duplex arterial ultrasound. The Vascular Surgery Clinic will contact the patient for specific appointment instructions.    While on the surgical unit, the patient continued to progress well. Chest tubes and temporary pacemaker wires were removed when deemed appropriate. Cardiac Medications were gradually reintroduced as tolerated. These medications include Metoprolol succinate 12.5mg daily, Spironolactone 25mg daily, and Furosemide 20mg daily. Cardiology Heart Failure was consulted for considerations of co-managing cardiac medications.    Bilateral PTX was noted on 3/2.  Interventional radiology was consulted for consideration of chest tube insertion.  Chest tubes were placed and placed to waterseal and removed on 3 March.  Small bilateral apical pneumothoraces remained after chest tube removal and subsequently resolved at discharge.  Chest x-rays were unremarkable, patient remained  did not require any oxygen, and patient did not experience any chest pain.    Hyperkalemia with fluid overload was resolved with diuresis.  After resolution of the hyperkalemia patient was restarted on her PTA dose of Spironolactone.  She will be discharged with spironolactone 25 mg daily.  Her potassium levels have remained stable without potassium repletion and gentle diuresis of furosemide 20 mg daily.    Physical deconditioning and moderate malnutrition in the context of acute on chronic illness was noted in the post-operative period. Nutrition was consulted for considerations and PT/OT continued to treat as indicated to improve nutrition and conditioning respectively.    Speech Language Pathology was consulted for considerations of Dysphagia. On 3/8 her diet was transitioned from thickened liquids to a regular diet with thin liquids. The patient continued to tolerate this diet without complication.     Patient was fluid overloaded and treated with diuretics. She was discharged 2.0 Kg below preoperative weight and will discharge with 14 days of diuretic therapy in the form of Furosemide 20mg daily and Spironolactone 25 mg daily.  Because of the potassium retention from spironolactone she will not be discharged with any potassium replacement therapy. She was instructed to take an additional tablet (20mg) by mouth if you notice a fluid accumulation in the lower extremities, a weight gain greater than 3-4 lbs in 24 hours, or a weight gain of greater than 5 pounds in one week. Patient will have re-evaluation for further Diuretic therapy need by CORE clinic at follow up on 3/23/22.    Patient was transiently hyperglycemic and treated with insulin infusion then transitioned to sliding scale insulin per protocol. Patients most recent Hemoglobin A1c is 5.6 and her blood sugars remained stable. No Further glycemic control was necessary at discharge.     Prior to discharge, her pain was controlled well, she was working  "well with therapies, able to perform most ADLs, ambulate with minimal assistance, and had full return of bowel and bladder function.  On March 18 2022, she was discharged to home with home health care and outpatient PT/OT in stable condition. Follow up with cardiology and cardiac surgery have been arranged. Pt encouraged to follow up with PCP and cardiac rehab upon discharge.    Patient discharged on aspirin:  Yes 162 mg  Patient discharged on beta blocker: yes Metoprolol Succinate 12.5mg daily  Patient discharged on ACE Inhibitor/ARB: no  Due to low blood pressure  Patient discharged on statin: yes, Atorvastatin 40mg daily         Discharge Disposition:     Discharged to home            Condition on Discharge:     Discharge condition: Stable   Discharge vitals: Blood pressure 104/55, pulse 70, temperature 98.4  F (36.9  C), temperature source Axillary, resp. rate 18, height 1.638 m (5' 4.5\"), weight 52 kg (114 lb 9.6 oz), SpO2 93 %.     Code status on discharge: DNR     Vitals:    03/15/22 0425 03/16/22 0447 03/17/22 0030   Weight: 52.5 kg (115 lb 12.8 oz) 52.5 kg (115 lb 12.8 oz) 52 kg (114 lb 9.6 oz)       DAY OF DISCHARGE PHYSICAL EXAM:    Blood pressure 104/55, pulse 70, temperature 98.4  F (36.9  C), temperature source Axillary, resp. rate 18, height 1.638 m (5' 4.5\"), weight 52 kg (114 lb 9.6 oz), SpO2 93 %.  Vitals:    03/15/22 0425 03/16/22 0447 03/17/22 0030   Weight: 52.5 kg (115 lb 12.8 oz) 52.5 kg (115 lb 12.8 oz) 52 kg (114 lb 9.6 oz)        Admit Wt: 54.0 Kg  Discharge Wt: 52.0 Kg      24 hr Fluid status; net loss -495 L.   MAPs: 70-82    Gen: A&Ox4, NAD  Neuro: no focal deficits   CV: RRR, normal S1 S2, no murmurs, rubs or gallops. No appreciable JVD  Pulm: CTA, no wheezing or rhonchi, normal breathing on RA  Abd: nondistended, normal BS, soft, nontender  Ext: negative peripheral edema, 0+ pitting  Incision: clean, dry, intact, no erythema, sternum stable  Tubes/drain sites: dressing clean and " dry    BMP  Recent Labs   Lab 03/18/22  0505 03/17/22  0608 03/16/22  0518 03/15/22  0609    137 136 137   POTASSIUM 3.9 4.0 4.5 4.5   CHLORIDE 103 103 100 101   HEIDI 9.2 9.7 9.4 9.1   CO2 25 26 30 30   BUN 16 15 20 18   CR 0.56 0.56 0.66 0.60   GLC 96 128* 85 88     CBC  Recent Labs   Lab 03/18/22  0505 03/17/22  0608 03/16/22  0518 03/15/22  0609   WBC 8.2 8.2 8.3 8.8   RBC 3.28* 3.56* 3.14* 3.04*   HGB 9.3* 10.1* 8.9* 8.6*   HCT 31.2* 33.8* 30.4* 29.1*   MCV 95 95 97 96   MCH 28.4 28.4 28.3 28.3   MCHC 29.8* 29.9* 29.3* 29.6*   RDW 17.2* 17.3* 17.6* 18.0*   * 498* 453* 441     INR  No lab results found in last 7 days.   Hepatic Panel  Recent Labs   Lab 03/18/22  0505 03/17/22  0608 03/16/22  0518 03/15/22  0609   AST 22 24 22 20   ALT 38 48 46 46   ALKPHOS 101 109 98 99   BILITOTAL 0.6 0.6 0.4 0.5   ALBUMIN 2.6* 2.9* 2.7* 2.6*         Recent Labs   Lab 03/18/22  0505 03/17/22  0608 03/16/22  0518 03/15/22  0609 03/14/22  2110 03/14/22  0536   GLC 96 128* 85 88 111* 92       ECHOCARDIOGRAM, 3/3/2022-   Left Ventricle  Left ventricular function is decreased. The ejection fraction is 10-15%  (severely reduced). Severe diffuse hypokinesis is present.     Right Ventricle  The right ventricle is normal size. Global right ventricular function is  normal.     Mitral Valve  Mild to moderate mitral insufficiency is present.     Aortic Valve  Trace aortic insufficiency is present.     Tricuspid Valve  Mild tricuspid insufficiency is present. Estimated pulmonary artery systolic  pressure is 30 mmHg plus right atrial pressure.     Vessels  IVC diameter >2.1 cm collapsing <50% with sniff suggests a high RA pressure  estimated at 15 mmHg or greater.    Chest CT w/ contrast 3/11/2022-   Findings:   Surgical changes of CABG with intact median sternotomy wires. A right  PICC line in terminates in the high right atrium. Normal caliber of  the thoracic aorta and main pulmonary trunk. Calcifications in the  native  coronary arteries, aortic arch as well as thoracic great  vessels.     Eccentrically located hypodensities in the right IJ and innominate  veins which remain patent. Nonocclusive thrombus versus mixing  artifact.     Unchanged prominent likely reactive mediastinal lymph nodes. Trace  pericardial effusion. Layering slightly greater than simple density  fluid along the anterior mediastinal/retrosternal internal space,  without associated rim enhancement or substantial inflammatory  changes.     Advanced obstructive emphysematous changes in the lungs. Unchanged  small bilateral pleural effusions, likely unchanged bibasilar  atelectasis given differences in slice thickness. Stable to decrease  in patchy consolidations in the peripheral upper lobes.     Unchanged nondisplaced left first rib fracture. Visualized upper  abdomen is unremarkable. Soft tissues within normal limits.                                                                      Impression:   1. Relatively unchanged greater than simple density fluid along the  retrocrural sternotomy space, without enhancement, gas, or  inflammatory changes to suggest an inflammatory fluid collection.  Favor postoperative seroma/hematoma.  2. Eccentric laterally located and likely chronic thrombus or fibrin  sheath within the right IJ and innominate veins. These are not  occluded.  Reportedly known to the primary surgery time.  3. Continued effusions and patchy peripheral airspace opacities, most  likely pulmonary edema though superimposed infection remains on the  differential.  4. Advanced destructive emphysematous changes in the lungs.    DISCHARGE INSTRUCTIONS:  AFTER YOU GO HOME FROM YOUR HEART SURGERY  (Four vessel coronary artery bypass surgery on 2/24/22)    You had a sternotomy, avoid lifting anything greater than ten pounds for 6 weeks after surgery and then less than 20 pounds for an additional 6 weeks. Do not reach backwards or use arms to push out of chair. Do  not let people pull on your arms to assist with standing. Avoid twisting or reaching too far across your body.  Avoid strenuous activities such as bowling, vacuuming, raking, shoveling, golf or tennis for 12 weeks after your surgery. It is okay to resume sex if you feel comfortable in doing so. You may have to try different positions with your partner.  Splint your chest incision by hugging a pillow or bringing your arms across your chest when coughing or sneezing. Please try to sleep on your back for the first 4-6 weeks to avoid extra stress on your sternum (breastbone) while it is healing.     Activity as tolerated with sternal precautions. No lifting more than 10 pounds for first 6 weeks, then no lifting more than 20 pounds for an additional 6 weeks. Avoid lifting arms above shoulders. After these 12 weeks, activity as tolerated with pain. Avoid strenuous activities such as bowling, vacuuming, raking, shoveling, golf or tennis for 12 weeks after your surgery. No sex for 6-8 weeks after surgery.     No driving for 4 weeks. If you continue to take narcotics after 4 weeks, no driving until you are not taking narcotics. Sit in the back seat for 4 weeks.      Shower or wash your incisions twice daily with soap and water (or as instructed), pat dry. Keep wound clean and dry, showers are okay after discharge, but don't let spray hit directly on incision. No baths or swimming for 1 month. Cover chest tube sites with gauze until they stop draining, then leave open to air. It is not abnormal for chest tube sites to drain yellowish/clear fluid for up to 2-3 weeks after surgery.   Watch for signs of infection: increased redness, tenderness, warmth or any drainage from sternum incision.  Also a temperature > 100.5 F or chills. Call your surgeon or primary care provider's office immediately. Remove any skin glue left on incisions after 10-14 days. This will not affect your incision and can speed up healing.    Exercise is very  important in your recovery. Please follow the guidelines set up for you in your cardiac rehab classes at the hospital. If outpatient cardiac rehab was ordered for you, we highly recommend you participate. If you have problems arranging your cardiac rehab, please call [phone] for all locations, with the exception of [place], please call [phone] and [place], please call [phone].    Avoid sitting for prolonged periods of time, try to walk every hour during the day. If you have a leg incision, elevate your leg often when you are not walking.    Check your weight when you get home from the hospital and continue to check it daily through your recovery for at least a month. If you notice a weight gain of 2-3 pounds in a week, notify your primary care physician, cardiologist or surgeon.    Bowel activity may be slow after surgery. If necessary, you may take an over the counter laxative such as Milk of Magnesia or Miralax. You may have bowel stimulants or stool softeners prescribed (docusate sodium, Senokot, Miralax). We recommend using stool softeners while using narcotics for pain (oxycodone/percocet, hydrocodone/vicodin, hydromorphone/dilaudid).      If you start to feel dizzy, nauseous, have chest pain, shortness of breath, lower extremity edema with pain and redness, or extreme headache that is out of the ordinary, it is important that you go to the closest ER for evaluation. Any emergency should be addressed by seeing your local Emergency Medicine provider. Other non-emergency concerns should be addresed by contacting your primary care physician or Cardiologist.    Wean OFF of narcotics (oxycodone, dilaudid, hydrocodone) as soon as possible. You should continue taking acetaminophen as long as you have any surgical pain as the first choice for pain control and add narcotics as necessary for pain to be tolerable.      DO NOT SMOKE.  IF YOU NEED HELP QUITTING, PLEASE TALK WITH YOUR CARDIOLOGIST OR  PRIMARY DOCTOR.    You are on the blood thinner, Aspirin 162 mg. Follow the instructions you were given in the hospital and DO NOT SKIP this medication. Try and take it the same time everyday.    REGARDING PRESCRIPTION REFILLS.  If you need a refill on your pain medication contact us to discuss your pain and a possible one time refill.   All other medications will be adjusted, discontinued and re-filled by your primary care provider and/or your cardiologist as they were prior to your surgery. We have given you enough for one to three month with possibly one refill.    POST-OPERATIVE CLINIC VISITS  Routine, You have a follow up visit with [facility] Surgery Advance Care Practitioners on 3/22/22 at the [facility]. You will then return to the care of your primary provider and your cardiologist. Future medication refills should come from your PCP or Cardiologist.   - You should see your primary care provider in 1-2 weeks after discharge.   - It is important to see your cardiologist in CORE clinic, Dr. Alex Mcmanus on 3/23/22.   - Follow up with Dr. Laura Allen MD in the Vascular Surgery Clinic in 2 weeks with a duplex arterial ultrasound. The Vascular Surgery Clinic will contact you to set up an appointment.    PRE-ADMISSION MEDICATIONS:  No current facility-administered medications on file prior to encounter.  albuterol (PROAIR HFA/PROVENTIL HFA/VENTOLIN HFA) 108 (90 Base) MCG/ACT inhaler, Inhale 1 puff into the lungs every 6 hours as needed for shortness of breath / dyspnea or wheezing  INCRUSE ELLIPTA 62.5 MCG/INH Inhaler, Inhale 1 puff into the lungs daily  nitroGLYcerin (NITROSTAT) 0.4 MG sublingual tablet, Place 0.4 mg under the tongue every 5 minutes as needed for chest pain         DISCHARGE MEDICATIONS:      Review of your medicines      START taking      Dose / Directions   aspirin 81 MG chewable tablet  Commonly known as: ASA  Used for: S/P CABG x 4  Replaces: aspirin 325 MG tablet      Dose:  162 mg  Take 2 tablets (162 mg) by mouth daily  Quantity: 120 tablet  Refills: 0     furosemide 20 MG tablet  Commonly known as: LASIX  Used for: S/P CABG x 4      Dose: 20 mg  Take 1 tablet (20 mg) by mouth daily  Quantity: 120 tablet  Refills: 0     methocarbamol 500 MG tablet  Commonly known as: ROBAXIN  Used for: S/P CABG x 4      Dose: 250-500 mg  Take 0.5-1 tablets (250-500 mg) by mouth every 6 hours as needed for muscle spasms  Quantity: 90 tablet  Refills: 0     metoprolol succinate ER 25 MG 24 hr tablet  Commonly known as: TOPROL-XL  Used for: S/P CABG x 4      Dose: 12.5 mg  Take 0.5 tablets (12.5 mg) by mouth daily  Quantity: 60 tablet  Refills: 0     multivitamin w/minerals tablet  Used for: S/P CABG x 4      Dose: 1 tablet  Take 1 tablet by mouth daily  Quantity: 60 tablet  Refills: 0     oxyCODONE 5 MG tablet  Commonly known as: ROXICODONE  Used for: S/P CABG x 4      Dose: 5 mg  Take 1 tablet (5 mg) by mouth every 4 hours as needed for moderate to severe pain (Use in the case of pain that is not controlled with Tylenol, Robaxin, or topical/nonpharmaceutical methods of pain control)  Quantity: 30 tablet  Refills: 0     pantoprazole 40 MG EC tablet  Commonly known as: PROTONIX  Used for: S/P CABG x 4      Dose: 40 mg  Start taking on: March 19, 2022  Take 1 tablet (40 mg) by mouth every morning (before breakfast)  Quantity: 30 tablet  Refills: 0     polyethylene glycol 17 GM/Dose powder  Commonly known as: MIRALAX  Used for: S/P CABG x 4      Dose: 17 g  Take 17 g by mouth daily  Quantity: 510 g  Refills: 0     spironolactone 25 MG tablet  Commonly known as: ALDACTONE  Used for: S/P CABG x 4      Dose: 25 mg  Take 1 tablet (25 mg) by mouth daily  Quantity: 90 tablet  Refills: 0     thiamine 100 MG tablet  Commonly known as: B-1  Used for: S/P CABG x 4      Dose: 100 mg  Take 1 tablet (100 mg) by mouth daily  Quantity: 90 tablet  Refills: 0        CONTINUE these medicines which may have CHANGED, or have  new prescriptions. If we are uncertain of the size of tablets/capsules you have at home, strength may be listed as something that might have changed.      Dose / Directions   atorvastatin 40 MG tablet  Commonly known as: LIPITOR  This may have changed:     medication strength    how much to take    when to take this  Used for: S/P CABG x 4      Dose: 40 mg  Take 1 tablet (40 mg) by mouth every morning  Quantity: 90 tablet  Refills: 0        CONTINUE these medicines which have NOT CHANGED      Dose / Directions   albuterol 108 (90 Base) MCG/ACT inhaler  Commonly known as: PROAIR HFA/PROVENTIL HFA/VENTOLIN HFA      Dose: 1 puff  Inhale 1 puff into the lungs every 6 hours as needed for shortness of breath / dyspnea or wheezing  Refills: 0     Incruse Ellipta 62.5 MCG/INH inhaler  Generic drug: umeclidinium      Dose: 1 puff  Inhale 1 puff into the lungs daily  Refills: 0     nitroGLYcerin 0.4 MG sublingual tablet  Commonly known as: NITROSTAT      Dose: 0.4 mg  Place 0.4 mg under the tongue every 5 minutes as needed for chest pain  Refills: 0        STOP taking    aspirin 325 MG tablet  Commonly known as: ASA  Replaced by: aspirin 81 MG chewable tablet        carvedilol 3.125 MG tablet  Commonly known as: COREG        isosorbide mononitrate 30 MG 24 hr tablet  Commonly known as: IMDUR              Where to get your medicines      These medications were sent to [facility] - [place], [place] - [address] [address]    Phone: [phone]     aspirin 81 MG chewable tablet    atorvastatin 40 MG tablet    furosemide 20 MG tablet    methocarbamol 500 MG tablet    metoprolol succinate ER 25 MG 24 hr tablet    multivitamin w/minerals tablet    oxyCODONE 5 MG tablet    pantoprazole 40 MG EC tablet    polyethylene glycol 17 GM/Dose powder    spironolactone 25 MG tablet    thiamine 100 MG tablet            CC:s  Clinic, [facility]    Cardiothoracic Surgery  Office phone: [phone]  Office fax: [phone]

## 2022-03-18 ENCOUNTER — APPOINTMENT (OUTPATIENT)
Dept: OCCUPATIONAL THERAPY | Facility: CLINIC | Age: 72
DRG: 233 | End: 2022-03-18
Attending: INTERNAL MEDICINE
Payer: MEDICARE

## 2022-03-18 ENCOUNTER — APPOINTMENT (OUTPATIENT)
Dept: PHYSICAL THERAPY | Facility: CLINIC | Age: 72
DRG: 233 | End: 2022-03-18
Attending: INTERNAL MEDICINE
Payer: MEDICARE

## 2022-03-18 VITALS
SYSTOLIC BLOOD PRESSURE: 104 MMHG | HEART RATE: 70 BPM | WEIGHT: 114.6 LBS | BODY MASS INDEX: 19.09 KG/M2 | OXYGEN SATURATION: 93 % | DIASTOLIC BLOOD PRESSURE: 55 MMHG | TEMPERATURE: 98.4 F | RESPIRATION RATE: 18 BRPM | HEIGHT: 65 IN

## 2022-03-18 LAB
ALBUMIN SERPL-MCNC: 2.6 G/DL (ref 3.4–5)
ALP SERPL-CCNC: 101 U/L (ref 40–150)
ALT SERPL W P-5'-P-CCNC: 38 U/L (ref 0–50)
ANION GAP SERPL CALCULATED.3IONS-SCNC: 9 MMOL/L (ref 3–14)
AST SERPL W P-5'-P-CCNC: 22 U/L (ref 0–45)
BILIRUB SERPL-MCNC: 0.6 MG/DL (ref 0.2–1.3)
BUN SERPL-MCNC: 16 MG/DL (ref 7–30)
CALCIUM SERPL-MCNC: 9.2 MG/DL (ref 8.5–10.1)
CHLORIDE BLD-SCNC: 103 MMOL/L (ref 94–109)
CO2 SERPL-SCNC: 25 MMOL/L (ref 20–32)
CREAT SERPL-MCNC: 0.56 MG/DL (ref 0.52–1.04)
ERYTHROCYTE [DISTWIDTH] IN BLOOD BY AUTOMATED COUNT: 17.2 % (ref 10–15)
GFR SERPL CREATININE-BSD FRML MDRD: >90 ML/MIN/1.73M2
GLUCOSE BLD-MCNC: 96 MG/DL (ref 70–99)
HCT VFR BLD AUTO: 31.2 % (ref 35–47)
HGB BLD-MCNC: 9.3 G/DL (ref 11.7–15.7)
MAGNESIUM SERPL-MCNC: 1.9 MG/DL (ref 1.6–2.3)
MCH RBC QN AUTO: 28.4 PG (ref 26.5–33)
MCHC RBC AUTO-ENTMCNC: 29.8 G/DL (ref 31.5–36.5)
MCV RBC AUTO: 95 FL (ref 78–100)
PHOSPHATE SERPL-MCNC: 4.2 MG/DL (ref 2.5–4.5)
PLATELET # BLD AUTO: 453 10E3/UL (ref 150–450)
POTASSIUM BLD-SCNC: 3.9 MMOL/L (ref 3.4–5.3)
PROT SERPL-MCNC: 6.1 G/DL (ref 6.8–8.8)
RBC # BLD AUTO: 3.28 10E6/UL (ref 3.8–5.2)
SODIUM SERPL-SCNC: 137 MMOL/L (ref 133–144)
WBC # BLD AUTO: 8.2 10E3/UL (ref 4–11)

## 2022-03-18 PROCEDURE — 250N000013 HC RX MED GY IP 250 OP 250 PS 637: Performed by: SURGERY

## 2022-03-18 PROCEDURE — 83735 ASSAY OF MAGNESIUM: CPT | Performed by: SURGERY

## 2022-03-18 PROCEDURE — 36415 COLL VENOUS BLD VENIPUNCTURE: CPT | Performed by: SURGERY

## 2022-03-18 PROCEDURE — 250N000013 HC RX MED GY IP 250 OP 250 PS 637: Performed by: PHYSICIAN ASSISTANT

## 2022-03-18 PROCEDURE — 97535 SELF CARE MNGMENT TRAINING: CPT | Mod: GO

## 2022-03-18 PROCEDURE — 97530 THERAPEUTIC ACTIVITIES: CPT | Mod: GP

## 2022-03-18 PROCEDURE — 85014 HEMATOCRIT: CPT | Performed by: SURGERY

## 2022-03-18 PROCEDURE — 250N000011 HC RX IP 250 OP 636: Performed by: NURSE PRACTITIONER

## 2022-03-18 PROCEDURE — 250N000013 HC RX MED GY IP 250 OP 250 PS 637: Performed by: STUDENT IN AN ORGANIZED HEALTH CARE EDUCATION/TRAINING PROGRAM

## 2022-03-18 PROCEDURE — 250N000013 HC RX MED GY IP 250 OP 250 PS 637: Performed by: NURSE PRACTITIONER

## 2022-03-18 PROCEDURE — 84100 ASSAY OF PHOSPHORUS: CPT | Performed by: SURGERY

## 2022-03-18 PROCEDURE — 80053 COMPREHEN METABOLIC PANEL: CPT | Performed by: PHYSICIAN ASSISTANT

## 2022-03-18 RX ORDER — LANOLIN ALCOHOL/MO/W.PET/CERES
100 CREAM (GRAM) TOPICAL DAILY
Qty: 90 TABLET | Refills: 0 | Status: SHIPPED | OUTPATIENT
Start: 2022-03-18

## 2022-03-18 RX ORDER — FUROSEMIDE 20 MG
20 TABLET ORAL DAILY
Qty: 120 TABLET | Refills: 0 | Status: SHIPPED | OUTPATIENT
Start: 2022-03-18 | End: 2022-03-18

## 2022-03-18 RX ORDER — OXYCODONE HYDROCHLORIDE 5 MG/1
5 TABLET ORAL EVERY 4 HOURS PRN
Qty: 30 TABLET | Refills: 0 | Status: SHIPPED | OUTPATIENT
Start: 2022-03-18 | End: 2022-03-22

## 2022-03-18 RX ORDER — FUROSEMIDE 20 MG
20 TABLET ORAL DAILY
Qty: 60 TABLET | Refills: 0 | Status: SHIPPED | OUTPATIENT
Start: 2022-03-18 | End: 2022-03-22

## 2022-03-18 RX ORDER — ATORVASTATIN CALCIUM 40 MG/1
40 TABLET, FILM COATED ORAL EVERY MORNING
Qty: 90 TABLET | Refills: 0 | Status: SHIPPED | OUTPATIENT
Start: 2022-03-18

## 2022-03-18 RX ORDER — POLYETHYLENE GLYCOL 3350 17 G/17G
17 POWDER, FOR SOLUTION ORAL DAILY
Qty: 510 G | Refills: 0 | Status: SHIPPED | OUTPATIENT
Start: 2022-03-18 | End: 2022-03-22

## 2022-03-18 RX ORDER — PANTOPRAZOLE SODIUM 40 MG/1
40 TABLET, DELAYED RELEASE ORAL
Qty: 30 TABLET | Refills: 0 | Status: SHIPPED | OUTPATIENT
Start: 2022-03-19

## 2022-03-18 RX ORDER — MULTIPLE VITAMINS W/ MINERALS TAB 9MG-400MCG
1 TAB ORAL DAILY
Qty: 60 TABLET | Refills: 0 | Status: SHIPPED | OUTPATIENT
Start: 2022-03-18

## 2022-03-18 RX ORDER — ASPIRIN 81 MG/1
162 TABLET, CHEWABLE ORAL DAILY
Qty: 120 TABLET | Refills: 0 | Status: SHIPPED | OUTPATIENT
Start: 2022-03-18

## 2022-03-18 RX ORDER — SPIRONOLACTONE 25 MG/1
25 TABLET ORAL DAILY
Qty: 90 TABLET | Refills: 0 | Status: SHIPPED | OUTPATIENT
Start: 2022-03-18 | End: 2022-03-22

## 2022-03-18 RX ORDER — METOPROLOL SUCCINATE 25 MG/1
12.5 TABLET, EXTENDED RELEASE ORAL DAILY
Qty: 60 TABLET | Refills: 0 | Status: SHIPPED | OUTPATIENT
Start: 2022-03-18 | End: 2022-03-22

## 2022-03-18 RX ORDER — METHOCARBAMOL 500 MG/1
250-500 TABLET, FILM COATED ORAL EVERY 6 HOURS PRN
Qty: 90 TABLET | Refills: 0 | Status: SHIPPED | OUTPATIENT
Start: 2022-03-18 | End: 2022-03-22

## 2022-03-18 RX ADMIN — Medication 1 TABLET: at 09:16

## 2022-03-18 RX ADMIN — SPIRONOLACTONE 25 MG: 25 TABLET ORAL at 09:15

## 2022-03-18 RX ADMIN — THIAMINE HCL TAB 100 MG 100 MG: 100 TAB at 09:16

## 2022-03-18 RX ADMIN — Medication 12.5 MG: at 09:15

## 2022-03-18 RX ADMIN — OXYCODONE HYDROCHLORIDE 5 MG: 5 TABLET ORAL at 08:24

## 2022-03-18 RX ADMIN — PANTOPRAZOLE SODIUM 40 MG: 40 TABLET, DELAYED RELEASE ORAL at 08:18

## 2022-03-18 RX ADMIN — ATORVASTATIN CALCIUM 40 MG: 40 TABLET, FILM COATED ORAL at 09:16

## 2022-03-18 RX ADMIN — FUROSEMIDE 20 MG: 20 TABLET ORAL at 09:16

## 2022-03-18 RX ADMIN — ASPIRIN 81 MG CHEWABLE TABLET 162 MG: 81 TABLET CHEWABLE at 09:15

## 2022-03-18 ASSESSMENT — ACTIVITIES OF DAILY LIVING (ADL)
ADLS_ACUITY_SCORE: 10
ADLS_ACUITY_SCORE: 12

## 2022-03-18 NOTE — PLAN OF CARE
Shift: 0700 - 1330    VS: Temp: 98.4  F (36.9  C) Temp src: Axillary BP: 104/55 Pulse: 70   Resp: 18 SpO2: 93 % O2 Device: None (Room air)      Pain: reports back pain, oxycodone given x1 per patient request. Decreased pain reported.   Neuro: A&Ox4. Anxious, forgetful, otherwise calm and cooperative with care.   Cardiac:   VSS, SR.   Respiratory: Lung sounds clear/diminished on RA. ELLER.   GI/Diet/Appetite: Regular diet, poor appetite. LBM 3/15.   :  Voiding w/o difficulty,   LDA's: PIV to RUE removed prior to discharge.   Skin: pale, sternal incision, harvest site incisions to LLE approximated LISS.   Activity: Ax1 w/Walker.   Tests/Procedures:   Pertinent Labs/Lab Collection:      Plan: Discharge orders reviewed with patient and her son, discharge teaching included sternal precautions, opioid side effects/safety, reviewed medication, discussed upcoming appointments, CORE clinic, home care and all other information included on her discharge paperwork. Transported to pharmacy/lobby via  by staff. Stopped at pharmacy to  medications. Patient discharged with prescribed walker.

## 2022-03-18 NOTE — PLAN OF CARE
Pt is s/p CABG x4 ON 2/24 and left femoral thrombectomy, bovine patch angioplasty on 2/25 due to femoral artery thrombosis. PMHx of HFrEF (10-15%) 2/2 ICM, Biventricular failure , COPD, HLD, Tobacco Use Disorder (quit 12/2021), CAD     Neuro: AOx4, Forgetful, Calm and cooperative  Cardiac/Tele/VS: SR, VSS (see flowsheets)  Respiratory: Room air tolerates well. ELLER. LS diminished on the bases  GI/: Voids without difficulty. Uses the bedside commode  Diet/Appetite: Regular diet. Poor oral intake; poor appetite  Skin: Incisions intact LISS. Groin sites intact. Generalized bruising  LDAs: PIV saline locked  Activity: Assist of 1 and walker  Pain: Denies pain.      Plan: Discharge home with son today

## 2022-03-18 NOTE — DISCHARGE INSTRUCTIONS
Ari Ramirez,   You are scheduled for a CORE/Heart Failure appt with Alex Meyers NP in the [facility] on 3/23/22 at 2:20pm for labs and 2:45pm to see Alex.    Please call us with questions or concerns about fluid buildup.    [phone]   Monitor Your Weight and Symptoms    Contact us if you:      Gain 2 pounds in one day or 5 pounds in one week    Feel more short of breath    Notice more leg swelling    Feel lightheadeded   Change your lifestyle    Limit Salt or Sodium:    2000 mg  Limit Fluids:    2000 mL or approximately 64 ounces  Eat a Heart Healthy Diet    Low in saturated fats  Stay Active:    Aim to move at least 150 minutes every  week         To Contact us    During Business Hours:  [phone], option # 1      After hours, weekends or holidays:   [phone], Option #4  Ask to speak to the On-Call Cardiologist. Inform them you are a CORE/heart failure patient at the [facility].     Use [employer] allows you to communicate directly with your heart team through secure messaging.    [employer] can be accessed any time on your phone, computer, or tablet.    If you need assistance, we'd be happy to help!         Keep your Heart Appointments:    Follow-up with Alex Meyers NP on 3/23/22 in the [facility]        ________________________________________________________  Discharge RN please fax discharge orders to home care agency: [facility]  ________________________________________________________              AFTER YOU GO HOME FROM YOUR HEART SURGERY  (Four vessel coronary artery bypass surgery on 2/24/22)    You had a sternotomy, avoid lifting anything greater than ten pounds for 6 weeks after surgery and then less than 20 pounds for an additional 6 weeks. Do not reach backwards or use arms to push out of chair. Do not let people pull on your arms to assist with standing. Avoid twisting or reaching too far across your body.  Avoid strenuous activities such as bowling, vacuuming,  raking, shoveling, golf or tennis for 12 weeks after your surgery. It is okay to resume sex if you feel comfortable in doing so. You may have to try different positions with your partner.  Splint your chest incision by hugging a pillow or bringing your arms across your chest when coughing or sneezing. Please try to sleep on your back for the first 4-6 weeks to avoid extra stress on your sternum (breastbone) while it is healing.     Activity as tolerated with sternal precautions. No lifting more than 10 pounds for first 6 weeks, then no lifting more than 20 pounds for an additional 6 weeks. Avoid lifting arms above shoulders. After these 12 weeks, activity as tolerated with pain. Avoid strenuous activities such as bowling, vacuuming, raking, shoveling, golf or tennis for 12 weeks after your surgery. No sex for 6-8 weeks after surgery.     No driving for 4 weeks. If you continue to take narcotics after 4 weeks, no driving until you are not taking narcotics. Sit in the back seat for 4 weeks.      Shower or wash your incisions twice daily with soap and water (or as instructed), pat dry. Keep wound clean and dry, showers are okay after discharge, but don't let spray hit directly on incision. No baths or swimming for 1 month. Cover chest tube sites with gauze until they stop draining, then leave open to air. It is not abnormal for chest tube sites to drain yellowish/clear fluid for up to 2-3 weeks after surgery.   Watch for signs of infection: increased redness, tenderness, warmth or any drainage from sternum incision.  Also a temperature > 100.5 F or chills. Call your surgeon or primary care provider's office immediately. Remove any skin glue left on incisions after 10-14 days. This will not affect your incision and can speed up healing.    Exercise is very important in your recovery. Please follow the guidelines set up for you in your cardiac rehab classes at the hospital. If outpatient cardiac rehab was ordered for  you, we highly recommend you participate. If you have problems arranging your cardiac rehab, please call [phone] for all locations, with the exception of [place], please call [phone] and [place], please call [phone].    Avoid sitting for prolonged periods of time, try to walk every hour during the day. If you have a leg incision, elevate your leg often when you are not walking.    Check your weight when you get home from the hospital and continue to check it daily through your recovery for at least a month. If you notice a weight gain of 2-3 pounds in a week, notify your primary care physician, cardiologist or surgeon.    Bowel activity may be slow after surgery. If necessary, you may take an over the counter laxative such as Milk of Magnesia or Miralax. You may have bowel stimulants or stool softeners prescribed (docusate sodium, Senokot, Miralax). We recommend using stool softeners while using narcotics for pain (oxycodone/percocet, hydrocodone/vicodin, hydromorphone/dilaudid).      If you start to feel dizzy, nauseous, have chest pain, shortness of breath, lower extremity edema with pain and redness, or extreme headache that is out of the ordinary, it is important that you go to the closest ER for evaluation. Any emergency should be addressed by seeing your local Emergency Medicine provider. Other non-emergency concerns should be addresed by contacting your primary care physician or Cardiologist.    Wean OFF of narcotics (oxycodone, dilaudid, hydrocodone) as soon as possible. You should continue taking acetaminophen as long as you have any surgical pain as the first choice for pain control and add narcotics as necessary for pain to be tolerable.      DO NOT SMOKE.  IF YOU NEED HELP QUITTING, PLEASE TALK WITH YOUR CARDIOLOGIST OR PRIMARY DOCTOR.    You are on the blood thinner, Aspirin 162 mg. Follow the instructions you were given in the hospital and DO NOT SKIP this medication. Try and take it  the same time everyday.    REGARDING PRESCRIPTION REFILLS.  If you need a refill on your pain medication contact us to discuss your pain and a possible one time refill.   All other medications will be adjusted, discontinued and re-filled by your primary care provider and/or your cardiologist as they were prior to your surgery. We have given you enough for one to three month with possibly one refill.    POST-OPERATIVE CLINIC VISITS  Routine, You have a follow up visit with CVTS Surgery Advance Care Practitioners on 3/22/22 at the [facility]. You will then return to the care of your primary provider and your cardiologist. Future medication refills should come from your PCP or Cardiologist.   - You should see your primary care provider in 1-2 weeks after discharge.   - It is important to see your cardiologist in CORE clinic, Dr. Alex Mcmanus on 3/23/22.   - Follow up with Dr. Laura Allen MD in the [facility] Surgery Clinic in 2 weeks with a duplex arterial ultrasound. The [facility] will contact you to set up an appointment.  If you do not hear from a  in 7 days, please call [phone] (choose option 1) and request to be seen with a general cardiologist or someone that you have seen in the past.   If there is a need to return to see CT Surgery please call our  at [phone].    SURGICAL QUESTIONS  Please call Ethan or Nai Lawrence with surgical recovery and medication questions, their phone numbers are listed below.  They will assist you with your needs and contact other surgery care team members as indicated.    On weekends or after hours, please call [phone] and ask the  to   page the Cardiothoracic Surgery fellow on call.      Thank you,    Your Cardiothoracic Surgery Team  Nai Lawrence RN Care Coordinator- [phone]

## 2022-03-18 NOTE — PLAN OF CARE
Occupational Therapy Discharge Summary    Reason for therapy discharge:    Discharged to home with home therapy. Recommending TCU however pt declining, discharging home w/ 24 hr assist from son.    Progress towards therapy goal(s). See goals on Care Plan in [facility] electronic health record for goal details.  Goals partially met.  Barriers to achieving goals:   discharge from facility.    Therapy recommendation(s):    Continued therapy is recommended.  Rationale/Recommendations:  Recommend HH OT to progress ADL/IADL I and support IND in home environment.

## 2022-03-18 NOTE — PLAN OF CARE
Pt s/p CABG x 4 2/24 and L thrombectomy 2/25.  Shift 15:00-23:30  Neuro: A&O x 4.   Resp: Lungs CTA. Sats 90s on RA. Denies SOB.  CV: VSS, SR 60s-90s with rare PVCs  GI/: Pt eating very little. 1/4 of soup and some sips of strawberry drink for supper. Refusing marinol. Encouraged to get on the commode to void but said she would do it at midnight shift change.  Mobility: Encouraged OOB but just moved herself in the bed.  Pain: Oxycodone x 1 for incisional pain.  Skin: Mid sternal, old CT site, and L leg incisions cleaned with chloraprep. Healing well.  Plan: Continue with POC. Notify primary team with changes/concerns.  Son will come tomorrow at 11:00 to discuss discharge. PT/OT will talk to patient and son about sternal precautions, etc. Patient will have PT/OT at home.

## 2022-03-19 DIAGNOSIS — Z71.89 OTHER SPECIFIED COUNSELING: ICD-10-CM

## 2022-03-21 ENCOUNTER — TELEPHONE (OUTPATIENT)
Dept: VASCULAR SURGERY | Facility: CLINIC | Age: 72
End: 2022-03-21

## 2022-03-21 ENCOUNTER — PATIENT OUTREACH (OUTPATIENT)
Dept: CARE COORDINATION | Facility: CLINIC | Age: 72
End: 2022-03-21
Payer: COMMERCIAL

## 2022-03-21 NOTE — TELEPHONE ENCOUNTER
[facility] Call Center    Phone Message    May a detailed message be left on voicemail: yes     Reason for Call: Appointment Intake    Referring Provider Name: HENRY Horowitzmike Smith  Diagnosis and/or Symptoms: Follow up with Dr. Laura Allen MD in the [facility] Surgery Clinic in 2 weeks with a duplex arterial ultrasound. The [facility] will contact you to set up an appointment. No additional information was provided in discharge summary, thanks!    Action Taken: Message routed to:  [facility] ([facility]): Vascular Surgery    Travel Screening: Not Applicable

## 2022-03-21 NOTE — PROGRESS NOTES
Clinic Care Coordination Contact    Background: Care Coordination referral placed from [facility] discharge report for reason of patient meeting criteria for a TCM outreach call by [facility] team.    Assessment: Patient with recent hospitalization at [facility] from 2/12/21 to 3/18/21 for primary diagnosis of severe multivessel coronary artery disease s/p CABGx4.    Patient discharged home with 24-7 support by her adult sons, referral to [facility] home care for skilled RN, PT and OT, and ongoing Care Management through her [facility].      Upon chart review, [facility] Team member will cancel/close the referral for TCM outreach due to reason below:    Patient has been opened to skilled home care services and they have already been in contact with patient's primary PCP ([facility]).  Patient's Care Coordinator is actively following per CareEverywhere encounters. Patient has primary care and Cardiology appointments scheduled for close hospital follow up care.    Plan: Care Coordination referral for TCM outreach canceled to minimize duplicative efforts.    Nery Cunningham RN  [facility], [facility]

## 2022-03-22 ENCOUNTER — OFFICE VISIT (OUTPATIENT)
Dept: CARDIOLOGY | Facility: CLINIC | Age: 72
End: 2022-03-22
Attending: SURGERY
Payer: COMMERCIAL

## 2022-03-22 VITALS
HEART RATE: 81 BPM | DIASTOLIC BLOOD PRESSURE: 68 MMHG | BODY MASS INDEX: 18.93 KG/M2 | SYSTOLIC BLOOD PRESSURE: 102 MMHG | OXYGEN SATURATION: 98 % | WEIGHT: 112 LBS

## 2022-03-22 DIAGNOSIS — Z95.1 S/P CABG X 4: ICD-10-CM

## 2022-03-22 DIAGNOSIS — I50.20 SYSTOLIC HEART FAILURE, UNSPECIFIED HF CHRONICITY (H): ICD-10-CM

## 2022-03-22 PROCEDURE — 99024 POSTOP FOLLOW-UP VISIT: CPT | Performed by: PHYSICIAN ASSISTANT

## 2022-03-22 PROCEDURE — G0463 HOSPITAL OUTPT CLINIC VISIT: HCPCS

## 2022-03-22 RX ORDER — FUROSEMIDE 20 MG
20 TABLET ORAL DAILY PRN
Qty: 30 TABLET | Refills: 0 | Status: SHIPPED | OUTPATIENT
Start: 2022-03-22

## 2022-03-22 RX ORDER — METOPROLOL SUCCINATE 25 MG/1
12.5 TABLET, EXTENDED RELEASE ORAL DAILY
Qty: 60 TABLET | Refills: 0 | Status: SHIPPED | OUTPATIENT
Start: 2022-03-22

## 2022-03-22 RX ORDER — SPIRONOLACTONE 25 MG/1
25 TABLET ORAL DAILY
Qty: 90 TABLET | Refills: 0 | Status: SHIPPED | OUTPATIENT
Start: 2022-03-22

## 2022-03-22 ASSESSMENT — PAIN SCALES - GENERAL: PAINLEVEL: NO PAIN (0)

## 2022-03-22 NOTE — PROGRESS NOTES
CARDIOTHORACIC SURGERY FOLLOW-UP VISIT     Ashley Osman   1950   [id number]      Reason for visit: Post-Op CABG with Dr. Bhandari on 2/24/2022    HPI: Ashley Osman is a 71 year old year old female seen in clinic for a routine follow-up appointment after surgery. Patient has past medical history of Biventricular failure (requiring inotropic support PTA to [facility]), COPD, HLD, Tobacco Use Disorder (quit 12/2021), CAD. Hospital course was remarkable for left femoral artery thrombosis, Bilateral PTX, severe physical deconditioning, and mod-severe protein calorie malnutiriton. Patient was discharged to home on 3/18/22.    Patient has been doing well since discharge and now returns to clinic for postop visit.  Ashley endorses chest pain that has been present since his surgery.  This chest pain is a focal point near her shoulder she says hurts when she touches it.  She also says that she has some shoulder discomfort after surgery.  The shoulder pain is worse in the morning after immobility and improves throughout the day with activity.  She states her passive range of motion is less than it used to and that the pain has not changed since surgery.  She does not take any of her pain medications and states her pain never gets above a 1.  She has not tried ice or heat on her shoulder.  She has not tried physical therapy.    Ashley states her diet has improved tremendously, she is not drinking a lot of fluid as before, and her bowel movements and urination is normal. Weight has been stable overall. She was discharged at 54.0 kg and weighs in today at 50.8 kg.  Patient states she was taking furosemide daily and feels a sensation of being thirsty.  She denies symptoms of orthostasis and states she has not felt dizzy or lightheaded at all, even during activity.  Patient was encouraged to stop taking furosemide scheduled and only take it as needed for weight gain of 3-4 pounds in 24 hours or a gradual weight gain of 5  pounds over the course of 1 week.    Patient has a an appointment with core clinic tomorrow where we encouraged them to follow-up after discharge from the hospital.  Further medication management from a cardiology standpoint will be performed at that office visit.     Patient reports incision is healing well without drainage, redness, increased pain along the incision, increased chest pain with inspiration, or increased chest pain with exertion..    Patient denies any fever, chills, palpitations, edema, lightheadedness, nausea and vomiting.      Has not been attending cardiac rehabilitation and that is going to start soon.        PAST MEDICAL HISTORY:  No past medical history on file.    PAST SURGICAL HISTORY:  Past Surgical History:   Procedure Laterality Date     BYPASS GRAFT ARTERY CORONARY N/A 02/24/2022    Procedure: median sternotomy, cardiopulmonary bypass, Coronary Artery Bypass Graft (CABG) x4 using left internal mammary artery and endoscopically harvested left saphenous vein, transesophageal echocardiogram per anesthesia;  Surgeon: Calixto Bhandari MD;  Location: [facility] OR     CV INTRA AORTIC BALLOON N/A 02/23/2022    Procedure: Intra-Aortic Balloon Pump Insertion;  Surgeon: Rafael Cummings MD;  Location:  HEART CARDIAC CATH LAB     CV RIGHT HEART CATH MEASUREMENTS RECORDED N/A 02/23/2022    Procedure: Right Heart Cath- leave in [place];  Surgeon: Rafael Cummings MD;  Location:  HEART CARDIAC CATH LAB     IR FLUORO 0-1 HOUR  03/02/2022     PICC DOUBLE LUMEN PLACEMENT Right 03/07/2022    43cm (1cm external), Lateral brachial vein     THROMBECTOMY LOWER EXTREMITY Left 02/25/2022    Procedure: THROMBECTOMY, LOWER EXTREMITY; Emergency Left Femeral Thrombectomy,;  Surgeon: Michelle Jules MD;  Location: [facility] OR       CURRENT MEDICATIONS:   Current Outpatient Medications   Medication     atorvastatin (LIPITOR) 40 MG tablet     furosemide (LASIX) 20 MG tablet     metoprolol succinate ER  (TOPROL-XL) 25 MG 24 hr tablet     multivitamin w/minerals (THERA-VIT-M) tablet     nitroGLYcerin (NITROSTAT) 0.4 MG sublingual tablet     pantoprazole (PROTONIX) 40 MG EC tablet     spironolactone (ALDACTONE) 25 MG tablet     thiamine (B-1) 100 MG tablet     albuterol (PROAIR HFA/PROVENTIL HFA/VENTOLIN HFA) 108 (90 Base) MCG/ACT inhaler     aspirin (ASA) 81 MG chewable tablet     INCRUSE ELLIPTA 62.5 MCG/INH Inhaler     No current facility-administered medications for this visit.       ALLERGIES:      Allergies   Allergen Reactions     Wasp Venom Protein Anaphylaxis     Bee Venom Swelling and Hives     Other Drug Allergy (See Comments)      1980- had c-sec. Was in ICU for swelling, chills- unsure of what med it was -at [facility]. Was a Dr. Harsh Valentine Rash     Siblings are allergic to PCN           ROS:  Review of symptoms otherwise negative unless commented about in HPI.     LABS:  Last Basic Metabolic Panel:  Lab Results   Component Value Date     03/18/2022      Lab Results   Component Value Date    POTASSIUM 3.9 03/18/2022     Lab Results   Component Value Date    CHLORIDE 103 03/18/2022     Lab Results   Component Value Date    HEIDI 9.2 03/18/2022     Lab Results   Component Value Date    CO2 25 03/18/2022     Lab Results   Component Value Date    BUN 16 03/18/2022     Lab Results   Component Value Date    CR 0.56 03/18/2022     Lab Results   Component Value Date    GLC 96 03/18/2022       Last CBC:   Lab Results   Component Value Date    WBC 8.2 03/18/2022     Lab Results   Component Value Date    RBC 3.28 03/18/2022     Lab Results   Component Value Date    HGB 9.3 03/18/2022     Lab Results   Component Value Date    HCT 31.2 03/18/2022     No components found for: MCT  Lab Results   Component Value Date    MCV 95 03/18/2022     Lab Results   Component Value Date    MCH 28.4 03/18/2022     Lab Results   Component Value Date    MCHC 29.8 03/18/2022     Lab Results   Component Value Date    RDW  17.2 03/18/2022     Lab Results   Component Value Date     03/18/2022       INR:  Lab Results   Component Value Date    INR 1.33 03/04/2022    INR 1.32 03/03/2022    INR 1.31 03/02/2022    INR 1.50 03/01/2022    INR 1.48 02/28/2022    INR 1.39 02/27/2022    INR 1.32 02/26/2022    INR 1.55 02/25/2022    INR 1.82 02/24/2022    INR 2.23 02/24/2022    INR 1.72 02/24/2022    INR 1.10 02/23/2022         IMAGING:  None    PHYSICAL EXAM:   /68 (BP Location: Left arm, Patient Position: Chair, Cuff Size: Adult Regular)   Pulse 81   Wt 50.8 kg (112 lb)   SpO2 98%   BMI 18.93 kg/m    General: alert and oriented x 3, pleasant, no acute distress, normal mood and affect  Neuro: no focal deficits   CV: S1 S2, no murmurs, rubs or gallops, regular rate and rhythm, no peripheral edema  Pulm: bilateral breath sounds, clear to auscultation, easy work of breathing  Incision: incisions clean dry and intact without erythema, swelling or drainage    PROCEDURES: None       ASSESSMENT/PLAN:  Ashley Osman is a 71 year old year old female status post CABG who returns to clinic for postop visit.     1. Surgically doing well overall.  Incisions are healing well with no signs of infection. Increasing activity and strength overall.   2. Hemodynamics are stable.Only take furosemide in the setting of weight gain of 3-4 pounds over the course of 24 hours or a gradual weight gain of 5 pounds over the course of 1 week.  3. A refill of her discharge medications was sent to her local pharmacy.  These medications include metoprolol succinate, spironolactone, furosemide, and atorvastatin.  4. Patient instructed to wear compression stockings in the morning, wear them throughout the day, and remove at night for lower extremity edema.  5. Follow up with CORE clinic on 3/23/22  6. Recommend following up with Laura Allen in vascular surgery clinic.  7. Continue Outpatient Cardiac Rehab until completed.   8. Continue sternal precautions for 12  weeks from surgery date.    9. If symptoms of cough, shortness of breath, chest discomfort with exertion, headache, dizziness, abdominal pain, nausea, vomiting, fever, or chills occur, please seek immediate medical attention at an urgent care, Emergency Department, or set up an appointment with your PCP if not urgent.        The total time spent with the patient was 25 minutes, > 50% of which was spent in counseling.    Discussed with Surgeon, Dr. Bhandari via written and verbal commnication.     Amita Smith PA-c  Pager: [phone]  1:27 PM March 22, 2022

## 2022-03-22 NOTE — LETTER
3/22/2022      RE: Ashley Osman  [address]  [facility] [address]       Dear Colleague,    Thank you for the opportunity to participate in the care of your patient, Ashley Osman, at the [facility] HEART CLINIC [place] at [facility]. Please see a copy of my visit note below.    CARDIOTHORACIC SURGERY FOLLOW-UP VISIT     Ashley Osman   1950   [id number]      Reason for visit: Post-Op CABG with Dr. Bhandari on 2/24/2022    HPI: Ashley Osman is a 71 year old year old female seen in clinic for a routine follow-up appointment after surgery. Patient has past medical history of Biventricular failure (requiring inotropic support PTA to [facility]), COPD, HLD, Tobacco Use Disorder (quit 12/2021), CAD. Hospital course was remarkable for left femoral artery thrombosis, Bilateral PTX, severe physical deconditioning, and mod-severe protein calorie malnutiriton. Patient was discharged to home on 3/18/22.    Patient has been doing well since discharge and now returns to clinic for postop visit.  Ashley endorses chest pain that has been present since his surgery.  This chest pain is a focal point near her shoulder she says hurts when she touches it.  She also says that she has some shoulder discomfort after surgery.  The shoulder pain is worse in the morning after immobility and improves throughout the day with activity.  She states her passive range of motion is less than it used to and that the pain has not changed since surgery.  She does not take any of her pain medications and states her pain never gets above a 1.  She has not tried ice or heat on her shoulder.  She has not tried physical therapy.    Ashley states her diet has improved tremendously, she is not drinking a lot of fluid as before, and her bowel movements and urination is normal. Weight has been stable overall. She was discharged at 54.0 kg and weighs in today at 50.8 kg.  Patient states she was taking  furosemide daily and feels a sensation of being thirsty.  She denies symptoms of orthostasis and states she has not felt dizzy or lightheaded at all, even during activity.  Patient was encouraged to stop taking furosemide scheduled and only take it as needed for weight gain of 3-4 pounds in 24 hours or a gradual weight gain of 5 pounds over the course of 1 week.    Patient has a an appointment with core clinic tomorrow where we encouraged them to follow-up after discharge from the hospital.  Further medication management from a cardiology standpoint will be performed at that office visit.     Patient reports incision is healing well without drainage, redness, increased pain along the incision, increased chest pain with inspiration, or increased chest pain with exertion..    Patient denies any fever, chills, palpitations, edema, lightheadedness, nausea and vomiting.      Has not been attending cardiac rehabilitation and that is going to start soon.        PAST MEDICAL HISTORY:  No past medical history on file.    PAST SURGICAL HISTORY:  Past Surgical History:   Procedure Laterality Date     BYPASS GRAFT ARTERY CORONARY N/A 02/24/2022    Procedure: median sternotomy, cardiopulmonary bypass, Coronary Artery Bypass Graft (CABG) x4 using left internal mammary artery and endoscopically harvested left saphenous vein, transesophageal echocardiogram per anesthesia;  Surgeon: Calixto Bhandari MD;  Location: [facility] OR     CV INTRA AORTIC BALLOON N/A 02/23/2022    Procedure: Intra-Aortic Balloon Pump Insertion;  Surgeon: Rafael Cummings MD;  Location:  HEART CARDIAC CATH LAB     CV RIGHT HEART CATH MEASUREMENTS RECORDED N/A 02/23/2022    Procedure: Right Heart Cath- leave in [place];  Surgeon: Rafael Cummings MD;  Location:  HEART CARDIAC CATH LAB     IR FLUORO 0-1 HOUR  03/02/2022     PICC DOUBLE LUMEN PLACEMENT Right 03/07/2022    43cm (1cm external), Lateral brachial vein     THROMBECTOMY LOWER EXTREMITY Left  02/25/2022    Procedure: THROMBECTOMY, LOWER EXTREMITY; Emergency Left Femeral Thrombectomy,;  Surgeon: Michelle Jules MD;  Location: [facility] OR       CURRENT MEDICATIONS:   Current Outpatient Medications   Medication     atorvastatin (LIPITOR) 40 MG tablet     furosemide (LASIX) 20 MG tablet     metoprolol succinate ER (TOPROL-XL) 25 MG 24 hr tablet     multivitamin w/minerals (THERA-VIT-M) tablet     nitroGLYcerin (NITROSTAT) 0.4 MG sublingual tablet     pantoprazole (PROTONIX) 40 MG EC tablet     spironolactone (ALDACTONE) 25 MG tablet     thiamine (B-1) 100 MG tablet     albuterol (PROAIR HFA/PROVENTIL HFA/VENTOLIN HFA) 108 (90 Base) MCG/ACT inhaler     aspirin (ASA) 81 MG chewable tablet     INCRUSE ELLIPTA 62.5 MCG/INH Inhaler     No current facility-administered medications for this visit.       ALLERGIES:      Allergies   Allergen Reactions     Wasp Venom Protein Anaphylaxis     Bee Venom Swelling and Hives     Other Drug Allergy (See Comments)      1980- had c-sec. Was in ICU for swelling, chills- unsure of what med it was -at [facility]. Was a Dr. Harsh Valentine Rash     Siblings are allergic to PCN           ROS:  Review of symptoms otherwise negative unless commented about in HPI.     LABS:  Last Basic Metabolic Panel:  Lab Results   Component Value Date     03/18/2022      Lab Results   Component Value Date    POTASSIUM 3.9 03/18/2022     Lab Results   Component Value Date    CHLORIDE 103 03/18/2022     Lab Results   Component Value Date    HEIDI 9.2 03/18/2022     Lab Results   Component Value Date    CO2 25 03/18/2022     Lab Results   Component Value Date    BUN 16 03/18/2022     Lab Results   Component Value Date    CR 0.56 03/18/2022     Lab Results   Component Value Date    GLC 96 03/18/2022       Last CBC:   Lab Results   Component Value Date    WBC 8.2 03/18/2022     Lab Results   Component Value Date    RBC 3.28 03/18/2022     Lab Results   Component Value Date    HGB 9.3  03/18/2022     Lab Results   Component Value Date    HCT 31.2 03/18/2022     No components found for: MCT  Lab Results   Component Value Date    MCV 95 03/18/2022     Lab Results   Component Value Date    MCH 28.4 03/18/2022     Lab Results   Component Value Date    MCHC 29.8 03/18/2022     Lab Results   Component Value Date    RDW 17.2 03/18/2022     Lab Results   Component Value Date     03/18/2022       INR:  Lab Results   Component Value Date    INR 1.33 03/04/2022    INR 1.32 03/03/2022    INR 1.31 03/02/2022    INR 1.50 03/01/2022    INR 1.48 02/28/2022    INR 1.39 02/27/2022    INR 1.32 02/26/2022    INR 1.55 02/25/2022    INR 1.82 02/24/2022    INR 2.23 02/24/2022    INR 1.72 02/24/2022    INR 1.10 02/23/2022         IMAGING:  None    PHYSICAL EXAM:   /68 (BP Location: Left arm, Patient Position: Chair, Cuff Size: Adult Regular)   Pulse 81   Wt 50.8 kg (112 lb)   SpO2 98%   BMI 18.93 kg/m    General: alert and oriented x 3, pleasant, no acute distress, normal mood and affect  Neuro: no focal deficits   CV: S1 S2, no murmurs, rubs or gallops, regular rate and rhythm, no peripheral edema  Pulm: bilateral breath sounds, clear to auscultation, easy work of breathing  Incision: incisions clean dry and intact without erythema, swelling or drainage    PROCEDURES: None       ASSESSMENT/PLAN:  Ashley Osman is a 71 year old year old female status post CABG who returns to clinic for postop visit.     1. Surgically doing well overall.  Incisions are healing well with no signs of infection. Increasing activity and strength overall.   2. Hemodynamics are stable.Only take furosemide in the setting of weight gain of 3-4 pounds over the course of 24 hours or a gradual weight gain of 5 pounds over the course of 1 week.  3. A refill of her discharge medications was sent to her local pharmacy.  These medications include metoprolol succinate, spironolactone, furosemide, and atorvastatin.  4. Patient instructed  to wear compression stockings in the morning, wear them throughout the day, and remove at night for lower extremity edema.  5. Follow up with CORE clinic on 3/23/22  6. Recommend following up with Laura Allen in vascular surgery clinic.  7. Continue Outpatient Cardiac Rehab until completed.   8. Continue sternal precautions for 12 weeks from surgery date.    9. If symptoms of cough, shortness of breath, chest discomfort with exertion, headache, dizziness, abdominal pain, nausea, vomiting, fever, or chills occur, please seek immediate medical attention at an urgent care, Emergency Department, or set up an appointment with your PCP if not urgent.        The total time spent with the patient was 25 minutes, > 50% of which was spent in counseling.    Discussed with Surgeon, Dr. Bhandari via written and verbal commnication.     Amita Smith PA-c  Pager: [phone]  1:27 PM March 22, 2022         Please do not hesitate to contact me if you have any questions/concerns.     Sincerely,     Cardiovascular Thoracic Surgery

## 2022-03-22 NOTE — NURSING NOTE
Chief Complaint   Patient presents with     New Patient     Post-Op CABG x 4 Elisabet     Vitals were taken and medications reconciled.    Taz Mitchell, MAHI  1:59 PM

## 2022-03-22 NOTE — PATIENT INSTRUCTIONS
1. Surgically doing well overall.  Incisions are healing well with no signs of infection. Increasing activity and strength overall.   2. Hemodynamics are stable.  3. Patient instructed to wear compression stockings in the morning, wear them throughout the day, and remove at night for lower extremity edema.  4. Follow up with CORE clinic on 3/23/22  5. Recommend following up with Laura Allen in vascular surgery clinic.  6. Continue Outpatient Cardiac Rehab until completed.   7. Continue sternal precautions for 12 weeks from surgery date.    8. If symptoms of cough, shortness of breath, chest discomfort with exertion, headache, dizziness, abdominal pain, nausea, vomiting, fever, or chills occur, please seek immediate medical attention at an urgent care, Emergency Department, or set up an appointment with your PCP if not urgent.

## 2022-03-23 ENCOUNTER — LAB (OUTPATIENT)
Dept: LAB | Facility: CLINIC | Age: 72
End: 2022-03-23
Payer: COMMERCIAL

## 2022-03-23 ENCOUNTER — OFFICE VISIT (OUTPATIENT)
Dept: CARDIOLOGY | Facility: CLINIC | Age: 72
End: 2022-03-23
Attending: PHYSICIAN ASSISTANT
Payer: COMMERCIAL

## 2022-03-23 VITALS
DIASTOLIC BLOOD PRESSURE: 57 MMHG | BODY MASS INDEX: 19.2 KG/M2 | HEART RATE: 79 BPM | WEIGHT: 113.6 LBS | OXYGEN SATURATION: 97 % | SYSTOLIC BLOOD PRESSURE: 105 MMHG

## 2022-03-23 DIAGNOSIS — Z95.1 S/P CABG X 4: ICD-10-CM

## 2022-03-23 DIAGNOSIS — I25.5 ISCHEMIC CARDIOMYOPATHY: ICD-10-CM

## 2022-03-23 DIAGNOSIS — I50.20 SYSTOLIC HEART FAILURE, UNSPECIFIED HF CHRONICITY (H): Primary | ICD-10-CM

## 2022-03-23 DIAGNOSIS — R09.82 POST-NASAL DRIP: ICD-10-CM

## 2022-03-23 DIAGNOSIS — Z87.891 HISTORY OF TOBACCO ABUSE: ICD-10-CM

## 2022-03-23 DIAGNOSIS — I50.22 CHRONIC SYSTOLIC HEART FAILURE (H): ICD-10-CM

## 2022-03-23 DIAGNOSIS — I25.10 CORONARY ARTERY DISEASE INVOLVING NATIVE CORONARY ARTERY OF NATIVE HEART WITHOUT ANGINA PECTORIS: ICD-10-CM

## 2022-03-23 LAB
ANION GAP SERPL CALCULATED.3IONS-SCNC: 8 MMOL/L (ref 3–14)
BUN SERPL-MCNC: 23 MG/DL (ref 7–30)
CALCIUM SERPL-MCNC: 9.2 MG/DL (ref 8.5–10.1)
CHLORIDE BLD-SCNC: 103 MMOL/L (ref 94–109)
CO2 SERPL-SCNC: 25 MMOL/L (ref 20–32)
CREAT SERPL-MCNC: 0.58 MG/DL (ref 0.52–1.04)
GFR SERPL CREATININE-BSD FRML MDRD: >90 ML/MIN/1.73M2
GLUCOSE BLD-MCNC: 83 MG/DL (ref 70–99)
NT-PROBNP SERPL-MCNC: 6643 PG/ML (ref 0–125)
POTASSIUM BLD-SCNC: 4.7 MMOL/L (ref 3.4–5.3)
SODIUM SERPL-SCNC: 136 MMOL/L (ref 133–144)

## 2022-03-23 PROCEDURE — 36415 COLL VENOUS BLD VENIPUNCTURE: CPT

## 2022-03-23 PROCEDURE — 80048 BASIC METABOLIC PNL TOTAL CA: CPT

## 2022-03-23 PROCEDURE — 99215 OFFICE O/P EST HI 40 MIN: CPT | Performed by: NURSE PRACTITIONER

## 2022-03-23 PROCEDURE — 83880 ASSAY OF NATRIURETIC PEPTIDE: CPT

## 2022-03-23 RX ORDER — LISINOPRIL 2.5 MG/1
2.5 TABLET ORAL DAILY
Qty: 30 TABLET | Refills: 0 | Status: SHIPPED | OUTPATIENT
Start: 2022-03-23

## 2022-03-23 NOTE — PROGRESS NOTES
Ashley Osman's goals for this visit include: Medication clean up. Would like to talk about another facility. Would like to perhaps discuss [facility] as an option. Heart and vascular clinic.     She requests these members of her care team be copied on today's visit information:     PCP: [facility]    Referring Provider:  Amita Smith PA-C  [address] [facility] [address]  [place] [address]    /57 (BP Location: Left arm, Patient Position: Sitting, Cuff Size: Adult Small)   Pulse 79   Wt 51.5 kg (113 lb 9.6 oz)   SpO2 97%   BMI 19.20 kg/m      Do you need any medication refills at today's visit? No.    Fuentes Burnett, EMT  Clinic Support  [facility]    [phone]    Employed by [facility] [employer]

## 2022-03-23 NOTE — PATIENT INSTRUCTIONS
Take your medicines every day, as directed    Changes made today:  o Lisinopril 2.5 mg daily  o Flonase 1 spray each nostril daily- use for one week - let primary care doctor know   o    Monitor Your Weight and Symptoms    Contact us if you:      Gain 2 pounds in one day or 5 pounds in one week    Feel more short of breath    Notice more leg swelling    Feel lightheadeded   Change your lifestyle    Limit Salt or Sodium:    2000 mg  Limit Fluids:    2000 mL or approximately 64 ounces  Eat a Heart Healthy Diet    Low in saturated fats  Stay Active:    Aim to move at least 150 minutes every  week         To Contact us    During Business Hours:  [phone], option # 1      After hours, weekends or holidays:   [phone], Option #4  Ask to speak to the On-Call Cardiologist. Inform them you are a CORE/heart failure patient at the [place].     Use [employer] allows you to communicate directly with your heart team through secure messaging.    Drill Map can be accessed any time on your phone, computer, or tablet.    If you need assistance, we'd be happy to help!         Keep your Heart Appointments:    BMP in 2 weeks     Patient would like to follow up at [facility]

## 2022-03-23 NOTE — PROGRESS NOTES
HPI: Ashley is a 71 year old White female with a past medical history of HFrEF (10-15%) 2/2 ICM (Hx LAD and Circumflex MIs; total occlusion of RCA and LAD), Biventricular failure (requiring inotropic support PTA to [facility]), COPD, HLD, Tobacco Use Disorder (quit 12/2021), CAD who underwent CABG x4 on 02/24 with Dr. Bhandari. Found to have left femoral artery thrombosis with acute left lower extremity ischemia s/p emergent left femoral thrombectomy, bovine patch angioplasty on 2/25.     Inpatient 2/12/22-3/18/22  CABG X 4 - 2/24  Postoperatively was admitted to the CVICU.  With a history of COPD, she was extubated within protocol on POD #5.  Blood pressure and cardiac index were managed with vasopressors and inotropic agents which were continuously weaned until no longer needed.  Patient was subsequently  transferred to the surgical telemetry floor.      During her post-operative period Ashley experienced a Left femoral artery thrombosis with acute left lower extremity ischemia s/p emergent left femoral thrombectomy, bovine patch angioplasty on 2/25 per Vascular Surgery. In the post-operative, deconditioned state, it is difficult to notice any residual weaknesses from this complication, but she continues LLE mobility without noticeable change comparison to the right. A right IJ non-occlusive thrombus was also noted via CT scan. In the setting of R IJ central venous catheter placement, it was assumed to be a provoked thrombus. No other intervention was added for this consideration. No plan for anticoagulation was continued per vascular surgery. Follow up with Dr. Laura Allen MD in the Vascular Surgery Clinic in 2 weeks with a duplex arterial ultrasound. The Vascular Surgery Clinic will contact the patient for specific appointment instructions.     While on the surgical unit, the patient continued to progress well. Chest tubes and temporary pacemaker wires were removed when deemed appropriate. Cardiac Medications were  gradually reintroduced as tolerated. These medications include Metoprolol succinate 12.5mg daily, Spironolactone 25mg daily, and Furosemide 20mg daily. Cardiology Heart Failure was consulted for considerations of co-managing cardiac medications.  Her dry weight is likely close to 114 lbs (or less) and she weighs 126 lbs today.    She denies SOB at rest.  She has been walking with a walker from room to room , she has some ELLER. She has been taking the Lasix 20 mg daily.  Has an ongoing dry cough and her nose can be runny. Her appetite has been getting better. She has weighing herself at home.  She is at 113 lbs. She sleeps in her bed. She uses multiple pillows due to comfort.      Denies  chest pain, palpitations, lightheadedness, dizziness, near syncopal/syncopal episodes. Ashley has been following salt and fluid restrictions.      PAST MEDICAL HISTORY:  No past medical history on file.    FAMILY HISTORY:  No family history on file.    SOCIAL HISTORY:  Social History     Tobacco Use     Smoking status: Former Smoker     Types: Cigarettes     Quit date: 2021     Years since quittin.3     Smokeless tobacco: Never Used   Substance Use Topics     Alcohol use: None     Drug use: None           CURRENT MEDICATIONS:  Current Outpatient Medications   Medication Sig Dispense Refill     albuterol (PROAIR HFA/PROVENTIL HFA/VENTOLIN HFA) 108 (90 Base) MCG/ACT inhaler Inhale 1 puff into the lungs every 6 hours as needed for shortness of breath / dyspnea or wheezing (Patient not taking: Reported on 3/22/2022)       aspirin (ASA) 81 MG chewable tablet Take 2 tablets (162 mg) by mouth daily (Patient not taking: Reported on 3/22/2022) 120 tablet 0     atorvastatin (LIPITOR) 40 MG tablet Take 1 tablet (40 mg) by mouth every morning 90 tablet 0     furosemide (LASIX) 20 MG tablet Take 1 tablet (20 mg) by mouth daily as needed (for fluid gain more than 3-4 lbs in 24 hours, or 5 lb gradual weight gain over 1 week) Take an  additional tablet (20mg) by mouth if you notice a fluid accumulation in the lower extremities, a weight gain greater than 3-4 lbs in 24 hours, or a weight gain of greater than 5 pounds in one week. 30 tablet 0     INCRUSE ELLIPTA 62.5 MCG/INH Inhaler Inhale 1 puff into the lungs daily (Patient not taking: Reported on 3/22/2022)       metoprolol succinate ER (TOPROL-XL) 25 MG 24 hr tablet Take 0.5 tablets (12.5 mg) by mouth daily 60 tablet 0     multivitamin w/minerals (THERA-VIT-M) tablet Take 1 tablet by mouth daily 60 tablet 0     nitroGLYcerin (NITROSTAT) 0.4 MG sublingual tablet Place 0.4 mg under the tongue every 5 minutes as needed for chest pain       pantoprazole (PROTONIX) 40 MG EC tablet Take 1 tablet (40 mg) by mouth every morning (before breakfast) 30 tablet 0     spironolactone (ALDACTONE) 25 MG tablet Take 1 tablet (25 mg) by mouth daily 90 tablet 0     thiamine (B-1) 100 MG tablet Take 1 tablet (100 mg) by mouth daily 90 tablet 0       ROS:  Review Of Systems  Skin: negative  Eyes: negative  Ears/Nose/Throat: negative  Respiratory: No shortness of breath, dyspnea on exertion, cough, or hemoptysis and Shortness of breath- with activity   Cardiovascular: negative  Gastrointestinal: negative  Genitourinary: negative  Musculoskeletal: negative  Neurologic: negative  Psychiatric: negative  Hematologic/Lymphatic/Immunologic: negative  Endocrine: negative      EXAM:  /57 (BP Location: Left arm, Patient Position: Sitting, Cuff Size: Adult Small)   Pulse 79   Wt 51.5 kg (113 lb 9.6 oz)   SpO2 97%   BMI 19.20 kg/m    Home weight:  General: alert, articulate, and in no acute distress.  HEENT: normocephalic, atraumatic, anicteric sclera, EOMI, mucosa moist, no cyanosis.   Neck: neck supple.  No adenopathy, masses, or carotid bruits.  JVP not detected at 90 degrees  Heart: regular rhythm, normal S1/S2, no murmur, gallop, rub.  Precordium quiet with normal PMI.     Lungs: clear, no rales, ronchi, or  wheezing.  No accessory muscle use, respirations unlabored.   Abdomen: soft, non-tender, bowel sounds present, no hepatomegaly  Extremities:no  pitting edema.   No cyanosis.  Extremities warm  Neurological: alert and oriented x 3.  normal speech and affect, no gross motor deficits  Skin:  No ecchymoses, rashes, or clubbing.    Labs:  CBC RESULTS:  Lab Results   Component Value Date    WBC 8.2 03/18/2022    RBC 3.28 (L) 03/18/2022    HGB 9.3 (L) 03/18/2022    HCT 31.2 (L) 03/18/2022    MCV 95 03/18/2022    MCH 28.4 03/18/2022    MCHC 29.8 (L) 03/18/2022    RDW 17.2 (H) 03/18/2022     (H) 03/18/2022       CMP RESULTS:  Lab Results   Component Value Date     03/18/2022    POTASSIUM 3.9 03/18/2022    CHLORIDE 103 03/18/2022    CO2 25 03/18/2022    ANIONGAP 9 03/18/2022    GLC 96 03/18/2022    BUN 16 03/18/2022    CR 0.56 03/18/2022    GFRESTIMATED >90 03/18/2022    HEIDI 9.2 03/18/2022    BILITOTAL 0.6 03/18/2022    ALBUMIN 2.6 (L) 03/18/2022    ALKPHOS 101 03/18/2022    ALT 38 03/18/2022    AST 22 03/18/2022        INR RESULTS:  Lab Results   Component Value Date    INR 1.33 (H) 03/04/2022       No components found for: CK  Lab Results   Component Value Date    MAG 1.9 03/18/2022     No results found for: NTBNP  @BRIEFLABR (dig)@    Most recent echocardiogram:  No results found for this or any previous visit (from the past 8760 hour(s)).      Assessment and Plan:    In summary,Ashley  is a 71-year-old female who is here for an initial CORE visit.  On exam she appears euvolemic.  She has not yet heard back from her vascular team for a follow-up visit I have advised her to reach out to them if she does not hear back by the end of this week.  The patient is also wondering if she can transfer her care to [facility].  I will go ahead and start the patient on 2.5 mg of lisinopril and I have asked that she obtain lab work in 2 weeks either with us here or at [facility].  She understands and will reach out to us if she needs  the lab work to be done here.  She describes having some postnasal drainage accompanied with dry cough I did advise her to purchase over-the-counter Flonase and to use 1 spray each nostril daily preferably before bed.  I have also advised the patient to follow-up with her primary care provider after hospitalization.    1.  Chronic systolic heart failure secondary to ICM.  Stage C  NYHA Class III  ACEi/ARB: yes ( none inpatient due to low BP) will initiate 2.5 mg daily  BB: yes  Aldosterone antagonist: yes  SCD prophylaxis: No life-vest or ICD inpatient. May consider with Cardiology in outpatient  Fluid status: euvolemic  Anticoagulation:   Antiplatelet:  ASA dose   Sleep apnea: not discussed today   NSAID use:  Contraindicated.  Avoid use.  Remote Monitoring:none  SGLT 2i -  Future addition.     2.  Other comordbid conditions:      Post nasal drip - Flonase OTC 1 spray each nostril daily     Tobacco abuse - history     AF- with RVR post surgery    CAD- CABG x4 on 02/24 with Dr. Bhandari.     Left femoral artery thrombosis - with acute left lower extremity ischemia s/p emergent left femoral thrombectomy, bovine patch angioplasty on 2/25/22. Needs to see vascular for follow up            3.  Follow-up   Lisinopril 2.5 mg daily   Flonase 1 spray each nostril daily   Patient would like to be followed at [facility] ( for HF)  due to convenience. - cardiac rehab recommended  BMP in 2 weeks.      Alex KUMAR, CNP  [facility]

## 2022-03-24 NOTE — TELEPHONE ENCOUNTER
The pt states that she is being treated at [facility] for her heart and Vascular care.  Champ Del Valle on 3/24/2022 at 2:46 PM

## 2022-03-28 NOTE — TELEPHONE ENCOUNTER
I contacted Ashley and left a voicemail. I let her know that our team would like to see her for follow-up since we performed a vascular procedure on her. I asked that she please contact our clinic to discuss.    DUYEN CazaresN, RN  [facility] - P Vascular Surgery  Ph: [phone]  Fax: [phone]

## 2022-03-29 NOTE — TELEPHONE ENCOUNTER
Pt called regarding below. Pt said that she will not be coming to the [facility] for a f/u. Pt said that she transferred all of her medical to [facility] already.  Pt said it's closer to her. Thanks

## 2022-03-29 NOTE — TELEPHONE ENCOUNTER
Pt declines follow-up with our team. I will let Dr. Jules and Dr. Allen know.    Vanessa Land, BSN, RN  [facility] - [facility] Vascular Surgery  Ph: [phone]  Fax: [phone]

## 2022-04-17 NOTE — PROGRESS NOTES
02/28/22 1100   General Information   Onset of Illness/Injury or Date of Surgery 02/25/22   Referring Physician Kalri Wise, APRN CNP   Patient/Family Therapy Goal Statement (SLP) The patient denied having a swallowing goal.    Pertinent History of Current Problem Ashley Osman is a 71 year old female with PMH of HFrEF (10-15%) 2/2 ICM (Hx LAD and Circumflex Mis; total occlusion of RCA and LAD), Biventricular failure (requiring inotropic support PTA to [facility]), COPD, HLD, Tobacco Use Disorder (quit 12/2021), CAD who underwent CABG x4 on 02/24 with Dr. Bhandari. Found to have left femoral artery thrombosis with acute left lower extremity ischemia s/p emergent left femoral thrombectomy, bovine patch angioplasty on 2/25. Pt was seen bedside swallow eval 2/28/22 per MD order.   General Observations The patient was awake and cooperative, slow to respond but following commands.   Past History of Dysphagia No prior hx of dysphagia.   Pain Assessment   Patient Currently in Pain No   Type of Evaluation   Type of Evaluation Swallow Evaluation   Oral Motor   Oral Musculature generally intact   Structural Abnormalities none present   Dentition (Oral Motor)   Dentition (Oral Motor) natural dentition   Facial Symmetry (Oral Motor)   Facial Symmetry (Oral Motor) WNL   Lip Function (Oral Motor)   Lip Range of Motion (Oral Motor) WNL   Tongue Function (Oral Motor)   Tongue ROM (Oral Motor) WNL   Jaw Function (Oral Motor)   Jaw Function (Oral Motor) WNL   Cough/Swallow/Gag Reflex (Oral Motor)   Soft Palate/Velum (Oral Motor) WNL   Gag Reflex (Oral Motor) not tested   Volitional Throat Clear/Cough (Oral Motor) unable/difficult to assess   Volitional Swallow (Oral Motor) unable/difficult to assess   Vocal Quality/Secretion Management (Oral Motor)   Vocal Quality (Oral Motor) WFL   General Swallowing Observations   Current Diet/Method of Nutritional Intake (General Swallowing Observations, NIS) NPO;nasogastric tube (NG)  Interval History: PT and OT recommended home with home health. Rather than discharging home today (Easter Sunday), will wait until tomorrow to get midline catheter placed and discharge when Case Management is fully staffed.    Review of Systems   Constitutional:  Negative for chills and fever.   Gastrointestinal:  Negative for nausea and vomiting.   Neurological:  Negative for seizures and syncope.   Objective:     Vital Signs (Most Recent):  Temp: 99.2 °F (37.3 °C) (04/17/22 0422)  Pulse: (!) 118 (04/17/22 0422)  Resp: 18 (04/17/22 0422)  BP: (!) 146/67 (04/17/22 0422)  SpO2: (!) 90 % (04/17/22 0422)   Vital Signs (24h Range):  Temp:  [96.6 °F (35.9 °C)-99.2 °F (37.3 °C)] 99.2 °F (37.3 °C)  Pulse:  [] 118  Resp:  [16-20] 18  SpO2:  [90 %-96 %] 90 %  BP: (114-146)/(56-76) 146/67     Weight: 60.8 kg (134 lb)  Body mass index is 29 kg/m².    Intake/Output Summary (Last 24 hours) at 4/17/2022 0727  Last data filed at 4/16/2022 2135  Gross per 24 hour   Intake 700 ml   Output --   Net 700 ml        Physical Exam  Vitals and nursing note reviewed.   Constitutional:       General: She is not in acute distress.     Appearance: She is well-developed. She is not toxic-appearing or diaphoretic.   Pulmonary:      Effort: Pulmonary effort is normal. No respiratory distress.   Neurological:      Mental Status: She is alert. Mental status is at baseline.      Motor: No seizure activity.           Significant Labs:  CBC:  Recent Labs   Lab 04/16/22  1101   WBC 24.17*   HGB 10.2*   HCT 32.0*   *       CMP:  Recent Labs   Lab 04/16/22  0542 04/17/22  0454   * 143   K 3.3* 4.7   * 108   CO2 22* 23   GLU 88 93   BUN 49* 51*   CREATININE 1.4 1.5*   CALCIUM 11.5* 10.5   ANIONGAP 13 12   EGFRNONAA 32.9* 30.2*          Respiratory Support (General Swallowing Observations) nasal cannula   Swallowing Evaluation Clinical swallow evaluation   Clinical Swallow Evaluation   Feeding Assistance no assistance needed   Clinical Swallow Eval: Thin Liquid Texture Trial   Mode of Presentation, Thin Liquids cup;spoon;straw;fed by clinician   Volume of Liquid or Food Presented 6 oz thin H20   Oral Phase of Swallow delayed AP movement   Pharyngeal Phase of Swallow impaired;coughing/choking;reduction in laryngeal movement   Diagnostic Statement Slow oral transit and delayed laryngel elevation.    Clinical Swallow Evaluation: Puree Solid Texture Trial   Mode of Presentation, Puree spoon;fed by clinician   Volume of Puree Presented 4 oz puree applesauce   Oral Phase, Puree delayed AP movement   Pharyngeal Phase, Puree intact   Diagnostic Statement Slow oral transit but no direct signs/symptoms of aspiration   Esophageal Phase of Swallow   Patient reports or presents with symptoms of esophageal dysphagia No   Swallowing Recommendations   Diet Consistency Recommendations mildly thick liquids (level 2);full liquid diet   Supervision Level for Intake close supervision needed   Mode of Delivery Recommendations bolus size, small;slow rate of intake   Swallowing Maneuver Recommendations alternate food and liquid intake   Monitoring/Assistance Required (Eating/Swallowing) stop eating activities when fatigue is present;monitor for cough or change in vocal quality with intake   Recommended Feeding/Eating Techniques (Swallow Eval) patient is independent, no specific recommendations;maintain upright sitting position for eating;maintain upright posture during/after eating for 30 minutes   Medication Administration Recommendations, Swallowing (SLP) ok for pills in puree or 1 pill at a time with mildly thick liquid   Instrumental Assessment Recommendations instrumental evaluation not recommended at this time   General Therapy Interventions   Planned Therapy  Interventions Dysphagia Treatment   Dysphagia treatment Oropharyngeal exercise training;Modified diet education;Instruction of safe swallow strategies   Clinical Impression   Criteria for Skilled Therapeutic Interventions Met (SLP Eval) Yes, treatment indicated   SLP Diagnosis mild oropharyngeal dysphagia   Risks & Benefits of therapy have been explained evaluation/treatment results reviewed;participants voiced agreement with care plan;participants included;patient   Clinical Impression Comments Clinical dysphagia exam completed per MD order. The patient presents with mild oropharyngeal dysphagia marked by generalized weakness and AMS. Delayed cough increased with progressive trials of thin consistency, likely related to reduced endurance. Recommend the pt initiate a mildly thick full liquid diet. Please feed small bites/sips at a slow rate when upright and alert.   SLP Discharge Planning   SLP Discharge Recommendation Transitional Care Facility   SLP Rationale for DC Rec below baseline swallowing skills & diet level   SLP Brief overview of current status  Recommend the patient initiate a full liquid diet - mildly thick consistency. Rec small bites/sips at a slow pace when upright and alert.    Total Evaluation Time   Total Evaluation Time (Minutes) 15   SLP Goals   Therapy Frequency (SLP Eval) 5 times/wk   SLP Predicated Duration/Target Date for Goal Attainment 03/28/22   SLP Goals Swallow   SLP: Safely tolerate diet without signs/symptoms of aspiration Full liquid diet;Mildly thick liquids;With use of swallow precautions;With assistance/supervision

## 2022-05-10 NOTE — PHARMACY-ADMISSION MEDICATION HISTORY
Admission Medication History Completed by Pharmacy    See [facility] Admission Navigator for allergy information, preferred outpatient pharmacy, prior to admission medications and immunization status.     Medication History Sources:     Discharge summary from transferring OSH. All medications new to patient    Changes made to PTA medication list (reason):    Added: all medications added    Deleted: None    Changed: None    Additional Information:    None    Prior to Admission medications    Medication Sig Last Dose Taking? Auth Provider   aspirin (ASA) 325 MG tablet Take 325 mg by mouth daily  Yes Unknown, Entered By History   albuterol (PROAIR HFA/PROVENTIL HFA/VENTOLIN HFA) 108 (90 Base) MCG/ACT inhaler Inhale 1 puff into the lungs every 6 hours as needed for shortness of breath / dyspnea or wheezing   Unknown, Entered By History   atorvastatin (LIPITOR) 20 MG tablet Take 30 mg by mouth daily   Unknown, Entered By History   carvedilol (COREG) 3.125 MG tablet Take 3.125 mg by mouth 2 times daily   Unknown, Entered By History   INCRUSE ELLIPTA 62.5 MCG/INH Inhaler Inhale 1 puff into the lungs daily   Unknown, Entered By History   isosorbide mononitrate (IMDUR) 30 MG 24 hr tablet Take 30 mg by mouth daily   Unknown, Entered By History   nitroGLYcerin (NITROSTAT) 0.4 MG sublingual tablet Place 0.4 mg under the tongue every 5 minutes as needed for chest pain   Unknown, Entered By History       Date completed: 02/19/22    Medication history completed by: Benedict Calderón [facility]    Benedict Calderón PharmD, [facility]  Inpatient clinical pharmacist               Quality 111:Pneumonia Vaccination Status For Older Adults: Pneumococcal Vaccination Previously Received Quality 130: Documentation Of Current Medications In The Medical Record: Current Medications Documented Detail Level: Generalized

## (undated) DEVICE — SU MONOCRYL 4-0 PS-2 27" UND Y426H

## (undated) DEVICE — SU ETHIBOND 3-0 BBDA 36" X588H

## (undated) DEVICE — LINEN TOWEL PACK X6 WHITE 5487

## (undated) DEVICE — CLIP HORIZON MED BLUE 002200

## (undated) DEVICE — SU PROLENE 4-0 SHDA 36" 8521H

## (undated) DEVICE — SU RETRACT-O-TAPE 1041

## (undated) DEVICE — BLADE KNIFE BEAVER MICROSHARP GREEN 377515

## (undated) DEVICE — SU ETHIBOND 2-0 SHDA 30" X563H

## (undated) DEVICE — CATH SWAN CCO/SV02/CEDV 8FR 110CM W/HEP 777F8

## (undated) DEVICE — TIES BANDING T50R

## (undated) DEVICE — CATH FOGARTY EMBOLECTOMY 4FR 80CM LATEX 120804FP

## (undated) DEVICE — SUTURE BOOTS 051003PBX

## (undated) DEVICE — SOL NACL 0.9% IRRIG 1000ML BOTTLE 2F7124

## (undated) DEVICE — SYR 30ML LL W/O NDL 302832

## (undated) DEVICE — DRSG TEGADERM IV ADVANCED 3.5X4.5" 1685

## (undated) DEVICE — KIT ENDO VASOVIEW HEMOPRO 2 VH-4000

## (undated) DEVICE — CLIP HORIZON SM RED WIDE SLOT 001201

## (undated) DEVICE — STRAP UNIVERSAL POSITIONING 2-PIECE 4X47X76" 91-287

## (undated) DEVICE — DRAPE FLUID WARMING 52 X 60" ORS-321

## (undated) DEVICE — PREP SKIN SCRUB TRAY 4461A

## (undated) DEVICE — NDL BLUNT 18GA 1" W/O FILTER 305181

## (undated) DEVICE — DRSG TEGADERM 2 3/8X2 3/4" 1624W

## (undated) DEVICE — TOURNIQUET SGL BLADDER 18"X4" RED 5921-218-135

## (undated) DEVICE — GLOVE BIOGEL PI ULTRATOUCH SZ 7.0 41170

## (undated) DEVICE — SU PROLENE 4-0 RB-1DA 36" 8557H

## (undated) DEVICE — SU VICRYL 3-0 FS-1 27" J442H

## (undated) DEVICE — SU SILK 3-0 TIE 12X30" A304H

## (undated) DEVICE — DRAIN CHEST TUBE 28FR STR 8028

## (undated) DEVICE — LINEN TOWEL PACK X30 5481

## (undated) DEVICE — SU PROLENE 5-0 C-1DA 36" 8720H

## (undated) DEVICE — SU VICRYL 0 CTX 36" J370H

## (undated) DEVICE — CLIP HORIZON LG ORANGE 004200

## (undated) DEVICE — ADH SKIN CLOSURE PREMIERPRO EXOFIN 1.0ML 3470

## (undated) DEVICE — BNDG ELASTIC 6"X5YDS STERILE 6611-6S

## (undated) DEVICE — SPECIMEN CONTAINER 5OZ STERILE 2600SA

## (undated) DEVICE — SU PLEDGET SOFT TFE 13MMX7MMX1.5MM D7044

## (undated) DEVICE — ANTIFOG SOLUTION W/FOAM PAD 31142527

## (undated) DEVICE — SURGICEL POWDER ABSORBABLE HEMOSTAT 3GM 3013SP

## (undated) DEVICE — ESU HOLSTER PLASTIC DISP E2400

## (undated) DEVICE — BNDG ESMARK 6" STERILE LF 820-3612

## (undated) DEVICE — DECANTER BAG 2002S

## (undated) DEVICE — SURGICEL HEMOSTAT 4X8" 1952

## (undated) DEVICE — PREP CHLORAPREP 26ML TINTED ORANGE  260815

## (undated) DEVICE — CANNULA VESSEL DLP  30001

## (undated) DEVICE — CUP AND LID 2PK 2OZ STERILE  SSK9006A

## (undated) DEVICE — SU STEEL MYO/WIRE II STERNOTOMY 8 BE-1 3X14" 048-217

## (undated) DEVICE — DRSG TELFA 3X8" 1238

## (undated) DEVICE — SU VICRYL 3-0 SH 27" UND J416H

## (undated) DEVICE — SU PLEDGET SOFT TFE 3/8"X3/26"X1/16" PCP40

## (undated) DEVICE — SYR 20ML LL W/O NDL 302830

## (undated) DEVICE — SOL NACL 0.9% IRRIG 3000ML BAG 2B7477

## (undated) DEVICE — SU VICRYL 2-0 CT-1 27" UND J259H

## (undated) DEVICE — DRAPE IOBAN INCISE 23X17" 6650EZ

## (undated) DEVICE — DEFIB PRO-PADZ LVP LQD GEL ADULT 8900-2105-01

## (undated) DEVICE — SU STEEL 6 CCS 4X18" M654G

## (undated) DEVICE — BNDG ELASTIC 4"X5YDS STERILE 6611-4S

## (undated) DEVICE — SU SILK 3-0 SH CR 8X18" C013D

## (undated) DEVICE — LEAD PACER MYOCARDIAL BIPOLAR TEMPORARY 53CM 6495F

## (undated) DEVICE — ESU PENCIL SMOKE EVAC W/ROCKER SWITCH 0703-047-000

## (undated) DEVICE — SYR 01ML 27GA 0.5" NDL TBC 309623

## (undated) DEVICE — TOURNIQUET VASCULAR KIT 7 1/2" 79012

## (undated) DEVICE — BLOWER/MISTER CLEARVIEW 22150

## (undated) DEVICE — Device

## (undated) DEVICE — INTRO SHEATH 8FRX10CM PINNACLE RSS802

## (undated) DEVICE — WIPES FOLEY CARE SURESTEP PROVON DFC100

## (undated) DEVICE — PUNCH AORTIC 4.0MMX8" RCB40

## (undated) DEVICE — TUBING SUCTION DRAINAGE PLEURAL DUAL 8884714200

## (undated) DEVICE — VESSEL LOOPS RED MINI 31145710

## (undated) DEVICE — KIT INTRODUCER DL 9FR 11.5CM J-TIP 0.35"X45CM CDC-21242-1A

## (undated) DEVICE — SU PROLENE 7-0 BV-1DA 24" 8702H

## (undated) DEVICE — SU VICRYL 2-0 SH 27" UND J417H

## (undated) DEVICE — SU PROLENE 3-0 SHDA 48" 8534H

## (undated) DEVICE — PROTECTOR ARM ONE-STEP TRENDELENBURG 40418

## (undated) DEVICE — CLIP SPRING FOGARTY SOFTJAW CSOFT6

## (undated) DEVICE — KIT RIGHT HEART CATH 60130719

## (undated) DEVICE — SUCTION MANIFOLD NEPTUNE 2 SYS 4 PORT 0702-020-000

## (undated) DEVICE — TAPE MEDIPORE 4"X2YD 2864

## (undated) DEVICE — CONNECTOR SIMS TUBING FOR CHEST TUBES 361

## (undated) DEVICE — DRSG BIOPATCH GERMICIDAL SPLIT SPONGE 4MM MED 4150

## (undated) DEVICE — SU ETHIBOND 0 CT-1 CR 8X18" CX21D

## (undated) DEVICE — SU PROLENE 6-0 C-1DA 4X24" M8726

## (undated) DEVICE — SU PROLENE 8-0 BV130-5DA 24" 8732H

## (undated) DEVICE — BLADE KNIFE BEAVER MINI STR BEAVER6900

## (undated) DEVICE — DRSG DRAIN 4X4" 7086

## (undated) DEVICE — VESSEL LOOPS YELLOW MAXI 31145694

## (undated) DEVICE — DRSG ABDOMINAL 07 1/2X8" 7197D

## (undated) DEVICE — DRSG TEGADERM 4X4 3/4" 1626W

## (undated) DEVICE — SUCTION CATH AIRLIFE TRI-FLO W/CONTROL PORT 14FR  T60C

## (undated) DEVICE — NDL COUNTER 40CT  31142311

## (undated) DEVICE — BLADE SAW STERNAL 20X30MM KM-32

## (undated) DEVICE — SU SILK 0 TIE 6X30" A306H

## (undated) DEVICE — LEAD ELEC MYOCARDIO PACING TEMPORARY MEDTRONIC

## (undated) DEVICE — ESU ELEC BLADE 2.75" COATED/INSULATED E1455

## (undated) DEVICE — SU PROLENE 7-0 BV-1DA 4X24" M8702

## (undated) DEVICE — SU SILK 2-0 TIE 12X30" A305H

## (undated) DEVICE — SU PROLENE 6-0 C-1DA 30" 8706H

## (undated) DEVICE — ESU PENCIL W/COATED BLADE E2450H

## (undated) DEVICE — POSITIONER ASSIST ESSTECH 3S T401210S

## (undated) DEVICE — PACK HEART LEFT CUSTOM

## (undated) DEVICE — PREP CHLORHEXIDINE 4% 4OZ (HIBICLENS) 57504

## (undated) DEVICE — CONNECTOR BLAKE DRAIN SGL BCC1

## (undated) DEVICE — SUCTION DRY CHEST DRAIN OASIS 3600-100

## (undated) DEVICE — RX SURGIFLO HEMOSTATIC MATRIX W/THROMBIN 8ML 2994

## (undated) DEVICE — SPONGE SURGIFOAM 100 1974

## (undated) DEVICE — TUBING INSUFFLATION PNEUMOCLEAR 0620050100

## (undated) DEVICE — ESU ELEC BLADE 6" COATED/INSULATED E1455-6

## (undated) DEVICE — PACK ADULT HEART UMMC PV15CG92D

## (undated) DEVICE — SU PROLENE 5-0 RB-2DA 30" 8710H

## (undated) RX ORDER — LIDOCAINE HYDROCHLORIDE 10 MG/ML
INJECTION, SOLUTION EPIDURAL; INFILTRATION; INTRACAUDAL; PERINEURAL
Status: DISPENSED
Start: 2022-03-02

## (undated) RX ORDER — HEPARIN SODIUM 1000 [USP'U]/ML
INJECTION, SOLUTION INTRAVENOUS; SUBCUTANEOUS
Status: DISPENSED
Start: 2022-02-25

## (undated) RX ORDER — LIDOCAINE HYDROCHLORIDE 10 MG/ML
INJECTION, SOLUTION EPIDURAL; INFILTRATION; INTRACAUDAL; PERINEURAL
Status: DISPENSED
Start: 2022-02-23

## (undated) RX ORDER — HYDROMORPHONE HYDROCHLORIDE 1 MG/ML
INJECTION, SOLUTION INTRAMUSCULAR; INTRAVENOUS; SUBCUTANEOUS
Status: DISPENSED
Start: 2022-02-24

## (undated) RX ORDER — FENTANYL CITRATE 50 UG/ML
INJECTION, SOLUTION INTRAMUSCULAR; INTRAVENOUS
Status: DISPENSED
Start: 2022-02-24

## (undated) RX ORDER — PROTAMINE SULFATE 10 MG/ML
INJECTION, SOLUTION INTRAVENOUS
Status: DISPENSED
Start: 2022-02-25

## (undated) RX ORDER — HEPARIN SODIUM 1000 [USP'U]/ML
INJECTION, SOLUTION INTRAVENOUS; SUBCUTANEOUS
Status: DISPENSED
Start: 2022-02-24

## (undated) RX ORDER — EPHEDRINE SULFATE 50 MG/ML
INJECTION, SOLUTION INTRAMUSCULAR; INTRAVENOUS; SUBCUTANEOUS
Status: DISPENSED
Start: 2022-02-25

## (undated) RX ORDER — FENTANYL CITRATE-0.9 % NACL/PF 10 MCG/ML
PLASTIC BAG, INJECTION (ML) INTRAVENOUS
Status: DISPENSED
Start: 2022-02-25

## (undated) RX ORDER — ROCURONIUM BROMIDE 50 MG/5 ML
SYRINGE (ML) INTRAVENOUS
Status: DISPENSED
Start: 2022-02-25

## (undated) RX ORDER — VANCOMYCIN HYDROCHLORIDE 1 G/20ML
INJECTION, POWDER, LYOPHILIZED, FOR SOLUTION INTRAVENOUS
Status: DISPENSED
Start: 2022-02-24

## (undated) RX ORDER — FENTANYL CITRATE 50 UG/ML
INJECTION, SOLUTION INTRAMUSCULAR; INTRAVENOUS
Status: DISPENSED
Start: 2022-02-23

## (undated) RX ORDER — PAPAVERINE HYDROCHLORIDE 30 MG/ML
INJECTION INTRAMUSCULAR; INTRAVENOUS
Status: DISPENSED
Start: 2022-02-24

## (undated) RX ORDER — PROPOFOL 10 MG/ML
INJECTION, EMULSION INTRAVENOUS
Status: DISPENSED
Start: 2022-02-25